# Patient Record
Sex: FEMALE | Race: WHITE | Employment: UNEMPLOYED | URBAN - METROPOLITAN AREA
[De-identification: names, ages, dates, MRNs, and addresses within clinical notes are randomized per-mention and may not be internally consistent; named-entity substitution may affect disease eponyms.]

---

## 2018-02-04 ENCOUNTER — HOSPITAL ENCOUNTER (EMERGENCY)
Age: 43
Discharge: HOME | End: 2018-02-04
Attending: EMERGENCY MEDICINE
Payer: COMMERCIAL

## 2018-02-04 ENCOUNTER — APPOINTMENT (OUTPATIENT)
Dept: GENERAL RADIOLOGY | Age: 43
End: 2018-02-04
Payer: COMMERCIAL

## 2018-02-04 ENCOUNTER — APPOINTMENT (OUTPATIENT)
Dept: CT IMAGING | Age: 43
End: 2018-02-04
Payer: COMMERCIAL

## 2018-02-04 VITALS
WEIGHT: 240 LBS | BODY MASS INDEX: 35.55 KG/M2 | DIASTOLIC BLOOD PRESSURE: 79 MMHG | OXYGEN SATURATION: 96 % | HEIGHT: 69 IN | SYSTOLIC BLOOD PRESSURE: 168 MMHG | TEMPERATURE: 98.5 F | RESPIRATION RATE: 13 BRPM | HEART RATE: 86 BPM

## 2018-02-04 DIAGNOSIS — R51.9 NONINTRACTABLE EPISODIC HEADACHE, UNSPECIFIED HEADACHE TYPE: ICD-10-CM

## 2018-02-04 DIAGNOSIS — I10 HYPERTENSION, UNSPECIFIED TYPE: Primary | ICD-10-CM

## 2018-02-04 DIAGNOSIS — J40 BRONCHITIS: ICD-10-CM

## 2018-02-04 LAB
ABSOLUTE EOS #: 0.23 K/UL (ref 0–0.4)
ABSOLUTE IMMATURE GRANULOCYTE: ABNORMAL K/UL (ref 0–0.3)
ABSOLUTE LYMPH #: 1.69 K/UL (ref 1–4.8)
ABSOLUTE MONO #: 0.31 K/UL (ref 0.2–0.8)
ANION GAP SERPL CALCULATED.3IONS-SCNC: 17 MMOL/L (ref 9–17)
BASOPHILS # BLD: 3 %
BASOPHILS ABSOLUTE: 0.23 K/UL (ref 0–0.2)
BUN BLDV-MCNC: 17 MG/DL (ref 6–20)
BUN/CREAT BLD: 24 (ref 9–20)
CALCIUM SERPL-MCNC: 9 MG/DL (ref 8.6–10.4)
CHLORIDE BLD-SCNC: 100 MMOL/L (ref 98–107)
CO2: 22 MMOL/L (ref 20–31)
CREAT SERPL-MCNC: 0.72 MG/DL (ref 0.5–0.9)
DIFFERENTIAL TYPE: ABNORMAL
DIRECT EXAM: NORMAL
EKG ATRIAL RATE: 78 BPM
EKG P AXIS: 17 DEGREES
EKG P-R INTERVAL: 126 MS
EKG Q-T INTERVAL: 396 MS
EKG QRS DURATION: 82 MS
EKG QTC CALCULATION (BAZETT): 451 MS
EKG R AXIS: 44 DEGREES
EKG T AXIS: -175 DEGREES
EKG VENTRICULAR RATE: 78 BPM
EOSINOPHILS RELATIVE PERCENT: 3 % (ref 1–4)
GFR AFRICAN AMERICAN: >60 ML/MIN
GFR NON-AFRICAN AMERICAN: >60 ML/MIN
GFR SERPL CREATININE-BSD FRML MDRD: ABNORMAL ML/MIN/{1.73_M2}
GFR SERPL CREATININE-BSD FRML MDRD: ABNORMAL ML/MIN/{1.73_M2}
GLUCOSE BLD-MCNC: 108 MG/DL (ref 70–99)
HCT VFR BLD CALC: 33.7 % (ref 36–46)
HEMOGLOBIN: 10.4 G/DL (ref 12–16)
IMMATURE GRANULOCYTES: ABNORMAL %
LYMPHOCYTES # BLD: 22 % (ref 24–44)
Lab: NORMAL
MCH RBC QN AUTO: 21.1 PG (ref 26–34)
MCHC RBC AUTO-ENTMCNC: 31 G/DL (ref 31–37)
MCV RBC AUTO: 68.2 FL (ref 80–100)
MONOCYTES # BLD: 4 % (ref 1–7)
MORPHOLOGY: ABNORMAL
MYOGLOBIN: 34 NG/ML (ref 25–58)
NRBC AUTOMATED: ABNORMAL PER 100 WBC
PDW BLD-RTO: 19.9 % (ref 11.5–14.5)
PLATELET # BLD: 454 K/UL (ref 130–400)
PLATELET ESTIMATE: ABNORMAL
PMV BLD AUTO: ABNORMAL FL (ref 6–12)
POTASSIUM SERPL-SCNC: 4.2 MMOL/L (ref 3.7–5.3)
RBC # BLD: 4.94 M/UL (ref 4–5.2)
RBC # BLD: ABNORMAL 10*6/UL
SEG NEUTROPHILS: 68 % (ref 36–66)
SEGMENTED NEUTROPHILS ABSOLUTE COUNT: 5.24 K/UL (ref 1.8–7.7)
SODIUM BLD-SCNC: 139 MMOL/L (ref 135–144)
SPECIMEN DESCRIPTION: NORMAL
STATUS: NORMAL
TROPONIN INTERP: NORMAL
TROPONIN T: <0.03 NG/ML
WBC # BLD: 7.7 K/UL (ref 3.5–11)
WBC # BLD: ABNORMAL 10*3/UL

## 2018-02-04 PROCEDURE — 94664 DEMO&/EVAL PT USE INHALER: CPT

## 2018-02-04 PROCEDURE — 6360000002 HC RX W HCPCS: Performed by: NURSE PRACTITIONER

## 2018-02-04 PROCEDURE — 70450 CT HEAD/BRAIN W/O DYE: CPT

## 2018-02-04 PROCEDURE — 96375 TX/PRO/DX INJ NEW DRUG ADDON: CPT

## 2018-02-04 PROCEDURE — 96374 THER/PROPH/DIAG INJ IV PUSH: CPT

## 2018-02-04 PROCEDURE — 84484 ASSAY OF TROPONIN QUANT: CPT

## 2018-02-04 PROCEDURE — 6370000000 HC RX 637 (ALT 250 FOR IP): Performed by: NURSE PRACTITIONER

## 2018-02-04 PROCEDURE — 93005 ELECTROCARDIOGRAM TRACING: CPT

## 2018-02-04 PROCEDURE — 80048 BASIC METABOLIC PNL TOTAL CA: CPT

## 2018-02-04 PROCEDURE — 94640 AIRWAY INHALATION TREATMENT: CPT

## 2018-02-04 PROCEDURE — 99284 EMERGENCY DEPT VISIT MOD MDM: CPT

## 2018-02-04 PROCEDURE — 83874 ASSAY OF MYOGLOBIN: CPT

## 2018-02-04 PROCEDURE — 87804 INFLUENZA ASSAY W/OPTIC: CPT

## 2018-02-04 PROCEDURE — 94150 VITAL CAPACITY TEST: CPT

## 2018-02-04 PROCEDURE — 85025 COMPLETE CBC W/AUTO DIFF WBC: CPT

## 2018-02-04 PROCEDURE — 71046 X-RAY EXAM CHEST 2 VIEWS: CPT

## 2018-02-04 RX ORDER — UBIDECARENONE 75 MG
50 CAPSULE ORAL DAILY
COMMUNITY
End: 2019-12-07 | Stop reason: ALTCHOICE

## 2018-02-04 RX ORDER — PREDNISONE 10 MG/1
10 TABLET ORAL DAILY
Qty: 27 TABLET | Refills: 0 | Status: SHIPPED | OUTPATIENT
Start: 2018-02-04 | End: 2018-02-14

## 2018-02-04 RX ORDER — FENTANYL CITRATE 50 UG/ML
50 INJECTION, SOLUTION INTRAMUSCULAR; INTRAVENOUS ONCE
Status: COMPLETED | OUTPATIENT
Start: 2018-02-04 | End: 2018-02-04

## 2018-02-04 RX ORDER — VERAPAMIL HYDROCHLORIDE 120 MG/1
120 CAPSULE, EXTENDED RELEASE ORAL NIGHTLY
COMMUNITY
End: 2019-12-07 | Stop reason: ALTCHOICE

## 2018-02-04 RX ORDER — AZITHROMYCIN 250 MG/1
TABLET, FILM COATED ORAL
Qty: 1 PACKET | Refills: 0 | Status: SHIPPED | OUTPATIENT
Start: 2018-02-04 | End: 2018-02-14

## 2018-02-04 RX ORDER — BENZONATATE 100 MG/1
100 CAPSULE ORAL 3 TIMES DAILY PRN
Qty: 21 CAPSULE | Refills: 0 | Status: SHIPPED | OUTPATIENT
Start: 2018-02-04 | End: 2018-02-11

## 2018-02-04 RX ORDER — FERROUS SULFATE 325(65) MG
325 TABLET ORAL
COMMUNITY
End: 2019-12-07 | Stop reason: ALTCHOICE

## 2018-02-04 RX ORDER — IPRATROPIUM BROMIDE AND ALBUTEROL SULFATE 2.5; .5 MG/3ML; MG/3ML
1 SOLUTION RESPIRATORY (INHALATION)
Status: DISCONTINUED | OUTPATIENT
Start: 2018-02-04 | End: 2018-02-04 | Stop reason: HOSPADM

## 2018-02-04 RX ORDER — ALBUTEROL SULFATE 90 UG/1
2 AEROSOL, METERED RESPIRATORY (INHALATION)
Status: DISCONTINUED | OUTPATIENT
Start: 2018-02-04 | End: 2018-02-04 | Stop reason: HOSPADM

## 2018-02-04 RX ORDER — VENLAFAXINE HYDROCHLORIDE 150 MG/1
150 CAPSULE, EXTENDED RELEASE ORAL DAILY
COMMUNITY
End: 2019-12-07 | Stop reason: ALTCHOICE

## 2018-02-04 RX ORDER — LISINOPRIL 10 MG/1
10 TABLET ORAL DAILY
Qty: 30 TABLET | Refills: 0 | Status: SHIPPED | OUTPATIENT
Start: 2018-02-04 | End: 2020-07-15

## 2018-02-04 RX ORDER — METHYLPREDNISOLONE SODIUM SUCCINATE 125 MG/2ML
125 INJECTION, POWDER, LYOPHILIZED, FOR SOLUTION INTRAMUSCULAR; INTRAVENOUS ONCE
Status: COMPLETED | OUTPATIENT
Start: 2018-02-04 | End: 2018-02-04

## 2018-02-04 RX ORDER — LISINOPRIL 10 MG/1
20 TABLET ORAL DAILY
Status: DISCONTINUED | OUTPATIENT
Start: 2018-02-04 | End: 2018-02-04 | Stop reason: HOSPADM

## 2018-02-04 RX ORDER — ACETAMINOPHEN AND CODEINE PHOSPHATE 300; 30 MG/1; MG/1
1 TABLET ORAL 3 TIMES DAILY PRN
Qty: 12 TABLET | Refills: 0 | Status: SHIPPED | OUTPATIENT
Start: 2018-02-04 | End: 2018-02-08

## 2018-02-04 RX ORDER — HYDRALAZINE HYDROCHLORIDE 20 MG/ML
10 INJECTION INTRAMUSCULAR; INTRAVENOUS ONCE
Status: COMPLETED | OUTPATIENT
Start: 2018-02-04 | End: 2018-02-04

## 2018-02-04 RX ORDER — ALBUTEROL SULFATE 2.5 MG/3ML
5 SOLUTION RESPIRATORY (INHALATION)
Status: DISCONTINUED | OUTPATIENT
Start: 2018-02-04 | End: 2018-02-04 | Stop reason: HOSPADM

## 2018-02-04 RX ORDER — ALBUTEROL SULFATE 90 UG/1
2 AEROSOL, METERED RESPIRATORY (INHALATION) EVERY 6 HOURS PRN
Qty: 1 INHALER | Refills: 0 | Status: SHIPPED | OUTPATIENT
Start: 2018-02-04 | End: 2020-07-15

## 2018-02-04 RX ADMIN — LISINOPRIL 20 MG: 10 TABLET ORAL at 13:40

## 2018-02-04 RX ADMIN — FENTANYL CITRATE 50 MCG: 50 INJECTION INTRAMUSCULAR; INTRAVENOUS at 13:18

## 2018-02-04 RX ADMIN — HYDRALAZINE HYDROCHLORIDE 10 MG: 20 INJECTION INTRAMUSCULAR; INTRAVENOUS at 11:45

## 2018-02-04 RX ADMIN — METHYLPREDNISOLONE SODIUM SUCCINATE 125 MG: 125 INJECTION, POWDER, FOR SOLUTION INTRAMUSCULAR; INTRAVENOUS at 11:29

## 2018-02-04 RX ADMIN — ALBUTEROL SULFATE 2.5 MG: 5 SOLUTION RESPIRATORY (INHALATION) at 11:48

## 2018-02-04 ASSESSMENT — PAIN DESCRIPTION - DESCRIPTORS: DESCRIPTORS: POUNDING;PRESSURE

## 2018-02-04 ASSESSMENT — PAIN SCALES - GENERAL
PAINLEVEL_OUTOF10: 7
PAINLEVEL_OUTOF10: 10

## 2018-02-04 ASSESSMENT — ENCOUNTER SYMPTOMS
COUGH: 1
WHEEZING: 1

## 2018-02-04 ASSESSMENT — PAIN DESCRIPTION - PAIN TYPE: TYPE: ACUTE PAIN

## 2018-02-04 ASSESSMENT — PAIN DESCRIPTION - LOCATION: LOCATION: HEAD

## 2018-02-04 NOTE — ED PROVIDER NOTES
The patient was seen and examined by me in conjunction with the mid-level provider. I agree with his/her assessment and treatment plan. The patient's workup is essentially negative here. She had presented with elevated blood pressure and this improved greatly with treatment here. She is started on lisinopril and has appointment with her family physician back home.      Lc Moser MD  02/04/18 [id number]

## 2018-02-04 NOTE — PROGRESS NOTES
· Bronchodilator assessment   []    Bronchodilator Assessment    FEV1 % PREDICTED 68%  FEV1 actual: 2.4  PEFR  314  PEFR % Predicted 67%  RR 18  Bronchodilator assessment at level  1 Patient given TX due to History MDI instruct given  BRONCHODILATOR ASSESSMENT SCORE  Score 1 2 3 4   Breath Sounds   [x]  Clear []  Mild Wheezing with good aeration []  Moderate I/E wheezing with adequate aeration []  Poor Aeration or diffuse wheezing   Respiratory Rate [x]  Less than 20 []  20-25 []  Greater than 25  []  Greater than 35    Dyspnea [x]  No SOB  []  SOB with minimal activity []  Speaking in partial sentences []  Acute/ At rest   Peakflow (asthma) []  80 % or greater predicted/PB  []  Unable []  70% or greater predicted/PB  []  Unable [x]  51%-70% predicted/PB  []  Unable []  Less than 50% predicted/PB  []  Unable due to distress   FEV1 % Predicted []  Greater than 69%  []  Unable  [x]  Less than 50%-69%  []  Unable  []  Less than 35%-49%  []  Unable  []  Less than 35%  []  Unable due to distress     · Bronchodilator assessment   []    Bronchodilator Assessment    FEV1 % PREDICTED   FEV1 actual:   PEFR    PEFR % Predicted   RR   Bronchodilator assessment at level    BRONCHODILATOR ASSESSMENT SCORE  Score 1 2 3 4   Breath Sounds   []  Clear []  Mild Wheezing with good aeration []  Moderate I/E wheezing with adequate aeration []  Poor Aeration or diffuse wheezing   Respiratory Rate []  Less than 20 []  20-25 []  Greater than 25  []  Greater than 35    Dyspnea []  No SOB  []  SOB with minimal activity []  Speaking in partial sentences []  Acute/ At rest   Peakflow (asthma) []  80 % or greater predicted/PB  []  Unable []  70% or greater predicted/PB  []  Unable []  51%-70% predicted/PB  []  Unable []  Less than 50% predicted/PB  []  Unable due to distress   FEV1 % Predicted []  Greater than 69%  []  Unable  []  Less than 50%-69%  []  Unable  []  Less than 35%-49%  []  Unable  []  Less than 35%  []  Unable due to distress · Bronchodilator assessment   []    Bronchodilator Assessment    FEV1 % PREDICTED   FEV1 actual:   PEFR    PEFR % Predicted   RR   Bronchodilator assessment at level    BRONCHODILATOR ASSESSMENT SCORE  Score 1 2 3 4   Breath Sounds   []  Clear []  Mild Wheezing with good aeration []  Moderate I/E wheezing with adequate aeration []  Poor Aeration or diffuse wheezing   Respiratory Rate []  Less than 20 []  20-25 []  Greater than 25  []  Greater than 35    Dyspnea []  No SOB  []  SOB with minimal activity []  Speaking in partial sentences []  Acute/ At rest   Peakflow (asthma) []  80 % or greater predicted/PB  []  Unable []  70% or greater predicted/PB  []  Unable []  51%-70% predicted/PB  []  Unable []  Less than 50% predicted/PB  []  Unable due to distress   FEV1 % Predicted []  Greater than 69%  []  Unable  []  Less than 50%-69%  []  Unable  []  Less than 35%-49%  []  Unable  []  Less than 35%  []  Unable due to distress     MDI Instruction done

## 2018-02-04 NOTE — ED PROVIDER NOTES
pleural effusion. No free air. Osseous structures appear grossly intact. Mild peribronchial wall thickening suggestive of bronchiolitis. No focal lung consolidation is seen to suggest pneumonia. Ct Head Wo Contrast    Result Date: 2/4/2018  EXAMINATION: CT OF THE HEAD WITHOUT CONTRAST  2/4/2018 12:00 pm TECHNIQUE: CT of the head was performed without the administration of intravenous contrast. Dose modulation, iterative reconstruction, and/or weight based adjustment of the mA/kV was utilized to reduce the radiation dose to as low as reasonably achievable. COMPARISON: None. HISTORY: ORDERING SYSTEM PROVIDED HISTORY: Headache, hypertension TECHNOLOGIST PROVIDED HISTORY: Has a \"code stroke\" or \"stroke alert\" been called? ->No FINDINGS: BRAIN/VENTRICLES: There is no acute intracranial hemorrhage, mass effect or midline shift. No abnormal extra-axial fluid collection. The gray-white differentiation is maintained without evidence of an acute infarct. There is no evidence of hydrocephalus. ORBITS: The visualized portion of the orbits demonstrate no acute abnormality. SINUSES: The visualized paranasal sinuses and mastoid air cells demonstrate no acute abnormality. SOFT TISSUES/SKULL:  No acute abnormality of the visualized skull or soft tissues. No acute intracranial abnormality. Interpretation per the Radiologist below, if available at the time of this note:    CT HEAD WO CONTRAST   Final Result   No acute intracranial abnormality. XR CHEST STANDARD (2 VW)   Final Result   Mild peribronchial wall thickening suggestive of bronchiolitis. No focal   lung consolidation is seen to suggest pneumonia.                ED BEDSIDE ULTRASOUND:   Performed by ED Physician - none    LABS:  Labs Reviewed   CBC WITH AUTO DIFFERENTIAL - Abnormal; Notable for the following:        Result Value    Hemoglobin 10.4 (*)     Hematocrit 33.7 (*)     MCV 68.2 (*)     MCH 21.1 (*)     RDW 19.9 (*)     Platelets 602 (*) Seg Neutrophils 68 (*)     Lymphocytes 22 (*)     Basophils # 0.23 (*)     All other components within normal limits   BASIC METABOLIC PANEL - Abnormal; Notable for the following:     Glucose 108 (*)     Bun/Cre Ratio 24 (*)     All other components within normal limits   RAPID INFLUENZA A/B ANTIGENS   TROP/MYOGLOBIN       All other labs were within normal range or not returned as of this dictation. EMERGENCY DEPARTMENT COURSE and DIFFERENTIAL DIAGNOSIS/MDM:   Patient was evaluated in conjunction with attending physician. Influenza screen negative. Labs reviewed. WBC 7.7. Troponin negative. CT of head per radiologist negative for acute intracranial abnormality. Chest x-ray per radiology shows mild peribronchial wall thickening suggestive of bronchiolitis. No focal lung consolidation. Patient had blood pressure 184/106 with a heart rate of 84. She was given IV hydralazine and breathing treatment in the ED. Blood pressure recheck was 164/73. After CT of head was negative. Patient was given fentanyl for headache which she states helped her pain. Per Dr. Ildefonso Bell she was given 20 mg of lisinopril prior to discharge. No neurological deficits. Patient was provided with a prescription for lisinopril. She will also be discharged with Z-Lemuel, prednisone, Tessalon and inhaler for bronchitis. She was provided with a [facility] primary care physician number as well as Dr. Fátima Welch number per patient request to follow-up with. Return ED if symptoms worsen. Vitals:    Vitals:    02/04/18 1206 02/04/18 1222 02/04/18 1253 02/04/18 1322   BP: (!) 159/77 (!) 164/73 (!) 149/72 (!) 168/79   Pulse:  84 95 86   Resp:  14 26 13   Temp:       TempSrc:       SpO2:  94% 95% 96%   Weight:       Height:             CONSULTS:  None    PROCEDURES:  Procedures    FINAL IMPRESSION      1. Hypertension, unspecified type    2. Nonintractable episodic headache, unspecified headache type    3.  Bronchitis DISPOSITION/PLAN   DISPOSITION Decision To Discharge 02/04/2018 01:26:22 PM      PATIENT REFERRED TO:     Call [phone] to schedule appointment with a [facility] primary care doctor. [facility] ED  [address] [facility]  [phone]    If symptoms worsen    Marcella Maier MD  [address] [place] [address]  [phone]    Schedule an appointment as soon as possible for a visit         DISCHARGE MEDICATIONS:     Discharge Medication List as of 2/4/2018  1:37 PM      START taking these medications    Details   lisinopril (PRINIVIL) 10 MG tablet Take 1 tablet by mouth daily, Disp-30 tablet, R-0Print      azithromycin (ZITHROMAX) 250 MG tablet Take 2 tablets by mouth on day 1, then take 1 tablet by mouth daily days 2 through 5, Disp-1 packet, R-0Print      albuterol sulfate HFA (PROVENTIL HFA) 108 (90 Base) MCG/ACT inhaler Inhale 2 puffs into the lungs every 6 hours as needed for Wheezing, Disp-1 Inhaler, R-0Print      benzonatate (TESSALON PERLES) 100 MG capsule Take 1 capsule by mouth 3 times daily as needed for Cough, Disp-21 capsule, R-0Print      acetaminophen-codeine (TYLENOL/CODEINE #3) 300-30 MG per tablet Take 1 tablet by mouth 3 times daily as needed for Pain for up to 4 days. , Disp-12 tablet, R-0Print      predniSONE (DELTASONE) 10 MG tablet Take 1 tablet by mouth daily for 10 days 4 tabs PO QD for 3 days,  Then 3 tabs PO QD for 3 days,  Then 2 tabs PO QD for 2 days,  Then 1 tab PO QD for 2 days, Disp-27 tablet, R-0Print           Electronically signed by Justina Prader, NP on 2/4/2018 at 1:50 PM           Justina Prader, NP  02/04/18 1715

## 2018-05-23 ENCOUNTER — HOSPITAL ENCOUNTER (EMERGENCY)
Age: 43
Discharge: HOME | End: 2018-05-23
Attending: EMERGENCY MEDICINE
Payer: MEDICAID

## 2018-05-23 ENCOUNTER — APPOINTMENT (OUTPATIENT)
Dept: GENERAL RADIOLOGY | Age: 43
End: 2018-05-23
Payer: MEDICAID

## 2018-05-23 VITALS
SYSTOLIC BLOOD PRESSURE: 188 MMHG | HEIGHT: 69 IN | HEART RATE: 95 BPM | RESPIRATION RATE: 21 BRPM | BODY MASS INDEX: 34.07 KG/M2 | DIASTOLIC BLOOD PRESSURE: 102 MMHG | TEMPERATURE: 100 F | WEIGHT: 230 LBS | OXYGEN SATURATION: 97 %

## 2018-05-23 DIAGNOSIS — J40 BRONCHITIS: Primary | ICD-10-CM

## 2018-05-23 LAB
ABSOLUTE EOS #: 0.2 K/UL (ref 0–0.4)
ABSOLUTE IMMATURE GRANULOCYTE: ABNORMAL K/UL (ref 0–0.3)
ABSOLUTE LYMPH #: 1.4 K/UL (ref 1–4.8)
ABSOLUTE MONO #: 0.4 K/UL (ref 0.2–0.8)
ANION GAP SERPL CALCULATED.3IONS-SCNC: 14 MMOL/L (ref 9–17)
BASOPHILS # BLD: 0 % (ref 0–2)
BASOPHILS ABSOLUTE: 0 K/UL (ref 0–0.2)
BUN BLDV-MCNC: 15 MG/DL (ref 6–20)
BUN/CREAT BLD: 19 (ref 9–20)
CALCIUM SERPL-MCNC: 8.9 MG/DL (ref 8.6–10.4)
CHLORIDE BLD-SCNC: 103 MMOL/L (ref 98–107)
CO2: 23 MMOL/L (ref 20–31)
CREAT SERPL-MCNC: 0.77 MG/DL (ref 0.5–0.9)
DIFFERENTIAL TYPE: ABNORMAL
EKG ATRIAL RATE: 114 BPM
EKG P AXIS: 20 DEGREES
EKG P-R INTERVAL: 130 MS
EKG Q-T INTERVAL: 322 MS
EKG QRS DURATION: 82 MS
EKG QTC CALCULATION (BAZETT): 443 MS
EKG R AXIS: 39 DEGREES
EKG T AXIS: -177 DEGREES
EKG VENTRICULAR RATE: 114 BPM
EOSINOPHILS RELATIVE PERCENT: 2 % (ref 1–4)
GFR AFRICAN AMERICAN: >60 ML/MIN
GFR NON-AFRICAN AMERICAN: >60 ML/MIN
GFR SERPL CREATININE-BSD FRML MDRD: NORMAL ML/MIN/{1.73_M2}
GFR SERPL CREATININE-BSD FRML MDRD: NORMAL ML/MIN/{1.73_M2}
GLUCOSE BLD-MCNC: 76 MG/DL (ref 70–99)
HCT VFR BLD CALC: 35.6 % (ref 36–46)
HEMOGLOBIN: 11.4 G/DL (ref 12–16)
IMMATURE GRANULOCYTES: ABNORMAL %
LYMPHOCYTES # BLD: 15 % (ref 24–44)
MCH RBC QN AUTO: 25 PG (ref 26–34)
MCHC RBC AUTO-ENTMCNC: 32 G/DL (ref 31–37)
MCV RBC AUTO: 78.1 FL (ref 80–100)
MONOCYTES # BLD: 5 % (ref 1–7)
NRBC AUTOMATED: ABNORMAL PER 100 WBC
PDW BLD-RTO: 15.1 % (ref 11.5–14.5)
PLATELET # BLD: 256 K/UL (ref 130–400)
PLATELET ESTIMATE: ABNORMAL
PMV BLD AUTO: 7.5 FL (ref 6–12)
POTASSIUM SERPL-SCNC: 4.1 MMOL/L (ref 3.7–5.3)
RBC # BLD: 4.56 M/UL (ref 4–5.2)
RBC # BLD: ABNORMAL 10*6/UL
SEG NEUTROPHILS: 78 % (ref 36–66)
SEGMENTED NEUTROPHILS ABSOLUTE COUNT: 7.3 K/UL (ref 1.8–7.7)
SODIUM BLD-SCNC: 140 MMOL/L (ref 135–144)
WBC # BLD: 9.4 K/UL (ref 3.5–11)
WBC # BLD: ABNORMAL 10*3/UL

## 2018-05-23 PROCEDURE — 94640 AIRWAY INHALATION TREATMENT: CPT

## 2018-05-23 PROCEDURE — 99283 EMERGENCY DEPT VISIT LOW MDM: CPT

## 2018-05-23 PROCEDURE — 93005 ELECTROCARDIOGRAM TRACING: CPT

## 2018-05-23 PROCEDURE — 71046 X-RAY EXAM CHEST 2 VIEWS: CPT

## 2018-05-23 PROCEDURE — 6370000000 HC RX 637 (ALT 250 FOR IP): Performed by: NURSE PRACTITIONER

## 2018-05-23 PROCEDURE — 85025 COMPLETE CBC W/AUTO DIFF WBC: CPT

## 2018-05-23 PROCEDURE — 94761 N-INVAS EAR/PLS OXIMETRY MLT: CPT

## 2018-05-23 PROCEDURE — 80048 BASIC METABOLIC PNL TOTAL CA: CPT

## 2018-05-23 PROCEDURE — 36415 COLL VENOUS BLD VENIPUNCTURE: CPT

## 2018-05-23 PROCEDURE — 6360000002 HC RX W HCPCS: Performed by: NURSE PRACTITIONER

## 2018-05-23 RX ORDER — METOPROLOL TARTRATE 50 MG/1
TABLET, FILM COATED ORAL
Refills: 11 | COMMUNITY
Start: 2018-04-05 | End: 2019-12-07 | Stop reason: ALTCHOICE

## 2018-05-23 RX ORDER — VENLAFAXINE 100 MG/1
150 TABLET ORAL
COMMUNITY
End: 2019-12-07 | Stop reason: ALTCHOICE

## 2018-05-23 RX ORDER — ALBUTEROL SULFATE 2.5 MG/3ML
2.5 SOLUTION RESPIRATORY (INHALATION) EVERY 6 HOURS PRN
Status: DISCONTINUED | OUTPATIENT
Start: 2018-05-23 | End: 2018-05-24 | Stop reason: HOSPADM

## 2018-05-23 RX ORDER — BUSPIRONE HYDROCHLORIDE 10 MG/1
10 TABLET ORAL
COMMUNITY
End: 2020-07-15 | Stop reason: SDUPTHER

## 2018-05-23 RX ORDER — AZITHROMYCIN 250 MG/1
TABLET, FILM COATED ORAL
Qty: 1 PACKET | Refills: 0 | Status: SHIPPED | OUTPATIENT
Start: 2018-05-23 | End: 2020-07-15

## 2018-05-23 RX ORDER — GUAIFENESIN/DEXTROMETHORPHAN 100-10MG/5
5 SYRUP ORAL 3 TIMES DAILY PRN
Qty: 120 ML | Refills: 0 | Status: SHIPPED | OUTPATIENT
Start: 2018-05-23 | End: 2018-06-02

## 2018-05-23 RX ORDER — ALBUTEROL SULFATE 90 UG/1
2 AEROSOL, METERED RESPIRATORY (INHALATION) EVERY 6 HOURS PRN
Qty: 1 INHALER | Refills: 0 | Status: SHIPPED | OUTPATIENT
Start: 2018-05-23 | End: 2021-03-18 | Stop reason: SDUPTHER

## 2018-05-23 RX ORDER — GUAIFENESIN/DEXTROMETHORPHAN 100-10MG/5
5 SYRUP ORAL EVERY 4 HOURS PRN
Status: DISCONTINUED | OUTPATIENT
Start: 2018-05-23 | End: 2018-05-24 | Stop reason: HOSPADM

## 2018-05-23 RX ORDER — CYCLOBENZAPRINE HCL 10 MG
TABLET ORAL
Refills: 0 | COMMUNITY
Start: 2018-03-20 | End: 2019-12-07 | Stop reason: ALTCHOICE

## 2018-05-23 RX ORDER — LISINOPRIL 10 MG/1
20 TABLET ORAL ONCE
Status: COMPLETED | OUTPATIENT
Start: 2018-05-23 | End: 2018-05-23

## 2018-05-23 RX ADMIN — LISINOPRIL 20 MG: 10 TABLET ORAL at 23:28

## 2018-05-23 RX ADMIN — METOPROLOL TARTRATE 50 MG: 25 TABLET ORAL at 23:31

## 2018-05-23 RX ADMIN — GUAIFENESIN AND DEXTROMETHORPHAN 5 ML: 100; 10 SYRUP ORAL at 22:15

## 2018-05-23 RX ADMIN — ALBUTEROL SULFATE 2.5 MG: 2.5 SOLUTION RESPIRATORY (INHALATION) at 22:55

## 2018-05-23 ASSESSMENT — PAIN DESCRIPTION - DESCRIPTORS: DESCRIPTORS: ACHING

## 2018-05-23 ASSESSMENT — PAIN SCALES - GENERAL: PAINLEVEL_OUTOF10: 6

## 2018-05-23 ASSESSMENT — PAIN DESCRIPTION - LOCATION: LOCATION: HEAD

## 2019-04-18 ENCOUNTER — APPOINTMENT (OUTPATIENT)
Dept: GENERAL RADIOLOGY | Age: 44
End: 2019-04-18

## 2019-04-18 ENCOUNTER — HOSPITAL ENCOUNTER (EMERGENCY)
Age: 44
Discharge: HOME | End: 2019-04-18
Attending: EMERGENCY MEDICINE

## 2019-04-18 VITALS
OXYGEN SATURATION: 100 % | WEIGHT: 234 LBS | HEIGHT: 69 IN | DIASTOLIC BLOOD PRESSURE: 114 MMHG | BODY MASS INDEX: 34.66 KG/M2 | SYSTOLIC BLOOD PRESSURE: 190 MMHG | RESPIRATION RATE: 18 BRPM | HEART RATE: 79 BPM | TEMPERATURE: 98.8 F

## 2019-04-18 DIAGNOSIS — R07.89 ATYPICAL CHEST PAIN: Primary | ICD-10-CM

## 2019-04-18 LAB
ABSOLUTE EOS #: 0.29 K/UL (ref 0–0.4)
ABSOLUTE IMMATURE GRANULOCYTE: ABNORMAL K/UL (ref 0–0.3)
ABSOLUTE LYMPH #: 2.23 K/UL (ref 1–4.8)
ABSOLUTE MONO #: 0.22 K/UL (ref 0.2–0.8)
ANION GAP SERPL CALCULATED.3IONS-SCNC: 14 MMOL/L (ref 9–17)
BASOPHILS # BLD: 0 %
BASOPHILS ABSOLUTE: 0 K/UL (ref 0–0.2)
BUN BLDV-MCNC: 14 MG/DL (ref 6–20)
BUN/CREAT BLD: 18 (ref 9–20)
CALCIUM SERPL-MCNC: 8.8 MG/DL (ref 8.6–10.4)
CHLORIDE BLD-SCNC: 104 MMOL/L (ref 98–107)
CO2: 21 MMOL/L (ref 20–31)
CREAT SERPL-MCNC: 0.8 MG/DL (ref 0.5–0.9)
DIFFERENTIAL TYPE: ABNORMAL
EOSINOPHILS RELATIVE PERCENT: 4 % (ref 1–4)
GFR AFRICAN AMERICAN: >60 ML/MIN
GFR NON-AFRICAN AMERICAN: >60 ML/MIN
GFR SERPL CREATININE-BSD FRML MDRD: ABNORMAL ML/MIN/{1.73_M2}
GFR SERPL CREATININE-BSD FRML MDRD: ABNORMAL ML/MIN/{1.73_M2}
GLUCOSE BLD-MCNC: 112 MG/DL (ref 70–99)
HCT VFR BLD CALC: 31.6 % (ref 36–46)
HEMOGLOBIN: 10.3 G/DL (ref 12–16)
IMMATURE GRANULOCYTES: ABNORMAL %
LYMPHOCYTES # BLD: 31 % (ref 24–44)
MCH RBC QN AUTO: 22.3 PG (ref 26–34)
MCHC RBC AUTO-ENTMCNC: 32.4 G/DL (ref 31–37)
MCV RBC AUTO: 68.7 FL (ref 80–100)
MONOCYTES # BLD: 3 % (ref 1–7)
MORPHOLOGY: ABNORMAL
MYOGLOBIN: <21 NG/ML (ref 25–58)
NRBC AUTOMATED: ABNORMAL PER 100 WBC
PDW BLD-RTO: 17.1 % (ref 11.5–14.5)
PLATELET # BLD: 335 K/UL (ref 130–400)
PLATELET ESTIMATE: ABNORMAL
PMV BLD AUTO: ABNORMAL FL (ref 6–12)
POTASSIUM SERPL-SCNC: 3.8 MMOL/L (ref 3.7–5.3)
RBC # BLD: 4.6 M/UL (ref 4–5.2)
RBC # BLD: ABNORMAL 10*6/UL
SEG NEUTROPHILS: 62 % (ref 36–66)
SEGMENTED NEUTROPHILS ABSOLUTE COUNT: 4.46 K/UL (ref 1.8–7.7)
SODIUM BLD-SCNC: 139 MMOL/L (ref 135–144)
TROPONIN INTERP: ABNORMAL
TROPONIN T: ABNORMAL NG/ML
TROPONIN, HIGH SENSITIVITY: 13 NG/L (ref 0–14)
WBC # BLD: 7.2 K/UL (ref 3.5–11)
WBC # BLD: ABNORMAL 10*3/UL

## 2019-04-18 PROCEDURE — 83874 ASSAY OF MYOGLOBIN: CPT

## 2019-04-18 PROCEDURE — 36415 COLL VENOUS BLD VENIPUNCTURE: CPT

## 2019-04-18 PROCEDURE — 84484 ASSAY OF TROPONIN QUANT: CPT

## 2019-04-18 PROCEDURE — 93005 ELECTROCARDIOGRAM TRACING: CPT

## 2019-04-18 PROCEDURE — 80048 BASIC METABOLIC PNL TOTAL CA: CPT

## 2019-04-18 PROCEDURE — 71046 X-RAY EXAM CHEST 2 VIEWS: CPT

## 2019-04-18 PROCEDURE — 99284 EMERGENCY DEPT VISIT MOD MDM: CPT

## 2019-04-18 PROCEDURE — 6370000000 HC RX 637 (ALT 250 FOR IP): Performed by: NURSE PRACTITIONER

## 2019-04-18 PROCEDURE — 85025 COMPLETE CBC W/AUTO DIFF WBC: CPT

## 2019-04-18 RX ORDER — RANITIDINE 150 MG/1
150 TABLET ORAL 2 TIMES DAILY
Qty: 60 TABLET | Refills: 0 | Status: SHIPPED | OUTPATIENT
Start: 2019-04-18 | End: 2020-07-15

## 2019-04-18 RX ADMIN — LIDOCAINE HYDROCHLORIDE: 20 SOLUTION ORAL; TOPICAL at 19:10

## 2019-04-18 ASSESSMENT — ENCOUNTER SYMPTOMS
BACK PAIN: 0
ABDOMINAL PAIN: 0
COUGH: 0
VOMITING: 0
SHORTNESS OF BREATH: 0
NAUSEA: 0

## 2019-04-18 ASSESSMENT — PAIN SCALES - GENERAL: PAINLEVEL_OUTOF10: 6

## 2019-04-18 NOTE — ED PROVIDER NOTES
Take two tabs on day one. Take one tab daily on days 2-5, Disp-1 packet, R-0Print      !! albuterol sulfate HFA (PROAIR HFA) 108 (90 Base) MCG/ACT inhaler Inhale 2 puffs into the lungs every 6 hours as needed for Wheezing, Disp-1 Inhaler, R-0Print      verapamil (VERELAN) 120 MG extended release capsule Take 120 mg by mouth nightlyHistorical Med      venlafaxine (EFFEXOR XR) 150 MG extended release capsule Take 150 mg by mouth dailyHistorical Med      SUMAtriptan Succinate (IMITREX PO) Take by mouthHistorical Med      Multiple Vitamins-Minerals (MULTIVITAMIN ADULT PO) Take by mouthHistorical Med      ferrous sulfate 325 (65 Fe) MG tablet Take 325 mg by mouth 3 times daily (with meals)Historical Med      vitamin B-12 (CYANOCOBALAMIN) 100 MCG tablet Take 50 mcg by mouth dailyHistorical Med      lisinopril (PRINIVIL) 10 MG tablet Take 1 tablet by mouth daily, Disp-30 tablet, R-0Print      !! albuterol sulfate HFA (PROVENTIL HFA) 108 (90 Base) MCG/ACT inhaler Inhale 2 puffs into the lungs every 6 hours as needed for Wheezing, Disp-1 Inhaler, R-0Print       !! - Potential duplicate medications found. Please discuss with provider. PAST MEDICAL HISTORY         Diagnosis Date    Anemia     Hypertension        SURGICAL HISTORY           Procedure Laterality Date    BREAST SURGERY      negative rt breast lumpectomy    CHOLECYSTECTOMY      GASTRIC BYPASS SURGERY      gastric sleeve    LIPOMA RESECTION      lt shoulder/upper back         FAMILY HISTORY     History reviewed. No pertinent family history. No family status information on file. SOCIAL HISTORY      reports that she has been smoking cigarettes. She has been smoking about 1.00 pack per day. She has never used smokeless tobacco. She reports that she drinks alcohol. She reports that she does not use drugs.     REVIEW OF SYSTEMS    (2-9 systems for level 4, 10 or more for level 5)     Review of Systems   Constitutional: Negative for activity change, appetite change and diaphoresis. Respiratory: Negative for cough and shortness of breath. Cardiovascular: Positive for chest pain. Negative for palpitations. Gastrointestinal: Negative for abdominal pain, nausea and vomiting. Musculoskeletal: Negative for back pain. Neurological: Negative for dizziness. Except as noted above the remainder of the review of systems was reviewed and negative. PHYSICAL EXAM    (up to 7 for level 4, 8 or more for level 5)     Vitals:    04/18/19 1834 04/18/19 1853 04/18/19 1950   BP: (!) 226/124  (!) 190/114   Pulse: 85  79   Resp: 18     Temp: 98.8 °F (37.1 °C)     TempSrc: Oral     SpO2: 100%     Weight:  234 lb (106.1 kg)    Height:  5' 9\" (1.753 m)          Physical Exam   Constitutional: She is oriented to person, place, and time. She appears well-developed and well-nourished. HENT:   Mouth/Throat: Oropharynx is clear and moist. No oropharyngeal exudate. Eyes: Conjunctivae are normal. Right eye exhibits no discharge. Left eye exhibits no discharge. Cardiovascular: Normal rate and regular rhythm. Pulmonary/Chest: Effort normal and breath sounds normal. No respiratory distress. Neurological: She is alert and oriented to person, place, and time. Skin: Skin is warm and dry. Psychiatric: She has a normal mood and affect. Her behavior is normal.           DIAGNOSTIC RESULTS     EKG: All EKG's are interpreted by the Emergency Department Physician who either signs or Co-signs this chart in the absence of a cardiologist.    EKG was interpreted by Dr. Goins Fitting:   Non-plain film images such as CT, Ultrasound and MRI are read by the radiologist. Plain radiographic images are visualized and preliminarily interpreted by the emergency physician with the below findings:    Interpretation per the Radiologist below, if available at the time of this note:    XR CHEST STANDARD (2 VW)   Final Result   No acute process.                LABS:  Labs Reviewed CBC WITH AUTO DIFFERENTIAL - Abnormal; Notable for the following components:       Result Value    Hemoglobin 10.3 (*)     Hematocrit 31.6 (*)     MCV 68.7 (*)     MCH 22.3 (*)     RDW 17.1 (*)     All other components within normal limits   BASIC METABOLIC PANEL - Abnormal; Notable for the following components:    Glucose 112 (*)     All other components within normal limits   TROP/MYOGLOBIN - Abnormal; Notable for the following components:    Myoglobin <21 (*)     All other components within normal limits       All other labs were within normal range or not returned as of this dictation. EMERGENCY DEPARTMENT COURSE and DIFFERENTIAL DIAGNOSIS/MDM:   Vitals:    Vitals:    19 1834 19 1853 19 1950   BP: (!) 226/124  (!) 190/114   Pulse: 85  79   Resp: 18     Temp: 98.8 °F (37.1 °C)     TempSrc: Oral     SpO2: 100%     Weight:  234 lb (106.1 kg)    Height:  5' 9\" (1.753 m)        Medical Decision Makin-year-old female with what she describes as atypical chest pain. Chest x-ray, EKG and cardiac markers are unremarkable. She may need additional outpatient GI workup and contact information has been provided for the GI clinic. Additionally emergent imaging is not indicated. Discharged home with Zantac which she is to take in addition to her Prilosec. FINAL IMPRESSION      1.  Atypical chest pain          DISPOSITION/PLAN   DISPOSITION Decision To Discharge 2019 08:56:56 PM      PATIENT REFERRED TO:   [facility]   [facility]. [phone] [id number] [phone]  Call in 1 day        Establish yourself with a primary care provider for follow up by calling [phone]  Call in 1 day        DISCHARGE MEDICATIONS:     Discharge Medication List as of 2019  8:59 PM      START taking these medications    Details   ranitidine (ZANTAC) 150 MG tablet Take 1 tablet by mouth 2 times daily, Disp-60 tablet, R-0Print               (Please note that portions of this note were completed with a voice recognition program.  Efforts were made to edit the dictations but occasionally words are mis-transcribed. )    TERA ROBERTS CNP, APRN - CNP  04/18/19 8638

## 2019-04-19 LAB
EKG ATRIAL RATE: 80 BPM
EKG P AXIS: 16 DEGREES
EKG P-R INTERVAL: 138 MS
EKG Q-T INTERVAL: 410 MS
EKG QRS DURATION: 88 MS
EKG QTC CALCULATION (BAZETT): 472 MS
EKG R AXIS: 20 DEGREES
EKG T AXIS: -175 DEGREES
EKG VENTRICULAR RATE: 80 BPM

## 2019-06-10 ENCOUNTER — NURSE ONLY (OUTPATIENT)
Dept: FAMILY MEDICINE CLINIC | Age: 44
End: 2019-06-10

## 2019-06-10 DIAGNOSIS — Z02.1 PRE-EMPLOYMENT DRUG SCREENING: Primary | ICD-10-CM

## 2019-12-07 ENCOUNTER — HOSPITAL ENCOUNTER (EMERGENCY)
Age: 44
Discharge: HOME | End: 2019-12-07
Attending: EMERGENCY MEDICINE
Payer: COMMERCIAL

## 2019-12-07 VITALS
RESPIRATION RATE: 20 BRPM | HEART RATE: 79 BPM | HEIGHT: 69 IN | SYSTOLIC BLOOD PRESSURE: 161 MMHG | DIASTOLIC BLOOD PRESSURE: 90 MMHG | BODY MASS INDEX: 37.03 KG/M2 | WEIGHT: 250 LBS | TEMPERATURE: 98.8 F | OXYGEN SATURATION: 98 %

## 2019-12-07 DIAGNOSIS — K08.89 PAIN, DENTAL: Primary | ICD-10-CM

## 2019-12-07 PROCEDURE — 99282 EMERGENCY DEPT VISIT SF MDM: CPT

## 2019-12-07 RX ORDER — HYDROCODONE BITARTRATE AND ACETAMINOPHEN 5; 325 MG/1; MG/1
1 TABLET ORAL EVERY 8 HOURS PRN
Qty: 20 TABLET | Refills: 0 | Status: SHIPPED | OUTPATIENT
Start: 2019-12-07 | End: 2019-12-14

## 2019-12-07 RX ORDER — PENICILLIN V POTASSIUM 500 MG/1
500 TABLET ORAL 4 TIMES DAILY
Qty: 40 TABLET | Refills: 0 | Status: SHIPPED | OUTPATIENT
Start: 2019-12-07 | End: 2019-12-17

## 2019-12-07 ASSESSMENT — ENCOUNTER SYMPTOMS
TROUBLE SWALLOWING: 0
VOICE CHANGE: 0
PHOTOPHOBIA: 0
SORE THROAT: 0
SHORTNESS OF BREATH: 0
EYE PAIN: 0
COLOR CHANGE: 0
FACIAL SWELLING: 0

## 2019-12-07 ASSESSMENT — PAIN SCALES - GENERAL: PAINLEVEL_OUTOF10: 10

## 2019-12-07 ASSESSMENT — PAIN DESCRIPTION - DESCRIPTORS: DESCRIPTORS: ACHING;SHARP;SHOOTING;STABBING;RADIATING

## 2019-12-07 ASSESSMENT — PAIN DESCRIPTION - LOCATION: LOCATION: EAR;JAW

## 2019-12-07 ASSESSMENT — PAIN DESCRIPTION - ORIENTATION: ORIENTATION: RIGHT;UPPER

## 2019-12-07 ASSESSMENT — PAIN DESCRIPTION - PAIN TYPE: TYPE: ACUTE PAIN

## 2020-07-15 ENCOUNTER — OFFICE VISIT (OUTPATIENT)
Dept: FAMILY MEDICINE CLINIC | Age: 45
End: 2020-07-15
Payer: COMMERCIAL

## 2020-07-15 VITALS
WEIGHT: 248.8 LBS | TEMPERATURE: 97.2 F | DIASTOLIC BLOOD PRESSURE: 98 MMHG | BODY MASS INDEX: 36.85 KG/M2 | OXYGEN SATURATION: 98 % | SYSTOLIC BLOOD PRESSURE: 188 MMHG | HEART RATE: 85 BPM | HEIGHT: 69 IN

## 2020-07-15 PROCEDURE — 99214 OFFICE O/P EST MOD 30 MIN: CPT | Performed by: FAMILY MEDICINE

## 2020-07-15 RX ORDER — SERTRALINE HYDROCHLORIDE 25 MG/1
25 TABLET, FILM COATED ORAL DAILY
Qty: 30 TABLET | Refills: 0 | Status: SHIPPED | OUTPATIENT
Start: 2020-07-15 | End: 2020-08-06

## 2020-07-15 RX ORDER — AMLODIPINE BESYLATE 5 MG/1
TABLET ORAL
COMMUNITY
Start: 2020-04-08 | End: 2020-07-15

## 2020-07-15 RX ORDER — SPIRONOLACTONE 25 MG/1
25 TABLET ORAL 2 TIMES DAILY
COMMUNITY
End: 2020-07-15 | Stop reason: SDDI

## 2020-07-15 RX ORDER — VENLAFAXINE HYDROCHLORIDE 150 MG/1
150 CAPSULE, EXTENDED RELEASE ORAL DAILY
COMMUNITY
End: 2020-07-15 | Stop reason: SINTOL

## 2020-07-15 RX ORDER — VENLAFAXINE 75 MG/1
75 TABLET ORAL DAILY
COMMUNITY
End: 2020-07-15

## 2020-07-15 RX ORDER — ESCITALOPRAM OXALATE 10 MG/1
10 TABLET ORAL DAILY
COMMUNITY
End: 2020-07-15 | Stop reason: SDDI

## 2020-07-15 RX ORDER — AMLODIPINE BESYLATE 10 MG/1
10 TABLET ORAL DAILY
COMMUNITY
End: 2020-07-15 | Stop reason: SDDI

## 2020-07-15 RX ORDER — LISINOPRIL 40 MG/1
40 TABLET ORAL DAILY
COMMUNITY
End: 2020-07-15 | Stop reason: DRUGHIGH

## 2020-07-15 RX ORDER — CARVEDILOL 12.5 MG/1
25 TABLET ORAL 2 TIMES DAILY WITH MEALS
COMMUNITY
End: 2020-07-15 | Stop reason: SDDI

## 2020-07-15 RX ORDER — OMEPRAZOLE 10 MG/1
20 CAPSULE, DELAYED RELEASE ORAL 2 TIMES DAILY
COMMUNITY
End: 2020-07-15

## 2020-07-15 RX ORDER — VARENICLINE TARTRATE
KIT
Qty: 1 BOX | Refills: 0 | Status: SHIPPED | OUTPATIENT
Start: 2020-07-15 | End: 2020-09-20

## 2020-07-15 RX ORDER — LISINOPRIL 20 MG/1
20 TABLET ORAL DAILY
Qty: 30 TABLET | Refills: 5 | Status: SHIPPED | OUTPATIENT
Start: 2020-07-15 | End: 2020-08-12 | Stop reason: SDUPTHER

## 2020-07-15 RX ORDER — PANTOPRAZOLE SODIUM 40 MG/1
40 TABLET, DELAYED RELEASE ORAL
Qty: 90 TABLET | Refills: 1 | Status: SHIPPED | OUTPATIENT
Start: 2020-07-15 | End: 2020-12-21

## 2020-07-15 RX ORDER — ALBUTEROL SULFATE 90 UG/1
2 AEROSOL, METERED RESPIRATORY (INHALATION) EVERY 6 HOURS PRN
COMMUNITY
End: 2020-07-15

## 2020-07-15 RX ORDER — BUSPIRONE HYDROCHLORIDE 10 MG/1
5 TABLET ORAL 2 TIMES DAILY
Qty: 60 TABLET | Refills: 1 | Status: SHIPPED | OUTPATIENT
Start: 2020-07-15 | End: 2020-09-28 | Stop reason: SDUPTHER

## 2020-07-15 SDOH — ECONOMIC STABILITY: INCOME INSECURITY: HOW HARD IS IT FOR YOU TO PAY FOR THE VERY BASICS LIKE FOOD, HOUSING, MEDICAL CARE, AND HEATING?: PATIENT DECLINED

## 2020-07-15 SDOH — ECONOMIC STABILITY: TRANSPORTATION INSECURITY
IN THE PAST 12 MONTHS, HAS LACK OF TRANSPORTATION KEPT YOU FROM MEETINGS, WORK, OR FROM GETTING THINGS NEEDED FOR DAILY LIVING?: PATIENT DECLINED

## 2020-07-15 SDOH — ECONOMIC STABILITY: FOOD INSECURITY: WITHIN THE PAST 12 MONTHS, YOU WORRIED THAT YOUR FOOD WOULD RUN OUT BEFORE YOU GOT MONEY TO BUY MORE.: PATIENT DECLINED

## 2020-07-15 SDOH — ECONOMIC STABILITY: TRANSPORTATION INSECURITY
IN THE PAST 12 MONTHS, HAS THE LACK OF TRANSPORTATION KEPT YOU FROM MEDICAL APPOINTMENTS OR FROM GETTING MEDICATIONS?: PATIENT DECLINED

## 2020-07-15 SDOH — ECONOMIC STABILITY: FOOD INSECURITY: WITHIN THE PAST 12 MONTHS, THE FOOD YOU BOUGHT JUST DIDN'T LAST AND YOU DIDN'T HAVE MONEY TO GET MORE.: PATIENT DECLINED

## 2020-07-15 ASSESSMENT — PATIENT HEALTH QUESTIONNAIRE - PHQ9
SUM OF ALL RESPONSES TO PHQ QUESTIONS 1-9: 2
SUM OF ALL RESPONSES TO PHQ QUESTIONS 1-9: 2
2. FEELING DOWN, DEPRESSED OR HOPELESS: 1
SUM OF ALL RESPONSES TO PHQ9 QUESTIONS 1 & 2: 2
1. LITTLE INTEREST OR PLEASURE IN DOING THINGS: 1

## 2020-07-15 ASSESSMENT — ENCOUNTER SYMPTOMS
RESPIRATORY NEGATIVE: 1
BLURRED VISION: 0
GASTROINTESTINAL NEGATIVE: 1
EYES NEGATIVE: 1

## 2020-07-15 NOTE — PROGRESS NOTES
No results found for: LABA1C          ( goal A1Cis < 7)   No results found for: LABMICR  No results found for: LDLCHOLESTEROL, LDLCALC    (goal LDL is <100)   BUN (mg/dL)   Date Value   04/18/2019 14     BP Readings from Last 3 Encounters:   07/15/20 (!) 188/98   12/07/19 (!) 161/90   04/18/19 (!) 190/114          (goal 120/80)    Past Medical History:   Diagnosis Date    Anemia     Hypertension       Past Surgical History:   Procedure Laterality Date    BREAST SURGERY      negative rt breast lumpectomy    CHOLECYSTECTOMY      GASTRIC BYPASS SURGERY      gastric sleeve    LIPOMA RESECTION      lt shoulder/upper back       History reviewed. No pertinent family history. Social History     Tobacco Use    Smoking status: Current Every Day Smoker     Packs/day: 1.00     Types: Cigarettes     Start date: 1/15/2020    Smokeless tobacco: Never Used   Substance Use Topics    Alcohol use: Yes     Comment: social      Current Outpatient Medications   Medication Sig Dispense Refill    cyanocobalamin 100 MCG tablet Take 100 mcg by mouth daily      Multiple Vitamins-Minerals (MULTIVITAMIN ADULT EXTRA C PO) Take 1 tablet by mouth daily      pantoprazole (PROTONIX) 40 MG tablet Take 1 tablet by mouth every morning (before breakfast) 90 tablet 1    lisinopril (PRINIVIL;ZESTRIL) 20 MG tablet Take 1 tablet by mouth daily 30 tablet 5    busPIRone (BUSPAR) 10 MG tablet Take 0.5 tablets by mouth 2 times daily 60 tablet 1    varenicline (CHANTIX STARTING MONTH UVALDO) 0.5 MG X 11 & 1 MG X 42 tablet Take by mouth. 1 box 0    sertraline (ZOLOFT) 25 MG tablet Take 1 tablet by mouth daily 30 tablet 0    levonorgestrel (MIRENA) IUD 52 mg 1 each by Intrauterine route      albuterol sulfate HFA (PROAIR HFA) 108 (90 Base) MCG/ACT inhaler Inhale 2 puffs into the lungs every 6 hours as needed for Wheezing 1 Inhaler 0     No current facility-administered medications for this visit.       Allergies   Allergen Reactions    Nsaids      Pt had gastric sleeve  Cannot take due to bariatric surgery  Cannot take due to bariatric surgery      Tolmetin      Pt had gastric sleeve    Codeine Itching       Health Maintenance   Topic Date Due    Pneumococcal 0-64 years Vaccine (1 of 1 - PPSV23) 06/20/1983    HIV screen  06/20/1992    DTaP/Tdap/Td vaccine (1 - Tdap) 06/20/1996    Cervical cancer screen  06/20/1998    Lipid screen  06/20/2017    Diabetes screen  06/20/2017    Potassium monitoring  04/18/2020    Creatinine monitoring  04/18/2020    Flu vaccine (1) 09/01/2020    Hepatitis A vaccine  Aged Out    Hepatitis B vaccine  Aged Out    Hib vaccine  Aged Out    Meningococcal (ACWY) vaccine  Aged Out       Subjective:     Review of Systems   Constitutional: Negative. HENT: Negative. Eyes: Negative. Negative for blurred vision. Respiratory: Negative. Cardiovascular: Negative. Negative for chest pain. Gastrointestinal: Negative. Genitourinary: Negative. Musculoskeletal: Negative. Skin: Negative. Allergic/Immunologic: Negative for environmental allergies, food allergies and immunocompromised state. Neurological: Positive for headaches. Psychiatric/Behavioral: Negative. Objective:     Physical Exam  Vitals signs and nursing note reviewed. Constitutional:       General: She is awake. She is not in acute distress. Appearance: Normal appearance. She is not toxic-appearing. HENT:      Head: Normocephalic and atraumatic. Right Ear: External ear normal.      Left Ear: External ear normal.      Nose: Nose normal.      Mouth/Throat:      Mouth: Mucous membranes are moist.   Eyes:      Extraocular Movements: Extraocular movements intact. Conjunctiva/sclera: Conjunctivae normal.   Neck:      Musculoskeletal: Normal range of motion and neck supple. Cardiovascular:      Rate and Rhythm: Normal rate and regular rhythm. Pulses: Normal pulses. Heart sounds: Normal heart sounds.  No murmur. Pulmonary:      Effort: Pulmonary effort is normal.      Breath sounds: Normal breath sounds. Abdominal:      General: Abdomen is flat. Bowel sounds are normal.      Palpations: Abdomen is soft. Musculoskeletal: Normal range of motion. Neurological:      General: No focal deficit present. Mental Status: She is alert and oriented to person, place, and time. Psychiatric:         Attention and Perception: Attention normal.         Mood and Affect: Mood and affect normal.         Speech: Speech normal.         Behavior: Behavior normal.       BP (!) 188/98 (Site: Left Upper Arm, Position: Sitting, Cuff Size: Large Adult)   Pulse 85   Temp 97.2 °F (36.2 °C)   Ht 5' 9\" (1.753 m)   Wt 248 lb 12.8 oz (112.9 kg)   SpO2 98%   BMI 36.74 kg/m²     Assessment:       Diagnosis Orders   1. Gastroesophageal reflux disease without esophagitis  pantoprazole (PROTONIX) 40 MG tablet    Sofi Mitchell DO, General Surgery, [place]   2. Class 2 severe obesity due to excess calories with serious comorbidity and body mass index (BMI) of 36.0 to 36.9 in adult [facility])  Sofi Mitchell DO, General Surgery, [place]    Lipid, Fasting    Comprehensive Metabolic Panel, Fasting   3. Essential hypertension  lisinopril (PRINIVIL;ZESTRIL) 20 MG tablet    DME Order for (Specify) as OP    CBC Auto Differential    TSH With Reflex Ft4   4. IUD (intrauterine device) in place  [address] [facility], [facility], Gynecology, [address] [facility]   5. Anxiety  busPIRone (BUSPAR) 10 MG tablet    sertraline (ZOLOFT) 25 MG tablet    Vitamin B12 & Folate    Iron And TIBC    Vitamin B1    Vitamin D 25 Hydroxy   6. Mild episode of recurrent major depressive disorder (HCC)  busPIRone (BUSPAR) 10 MG tablet    sertraline (ZOLOFT) 25 MG tablet    Vitamin B12 & Folate    Iron And TIBC    Vitamin B1    Vitamin D 25 Hydroxy   7. Tobacco use  varenicline (CHANTIX STARTING MONTH PAK) 0.5 MG X 11 & 1 MG X 42 tablet   8.  Encounter for screening for HIV  HIV Screen   9. Lipid screening  Lipid, Fasting   10. Diabetes mellitus screening  Comprehensive Metabolic Panel, Fasting   11. Visit for screening mammogram  CONSTANTINO DIGITAL SCREEN W CAD BILATERAL   12. History of weight loss surgery  Vitamin B12 & Folate    Iron And TIBC    Vitamin B1    Vitamin D 25 Hydroxy   13. Other iron deficiency anemia               Plan:    GERD - patient advised to start protonix and will need to consider EGD. Patient referred to see Dr. Shital Restrepo to discuss concerns about past bariatric surgery    Obesity - patient referred to Dr. Shital Restrepo to discuss bariatric surgery/concerns and potential problems especially with all the patient's symptoms    HTN - patient started on lisinopril and to return in 2 weeks for repeat bp. Get fasting labs done    IUD - patient to follow up with gyn for removal and replacement    Anxiety/depression - start medications as discussed, return in 2-4 weeks for recheck, connect with psychiatry to discuss other treatment options.     Tobacco use - restart chantix   Return in about 2 weeks (around 7/29/2020) for hypertension, physical.    Orders Placed This Encounter   Procedures    CONSTANTINO DIGITAL SCREEN W CAD BILATERAL     Standing Status:   Future     Standing Expiration Date:   9/15/2021     Order Specific Question:   Reason for exam:     Answer:   z12.31    HIV Screen     Standing Status:   Future     Standing Expiration Date:   7/15/2021    Lipid, Fasting     Standing Status:   Future     Standing Expiration Date:   7/15/2021    Comprehensive Metabolic Panel, Fasting     Standing Status:   Future     Standing Expiration Date:   7/15/2021    CBC Auto Differential     Standing Status:   Future     Standing Expiration Date:   7/15/2021    TSH With Reflex Ft4     Standing Status:   Future     Standing Expiration Date:   7/15/2021    Vitamin B12 & Folate     Standing Status:   Future     Standing Expiration Date:   7/15/2021    Iron And TIBC Standing Status:   Future     Standing Expiration Date:   7/15/2021     Order Specific Question:   Is Patient Fasting? Answer:   yes     Order Specific Question:   No of Hours? Answer:   8-12 hours    Vitamin B1     Standing Status:   Future     Standing Expiration Date:   7/15/2021    Vitamin D 25 Hydroxy     Standing Status:   Future     Standing Expiration Date:   7/15/2021   [id number] [facility], , General Surgery, [place]     Referral Priority:   Routine     Referral Type:   Consult for Advice and Opinion     Referral Reason:   Specialty Services Required     Requested Specialty:   General Surgery     Number of Visits Requested:   [address] [facility], [address] [facility], , Gynecology, [facility]     Referral Priority:   Routine     Referral Type:   Eval and Treat     Referral Reason:   Specialty Services Required     Referred to Provider:   Karolyn Zhao DO     Requested Specialty:   Obstetrics & Gynecology     Number of Visits Requested:   1    DME Order for (Specify) as OP     - DME device ordered - blood pressure cuff   - Diagnosis: essential hypertension  - Length of Need: Lifetime     Orders Placed This Encounter   Medications    pantoprazole (PROTONIX) 40 MG tablet     Sig: Take 1 tablet by mouth every morning (before breakfast)     Dispense:  90 tablet     Refill:  1    lisinopril (PRINIVIL;ZESTRIL) 20 MG tablet     Sig: Take 1 tablet by mouth daily     Dispense:  30 tablet     Refill:  5    busPIRone (BUSPAR) 10 MG tablet     Sig: Take 0.5 tablets by mouth 2 times daily     Dispense:  60 tablet     Refill:  1    varenicline (CHANTIX STARTING MONTH UVALDO) 0.5 MG X 11 & 1 MG X 42 tablet     Sig: Take by mouth. Dispense:  1 box     Refill:  0    sertraline (ZOLOFT) 25 MG tablet     Sig: Take 1 tablet by mouth daily     Dispense:  30 tablet     Refill:  0       Patient given educational materials - see patient instructions. Discussed use, benefit, and side effects of prescribed medications. All patientquestions answered. Pt voiced understanding. Reviewed health maintenance. Instructedto continue current medications, diet and exercise. Patient agreed with treatmentplan. Follow up as directed.      Electronically signed by Grecia Ramírez MD on 7/15/2020 at 2:43 PM

## 2020-07-29 ENCOUNTER — TELEPHONE (OUTPATIENT)
Dept: BARIATRICS/WEIGHT MGMT | Age: 45
End: 2020-07-29

## 2020-08-03 ENCOUNTER — HOSPITAL ENCOUNTER (OUTPATIENT)
Age: 45
Setting detail: SPECIMEN
Discharge: HOME | End: 2020-08-03
Payer: COMMERCIAL

## 2020-08-03 LAB
ABSOLUTE EOS #: 0.44 K/UL (ref 0–0.44)
ABSOLUTE IMMATURE GRANULOCYTE: <0.03 K/UL (ref 0–0.3)
ABSOLUTE LYMPH #: 1.67 K/UL (ref 1.1–3.7)
ABSOLUTE MONO #: 0.28 K/UL (ref 0.1–1.2)
ALBUMIN SERPL-MCNC: 4.2 G/DL (ref 3.5–5.2)
ALBUMIN/GLOBULIN RATIO: 1.4 (ref 1–2.5)
ALP BLD-CCNC: 102 U/L (ref 35–104)
ALT SERPL-CCNC: 12 U/L (ref 5–33)
ANION GAP SERPL CALCULATED.3IONS-SCNC: 15 MMOL/L (ref 9–17)
AST SERPL-CCNC: 17 U/L
BASOPHILS # BLD: 1 % (ref 0–2)
BASOPHILS ABSOLUTE: 0.06 K/UL (ref 0–0.2)
BILIRUB SERPL-MCNC: 0.3 MG/DL (ref 0.3–1.2)
BUN BLDV-MCNC: 13 MG/DL (ref 6–20)
BUN/CREAT BLD: NORMAL (ref 9–20)
CALCIUM SERPL-MCNC: 9.7 MG/DL (ref 8.6–10.4)
CHLORIDE BLD-SCNC: 102 MMOL/L (ref 98–107)
CHOLESTEROL, FASTING: 220 MG/DL
CHOLESTEROL/HDL RATIO: 5.2
CO2: 23 MMOL/L (ref 20–31)
CREAT SERPL-MCNC: 0.74 MG/DL (ref 0.5–0.9)
DIFFERENTIAL TYPE: ABNORMAL
EOSINOPHILS RELATIVE PERCENT: 7 % (ref 1–4)
FOLATE: 4.6 NG/ML
GFR AFRICAN AMERICAN: >60 ML/MIN
GFR NON-AFRICAN AMERICAN: >60 ML/MIN
GFR SERPL CREATININE-BSD FRML MDRD: NORMAL ML/MIN/{1.73_M2}
GFR SERPL CREATININE-BSD FRML MDRD: NORMAL ML/MIN/{1.73_M2}
GLUCOSE FASTING: 79 MG/DL (ref 70–99)
HCT VFR BLD CALC: 37.8 % (ref 36.3–47.1)
HDLC SERPL-MCNC: 42 MG/DL
HEMOGLOBIN: 11 G/DL (ref 11.9–15.1)
HIV AG/AB: NONREACTIVE
IMMATURE GRANULOCYTES: 0 %
IRON SATURATION: 10 % (ref 20–55)
IRON: 52 UG/DL (ref 37–145)
LDL CHOLESTEROL: 149 MG/DL (ref 0–130)
LYMPHOCYTES # BLD: 28 % (ref 24–43)
MCH RBC QN AUTO: 21.8 PG (ref 25.2–33.5)
MCHC RBC AUTO-ENTMCNC: 29.1 G/DL (ref 28.4–34.8)
MCV RBC AUTO: 74.9 FL (ref 82.6–102.9)
MONOCYTES # BLD: 5 % (ref 3–12)
NRBC AUTOMATED: 0 PER 100 WBC
PDW BLD-RTO: 16.9 % (ref 11.8–14.4)
PLATELET # BLD: 310 K/UL (ref 138–453)
PLATELET ESTIMATE: ABNORMAL
PMV BLD AUTO: 10.3 FL (ref 8.1–13.5)
POTASSIUM SERPL-SCNC: 4.3 MMOL/L (ref 3.7–5.3)
RBC # BLD: 5.05 M/UL (ref 3.95–5.11)
RBC # BLD: ABNORMAL 10*6/UL
SEG NEUTROPHILS: 59 % (ref 36–65)
SEGMENTED NEUTROPHILS ABSOLUTE COUNT: 3.48 K/UL (ref 1.5–8.1)
SODIUM BLD-SCNC: 140 MMOL/L (ref 135–144)
TOTAL IRON BINDING CAPACITY: 518 UG/DL (ref 250–450)
TOTAL PROTEIN: 7.2 G/DL (ref 6.4–8.3)
TRIGLYCERIDE, FASTING: 144 MG/DL
TSH SERPL DL<=0.05 MIU/L-ACNC: 2.29 MIU/L (ref 0.3–5)
UNSATURATED IRON BINDING CAPACITY: 466 UG/DL (ref 112–347)
VITAMIN B-12: 1917 PG/ML (ref 232–1245)
VITAMIN D 25-HYDROXY: 34 NG/ML (ref 30–100)
VLDLC SERPL CALC-MCNC: ABNORMAL MG/DL (ref 1–30)
WBC # BLD: 5.9 K/UL (ref 3.5–11.3)
WBC # BLD: ABNORMAL 10*3/UL

## 2020-08-03 PROCEDURE — 83540 ASSAY OF IRON: CPT

## 2020-08-03 PROCEDURE — 84425 ASSAY OF VITAMIN B-1: CPT

## 2020-08-03 PROCEDURE — 82607 VITAMIN B-12: CPT

## 2020-08-03 PROCEDURE — 36415 COLL VENOUS BLD VENIPUNCTURE: CPT

## 2020-08-03 PROCEDURE — 80053 COMPREHEN METABOLIC PANEL: CPT

## 2020-08-03 PROCEDURE — 82746 ASSAY OF FOLIC ACID SERUM: CPT

## 2020-08-03 PROCEDURE — 83550 IRON BINDING TEST: CPT

## 2020-08-03 PROCEDURE — 87389 HIV-1 AG W/HIV-1&-2 AB AG IA: CPT

## 2020-08-03 PROCEDURE — 82306 VITAMIN D 25 HYDROXY: CPT

## 2020-08-03 PROCEDURE — 85025 COMPLETE CBC W/AUTO DIFF WBC: CPT

## 2020-08-03 PROCEDURE — 84443 ASSAY THYROID STIM HORMONE: CPT

## 2020-08-03 PROCEDURE — 80061 LIPID PANEL: CPT

## 2020-08-04 ENCOUNTER — PROCEDURE VISIT (OUTPATIENT)
Dept: OBGYN CLINIC | Age: 45
End: 2020-08-04
Payer: COMMERCIAL

## 2020-08-04 VITALS
BODY MASS INDEX: 36.62 KG/M2 | WEIGHT: 248 LBS | TEMPERATURE: 97.9 F | SYSTOLIC BLOOD PRESSURE: 132 MMHG | RESPIRATION RATE: 16 BRPM | DIASTOLIC BLOOD PRESSURE: 70 MMHG

## 2020-08-04 PROCEDURE — 58301 REMOVE INTRAUTERINE DEVICE: CPT | Performed by: OBSTETRICS & GYNECOLOGY

## 2020-08-04 PROCEDURE — 58300 INSERT INTRAUTERINE DEVICE: CPT | Performed by: OBSTETRICS & GYNECOLOGY

## 2020-08-05 PROBLEM — Z97.5 IUD (INTRAUTERINE DEVICE) IN PLACE: Status: ACTIVE | Noted: 2020-08-05

## 2020-08-05 LAB — VITAMIN B1 WHOLE BLOOD: 99 NMOL/L (ref 70–180)

## 2020-08-05 NOTE — PROGRESS NOTES
Noah Castillo  2020    YOB: 1975        HPI:  Noah Castillo is a 39 y.o. female [id number]     Patient states she has not been sexually active since  when her  passed away. Had her last child in  with IUD and IUD is due to be replaced. Menses were very heavy and unpredictable at that time. OB History    Para Term  AB Living   3 2 2 0 1 2   SAB TAB Ectopic Molar Multiple Live Births   1 0 0 0 0 2      # Outcome Date GA Lbr Frank/2nd Weight Sex Delivery Anes PTL Lv   3 Term  40w0d   F Vag-Spont   LIDIA   2 Term  38w0d   M Vag-Spont   LIDIA   1 SAB  12w0d    SAB          Past Medical History:   Diagnosis Date    Anemia     History of abnormal cervical Pap smear     Hypertension     Menometrorrhagia        Past Surgical History:   Procedure Laterality Date    BREAST SURGERY      negative rt breast lumpectomy    CHOLECYSTECTOMY      COLPOSCOPY      GASTRIC BYPASS SURGERY      gastric sleeve    LIPOMA RESECTION      lt shoulder/upper back       No family history on file. Social History     Socioeconomic History    Marital status:       Spouse name: Not on file    Number of children: Not on file    Years of education: Not on file    Highest education level: Not on file   Occupational History    Not on file   Social Needs    Financial resource strain: Patient refused    Food insecurity     Worry: Patient refused     Inability: Patient refused    Transportation needs     Medical: Patient refused     Non-medical: Patient refused   Tobacco Use    Smoking status: Current Every Day Smoker     Packs/day: 1.00     Types: Cigarettes     Start date: 1/15/2020    Smokeless tobacco: Never Used   Substance and Sexual Activity    Alcohol use: Yes     Comment: social    Drug use: No    Sexual activity: Not on file   Lifestyle    Physical activity     Days per week: Not on file     Minutes per session: Not on file    Stress: Not on file Relationships    Social connections     Talks on phone: Not on file     Gets together: Not on file     Attends Voodoo service: Not on file     Active member of club or organization: Not on file     Attends meetings of clubs or organizations: Not on file     Relationship status: Not on file    Intimate partner violence     Fear of current or ex partner: Not on file     Emotionally abused: Not on file     Physically abused: Not on file     Forced sexual activity: Not on file   Other Topics Concern    Not on file   Social History Narrative    Not on file         MEDICATIONS:  Current Outpatient Medications   Medication Sig Dispense Refill    cyanocobalamin 100 MCG tablet Take 100 mcg by mouth daily      Multiple Vitamins-Minerals (MULTIVITAMIN ADULT EXTRA C PO) Take 1 tablet by mouth daily      pantoprazole (PROTONIX) 40 MG tablet Take 1 tablet by mouth every morning (before breakfast) 90 tablet 1    lisinopril (PRINIVIL;ZESTRIL) 20 MG tablet Take 1 tablet by mouth daily 30 tablet 5    busPIRone (BUSPAR) 10 MG tablet Take 0.5 tablets by mouth 2 times daily 60 tablet 1    varenicline (CHANTIX STARTING MONTH PAK) 0.5 MG X 11 & 1 MG X 42 tablet Take by mouth.  1 box 0    sertraline (ZOLOFT) 25 MG tablet Take 1 tablet by mouth daily 30 tablet 0    levonorgestrel (MIRENA) IUD 52 mg 1 each by Intrauterine route      albuterol sulfate HFA (PROAIR HFA) 108 (90 Base) MCG/ACT inhaler Inhale 2 puffs into the lungs every 6 hours as needed for Wheezing 1 Inhaler 0     Current Facility-Administered Medications   Medication Dose Route Frequency Provider Last Rate Last Dose    levonorgestrel (MIRENA) IUD 52 mg 1 each  1 each Intrauterine Once [place] Honor, DO                 ALLERGIES:  Allergies as of 08/04/2020 - Review Complete 07/15/2020   Allergen Reaction Noted    Nsaids  01/21/2018    Tolmetin  02/04/2018    Codeine Itching 06/13/2013       Review Of Systems (11 point):  Constitutional: No fever, chills or malaise; No weight change or fatigue  Head and Eyes: No vision, Headache, Dizziness or trauma in last 12 months  ENT ROS: No hearing, Tinnitis, sinus or taste problems  Hematological and Lymphatic ROS:No Lymphoma, Von Willebrand's, Hemophillia or Bleeding History  Psych ROS: No Depression, Homicidal thoughts,suicidal thoughts, or anxiety  Breast ROS: No prior breast abnormalities or lumps  Respiratory ROS: No SOB, Pneumoniae,Cough, or Pulmonary Embolism History  Cardiovascular ROS: No Chest Pain with Exertion, Palpitations, Syncope, Edema, Arrhythmia  Gastrointestinal ROS: No Indigestion, Heartburn, Nausea, vomiting, Diarrhea, Constipation,or Bowel Changes; No Bloody Stools or melena  Genito-Urinary ROS: No Dysuria, Hematuria or Nocturia. No Urinary Incontinence or Vaginal Discharge  Musculoskeletal ROS: No Arthralgia, Arthritis,Gout,Osteoporosis or Rheumatism  Neurological ROS: No CVA, Migraines, Epilepsy, Seizure Hx, or Limb Weakness  Dermatological ROS: No Rash, Itching, Hives, Mole Changes or Cancer          Blood pressure 132/70, temperature 97.9 °F (36.6 °C), temperature source Temporal, resp. rate 16, weight 248 lb (112.5 kg). Abdomen: Soft non-tender; good bowel sounds. No guarding, rebound or rigidity. No CVA tenderness bilaterally. Extremities: No calf tenderness, DTR 2/4, and No edema bilaterally    IUD PROCEDURE NOTE:     The patient was positioned comfortably on the exam table. speculum was placed without incident and the string was identified at the external OS. The site was then cleansed with betadine and the string was grasped with a ring forcep and the IUD was removed without incident. The patient is requesting that a Mirena IUD be inserted for irregular menses. the uterus was sounded to 7 cm. The Mirena IUD was opened and loaded into the delivery system. The wand was inserted to just past the internal portio and the button was retracted to the first line.  The wand was held in place for 10 seconds and then the button was retracted to its final position while the IUD was moved to the fundus. The string was trimmed in standard fashion. Post procedure restrictions were reviewed and given to the patient. She was instructed to use barrier protection for sexually transmitted disease prevention as well as string checks/timing. Assessment:   Diagnosis Orders   1. Encounter for IUD removal  [phone] - MO REMOVE INTRAUTERINE DEVICE   2. Menometrorrhagia  levonorgestrel (MIRENA) IUD 52 mg 1 each    72058 - MO INSERT INTRAUTERINE DEVICE   3. Encounter for IUD insertion     4. Pre-procedure lab exam     5.  IUD check up  [address] [place] [address] Non OB Transvaginal     Patient Active Problem List    Diagnosis Date Noted    Menometrorrhagia        PLAN:  IUD removed and new IUD inserted today  RTO for annual exam as scheduled      Orders Placed This Encounter   Procedures    US PELVIS COMPLETE     Standing Status:   Future     Standing Expiration Date:   8/4/2021    US Non OB Transvaginal     Standing Status:   Future     Standing Expiration Date:   8/4/2021    41436 - MO REMOVE INTRAUTERINE DEVICE    40314 - MO INSERT INTRAUTERINE DEVICE

## 2020-08-06 RX ORDER — SERTRALINE HYDROCHLORIDE 25 MG/1
TABLET, FILM COATED ORAL
Qty: 30 TABLET | Refills: 0 | Status: SHIPPED | OUTPATIENT
Start: 2020-08-06 | End: 2020-08-12 | Stop reason: SDUPTHER

## 2020-08-06 NOTE — TELEPHONE ENCOUNTER
Electronic medication refill request. Pharmacy on file. Please advise.       Next Visit Date:  Future Appointments   Date Time Provider Bayron Gamez   8/12/2020 10:30 AM Calderon Cespedes MD [facility] [address] Maintenance   Topic Date Due    Pneumococcal 0-64 years Vaccine (1 of 1 - PPSV23) 06/20/1983    DTaP/Tdap/Td vaccine (1 - Tdap) 06/20/1996    Cervical cancer screen  06/20/1998    Flu vaccine (1) 09/01/2020    Potassium monitoring  08/03/2021    Creatinine monitoring  08/03/2021    Diabetes screen  08/03/2023    Lipid screen  08/03/2025    HIV screen  Completed    Hepatitis A vaccine  Aged Out    Hepatitis B vaccine  Aged Out    Hib vaccine  Aged Out    Meningococcal (ACWY) vaccine  Aged Out       No results found for: LABA1C          ( goal A1C is < 7)   No results found for: LABMICR  LDL Cholesterol (mg/dL)   Date Value   08/03/2020 149 (H)       (goal LDL is <100)   AST (U/L)   Date Value   08/03/2020 17     ALT (U/L)   Date Value   08/03/2020 12     BUN (mg/dL)   Date Value   08/03/2020 13     BP Readings from Last 3 Encounters:   08/04/20 132/70   07/15/20 (!) 188/98   12/07/19 (!) 161/90          (goal 120/80)    All Future Testing planned in [facility]  Lab Frequency Next Occurrence   CONSTANTINO DIGITAL SCREEN W CAD BILATERAL Once 07/15/2020   US PELVIS COMPLETE Once 08/14/2020   US Non OB Transvaginal Once 09/05/2020               Patient Active Problem List:     Menometrorrhagia     IUD (intrauterine device) in place

## 2020-08-12 ENCOUNTER — OFFICE VISIT (OUTPATIENT)
Dept: FAMILY MEDICINE CLINIC | Age: 45
End: 2020-08-12
Payer: COMMERCIAL

## 2020-08-12 VITALS
WEIGHT: 247 LBS | HEART RATE: 69 BPM | DIASTOLIC BLOOD PRESSURE: 106 MMHG | HEIGHT: 69 IN | TEMPERATURE: 97.3 F | BODY MASS INDEX: 36.58 KG/M2 | RESPIRATION RATE: 16 BRPM | OXYGEN SATURATION: 98 % | SYSTOLIC BLOOD PRESSURE: 170 MMHG

## 2020-08-12 PROBLEM — F41.1 ANXIETY STATE: Status: ACTIVE | Noted: 2020-08-12

## 2020-08-12 PROBLEM — G47.30 SLEEP APNEA: Status: ACTIVE | Noted: 2020-08-12

## 2020-08-12 PROBLEM — K44.9 HIATAL HERNIA: Status: ACTIVE | Noted: 2020-08-12

## 2020-08-12 PROBLEM — I10 ESSENTIAL HYPERTENSION: Status: ACTIVE | Noted: 2020-08-12

## 2020-08-12 PROBLEM — E78.5 HYPERLIPIDEMIA: Status: ACTIVE | Noted: 2020-08-12

## 2020-08-12 PROBLEM — E66.01 MORBID OBESITY (HCC): Status: ACTIVE | Noted: 2020-08-12

## 2020-08-12 PROBLEM — E55.9 VITAMIN D DEFICIENCY: Status: ACTIVE | Noted: 2020-08-12

## 2020-08-12 PROBLEM — D50.9 IRON DEFICIENCY ANEMIA: Status: ACTIVE | Noted: 2020-08-12

## 2020-08-12 PROCEDURE — 99396 PREV VISIT EST AGE 40-64: CPT | Performed by: FAMILY MEDICINE

## 2020-08-12 RX ORDER — FOLIC ACID 1 MG/1
1 TABLET ORAL DAILY
Qty: 90 TABLET | Refills: 1 | Status: SHIPPED | OUTPATIENT
Start: 2020-08-12 | End: 2021-01-20 | Stop reason: SDUPTHER

## 2020-08-12 RX ORDER — LISINOPRIL 40 MG/1
40 TABLET ORAL DAILY
Qty: 90 TABLET | Refills: 0 | Status: SHIPPED | OUTPATIENT
Start: 2020-08-12 | End: 2020-12-16

## 2020-08-12 RX ORDER — ATORVASTATIN CALCIUM 10 MG/1
10 TABLET, FILM COATED ORAL DAILY
Qty: 90 TABLET | Refills: 1 | Status: SHIPPED | OUTPATIENT
Start: 2020-08-12 | End: 2021-03-15

## 2020-08-12 SDOH — HEALTH STABILITY: PHYSICAL HEALTH: ON AVERAGE, HOW MANY MINUTES DO YOU ENGAGE IN EXERCISE AT THIS LEVEL?: 30 MIN

## 2020-08-12 SDOH — HEALTH STABILITY: MENTAL HEALTH
STRESS IS WHEN SOMEONE FEELS TENSE, NERVOUS, ANXIOUS, OR CAN'T SLEEP AT NIGHT BECAUSE THEIR MIND IS TROUBLED. HOW STRESSED ARE YOU?: ONLY A LITTLE

## 2020-08-12 SDOH — HEALTH STABILITY: PHYSICAL HEALTH: ON AVERAGE, HOW MANY DAYS PER WEEK DO YOU ENGAGE IN MODERATE TO STRENUOUS EXERCISE (LIKE A BRISK WALK)?: 5 DAYS

## 2020-08-12 ASSESSMENT — ENCOUNTER SYMPTOMS
GASTROINTESTINAL NEGATIVE: 1
EYES NEGATIVE: 1
RESPIRATORY NEGATIVE: 1

## 2020-08-12 NOTE — PROGRESS NOTES
[address] PRIMARY CARE  [address] [place] [id number]  Dept: [phone]  Dept Fax: [phone]    Karely Bloom is a 39 y.o. female who presents today for her medical conditions/complaints as noted below. Karely Bloom is c/o of   Chief Complaint   Patient presents with   [employer] Maintenance     she has one scheduled for 8/28 and mamm 8/13/20         HPI:     Patient presents for physical.  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 She currently states her mood has improved since going on the zoloft but states she still doesn't feel like she is 100%. We did discuss increasing the dose. The patient's blood pressure has still been running high at home as well but it is gradually coming down. History was reviewed and updated. Labs reviewed with patient. No results found for: LABA1C          ( goal A1Cis < 7)   No results found for: LABMICR  LDL Cholesterol (mg/dL)   Date Value   08/03/2020 149 (H)       (goal LDL is <100)   AST (U/L)   Date Value   08/03/2020 17     ALT (U/L)   Date Value   08/03/2020 12     BUN (mg/dL)   Date Value   08/03/2020 13     BP Readings from Last 3 Encounters:   08/12/20 (!) 170/106   08/04/20 132/70   07/15/20 (!) 188/98          (goal 120/80)    Past Medical History:   Diagnosis Date    Anemia     History of abnormal cervical Pap smear     Hypertension     IUD (intrauterine device) in place 08/04/2020    Mirena    Menometrorrhagia       Past Surgical History:   Procedure Laterality Date    BREAST SURGERY      negative rt breast lumpectomy    CHOLECYSTECTOMY      COLPOSCOPY      GASTRIC BYPASS SURGERY      gastric sleeve    INTRAUTERINE DEVICE INSERTION  08/04/2020    Mirena     LIPOMA RESECTION      lt shoulder/upper back       History reviewed. No pertinent family history.     Social History     Tobacco Use    Smoking status: Former Smoker     Packs/day: 1.00     Years: 22.00     Pack years: 22.00     Types: Cigarettes     Start date: 1/15/2020     Last attempt to quit: 2020     Years since quittin.0    Smokeless tobacco: Never Used   Substance Use Topics    Alcohol use: Yes     Comment: social      Current Outpatient Medications   Medication Sig Dispense Refill    sertraline (ZOLOFT) 50 MG tablet TAKE 1 TABLET BY MOUTH EVERY DAY 90 tablet 0    folic acid (FOLVITE) 1 MG tablet Take 1 tablet by mouth daily 90 tablet 1    atorvastatin (LIPITOR) 10 MG tablet Take 1 tablet by mouth daily 90 tablet 1    lisinopril (PRINIVIL;ZESTRIL) 40 MG tablet Take 1 tablet by mouth daily 90 tablet 0    cyanocobalamin 100 MCG tablet Take 100 mcg by mouth daily      Multiple Vitamins-Minerals (MULTIVITAMIN ADULT EXTRA C PO) Take 1 tablet by mouth daily      pantoprazole (PROTONIX) 40 MG tablet Take 1 tablet by mouth every morning (before breakfast) 90 tablet 1    busPIRone (BUSPAR) 10 MG tablet Take 0.5 tablets by mouth 2 times daily 60 tablet 1    varenicline (CHANTIX STARTING MONTH ) 0.5 MG X 11 & 1 MG X 42 tablet Take by mouth. 1 box 0    levonorgestrel (MIRENA) IUD 52 mg 1 each by Intrauterine route      albuterol sulfate HFA (PROAIR HFA) 108 (90 Base) MCG/ACT inhaler Inhale 2 puffs into the lungs every 6 hours as needed for Wheezing 1 Inhaler 0     No current facility-administered medications for this visit.       Allergies   Allergen Reactions    Nsaids      Pt had gastric sleeve  Cannot take due to bariatric surgery  Cannot take due to bariatric surgery      Tolmetin      Pt had gastric sleeve    Codeine Itching       Health Maintenance   Topic Date Due    Cervical cancer screen  1998    Flu vaccine (1) 2020    Lipid screen  2021    Potassium monitoring  2021    Creatinine monitoring  2021    Diabetes screen  2023    DTaP/Tdap/Td vaccine (2 - Td) 2023    HIV screen  Completed    Hepatitis A vaccine  Aged Out    Hepatitis B vaccine  Aged Out    Hib vaccine  Aged Out    Meningococcal (ACWY) vaccine  Aged Out    Pneumococcal 0-64 years Vaccine  Aged Out       Subjective:     Review of Systems   Constitutional: Negative. HENT: Negative. Eyes: Negative. Respiratory: Negative. Cardiovascular: Negative. Gastrointestinal: Negative. Genitourinary: Negative. Musculoskeletal: Negative. Skin: Negative. Allergic/Immunologic: Negative for environmental allergies, food allergies and immunocompromised state. Neurological: Negative. Psychiatric/Behavioral: Negative. All other systems reviewed and are negative. Objective:     Physical Exam  Vitals signs and nursing note reviewed. Constitutional:       General: She is awake. She is not in acute distress. Appearance: Normal appearance. She is obese. She is not ill-appearing, toxic-appearing or diaphoretic. HENT:      Head: Normocephalic and atraumatic. Right Ear: External ear normal.      Left Ear: External ear normal.      Nose: Nose normal.      Mouth/Throat:      Mouth: Mucous membranes are moist.   Eyes:      Extraocular Movements: Extraocular movements intact. Conjunctiva/sclera: Conjunctivae normal.      Pupils: Pupils are equal, round, and reactive to light. Neck:      Musculoskeletal: Normal range of motion and neck supple. Cardiovascular:      Rate and Rhythm: Normal rate and regular rhythm. Pulses: Normal pulses. Heart sounds: Normal heart sounds. No murmur. Pulmonary:      Effort: Pulmonary effort is normal.      Breath sounds: Normal breath sounds. Abdominal:      General: Abdomen is flat. Bowel sounds are normal.      Palpations: Abdomen is soft. Musculoskeletal: Normal range of motion. Skin:     Capillary Refill: Capillary refill takes less than 2 seconds. Neurological:      General: No focal deficit present. Mental Status: She is alert and oriented to person, place, and time.       Cranial Nerves: No cranial nerve deficit. Motor: No weakness. Gait: Gait normal.   Psychiatric:         Attention and Perception: Attention normal.         Mood and Affect: Mood and affect normal.         Speech: Speech normal.         Behavior: Behavior normal.         Thought Content: Thought content normal.         Judgment: Judgment normal.       BP (!) 170/106   Pulse 69   Temp 97.3 °F (36.3 °C) (Skin)   Resp 16   Ht 5' 9\" (1.753 m)   Wt 247 lb (112 kg)   SpO2 98%   BMI 36.48 kg/m²     Assessment:       Diagnosis Orders   1. Healthcare maintenance     2. Anxiety  sertraline (ZOLOFT) 50 MG tablet   3. Mild episode of recurrent major depressive disorder (HCC)  sertraline (ZOLOFT) 50 MG tablet   4. Dyslipidemia  atorvastatin (LIPITOR) 10 MG tablet   5. Folic acid deficiency  folic acid (FOLVITE) 1 MG tablet   6. Essential hypertension  lisinopril (PRINIVIL;ZESTRIL) 40 MG tablet             Plan:    HM - patient encouraged to continue to pursue exercise and weight loss. She is pursuing a conversion from partial gastrectomy to rob en y. Encouraged her to take her vitamins and get plenty of sleep. Anxiety - patient's close friend recently passed away and her son is going back to penitentiary. The patient requests increase in zoloft. Encouraged her to pursue counseling as well. Depression - Patient denies any SI/HI. Increase zoloft as discussed    Dyslipid - start atorvastatin, recheck lipids in 6 months    Folic acid deficiency - likely secondary to gastrectomy, will start on folic acid    HTN - increase lisinopril to 40 mg daily  Return in about 3 months (around 11/12/2020) for deprssion. No orders of the defined types were placed in this encounter.     Orders Placed This Encounter   Medications    sertraline (ZOLOFT) 50 MG tablet     Sig: TAKE 1 TABLET BY MOUTH EVERY DAY     Dispense:  90 tablet     Refill:  0    folic acid (FOLVITE) 1 MG tablet     Sig: Take 1 tablet by mouth daily     Dispense:  90 tablet     Refill: 1    atorvastatin (LIPITOR) 10 MG tablet     Sig: Take 1 tablet by mouth daily     Dispense:  90 tablet     Refill:  1    lisinopril (PRINIVIL;ZESTRIL) 40 MG tablet     Sig: Take 1 tablet by mouth daily     Dispense:  90 tablet     Refill:  0       Patient given educational materials - see patient instructions. Discussed use, benefit, and side effects of prescribed medications. All patientquestions answered. Pt voiced understanding. Reviewed health maintenance. Instructedto continue current medications, diet and exercise. Patient agreed with treatmentplan. Follow up as directed.      Electronically signed by Martir Hennessy MD on 8/12/2020 at 2:41 PM

## 2020-08-13 ENCOUNTER — HOSPITAL ENCOUNTER (OUTPATIENT)
Dept: MAMMOGRAPHY | Age: 45
Discharge: HOME | End: 2020-08-15
Payer: COMMERCIAL

## 2020-08-13 ENCOUNTER — HOSPITAL ENCOUNTER (OUTPATIENT)
Dept: ULTRASOUND IMAGING | Age: 45
Discharge: HOME | End: 2020-08-15
Payer: COMMERCIAL

## 2020-08-13 PROCEDURE — 76830 TRANSVAGINAL US NON-OB: CPT

## 2020-08-13 PROCEDURE — 76856 US EXAM PELVIC COMPLETE: CPT

## 2020-08-13 PROCEDURE — 77063 BREAST TOMOSYNTHESIS BI: CPT

## 2020-08-31 ENCOUNTER — TELEPHONE (OUTPATIENT)
Dept: BARIATRICS/WEIGHT MGMT | Age: 45
End: 2020-08-31

## 2020-08-31 NOTE — TELEPHONE ENCOUNTER
Completed online session   Verified  Insurance Benefit   with:  Pura BARBER at [employer] BENEFIT FOR BARIATRIC SURGERY      Patient informed the following: This is NOT a guarantee of payment  When stating that you have a Benefit or Coverage for Bariatric Surgery - that means that you may qualify for the surgery  Bariatric Surgery is considered an elective procedure, patient is responsible to know their benefits . Any information we obtain when calling your insurance  is not  a guarantee of  coverage  and/or  benefit. Appointment Note :   Remind  Patient of  $200  Program fee with  $ 100  Required at  Second visit with office on initial dietician visit. Remind Patient they must be nicotine free. They will be tested at the beginning of the program and prior to surgery. Advise  Patient  Responsible for out of pocket, copay at medical visits,  Deductible and coinsurance applied to medical visits and procedure. You will be responsible for any of the following:  · Copays    · Deductibles   · Co insurances     The items mentioned above are  indicated or required by your insurance plan. Your deductible and coinsurance are applied to medical visits and procedures. Verified with patient if he or she has had any previous bariatric surgery?    ( If yes ,advise patient of transfer of care process and program fee)

## 2020-09-20 ENCOUNTER — HOSPITAL ENCOUNTER (INPATIENT)
Age: 45
LOS: 2 days | Discharge: HOME | DRG: 305 | End: 2020-09-22
Attending: EMERGENCY MEDICINE | Admitting: INTERNAL MEDICINE
Payer: COMMERCIAL

## 2020-09-20 ENCOUNTER — APPOINTMENT (OUTPATIENT)
Dept: CT IMAGING | Age: 45
DRG: 305 | End: 2020-09-20
Payer: COMMERCIAL

## 2020-09-20 PROBLEM — I16.1 HYPERTENSIVE EMERGENCY: Status: ACTIVE | Noted: 2020-09-20

## 2020-09-20 PROBLEM — F33.41 RECURRENT MAJOR DEPRESSIVE DISORDER, IN PARTIAL REMISSION (HCC): Status: ACTIVE | Noted: 2020-09-20

## 2020-09-20 PROBLEM — E66.9 OBESITY (BMI 30-39.9): Status: ACTIVE | Noted: 2020-09-20

## 2020-09-20 LAB
-: NORMAL
ABSOLUTE EOS #: 0.41 K/UL (ref 0–0.44)
ABSOLUTE IMMATURE GRANULOCYTE: 0.02 K/UL (ref 0–0.3)
ABSOLUTE LYMPH #: 2.3 K/UL (ref 1.1–3.7)
ABSOLUTE MONO #: 0.5 K/UL (ref 0.1–1.2)
AMORPHOUS: NORMAL
AMPHETAMINE SCREEN URINE: NEGATIVE
ANION GAP SERPL CALCULATED.3IONS-SCNC: 11 MMOL/L (ref 9–17)
BACTERIA: NORMAL
BARBITURATE SCREEN URINE: NEGATIVE
BASOPHILS # BLD: 1 % (ref 0–2)
BASOPHILS ABSOLUTE: 0.05 K/UL (ref 0–0.2)
BENZODIAZEPINE SCREEN, URINE: NEGATIVE
BILIRUBIN URINE: NEGATIVE
BNP INTERPRETATION: ABNORMAL
BUN BLDV-MCNC: 12 MG/DL (ref 6–20)
BUN/CREAT BLD: 17 (ref 9–20)
BUPRENORPHINE URINE: NORMAL
CALCIUM SERPL-MCNC: 9.6 MG/DL (ref 8.6–10.4)
CANNABINOID SCREEN URINE: NEGATIVE
CASTS UA: NORMAL /LPF
CHLORIDE BLD-SCNC: 104 MMOL/L (ref 98–107)
CO2: 26 MMOL/L (ref 20–31)
COCAINE METABOLITE, URINE: NEGATIVE
COLOR: YELLOW
COMMENT UA: ABNORMAL
CREAT SERPL-MCNC: 0.7 MG/DL (ref 0.5–0.9)
CRYSTALS, UA: NORMAL /HPF
DIFFERENTIAL TYPE: ABNORMAL
EKG ATRIAL RATE: 79 BPM
EKG P AXIS: 19 DEGREES
EKG P-R INTERVAL: 144 MS
EKG Q-T INTERVAL: 422 MS
EKG QRS DURATION: 88 MS
EKG QTC CALCULATION (BAZETT): 483 MS
EKG R AXIS: 23 DEGREES
EKG T AXIS: -178 DEGREES
EKG VENTRICULAR RATE: 79 BPM
EOSINOPHILS RELATIVE PERCENT: 5 % (ref 1–4)
EPITHELIAL CELLS UA: NORMAL /HPF (ref 0–5)
GFR AFRICAN AMERICAN: >60 ML/MIN
GFR NON-AFRICAN AMERICAN: >60 ML/MIN
GFR SERPL CREATININE-BSD FRML MDRD: ABNORMAL ML/MIN/{1.73_M2}
GFR SERPL CREATININE-BSD FRML MDRD: ABNORMAL ML/MIN/{1.73_M2}
GLUCOSE BLD-MCNC: 100 MG/DL (ref 70–99)
GLUCOSE URINE: NEGATIVE
HCG QUALITATIVE: NEGATIVE
HCT VFR BLD CALC: 34.9 % (ref 36.3–47.1)
HEMOGLOBIN: 10.4 G/DL (ref 11.9–15.1)
IMMATURE GRANULOCYTES: 0 %
KETONES, URINE: NEGATIVE
LEUKOCYTE ESTERASE, URINE: ABNORMAL
LYMPHOCYTES # BLD: 30 % (ref 24–43)
MCH RBC QN AUTO: 22.5 PG (ref 25.2–33.5)
MCHC RBC AUTO-ENTMCNC: 29.8 G/DL (ref 28.4–34.8)
MCV RBC AUTO: 75.5 FL (ref 82.6–102.9)
MDMA URINE: NORMAL
METHADONE SCREEN, URINE: NEGATIVE
METHAMPHETAMINE, URINE: NORMAL
MONOCYTES # BLD: 7 % (ref 3–12)
MUCUS: NORMAL
MYOGLOBIN: 31 NG/ML (ref 25–58)
NITRITE, URINE: NEGATIVE
NRBC AUTOMATED: 0 PER 100 WBC
OPIATES, URINE: NEGATIVE
OTHER OBSERVATIONS UA: NORMAL
OXYCODONE SCREEN URINE: NEGATIVE
PDW BLD-RTO: 18 % (ref 11.8–14.4)
PH UA: 7 (ref 5–8)
PHENCYCLIDINE, URINE: NEGATIVE
PLATELET # BLD: 306 K/UL (ref 138–453)
PLATELET ESTIMATE: ABNORMAL
PMV BLD AUTO: 9.1 FL (ref 8.1–13.5)
POTASSIUM SERPL-SCNC: 3.9 MMOL/L (ref 3.7–5.3)
PRO-BNP: 504 PG/ML
PROPOXYPHENE, URINE: NORMAL
PROTEIN UA: NEGATIVE
RBC # BLD: 4.62 M/UL (ref 3.95–5.11)
RBC # BLD: ABNORMAL 10*6/UL
RBC UA: NORMAL /HPF (ref 0–2)
RENAL EPITHELIAL, UA: NORMAL /HPF
SEG NEUTROPHILS: 57 % (ref 36–65)
SEGMENTED NEUTROPHILS ABSOLUTE COUNT: 4.28 K/UL (ref 1.5–8.1)
SODIUM BLD-SCNC: 141 MMOL/L (ref 135–144)
SPECIFIC GRAVITY UA: 1.01 (ref 1–1.03)
TEST INFORMATION: NORMAL
TRICHOMONAS: NORMAL
TRICYCLIC ANTIDEPRESSANTS, UR: NORMAL
TROPONIN INTERP: NORMAL
TROPONIN INTERP: NORMAL
TROPONIN T: NORMAL NG/ML
TROPONIN T: NORMAL NG/ML
TROPONIN, HIGH SENSITIVITY: 13 NG/L (ref 0–14)
TROPONIN, HIGH SENSITIVITY: 13 NG/L (ref 0–14)
TURBIDITY: CLEAR
URINE HGB: ABNORMAL
UROBILINOGEN, URINE: NORMAL
WBC # BLD: 7.6 K/UL (ref 3.5–11.3)
WBC # BLD: ABNORMAL 10*3/UL
WBC UA: NORMAL /HPF (ref 0–5)
YEAST: NORMAL

## 2020-09-20 PROCEDURE — 96375 TX/PRO/DX INJ NEW DRUG ADDON: CPT

## 2020-09-20 PROCEDURE — 2580000003 HC RX 258: Performed by: INTERNAL MEDICINE

## 2020-09-20 PROCEDURE — 83874 ASSAY OF MYOGLOBIN: CPT

## 2020-09-20 PROCEDURE — 2000000000 HC ICU R&B

## 2020-09-20 PROCEDURE — 6360000002 HC RX W HCPCS: Performed by: NURSE PRACTITIONER

## 2020-09-20 PROCEDURE — 96374 THER/PROPH/DIAG INJ IV PUSH: CPT

## 2020-09-20 PROCEDURE — 6370000000 HC RX 637 (ALT 250 FOR IP): Performed by: NURSE PRACTITIONER

## 2020-09-20 PROCEDURE — 6360000002 HC RX W HCPCS: Performed by: INTERNAL MEDICINE

## 2020-09-20 PROCEDURE — 81001 URINALYSIS AUTO W/SCOPE: CPT

## 2020-09-20 PROCEDURE — 6370000000 HC RX 637 (ALT 250 FOR IP): Performed by: INTERNAL MEDICINE

## 2020-09-20 PROCEDURE — 80048 BASIC METABOLIC PNL TOTAL CA: CPT

## 2020-09-20 PROCEDURE — 93005 ELECTROCARDIOGRAM TRACING: CPT | Performed by: NURSE PRACTITIONER

## 2020-09-20 PROCEDURE — 99285 EMERGENCY DEPT VISIT HI MDM: CPT

## 2020-09-20 PROCEDURE — 2580000003 HC RX 258: Performed by: NURSE PRACTITIONER

## 2020-09-20 PROCEDURE — 83880 ASSAY OF NATRIURETIC PEPTIDE: CPT

## 2020-09-20 PROCEDURE — 80307 DRUG TEST PRSMV CHEM ANLYZR: CPT

## 2020-09-20 PROCEDURE — 85025 COMPLETE CBC W/AUTO DIFF WBC: CPT

## 2020-09-20 PROCEDURE — 84484 ASSAY OF TROPONIN QUANT: CPT

## 2020-09-20 PROCEDURE — 70450 CT HEAD/BRAIN W/O DYE: CPT

## 2020-09-20 PROCEDURE — 84703 CHORIONIC GONADOTROPIN ASSAY: CPT

## 2020-09-20 PROCEDURE — 2500000003 HC RX 250 WO HCPCS: Performed by: NURSE PRACTITIONER

## 2020-09-20 PROCEDURE — 99223 1ST HOSP IP/OBS HIGH 75: CPT | Performed by: INTERNAL MEDICINE

## 2020-09-20 RX ORDER — ACETAMINOPHEN 325 MG/1
650 TABLET ORAL EVERY 6 HOURS PRN
Status: DISCONTINUED | OUTPATIENT
Start: 2020-09-20 | End: 2020-09-21

## 2020-09-20 RX ORDER — NICOTINE 21 MG/24HR
1 PATCH, TRANSDERMAL 24 HOURS TRANSDERMAL DAILY PRN
Status: DISCONTINUED | OUTPATIENT
Start: 2020-09-20 | End: 2020-09-22 | Stop reason: HOSPADM

## 2020-09-20 RX ORDER — SODIUM CHLORIDE 9 MG/ML
INJECTION, SOLUTION INTRAVENOUS CONTINUOUS
Status: DISCONTINUED | OUTPATIENT
Start: 2020-09-20 | End: 2020-09-21

## 2020-09-20 RX ORDER — M-VIT,TX,IRON,MINS/CALC/FOLIC 27MG-0.4MG
1 TABLET ORAL DAILY
Status: DISCONTINUED | OUTPATIENT
Start: 2020-09-20 | End: 2020-09-22 | Stop reason: HOSPADM

## 2020-09-20 RX ORDER — FOLIC ACID 1 MG/1
1 TABLET ORAL DAILY
Status: DISCONTINUED | OUTPATIENT
Start: 2020-09-20 | End: 2020-09-22 | Stop reason: HOSPADM

## 2020-09-20 RX ORDER — SODIUM CHLORIDE 0.9 % (FLUSH) 0.9 %
10 SYRINGE (ML) INJECTION PRN
Status: DISCONTINUED | OUTPATIENT
Start: 2020-09-20 | End: 2020-09-22 | Stop reason: HOSPADM

## 2020-09-20 RX ORDER — VARENICLINE TARTRATE 25 MG
1 KIT ORAL 2 TIMES DAILY
Status: DISCONTINUED | OUTPATIENT
Start: 2020-09-20 | End: 2020-09-20

## 2020-09-20 RX ORDER — FENTANYL CITRATE 50 UG/ML
50 INJECTION, SOLUTION INTRAMUSCULAR; INTRAVENOUS ONCE
Status: COMPLETED | OUTPATIENT
Start: 2020-09-20 | End: 2020-09-20

## 2020-09-20 RX ORDER — PROMETHAZINE HYDROCHLORIDE 25 MG/1
12.5 TABLET ORAL EVERY 6 HOURS PRN
Status: DISCONTINUED | OUTPATIENT
Start: 2020-09-20 | End: 2020-09-22 | Stop reason: HOSPADM

## 2020-09-20 RX ORDER — ACETAMINOPHEN 500 MG
1000 TABLET ORAL ONCE
Status: COMPLETED | OUTPATIENT
Start: 2020-09-20 | End: 2020-09-20

## 2020-09-20 RX ORDER — ALBUTEROL SULFATE 90 UG/1
2 AEROSOL, METERED RESPIRATORY (INHALATION) EVERY 6 HOURS PRN
Status: DISCONTINUED | OUTPATIENT
Start: 2020-09-20 | End: 2020-09-22 | Stop reason: HOSPADM

## 2020-09-20 RX ORDER — TRAMADOL HYDROCHLORIDE 50 MG/1
50 TABLET ORAL EVERY 8 HOURS PRN
Status: DISCONTINUED | OUTPATIENT
Start: 2020-09-20 | End: 2020-09-22 | Stop reason: HOSPADM

## 2020-09-20 RX ORDER — ACETAMINOPHEN 650 MG/1
650 SUPPOSITORY RECTAL EVERY 6 HOURS PRN
Status: DISCONTINUED | OUTPATIENT
Start: 2020-09-20 | End: 2020-09-21

## 2020-09-20 RX ORDER — ATORVASTATIN CALCIUM 10 MG/1
10 TABLET, FILM COATED ORAL DAILY
Status: DISCONTINUED | OUTPATIENT
Start: 2020-09-20 | End: 2020-09-22 | Stop reason: HOSPADM

## 2020-09-20 RX ORDER — UBIDECARENONE 75 MG
100 CAPSULE ORAL DAILY
Status: DISCONTINUED | OUTPATIENT
Start: 2020-09-20 | End: 2020-09-22 | Stop reason: HOSPADM

## 2020-09-20 RX ORDER — SODIUM CHLORIDE 0.9 % (FLUSH) 0.9 %
10 SYRINGE (ML) INJECTION EVERY 12 HOURS SCHEDULED
Status: DISCONTINUED | OUTPATIENT
Start: 2020-09-20 | End: 2020-09-22 | Stop reason: HOSPADM

## 2020-09-20 RX ORDER — PANTOPRAZOLE SODIUM 40 MG/1
40 TABLET, DELAYED RELEASE ORAL
Status: DISCONTINUED | OUTPATIENT
Start: 2020-09-21 | End: 2020-09-22 | Stop reason: HOSPADM

## 2020-09-20 RX ORDER — BUSPIRONE HYDROCHLORIDE 5 MG/1
5 TABLET ORAL 2 TIMES DAILY
Status: DISCONTINUED | OUTPATIENT
Start: 2020-09-20 | End: 2020-09-22 | Stop reason: HOSPADM

## 2020-09-20 RX ORDER — HYDRALAZINE HYDROCHLORIDE 20 MG/ML
10 INJECTION INTRAMUSCULAR; INTRAVENOUS ONCE
Status: COMPLETED | OUTPATIENT
Start: 2020-09-20 | End: 2020-09-20

## 2020-09-20 RX ORDER — ONDANSETRON 2 MG/ML
4 INJECTION INTRAMUSCULAR; INTRAVENOUS EVERY 6 HOURS PRN
Status: DISCONTINUED | OUTPATIENT
Start: 2020-09-20 | End: 2020-09-22 | Stop reason: HOSPADM

## 2020-09-20 RX ADMIN — BUSPIRONE HYDROCHLORIDE 5 MG: 5 TABLET ORAL at 20:52

## 2020-09-20 RX ADMIN — ONDANSETRON 4 MG: 2 INJECTION INTRAMUSCULAR; INTRAVENOUS at 22:02

## 2020-09-20 RX ADMIN — DEXTROSE MONOHYDRATE 5 MG/HR: 50 INJECTION, SOLUTION INTRAVENOUS at 13:04

## 2020-09-20 RX ADMIN — TRAMADOL HYDROCHLORIDE 50 MG: 50 TABLET, FILM COATED ORAL at 20:25

## 2020-09-20 RX ADMIN — FENTANYL CITRATE 50 MCG: 50 INJECTION, SOLUTION INTRAMUSCULAR; INTRAVENOUS at 22:02

## 2020-09-20 RX ADMIN — HYDRALAZINE HYDROCHLORIDE 10 MG: 20 INJECTION INTRAMUSCULAR; INTRAVENOUS at 11:54

## 2020-09-20 RX ADMIN — ACETAMINOPHEN 1000 MG: 500 TABLET ORAL at 11:54

## 2020-09-20 RX ADMIN — SODIUM CHLORIDE: 9 INJECTION, SOLUTION INTRAVENOUS at 18:21

## 2020-09-20 RX ADMIN — FENTANYL CITRATE 50 MCG: 50 INJECTION, SOLUTION INTRAMUSCULAR; INTRAVENOUS at 12:52

## 2020-09-20 ASSESSMENT — ENCOUNTER SYMPTOMS
NAUSEA: 0
PHOTOPHOBIA: 0
VOMITING: 0
SHORTNESS OF BREATH: 0

## 2020-09-20 ASSESSMENT — PAIN DESCRIPTION - LOCATION
LOCATION: HEAD
LOCATION: HEAD

## 2020-09-20 ASSESSMENT — PAIN SCALES - GENERAL
PAINLEVEL_OUTOF10: 7
PAINLEVEL_OUTOF10: 7
PAINLEVEL_OUTOF10: 8
PAINLEVEL_OUTOF10: 5
PAINLEVEL_OUTOF10: 8

## 2020-09-20 ASSESSMENT — PAIN DESCRIPTION - PAIN TYPE
TYPE: ACUTE PAIN
TYPE: ACUTE PAIN

## 2020-09-20 ASSESSMENT — VISUAL ACUITY: OU: 1

## 2020-09-20 NOTE — ED PROVIDER NOTES
[address] [facility] ED  EMERGENCY DEPARTMENT ENCOUNTER   ATTENDING ATTESTATION     Pt Name: Margo Campbell  MRN: [id number]  [place] 1975  Date of evaluation: 9/20/20       Margo Campbell is a 39 y.o. female who presents with Hypertension and Headache      MDM:     Patient EKG shows 78 with normal sinus rhythm, DE QRS QTC intervals unremarkable patient has normal axis, no ST elevations or depressions, some diffuse inverted T waves. Nonspecific EKG. Vitals:   Vitals:    09/20/20 1118 09/20/20 1156 09/20/20 1211 09/20/20 1226   BP: (!) 246/130 (!) 191/134 (!) 196/100 (!) 212/117   Pulse: 74 72 79 82   Resp: 14 21 26 24   Temp:       TempSrc:       SpO2: 98% 98% 98% 99%   Weight:       Height:             I personally evaluated and examined the patient in conjunction with the Midlevel provider and agree with the assessment, treatment plan, and disposition of the patient as recorded by the midlevel. I performed a history and physical examination of the patient and discussed management with the midlevel. I reviewed the midlevels note and agree with the documented findings and plan of care. Any areas of disagreement are noted on the chart. I was personally present for the key portions of any procedures. I have documented in the chart those procedures where I was not present during the key portions. I have personally reviewed all images and agree with the midlevel's interpretation. I have reviewed the emergency nurses triage note. I agree with the chief complaint, past medical history, past surgical history, allergies, medications, social and family history as documented unless otherwise noted.     Jose R Flaherty MD  Attending Emergency  Physician                 Geovanna Dempsey MD  09/20/20 [id number]

## 2020-09-20 NOTE — ED PROVIDER NOTES
[address] [place] [address] ED  EMERGENCY DEPARTMENT ENCOUNTER      Pt Name: Karely Bloom  MRN: [id number]  [place] 1975  Date of evaluation: 9/20/2020  Provider: TERA Dyson CNP    CHIEF COMPLAINT       Chief Complaint   Patient presents with    Hypertension    Headache         HISTORY OFPRESENT ILLNESS  (Location/Symptom, Timing/Onset, Context/Setting, Quality, Duration, Modifying Factors, Severity.)   Karely Bloom is a 39 y.o. female who presents to the emergency department for evaluation of hypertension and headache. Patient has history of hypertension. She states yesterday evening she developed a frontal headache and checked her blood pressure and it was in the 238M systolically. States she takes lisinopril 40 mg daily and took her last dose this morning. Denies photophobia, chest pain, nausea or vomiting. Rates her headache pain an 8 out of 10. Nursing Notes were reviewed.     PASTMEDICAL HISTORY     Past Medical History:   Diagnosis Date    Anemia     History of abnormal cervical Pap smear     Hypertension     IUD (intrauterine device) in place 08/04/2020    Mirena    Menometrorrhagia          SURGICAL HISTORY       Past Surgical History:   Procedure Laterality Date    BREAST SURGERY      negative rt breast lumpectomy    CHOLECYSTECTOMY      COLPOSCOPY      GASTRIC BYPASS SURGERY      gastric sleeve    INTRAUTERINE DEVICE INSERTION  08/04/2020    Mirena     LIPOMA RESECTION      lt shoulder/upper back         CURRENT MEDICATIONS     Previous Medications    ALBUTEROL SULFATE HFA (PROAIR HFA) 108 (90 BASE) MCG/ACT INHALER    Inhale 2 puffs into the lungs every 6 hours as needed for Wheezing    ATORVASTATIN (LIPITOR) 10 MG TABLET    Take 1 tablet by mouth daily    BUSPIRONE (BUSPAR) 10 MG TABLET    Take 0.5 tablets by mouth 2 times daily    CYANOCOBALAMIN 100 MCG TABLET    Take 100 mcg by mouth daily    FOLIC ACID (FOLVITE) 1 MG TABLET    Take 1 tablet by mouth daily LEVONORGESTREL (MIRENA) IUD 52 MG    1 each by Intrauterine route    LISINOPRIL (PRINIVIL;ZESTRIL) 40 MG TABLET    Take 1 tablet by mouth daily    MULTIPLE VITAMINS-MINERALS (MULTIVITAMIN ADULT EXTRA C PO)    Take 1 tablet by mouth daily    PANTOPRAZOLE (PROTONIX) 40 MG TABLET    Take 1 tablet by mouth every morning (before breakfast)    SERTRALINE (ZOLOFT) 50 MG TABLET    TAKE 1 TABLET BY MOUTH EVERY DAY    VARENICLINE (CHANTIX STARTING MONTH ) 0.5 MG X 11 & 1 MG X 42 TABLET    Take by mouth. ALLERGIES     Nsaids; Tolmetin; and Codeine    FAMILY HISTORY     History reviewed. No pertinent family history. SOCIAL HISTORY       Social History     Socioeconomic History    Marital status:      Spouse name: None    Number of children: None    Years of education: None    Highest education level: None   Occupational History    None   Social Needs    Financial resource strain: Patient refused    Food insecurity     Worry: Patient refused     Inability: Patient refused    Transportation needs     Medical: Patient refused     Non-medical: Patient refused   Tobacco Use    Smoking status: Former Smoker     Packs/day: 1.00     Years: 22.00     Pack years: 22.00     Types: Cigarettes     Start date: 1/15/2020     Last attempt to quit: 2020     Years since quittin.1    Smokeless tobacco: Never Used   Substance and Sexual Activity    Alcohol use: Yes     Comment: social    Drug use: No    Sexual activity: None   Lifestyle    Physical activity     Days per week: 5 days     Minutes per session: 30 min    Stress:  Only a little   Relationships    Social connections     Talks on phone: None     Gets together: None     Attends [employer] service: None     Active member of club or organization: None     Attends meetings of clubs or organizations: None     Relationship status: None    Intimate partner violence     Fear of current or ex partner: None     Emotionally abused: None     Physically abused: None     Forced sexual activity: None   Other Topics Concern    None   Social History Narrative    None         REVIEW OF SYSTEMS    (2-9 systems for level 4, 10 or more for level 5)     Review of Systems   Eyes: Negative for photophobia and visual disturbance. Respiratory: Negative for shortness of breath. Cardiovascular: Negative for chest pain. Gastrointestinal: Negative for nausea and vomiting. Neurological: Positive for headaches. Negative for dizziness, facial asymmetry, speech difficulty, weakness, light-headedness and numbness. All other systems reviewed and are negative. Except as noted above the remainder of the review of systems was reviewed and negative. PHYSICAL EXAM    (up to 7 for level 4, 8 or more for level 5)     ED Triage Vitals [09/20/20 1100]   BP Temp Temp Source Pulse Resp SpO2 Height Weight   (!) 224/121 98.1 °F (36.7 °C) Oral 78 18 99 % 5' 9\" (1.753 m) 255 lb (115.7 kg)       Physical Exam  Constitutional:       Appearance: Normal appearance. She is well-developed and well-groomed. HENT:      Head: Normocephalic. Right Ear: Hearing and external ear normal.      Left Ear: Hearing and external ear normal.      Nose: Nose normal.      Mouth/Throat:      Lips: Pink. Pharynx: Oropharynx is clear. Eyes:      General: Lids are normal. Vision grossly intact. Extraocular Movements: Extraocular movements intact. Conjunctiva/sclera: Conjunctivae normal.      Pupils: Pupils are equal, round, and reactive to light. Comments: Pupils are 2 mm and equally reactive to light. Neck:      Musculoskeletal: Normal range of motion and neck supple. Cardiovascular:      Rate and Rhythm: Normal rate and regular rhythm. Pulses: Normal pulses. Heart sounds: Normal heart sounds. Pulmonary:      Effort: Pulmonary effort is normal.      Breath sounds: Normal breath sounds. Musculoskeletal: Normal range of motion.    Skin:     General: Skin is warm and dry. Capillary Refill: Capillary refill takes less than 2 seconds. Neurological:      Mental Status: She is alert and oriented to person, place, and time. GCS: GCS eye subscore is 4. GCS verbal subscore is 5. GCS motor subscore is 6. Cranial Nerves: Cranial nerves are intact. Sensory: Sensation is intact. Motor: Motor function is intact. Psychiatric:         Mood and Affect: Mood normal.         Behavior: Behavior normal.         Thought Content: Thought content normal.         Judgment: Judgment normal.           DIAGNOSTIC RESULTS     EKG:All EKG's are interpreted by the Emergency Department Physician who either signs or Co-signs this chart in the absence of a cardiologist.    EKG interpreted by attending physician    RADIOLOGY:   Non-plain film images such as CT, Ultrasound and MRI are read by theradiologist. Plain radiographic images are visualized and preliminarily interpreted by the emergency physician with the below findings:    Ct Head Wo Contrast    Result Date: 9/20/2020  EXAMINATION: CT OF THE HEAD WITHOUT CONTRAST  9/20/2020 11:47 am TECHNIQUE: CT of the head was performed without the administration of intravenous contrast. Dose modulation, iterative reconstruction, and/or weight based adjustment of the mA/kV was utilized to reduce the radiation dose to as low as reasonably achievable. COMPARISON: 02/04/2018 HISTORY: ORDERING SYSTEM PROVIDED HISTORY: Hypertension, headache TECHNOLOGIST PROVIDED HISTORY: Hypertension, headache Is the patient pregnant?->No Reason for Exam: Headache hypertension Acuity: Acute Type of Exam: Initial FINDINGS: BRAIN/VENTRICLES: There is no acute intracranial hemorrhage, mass effect or midline shift. No abnormal extra-axial fluid collection. The gray-white differentiation is maintained without evidence of an acute infarct. There is no evidence of hydrocephalus. ORBITS: The visualized portion of the orbits demonstrate no acute abnormality. SINUSES: The visualized paranasal sinuses and mastoid air cells demonstrate no acute abnormality. SOFT TISSUES/SKULL:  No acute abnormality of the visualized skull or soft tissues. No acute intracranial abnormality. Interpretation per the Radiologist below, if available at the time of this note:    CT HEAD WO CONTRAST   Final Result   No acute intracranial abnormality. EDBEDSIDE ULTRASOUND:   Performed by Gia Anguiano - hero    LABS:  Labs Reviewed   CBC WITH AUTO DIFFERENTIAL - Abnormal; Notable for the following components:       Result Value    Hemoglobin 10.4 (*)     Hematocrit 34.9 (*)     MCV 75.5 (*)     MCH 22.5 (*)     RDW 18.0 (*)     Eosinophils % 5 (*)     All other components within normal limits   BASIC METABOLIC PANEL - Abnormal; Notable for the following components:    Glucose 100 (*)     All other components within normal limits   URINALYSIS - Abnormal; Notable for the following components:    Urine Hgb TRACE (*)     Leukocyte Esterase, Urine TRACE (*)     All other components within normal limits   TROP/MYOGLOBIN   HCG, SERUM, QUALITATIVE   MICROSCOPIC URINALYSIS       All other labs were within normal range or not returned as of this dictation. EMERGENCY DEPARTMENT COURSE andDIFFERENTIAL DIAGNOSIS/MDM:   The patient was evaluated in conjunction with attending physician. Labs reviewed. Kidney function normal.  CT of head per radiologist shows no acute intracranial abnormality. No neurological deficits. Patient had a blood pressure of 224/121 upon arrival.  She was initially given 10 mg of IV hydralazine and blood pressure was 196/100. However her blood pressure did increase again up to 212/117 and she was placed on a Cardene drip. She is given Tylenol initially for headache and then given fentanyl. I discussed test results and admission to the hospital with the patient.     ED Course as of Sep 20 1308   Sun Sep 20, 2020   1254 Spoke with Dr. Iris Moody from [facility] the

## 2020-09-20 NOTE — H&P
[facility]  Office: [address], DO, Landon Sahil, DO, Mayelin Scientology, DO, Rosi Odell Blood, DO, Antonio Wilburn MD, Thanh Bauman MD, María Arce MD, Heather Boast, MD, Dennis Hardin MD, Blue Thurston MD, Lia Collins MD, Flaquita Whittington MD, Andria Smith MD, Ag Thornton, DO, Gisela Vargas MD, James Ozuna MD, Ariel Jimenez, DO, Tamiko Gomez MD,  Maty Perry DO, Tila Garcia MD, Richmond Pena MD, Ramon Canada, LATANYA, [facility], CNP, Tamara Crespo, CNP, Priyank Agarwal, CNS, Billy Braun, CNP, Luis M Reyes, CNP, Reese Bryan, CNP, Leonela Richardson, CNP, Nessa Xie, CNP, Bro Jamison PA-C, Jacinta Lozano, [facility], Nickie Salines, CNP, Benny Phlegm, CNP, Omar Pereira, CNP, Soledad All, CNP, Jeannette Millington, CNP         [address]    HISTORY AND PHYSICAL EXAMINATION            Date:   9/20/2020  Patient name:  Abel Park  Date of admission:  9/20/2020 10:55 AM  MRN:   [id number]  Account:  [de-identified]  YOB: 1975  PCP:    Reji Zacarias MD  Room:   [place] [address]  Code Status:    FULL CODE    Chief Complaint:     Chief Complaint   Patient presents with    Hypertension    Headache       History Obtained From:     patient, electronic medical record    History of Present Illness:     Abel Park is a 39 y.o.  male who presented to [facility] on 9/20/2020 for evaluation of elevated blood pressure readings. Patient was at home and noted that her systolic blood pressures were in the 230s overnight. Her initial symptom that tipped her off was the beginnings of a migraine headache. She reports that the last time she had the symptoms was during her hospital last hospitalization on August 5, 2019. During this hospitalization, patient was discovered to have uncontrolled hypertension. Her antihypertensives were adjusted. She was noted to have LVH on her 2D echo.   She was discharged home on Coreg, Norvasc, lisinopril, spironolactone at that time. However, she discontinued these medications shortly thereafter secondary to adverse effects including dizziness and sluggishness. She follow-up with her PCP who stated that she only needed to be on lisinopril. She states that she was on long-term lisinopril and metoprolol for \"many years. \"  However, she recently DC'd her metoprolol as it was not \"doing anything for her blood pressure. \"  Today, she denies nausea, vomiting, chest came back, abdominal pain, headache. CT of the brain was negative for acute hemorrhages. Urine drug screen negative. Electrolytes within normal limits. She will be admitted for blood pressure control. Past Medical History:     Past Medical History:   Diagnosis Date    Anemia     History of abnormal cervical Pap smear     Hypertension     IUD (intrauterine device) in place 08/04/2020    Mirena    Menometrorrhagia         Past Surgical History:     Past Surgical History:   Procedure Laterality Date    BREAST SURGERY      negative rt breast lumpectomy    CHOLECYSTECTOMY      COLPOSCOPY      GASTRIC BYPASS SURGERY      gastric sleeve    INTRAUTERINE DEVICE INSERTION  08/04/2020    Mirena     LIPOMA RESECTION      lt shoulder/upper back        Medications Prior to Admission:     Prior to Admission medications    Medication Sig Start Date End Date Taking?  Authorizing Provider   sertraline (ZOLOFT) 50 MG tablet TAKE 1 TABLET BY MOUTH EVERY DAY 8/12/20   Josr Chapa MD   folic acid (FOLVITE) 1 MG tablet Take 1 tablet by mouth daily 8/12/20   Josr Chapa MD   atorvastatin (LIPITOR) 10 MG tablet Take 1 tablet by mouth daily 8/12/20   Josr Chapa MD   lisinopril (PRINIVIL;ZESTRIL) 40 MG tablet Take 1 tablet by mouth daily 8/12/20   Josr Chapa MD   cyanocobalamin 100 MCG tablet Take 100 mcg by mouth daily    Historical Provider, MD   Multiple Vitamins-Minerals (MULTIVITAMIN ADULT EXTRA C PO) Take 1 Collection Time: 09/20/20 11:09 AM   Result Value Ref Range    Ventricular Rate 79 BPM    Atrial Rate 79 BPM    P-R Interval 144 ms    QRS Duration 88 ms    Q-T Interval 422 ms    QTc Calculation (Bazett) 483 ms    P Axis 19 degrees    R Axis 23 degrees    T Axis -178 degrees   CBC with DIFF    Collection Time: 09/20/20 11:35 AM   Result Value Ref Range    WBC 7.6 3.5 - 11.3 k/uL    RBC 4.62 3.95 - 5.11 m/uL    Hemoglobin 10.4 (L) 11.9 - 15.1 g/dL    Hematocrit 34.9 (L) 36.3 - 47.1 %    MCV 75.5 (L) 82.6 - 102.9 fL    MCH 22.5 (L) 25.2 - 33.5 pg    MCHC 29.8 28.4 - 34.8 g/dL    RDW 18.0 (H) 11.8 - 14.4 %    Platelets 610 321 - 770 k/uL    MPV 9.1 8.1 - 13.5 fL    NRBC Automated 0.0 0.0 per 100 WBC    Differential Type NOT REPORTED     Seg Neutrophils 57 36 - 65 %    Lymphocytes 30 24 - 43 %    Monocytes 7 3 - 12 %    Eosinophils % 5 (H) 1 - 4 %    Basophils 1 0 - 2 %    Immature Granulocytes 0 0 %    Segs Absolute 4.28 1.50 - 8.10 k/uL    Absolute Lymph # 2.30 1.10 - 3.70 k/uL    Absolute Mono # 0.50 0.10 - 1.20 k/uL    Absolute Eos # 0.41 0.00 - 0.44 k/uL    Basophils Absolute 0.05 0.00 - 0.20 k/uL    Absolute Immature Granulocyte 0.02 0.00 - 0.30 k/uL    WBC Morphology NOT REPORTED     RBC Morphology ANISOCYTOSIS PRESENT     Platelet Estimate NOT REPORTED    Basic Metabolic Prof    Collection Time: 09/20/20 11:35 AM   Result Value Ref Range    Glucose 100 (H) 70 - 99 mg/dL    BUN 12 6 - 20 mg/dL    CREATININE 0.70 0.50 - 0.90 mg/dL    Bun/Cre Ratio 17 9 - 20    Calcium 9.6 8.6 - 10.4 mg/dL    Sodium 141 135 - 144 mmol/L    Potassium 3.9 3.7 - 5.3 mmol/L    Chloride 104 98 - 107 mmol/L    CO2 26 20 - 31 mmol/L    Anion Gap 11 9 - 17 mmol/L    GFR Non-African American >60 >60 mL/min    GFR African American >60 >60 mL/min    GFR Comment          GFR Staging NOT REPORTED    Trop/Myoglobin    Collection Time: 09/20/20 11:35 AM   Result Value Ref Range    Troponin, High Sensitivity 13 0 - 14 ng/L    Troponin T NOT NEGATIVE NEGATIVE    Methamphetamine, Urine NOT REPORTED NEGATIVE    Tricyclic Antidepressants, Urine NOT REPORTED NEGATIVE    MDMA, Urine NOT REPORTED NEGATIVE    Buprenorphine Urine NOT REPORTED NEGATIVE    Test Information       Assay provides medical screening only. The absence of expected drug(s) and/or metabolite(s) may indicate diluted or adulterated urine, limitations of testing or timing of collection. Imaging/Diagnostics:  Ct Head Wo Contrast    Result Date: 9/20/2020  No acute intracranial abnormality. Assessment :      Hospital Problems           Last Modified POA    * (Principal) Hypertensive emergency 9/20/2020 Yes    Essential hypertension 9/20/2020 Yes    Obesity (BMI 30-39.9) 9/20/2020 Yes    Recurrent major depressive disorder, in partial remission ([facility] [address] 9/20/2020 Yes          Plan:     Patient status inpatient in the Cardiovascular ICU    1. Admit to [facility] to CVICU  2. Cardene infusion  3. Goal -180 at midnight. We can decrease her to 140-160 after midnight. 4. Neurologic checks q 4 hours  5. Will initiate oral antihypetensives and wean cardene in AM  6. Check 2D echo  7. UDS negative  8. Check iron studies in AM    Consultations:   IP CONSULT TO HOSPITALIST    Patient is admitted as inpatient status because of co-morbidities listed above, severity of signs and symptoms as outlined, requirement for current medical therapies and most importantly because of direct risk to patient if care not provided in a hospital setting. Expected length of stay > 48 hours.     Susan Katz DO  9/20/2020  5:35 PM    Copy sent to Dr. José Garcia MD

## 2020-09-21 PROBLEM — G43.009 MIGRAINE WITHOUT AURA AND WITHOUT STATUS MIGRAINOSUS, NOT INTRACTABLE: Status: ACTIVE | Noted: 2020-09-21

## 2020-09-21 PROBLEM — E53.8 FOLIC ACID DEFICIENCY: Status: ACTIVE | Noted: 2020-09-21

## 2020-09-21 LAB
ALBUMIN SERPL-MCNC: 4 G/DL (ref 3.5–5.2)
ALBUMIN/GLOBULIN RATIO: NORMAL (ref 1–2.5)
ALP BLD-CCNC: 104 U/L (ref 35–104)
ALT SERPL-CCNC: 14 U/L (ref 5–33)
ANION GAP SERPL CALCULATED.3IONS-SCNC: 13 MMOL/L (ref 9–17)
AST SERPL-CCNC: 16 U/L
BILIRUB SERPL-MCNC: 0.36 MG/DL (ref 0.3–1.2)
BUN BLDV-MCNC: 13 MG/DL (ref 6–20)
BUN/CREAT BLD: 16 (ref 9–20)
CALCIUM SERPL-MCNC: 9.2 MG/DL (ref 8.6–10.4)
CHLORIDE BLD-SCNC: 103 MMOL/L (ref 98–107)
CHOLESTEROL/HDL RATIO: 3.2
CHOLESTEROL: 164 MG/DL
CO2: 25 MMOL/L (ref 20–31)
CREAT SERPL-MCNC: 0.81 MG/DL (ref 0.5–0.9)
FERRITIN: 7 UG/L (ref 13–150)
GFR AFRICAN AMERICAN: >60 ML/MIN
GFR NON-AFRICAN AMERICAN: >60 ML/MIN
GFR SERPL CREATININE-BSD FRML MDRD: NORMAL ML/MIN/{1.73_M2}
GFR SERPL CREATININE-BSD FRML MDRD: NORMAL ML/MIN/{1.73_M2}
GLUCOSE BLD-MCNC: 83 MG/DL (ref 70–99)
HCT VFR BLD CALC: 35.3 % (ref 36.3–47.1)
HDLC SERPL-MCNC: 51 MG/DL
HEMOGLOBIN: 10.5 G/DL (ref 11.9–15.1)
IRON SATURATION: 10 % (ref 20–55)
IRON: 47 UG/DL (ref 37–145)
LDL CHOLESTEROL: 80 MG/DL (ref 0–130)
MCH RBC QN AUTO: 23 PG (ref 25.2–33.5)
MCHC RBC AUTO-ENTMCNC: 29.7 G/DL (ref 28.4–34.8)
MCV RBC AUTO: 77.2 FL (ref 82.6–102.9)
NRBC AUTOMATED: 0 PER 100 WBC
PDW BLD-RTO: 18.3 % (ref 11.8–14.4)
PLATELET # BLD: 301 K/UL (ref 138–453)
PMV BLD AUTO: 9.4 FL (ref 8.1–13.5)
POTASSIUM SERPL-SCNC: 4.3 MMOL/L (ref 3.7–5.3)
RBC # BLD: 4.57 M/UL (ref 3.95–5.11)
SODIUM BLD-SCNC: 141 MMOL/L (ref 135–144)
TOTAL IRON BINDING CAPACITY: 461 UG/DL (ref 250–450)
TOTAL PROTEIN: 7 G/DL (ref 6.4–8.3)
TRIGL SERPL-MCNC: 166 MG/DL
UNSATURATED IRON BINDING CAPACITY: 414 UG/DL (ref 112–347)
VLDLC SERPL CALC-MCNC: ABNORMAL MG/DL (ref 1–30)
WBC # BLD: 7.3 K/UL (ref 3.5–11.3)

## 2020-09-21 PROCEDURE — 6360000002 HC RX W HCPCS: Performed by: INTERNAL MEDICINE

## 2020-09-21 PROCEDURE — 2500000003 HC RX 250 WO HCPCS: Performed by: INTERNAL MEDICINE

## 2020-09-21 PROCEDURE — 36415 COLL VENOUS BLD VENIPUNCTURE: CPT

## 2020-09-21 PROCEDURE — 82728 ASSAY OF FERRITIN: CPT

## 2020-09-21 PROCEDURE — 2000000000 HC ICU R&B

## 2020-09-21 PROCEDURE — 2580000003 HC RX 258: Performed by: INTERNAL MEDICINE

## 2020-09-21 PROCEDURE — 83540 ASSAY OF IRON: CPT

## 2020-09-21 PROCEDURE — 99233 SBSQ HOSP IP/OBS HIGH 50: CPT | Performed by: INTERNAL MEDICINE

## 2020-09-21 PROCEDURE — 85027 COMPLETE CBC AUTOMATED: CPT

## 2020-09-21 PROCEDURE — 6360000002 HC RX W HCPCS: Performed by: NURSE PRACTITIONER

## 2020-09-21 PROCEDURE — 83550 IRON BINDING TEST: CPT

## 2020-09-21 PROCEDURE — 6370000000 HC RX 637 (ALT 250 FOR IP): Performed by: INTERNAL MEDICINE

## 2020-09-21 PROCEDURE — 80053 COMPREHEN METABOLIC PANEL: CPT

## 2020-09-21 PROCEDURE — 80061 LIPID PANEL: CPT

## 2020-09-21 RX ORDER — FENTANYL CITRATE 50 UG/ML
25 INJECTION, SOLUTION INTRAMUSCULAR; INTRAVENOUS ONCE
Status: COMPLETED | OUTPATIENT
Start: 2020-09-21 | End: 2020-09-21

## 2020-09-21 RX ORDER — LISINOPRIL 40 MG/1
40 TABLET ORAL DAILY
Status: DISCONTINUED | OUTPATIENT
Start: 2020-09-21 | End: 2020-09-22 | Stop reason: HOSPADM

## 2020-09-21 RX ORDER — KETOROLAC TROMETHAMINE 30 MG/ML
30 INJECTION, SOLUTION INTRAMUSCULAR; INTRAVENOUS EVERY 6 HOURS PRN
Status: DISCONTINUED | OUTPATIENT
Start: 2020-09-21 | End: 2020-09-21

## 2020-09-21 RX ORDER — DIPHENHYDRAMINE HYDROCHLORIDE 50 MG/ML
25 INJECTION INTRAMUSCULAR; INTRAVENOUS ONCE
Status: COMPLETED | OUTPATIENT
Start: 2020-09-21 | End: 2020-09-21

## 2020-09-21 RX ORDER — BUTALBITAL, ACETAMINOPHEN AND CAFFEINE 50; 325; 40 MG/1; MG/1; MG/1
1 TABLET ORAL EVERY 4 HOURS PRN
Status: DISCONTINUED | OUTPATIENT
Start: 2020-09-21 | End: 2020-09-22 | Stop reason: HOSPADM

## 2020-09-21 RX ORDER — PROMETHAZINE HYDROCHLORIDE 25 MG/ML
6.25 INJECTION, SOLUTION INTRAMUSCULAR; INTRAVENOUS ONCE
Status: COMPLETED | OUTPATIENT
Start: 2020-09-21 | End: 2020-09-21

## 2020-09-21 RX ORDER — LANOLIN ALCOHOL/MO/W.PET/CERES
325 CREAM (GRAM) TOPICAL
Status: DISCONTINUED | OUTPATIENT
Start: 2020-09-21 | End: 2020-09-22 | Stop reason: HOSPADM

## 2020-09-21 RX ADMIN — ATORVASTATIN CALCIUM 10 MG: 10 TABLET, FILM COATED ORAL at 08:16

## 2020-09-21 RX ADMIN — FOLIC ACID 1 MG: 1 TABLET ORAL at 08:16

## 2020-09-21 RX ADMIN — DEXTROSE MONOHYDRATE 12.5 MG/HR: 50 INJECTION, SOLUTION INTRAVENOUS at 12:23

## 2020-09-21 RX ADMIN — FERROUS SULFATE TAB EC 325 MG (65 MG FE EQUIVALENT) 325 MG: 325 (65 FE) TABLET DELAYED RESPONSE at 08:19

## 2020-09-21 RX ADMIN — PANTOPRAZOLE SODIUM 40 MG: 40 TABLET, DELAYED RELEASE ORAL at 08:19

## 2020-09-21 RX ADMIN — BUSPIRONE HYDROCHLORIDE 5 MG: 5 TABLET ORAL at 08:17

## 2020-09-21 RX ADMIN — ONDANSETRON 4 MG: 2 INJECTION INTRAMUSCULAR; INTRAVENOUS at 07:30

## 2020-09-21 RX ADMIN — DIPHENHYDRAMINE HYDROCHLORIDE 25 MG: 50 INJECTION, SOLUTION INTRAMUSCULAR; INTRAVENOUS at 11:45

## 2020-09-21 RX ADMIN — BUSPIRONE HYDROCHLORIDE 5 MG: 5 TABLET ORAL at 21:33

## 2020-09-21 RX ADMIN — SERTRALINE HYDROCHLORIDE 50 MG: 50 TABLET ORAL at 08:17

## 2020-09-21 RX ADMIN — Medication 100 MCG: at 08:17

## 2020-09-21 RX ADMIN — MULTIPLE VITAMINS W/ MINERALS TAB 1 TABLET: TAB at 08:17

## 2020-09-21 RX ADMIN — LISINOPRIL 40 MG: 40 TABLET ORAL at 08:17

## 2020-09-21 RX ADMIN — DEXTROSE MONOHYDRATE 7.5 MG/HR: 50 INJECTION, SOLUTION INTRAVENOUS at 06:36

## 2020-09-21 RX ADMIN — PROMETHAZINE HYDROCHLORIDE 6.25 MG: 25 INJECTION INTRAMUSCULAR; INTRAVENOUS at 11:45

## 2020-09-21 RX ADMIN — FENTANYL CITRATE 25 MCG: 50 INJECTION, SOLUTION INTRAMUSCULAR; INTRAVENOUS at 07:30

## 2020-09-21 ASSESSMENT — PAIN SCALES - GENERAL: PAINLEVEL_OUTOF10: 8

## 2020-09-21 NOTE — PLAN OF CARE
Problem: Pain:  Goal: Pain level will decrease  Description: Pain level will decrease  Outcome: Ongoing     Problem: Pain:  Goal: Control of acute pain  Description: Control of acute pain  Outcome: Ongoing     Problem: Falls - Risk of:  Goal: Will remain free from falls  Description: Will remain free from falls  Outcome: Ongoing  Note: Fall risk assessment completed. Patient instructed to use call light. Bed locked and in lowest position, side rails up 2/4, call light and bedside table within reach, clutter removed, and non-skid footwear on when pt out of bed. Hourly rounds will continue.       Problem: Cardiac:  Goal: Complications related to the disease process, condition or treatment will be avoided or minimized  Description: Complications related to the disease process, condition or treatment will be avoided or minimized  Outcome: Ongoing

## 2020-09-21 NOTE — CARE COORDINATION
Attempted to see pt twice today for an assessment but pt sleeping and not disturbed due to headaches today.

## 2020-09-21 NOTE — FLOWSHEET NOTE
Writer phuong. Patient sleeping,. There are no visitors. Writer prays for patient and leaves note of support. Spiritual Care will follow as needed.        09/21/20 1417   Encounter Summary   Services provided to: Patient   Referral/Consult From: Celia   Continue Visiting No   Volunteer Visit   (9/21/20)   Complexity of Encounter Low   Length of Encounter 15 minutes   Routine   Type Initial   Assessment Sleeping   Intervention Prayer

## 2020-09-21 NOTE — PROGRESS NOTES
Transitions of Care Pharmacy Service   Medication Review    The patient's list of current home medications has been reviewed. Source(s) of information: Surescript and patient     Based on information provided by the above source(s), no changes to the patient's home medication list were necessary. Please review the ACTION REQUESTED section of this note for any discrepancies on current hospital orders. I changed or updated the following medications on the patient's home medication list:  Discontinued None      Added None      Adjusted   None      Other Notes Informed patient to double check the expiration date on her albuterol inhaler (last filled 02-)          PROVIDER ACTION REQUESTED  Discrepancies on current hospital orders that need to be addressed by a physician/nurse practitioner:    Medication Action Requested        None at this time          Please feel free to call me with any questions about this encounter. Thank you. This note will be reviewed and co-signed by the Transitions of [facility] Pharmacist.    Camille Rollins, PharmD student  Transitions [facility] Pharmacy Service  Phone:  [phone]  Fax: [phone]      Electronically signed by Camille Rollins on 9/21/2020 at 11:18 AM     Prior to Admission medications    Medication Sig Start Date End Date Taking?  Authorizing Provider   sertraline (ZOLOFT) 50 MG tablet TAKE 1 TABLET BY MOUTH EVERY DAY 8/12/20   Karlo Dhillon MD   folic acid (FOLVITE) 1 MG tablet Take 1 tablet by mouth daily 8/12/20   Karlo Dhillon MD   atorvastatin (LIPITOR) 10 MG tablet Take 1 tablet by mouth daily 8/12/20   Karlo Dhillon MD   lisinopril (PRINIVIL;ZESTRIL) 40 MG tablet Take 1 tablet by mouth daily 8/12/20   Karlo Dhillon MD   cyanocobalamin 100 MCG tablet Take 100 mcg by mouth daily    Historical Provider, MD   Multiple Vitamins-Minerals (MULTIVITAMIN ADULT EXTRA C PO) Take 1 tablet by mouth daily    Historical Provider, MD   pantoprazole (PROTONIX) 40 MG tablet Take 1 tablet by mouth every morning (before breakfast) 7/15/20   Sury Rivera MD   busPIRone (BUSPAR) 10 MG tablet Take 0.5 tablets by mouth 2 times daily 7/15/20   Sury Rivera MD   levonorgestrel [facility]) IUD 52 mg 1 each by Intrauterine route    Historical Provider, MD   albuterol sulfate HFA (PROAIR HFA) 108 (90 Base) MCG/ACT inhaler Inhale 2 puffs into the lungs every 6 hours as needed for Wheezing 5/23/18   TERA Membreno - CNP

## 2020-09-22 VITALS
RESPIRATION RATE: 18 BRPM | HEIGHT: 69 IN | OXYGEN SATURATION: 95 % | HEART RATE: 72 BPM | WEIGHT: 255 LBS | DIASTOLIC BLOOD PRESSURE: 79 MMHG | BODY MASS INDEX: 37.77 KG/M2 | TEMPERATURE: 98.6 F | SYSTOLIC BLOOD PRESSURE: 151 MMHG

## 2020-09-22 PROCEDURE — 99239 HOSP IP/OBS DSCHRG MGMT >30: CPT | Performed by: INTERNAL MEDICINE

## 2020-09-22 PROCEDURE — 6370000000 HC RX 637 (ALT 250 FOR IP): Performed by: INTERNAL MEDICINE

## 2020-09-22 PROCEDURE — 6360000002 HC RX W HCPCS: Performed by: INTERNAL MEDICINE

## 2020-09-22 RX ORDER — LANOLIN ALCOHOL/MO/W.PET/CERES
325 CREAM (GRAM) TOPICAL
Qty: 90 TABLET | Refills: 0 | Status: SHIPPED | OUTPATIENT
Start: 2020-09-23 | End: 2021-01-20 | Stop reason: SDUPTHER

## 2020-09-22 RX ORDER — BUTALBITAL, ACETAMINOPHEN AND CAFFEINE 50; 325; 40 MG/1; MG/1; MG/1
1 TABLET ORAL EVERY 4 HOURS PRN
Qty: 20 TABLET | Refills: 0 | Status: SHIPPED | OUTPATIENT
Start: 2020-09-22 | End: 2021-09-23

## 2020-09-22 RX ORDER — LABETALOL 100 MG/1
50 TABLET, FILM COATED ORAL EVERY 12 HOURS SCHEDULED
Qty: 60 TABLET | Refills: 3 | Status: SHIPPED | OUTPATIENT
Start: 2020-09-22 | End: 2020-09-28 | Stop reason: SDUPTHER

## 2020-09-22 RX ORDER — LABETALOL 200 MG/1
200 TABLET, FILM COATED ORAL EVERY 12 HOURS SCHEDULED
Status: DISCONTINUED | OUTPATIENT
Start: 2020-09-22 | End: 2020-09-22

## 2020-09-22 RX ORDER — LABETALOL 100 MG/1
50 TABLET, FILM COATED ORAL EVERY 12 HOURS SCHEDULED
Status: DISCONTINUED | OUTPATIENT
Start: 2020-09-22 | End: 2020-09-22 | Stop reason: HOSPADM

## 2020-09-22 RX ADMIN — SERTRALINE HYDROCHLORIDE 50 MG: 50 TABLET ORAL at 08:28

## 2020-09-22 RX ADMIN — Medication 100 MCG: at 08:28

## 2020-09-22 RX ADMIN — ATORVASTATIN CALCIUM 10 MG: 10 TABLET, FILM COATED ORAL at 08:28

## 2020-09-22 RX ADMIN — LABETALOL HYDROCHLORIDE 200 MG: 200 TABLET, FILM COATED ORAL at 08:28

## 2020-09-22 RX ADMIN — BUSPIRONE HYDROCHLORIDE 5 MG: 5 TABLET ORAL at 08:28

## 2020-09-22 RX ADMIN — FERROUS SULFATE TAB EC 325 MG (65 MG FE EQUIVALENT) 325 MG: 325 (65 FE) TABLET DELAYED RESPONSE at 08:28

## 2020-09-22 RX ADMIN — FOLIC ACID 1 MG: 1 TABLET ORAL at 08:28

## 2020-09-22 RX ADMIN — MULTIPLE VITAMINS W/ MINERALS TAB 1 TABLET: TAB at 08:28

## 2020-09-22 RX ADMIN — ONDANSETRON 4 MG: 2 INJECTION INTRAMUSCULAR; INTRAVENOUS at 09:20

## 2020-09-22 RX ADMIN — LISINOPRIL 40 MG: 40 TABLET ORAL at 08:28

## 2020-09-22 RX ADMIN — PANTOPRAZOLE SODIUM 40 MG: 40 TABLET, DELAYED RELEASE ORAL at 08:27

## 2020-09-22 NOTE — DISCHARGE SUMMARY
[facility]  Office: [address], DO, Scar Ledezma, DO, Annel Mansfield, DO, Theodore Alicia, DO, Katei Olivera MD, Rashaad Avila MD, Juan Church MD, Jose Billings MD, Jonathon Soliz MD, Lauren Oconnor MD, Anjel Rutledge MD, Hugo Stanford MD, Andria Regan MD, Rao Coy DO, Liset Aggarwal MD, Chelsie Wesley MD, Welton Sacks, DO, Jesse Anderson MD,  Jitendra Babin, DO, Kadi Glez MD, Ema Le MD, Davonte Lassiter, [facility], [facility], [facility], Robbie Torres, CNP, Marco Barros, CNS, Temo Ansari, CNP, Lizabeth Simmonds, CNP, Lauren Judd, CNP, Vel Reid, CNP, Liddie Dancer, CNP, Ramírez Fortune PA-C, Wiliam Griffith, [facility], Theodore Petersen, CNP, Jay Hardin, CNP, Elyse Richardson, CNP, Su Boas, CNP, Telma Gaines, [facility]    Discharge Summary     Patient ID: Swapnil Soto  :  1975   MRN: [id number]     ACCOUNT:  [de-identified]   Patient's PCP: Grecia Ramírez MD  Admit Date: 2020   Discharge Date: 2020     Length of Stay: 2  Code Status:  Full Code  Admitting Physician: Sha Gregory DO  Discharge Physician: Denisse Alicia DO     Active Discharge Diagnoses:     Hospital Problem Lists:  Principal Problem:    Hypertensive emergency  Active Problems:    Obesity (BMI 30-39. 9)    Recurrent major depressive disorder, in partial remission (HCC)    Folic acid deficiency    Migraine without aura and without status migrainosus, not intractable    Essential hypertension    Iron deficiency anemia  Resolved Problems:    * No resolved hospital problems. *      Admission Condition:  serious     Discharged Condition: stable    Hospital Stay:     Hospital Course:  Swapnil Soto is a 39 y.o. female who was admitted for the management of  Hypertensive emergency , presented to ER with Hypertension and Headache    Admitted with headache and hypertensive urgency, sbp 230.   She was placed in icu on cardene drip, which was ultimately weaned off but then her bp spiked again and it was resumed and then weaned again. She was placed back on her usual lisinopril 40mg daily and had labetalol 200mg added-her sbp dropped to 117 and she felt dizzy; the labetalol dose was reduced and her sbp is now in 140s-150s. She will need further titration of htn meds as an outpatient      Significant therapeutic interventions: see above    Significant Diagnostic Studies:   Labs / Micro:  CBC:   Lab Results   Component Value Date    WBC 7.3 09/21/2020    RBC 4.57 09/21/2020    HGB 10.5 09/21/2020    HCT 35.3 09/21/2020    MCV 77.2 09/21/2020    MCH 23.0 09/21/2020    MCHC 29.7 09/21/2020    RDW 18.3 09/21/2020     09/21/2020     CMP:    Lab Results   Component Value Date    GLUCOSE 83 09/21/2020     09/21/2020    K 4.3 09/21/2020     09/21/2020    CO2 25 09/21/2020    BUN 13 09/21/2020    CREATININE 0.81 09/21/2020    ANIONGAP 13 09/21/2020    ALKPHOS 104 09/21/2020    ALT 14 09/21/2020    AST 16 09/21/2020    BILITOT 0.36 09/21/2020    LABALBU 4.0 09/21/2020    ALBUMIN NOT REPORTED 09/21/2020    LABGLOM >60 09/21/2020    GFRAA >60 09/21/2020    GFR      09/21/2020    GFR NOT REPORTED 09/21/2020    PROT 7.0 09/21/2020    CALCIUM 9.2 09/21/2020        Radiology:  Ct Head Wo Contrast    Result Date: 9/20/2020  No acute intracranial abnormality. Consultations:    Consults:     Final Specialist Recommendations/Findings:   IP CONSULT TO HOSPITALIST      The patient was seen and examined on day of discharge and this discharge summary is in conjunction with any daily progress note from day of discharge.     Discharge plan:     Disposition: Home    Physician Follow Up:     Ramin Fox, [address] [place] [address]  [phone]             Requiring Further Evaluation/Follow Up POST HOSPITALIZATION/Incidental Findings: htn med titration    Also needs iron deficiency anemia workup as an outpatient    Diet: regular diet    Activity: As tolerated    Instructions to Patient: take medications as prescribed      Discharge Medications:      Medication List      START taking these medications    butalbital-acetaminophen-caffeine -40 MG per tablet  Commonly known as:  FIORICET, ESGIC  Take 1 tablet by mouth every 4 hours as needed for Headaches     ferrous sulfate 325 (65 Fe) MG EC tablet  Commonly known as:  FE TABS 325  Take 1 tablet by mouth daily (with breakfast)  Start taking on:  September 23, 2020     labetalol 100 MG tablet  Commonly known as:  NORMODYNE  Take 0.5 tablets by mouth every 12 hours        CONTINUE taking these medications    albuterol sulfate  (90 Base) MCG/ACT inhaler  Commonly known as:  ProAir HFA  Inhale 2 puffs into the lungs every 6 hours as needed for Wheezing     atorvastatin 10 MG tablet  Commonly known as:  LIPITOR  Take 1 tablet by mouth daily     busPIRone 10 MG tablet  Commonly known as:  BUSPAR  Take 0.5 tablets by mouth 2 times daily     cyanocobalamin 100 MCG tablet     folic acid 1 MG tablet  Commonly known as:  FOLVITE  Take 1 tablet by mouth daily     levonorgestrel IUD 52 mg  Commonly known as:  MIRENA     lisinopril 40 MG tablet  Commonly known as:  PRINIVIL;ZESTRIL  Take 1 tablet by mouth daily     MULTIVITAMIN ADULT EXTRA C PO     pantoprazole 40 MG tablet  Commonly known as:  PROTONIX  Take 1 tablet by mouth every morning (before breakfast)     sertraline 50 MG tablet  Commonly known as:  ZOLOFT  TAKE 1 TABLET BY MOUTH EVERY DAY           Where to Get Your Medications      These medications were sent to [address] [place] [address] [phone] - F [phone] [address] [place], [place] [address] [address]    Phone:  [phone]   · butalbital-acetaminophen-caffeine -40 MG per tablet  · ferrous sulfate 325 (65 Fe) MG EC tablet  · labetalol 100 MG tablet         No discharge procedures on file.     Time Spent on discharge is  29 mins in patient examination, evaluation, counseling as well as medication reconciliation, prescriptions for required medications, discharge plan and follow up. Electronically signed by   Tarsha Alicia DO  9/22/2020  2:25 PM      Thank you Dr. Reji Zacarias MD for the opportunity to be involved in this patient's care.

## 2020-09-22 NOTE — PROGRESS NOTES
Patient discharged with all belongings. IV was removed. Discussed discharge instructions with patient. Informed that she should follow up with PCP sometime in the next week. No questions or issues at time of discharge.

## 2020-09-22 NOTE — PROGRESS NOTES
[facility]  Office: [address], DO, Lora Dines, DO, Meg Gela, DO, Cyndi Hedrickaaron Blood, DO, Waldo Carbone MD, Td Navas MD, Michelle Fernández MD, Alli Meneses MD, Jose Francisco Ragland MD, Naomi Fairbanks MD, David Mosher MD, Shanice Solis MD, Andria Harden MD, Bharath Cortez DO, Jordana Haywood MD, Tamara Jimenez MD, Fara Vickers DO, Antonia Mae MD,  Kemar Tamayo DO, Mariel Bragg MD, Andria Trejo MD, Juan Barbour, [facility], [facility], CNP, Honorio Amas, CNP, Barbi Pulse, CNS, Deena Guerreroed, CNP, Suyapa Barajas, CNP, Wally Callejas, CNP, Yi Pulse, CNP, Paulette Nolan, CNP, Meg Starkey PA-C, [place] , [facility], Georgi Moore, CNP, Dane Garay, CNP, Ray Vargas, CNP, Dottie Wu, CNP, Rachana Lechuga, [facility]    Progress Note    9/22/2020    7:43 AM    Name:   Gia Whalen  MRN:     [id number]     Acct:      [de-identified]   Room:   [address] [place] [address] Day:  2  Admit Date:  9/20/2020 10:55 AM    PCP:   Kedar Murphy MD  Code Status:  Full Code    Subjective:     C/C:   Chief Complaint   Patient presents with    Hypertension    Headache     Interval History Status: improved. Still having a headache but bp better: off cardene drip now    Denies cp/sob/n/v    Brief History:     Per my partner:   \"Tricia Montiel Hasten a 39 y.o.  male who presented to [facility] on 9/20/2020 for evaluation of elevated blood pressure readings.  Patient was at home and noted that her systolic blood pressures were in the 230s overnight.  Her initial symptom that tipped her off was the beginnings of a migraine headache.  She reports that the last time she had the symptoms was during her hospital last hospitalization on August 5, 2019.  During this hospitalization, patient was discovered to have uncontrolled hypertension.  Her antihypertensives were adjusted. Malik Boone was noted to have LVH on her 2D echo. Shannan Murillo was discharged home on Coreg, Norvasc, lisinopril, spironolactone at that time. [facility], she discontinued these medications shortly thereafter secondary to adverse effects including dizziness and sluggishness.  She follow-up with her PCP who stated that she only needed to be on lisinopril.  She states that she was on long-term lisinopril and metoprolol for \"many years. \" [facility], she recently DC'd her metoprolol as it was not \"doing anything for her blood pressure. \"\"    Review of Systems:     Constitutional:  negative for chills, fevers, sweats  Respiratory:  negative for cough, dyspnea on exertion, shortness of breath, wheezing  Cardiovascular:  negative for chest pain, chest pressure/discomfort, lower extremity edema, palpitations  Gastrointestinal:  negative for abdominal pain, constipation, diarrhea, nausea, vomiting  Neurological:  positive for dizziness, headache    Medications: Allergies:     Allergies   Allergen Reactions    Nsaids      Pt had gastric sleeve  Cannot take due to bariatric surgery  Cannot take due to bariatric surgery      Tolmetin      Pt had gastric sleeve    Codeine Itching       Current Meds:   Scheduled Meds:    lisinopril  40 mg Oral Daily    ferrous sulfate  325 mg Oral Daily with breakfast    atorvastatin  10 mg Oral Daily    busPIRone  5 mg Oral BID    cyanocobalamin  100 mcg Oral Daily    folic acid  1 mg Oral Daily    therapeutic multivitamin-minerals  1 tablet Oral Daily    pantoprazole  40 mg Oral QAM AC    sertraline  50 mg Oral Daily    sodium chloride flush  10 mL Intravenous 2 times per day     Continuous Infusions:    niCARdipene (CARDENE) infusion Stopped (09/21/20 9966)     PRN Meds: butalbital-acetaminophen-caffeine, albuterol sulfate HFA, sodium chloride flush, promethazine **OR** ondansetron, nicotine, traMADol    Data:     Past Medical History:   has a past medical history of Anemia, History of abnormal cervical Pap smear, Hypertension, POCPO2, PO2ART, PO2, POCHCO3, TAX4NSQ, HCO3, NBEA, PBEA, BEART, BE, THGBART, THB, HTH8NGK, HBOD1KTQ, L8QWQDKC, O2SAT, FIO2  Lab Results   Component Value Date/Time    SPECIAL NOT REPORTED 02/04/2018 11:40 AM     No results found for: CULTURE    Radiology:  Ct Head Wo Contrast    Result Date: 9/20/2020  No acute intracranial abnormality. Physical Examination:        General appearance:  alert, cooperative and no distress  Mental Status:  oriented to person, place and time and normal affect  Lungs:  clear to auscultation bilaterally, normal effort  Heart:  regular rate and rhythm, no murmur  Abdomen:  soft, nontender, nondistended, normal bowel sounds, no masses, hepatomegaly, splenomegaly; obese  Extremities:  no edema, redness, tenderness in the calves  Skin:  no gross lesions, rashes, induration    Assessment:        Hospital Problems           Last Modified POA    * (Principal) Hypertensive emergency 9/20/2020 Yes    Obesity (BMI 30-39.9) 9/20/2020 Yes    Recurrent major depressive disorder, in partial remission ([facility] [address] 2/97/5779 Yes    Folic acid deficiency 8/67/7666 Yes    Migraine without aura and without status migrainosus, not intractable 9/21/2020 Yes    Essential hypertension 9/20/2020 Yes    Iron deficiency anemia 9/21/2020 Yes          Plan:        1. Add labetalol and see how she does (she has side effects to many meds, but metoprolol is tolerated but not real effective)  2. Keep off cardene  3. Ok for sbp 150-170s for now  4. Would add additional htn agents as an outpatient  5. Outpatient MILLER workup  6.  Got message to call mother, tried calling and it went straight to [address], DO  9/22/2020  7:43 AM

## 2020-09-22 NOTE — CARE COORDINATION
Case Management Initial Discharge Plan  Hollie Gonzalez,         Readmission Risk              Risk of Unplanned Readmission:        10             Met with:patient to discuss discharge plans. Information verified: address, contacts, phone number, , insurance Yes  PCP: Francisco Wolf MD  Date of last visit:     Insurance Provider: SARITA     Discharge Planning  Current Residence:  [facility]    Living Arrangements:  Children - 13year old daughter       Home has 2 stories/1 stairs to climb to enter the home. . her bedroom is upstairs but has bathroom on both levels. Support Systems:  Family Members       Current Services PTA:  None   Agency: none       Patient able to perform ADL's:Independent  DME in home:  None   DME used to aid ambulation prior to admission:   None   DME used during admission:  None     Potential Assistance Needed:  N/A    [facility] [address]    Potential Assistance Purchasing Medications:  No  Does patient want to participate in local refill/ meds to beds program?  No    Patient agreeable to home care: No  Ruth of choice provided:  n/a      Type of Home Care Services:  None  Patient expects to be discharged to:  home    Prior SNF/Rehab Placement and Facility: none   Agreeable to SNF/Rehab: No  Ruth of choice provided: n/a   Evaluation: n/a    Expected Discharge date:  20  Follow Up Appointment: Best Day/ Time: Monday AM    Transportation provider: per family  Transportation arrangements needed for discharge: No    Discharge Plan:   Patient lives at home with her teenage daughter. She works full time for [employer]. She is an active . She is admitted with hypertensive emergency. She uses no dme and will follow . Do not anticipate any skilled needs on discharge.      Electronically signed by Lela Navarro RN on 20 at 1:35 PM EDT

## 2020-09-22 NOTE — FLOWSHEET NOTE
Patient resting in bed; states she is feeling tired; expresses no spiritual/emotional needs at this time; thanks writer for visiting. Writer provides presence and support. Spiritual Care will follow as needed.      09/22/20 1117   Encounter Summary   Services provided to: Patient   Referral/Consult From: Celia   Continue Visiting   (9/22/20)   Complexity of Encounter Low   Length of Encounter 15 minutes   Routine   Type Follow up   Assessment Passive   Intervention Active listening   Outcome Expressed gratitude

## 2020-09-22 NOTE — PROGRESS NOTES
Patient now having watery diarrhea. She also desated to 85% on room air, placed on 2 L nasal cannula. Will continue to monitor.

## 2020-09-22 NOTE — PLAN OF CARE
Problem: Pain:  Goal: Pain level will decrease  Description: Pain level will decrease  Outcome: Ongoing  Note: Pt medicated with pain medication prn. Assessed all pain characteristics including level, type, location, frequency, and onset. Non-pharmacologic interventions offered to pt as well. Pt states pain is tolerable at this time. Will continue to monitor      Problem: Falls - Risk of:  Goal: Will remain free from falls  Description: Will remain free from falls  Outcome: Ongoing  Note: Patient is a fall risk during this admission. Fall risk assessment was performed. Patient is absent of falls. Bed is in the lowest position. Wheels on the bed are locked. Call light and bed side table are within reach. Clutter is removed. Patient was educated to call out when needing assistance or wanting to get out of bed. Patient offered toileting assistance during rounding. Hourly rounds have been performed.

## 2020-09-24 ENCOUNTER — TELEPHONE (OUTPATIENT)
Dept: FAMILY MEDICINE CLINIC | Age: 45
End: 2020-09-24

## 2020-09-24 NOTE — TELEPHONE ENCOUNTER
[facility] Transitions Initial Follow Up Call    Outreach made within 2 business days of discharge: Yes    Patient: Warren Aaron Patient : 1975   MRN: [id number]  Reason for Admission: There are no discharge diagnoses documented for the most recent discharge. Discharge Date: 20       Spoke with:     Discharge department/facility:    [facility] Interactive Patient Contact:  Was patient able to fill all prescriptions: Yes  Was patient instructed to bring all medications to the follow-up visit: Yes  Is patient taking all medications as directed in the discharge summary?  Yes  Does patient understand their discharge instructions: Yes  Does patient have questions or concerns that need addressed prior to 7-14 day follow up office visit: yes -     Scheduled appointment with PCP within 7-14 days    Follow Up  Future Appointments   Date Time Provider Bayron Gamez   2020  9:45 AM MD Luz Farah [facility]   2020 12:30 PM MD Luz Farah [facility]       Brunetta Dakin, MA

## 2020-09-28 ENCOUNTER — OFFICE VISIT (OUTPATIENT)
Dept: FAMILY MEDICINE CLINIC | Age: 45
End: 2020-09-28
Payer: COMMERCIAL

## 2020-09-28 ENCOUNTER — CLINICAL DOCUMENTATION (OUTPATIENT)
Dept: PSYCHOLOGY | Age: 45
End: 2020-09-28

## 2020-09-28 VITALS
HEIGHT: 69 IN | DIASTOLIC BLOOD PRESSURE: 92 MMHG | WEIGHT: 250.4 LBS | TEMPERATURE: 97.5 F | BODY MASS INDEX: 37.09 KG/M2 | HEART RATE: 74 BPM | OXYGEN SATURATION: 98 % | SYSTOLIC BLOOD PRESSURE: 142 MMHG

## 2020-09-28 PROCEDURE — 99495 TRANSJ CARE MGMT MOD F2F 14D: CPT | Performed by: FAMILY MEDICINE

## 2020-09-28 PROCEDURE — 1111F DSCHRG MED/CURRENT MED MERGE: CPT | Performed by: FAMILY MEDICINE

## 2020-09-28 RX ORDER — SUMATRIPTAN 25 MG/1
25 TABLET, FILM COATED ORAL
Qty: 9 TABLET | Refills: 0 | Status: SHIPPED | OUTPATIENT
Start: 2020-09-28 | End: 2020-11-12 | Stop reason: SDUPTHER

## 2020-09-28 RX ORDER — SERTRALINE HYDROCHLORIDE 25 MG/1
TABLET, FILM COATED ORAL
COMMUNITY
Start: 2020-09-22 | End: 2020-09-28

## 2020-09-28 RX ORDER — BUSPIRONE HYDROCHLORIDE 10 MG/1
10 TABLET ORAL 2 TIMES DAILY
Qty: 60 TABLET | Refills: 1 | Status: SHIPPED | OUTPATIENT
Start: 2020-09-28 | End: 2020-11-12 | Stop reason: SDUPTHER

## 2020-09-28 RX ORDER — LABETALOL 100 MG/1
100 TABLET, FILM COATED ORAL EVERY 12 HOURS SCHEDULED
Qty: 60 TABLET | Refills: 3
Start: 2020-09-28 | End: 2021-01-06 | Stop reason: SDUPTHER

## 2020-09-28 ASSESSMENT — ENCOUNTER SYMPTOMS
ORTHOPNEA: 0
BLURRED VISION: 0
RESPIRATORY NEGATIVE: 1
SHORTNESS OF BREATH: 0
EYES NEGATIVE: 1
GASTROINTESTINAL NEGATIVE: 1

## 2020-09-28 NOTE — LETTER
[facility]  [address] [address]  Phone: [phone]  Fax: [phone]    José Garcia MD        September 28, 2020     Patient: Ginette Magallon   YOB: 1975   Date of Visit: 9/28/2020       To Whom It May Concern: It is my medical opinion that Sincere Polo should remain out of work until 10/05/2020. If you have any questions or concerns, please don't hesitate to call.     Sincerely,        José Garcia MD

## 2020-09-28 NOTE — PROGRESS NOTES
Post-Discharge Transitional Care Management Services or Hospital Follow Up      Edda Pickett   YOB: 1975    Date of Office Visit:  9/28/2020  Date of Hospital Admission: 9/20/20  Date of Hospital Discharge: 9/22/20  Readmission Risk Score(high >=14%. Medium >=10%):Readmission Risk Score: 10      Care management risk score Rising risk (score 2-5) and Complex Care (Scores >=6): 0     Non face to face  following discharge, date last encounter closed (first attempt may have been earlier): 9/24/2020 11:44 AM 9/24/2020 11:44 AM    Call initiated 2 business days of discharge: Yes     Patient Active Problem List   Diagnosis    Menometrorrhagia    IUD (intrauterine device) in place    Essential hypertension    Hiatal hernia    Hyperlipidemia    Iron deficiency anemia    Morbid obesity ([address]    Postgastrectomy malabsorption    Sleep apnea    Vitamin D deficiency    Anxiety state    Hypertensive emergency    Obesity (BMI 30-39. 9)    Recurrent major depressive disorder, in partial remission (HCC)    Folic acid deficiency    Migraine without aura and without status migrainosus, not intractable       Allergies   Allergen Reactions    Nsaids      Pt had gastric sleeve  Cannot take due to bariatric surgery  Cannot take due to bariatric surgery      Tolmetin      Pt had gastric sleeve    Codeine Itching       Medications listed as ordered at the time of discharge from hospital   Nona Lyons   Willis Medication Instructions STUART:    Printed on:09/28/20 6150   Medication Information                      albuterol sulfate HFA (PROAIR HFA) 108 (90 Base) MCG/ACT inhaler  Inhale 2 puffs into the lungs every 6 hours as needed for Wheezing             atorvastatin (LIPITOR) 10 MG tablet  Take 1 tablet by mouth daily             busPIRone (BUSPAR) 10 MG tablet  Take 1 tablet by mouth 2 times daily             butalbital-acetaminophen-caffeine (FIORICET, ESGIC) -40 MG per tablet  Take 1 tablet by mouth every 4 hours as needed for Headaches             cyanocobalamin 100 MCG tablet  Take 100 mcg by mouth daily             ferrous sulfate (FE TABS 325) 325 (65 Fe) MG EC tablet  Take 1 tablet by mouth daily (with breakfast)             folic acid (FOLVITE) 1 MG tablet  Take 1 tablet by mouth daily             labetalol (NORMODYNE) 100 MG tablet  Take 1 tablet by mouth every 12 hours             levonorgestrel (MIRENA) IUD 52 mg  1 each by Intrauterine route             lisinopril (PRINIVIL;ZESTRIL) 40 MG tablet  Take 1 tablet by mouth daily             Multiple Vitamins-Minerals (MULTIVITAMIN ADULT EXTRA C PO)  Take 1 tablet by mouth daily             pantoprazole (PROTONIX) 40 MG tablet  Take 1 tablet by mouth every morning (before breakfast)             sertraline (ZOLOFT) 50 MG tablet  TAKE 1 TABLET BY MOUTH EVERY DAY             SUMAtriptan (IMITREX) 25 MG tablet  Take 1 tablet by mouth once as needed for Migraine                   Medications marked \"taking\" at this time  Outpatient Medications Marked as Taking for the 9/28/20 encounter (Office Visit) with Ang Ramirez MD   Medication Sig Dispense Refill    SUMAtriptan (IMITREX) 25 MG tablet Take 1 tablet by mouth once as needed for Migraine 9 tablet 0    busPIRone (BUSPAR) 10 MG tablet Take 1 tablet by mouth 2 times daily 60 tablet 1    labetalol (NORMODYNE) 100 MG tablet Take 1 tablet by mouth every 12 hours 60 tablet 3    ferrous sulfate (FE TABS 325) 325 (65 Fe) MG EC tablet Take 1 tablet by mouth daily (with breakfast) 90 tablet 0    butalbital-acetaminophen-caffeine (FIORICET, ESGIC) -40 MG per tablet Take 1 tablet by mouth every 4 hours as needed for Headaches 20 tablet 0    sertraline (ZOLOFT) 50 MG tablet TAKE 1 TABLET BY MOUTH EVERY DAY 90 tablet 0    folic acid (FOLVITE) 1 MG tablet Take 1 tablet by mouth daily 90 tablet 1    atorvastatin (LIPITOR) 10 MG tablet Take 1 tablet by mouth daily 90 tablet 1    lisinopril (PRINIVIL;ZESTRIL) 40 MG tablet Take 1 tablet by mouth daily 90 tablet 0    cyanocobalamin 100 MCG tablet Take 100 mcg by mouth daily      Multiple Vitamins-Minerals (MULTIVITAMIN ADULT EXTRA C PO) Take 1 tablet by mouth daily      pantoprazole (PROTONIX) 40 MG tablet Take 1 tablet by mouth every morning (before breakfast) 90 tablet 1    levonorgestrel (MIRENA) IUD 52 mg 1 each by Intrauterine route      albuterol sulfate HFA (PROAIR HFA) 108 (90 Base) MCG/ACT inhaler Inhale 2 puffs into the lungs every 6 hours as needed for Wheezing 1 Inhaler 0        Medications patient taking as of now reconciled against medications ordered at time of hospital discharge: Yes    Chief Complaint   Patient presents with    Follow-Up from Hospital      3 days  [facility]    Hypertension    Stress       The patient had migraines and noted no improvement despite taking the fiorcet. The patient checked her blood pressure which was in the 200's. The next morning when she woke up she rechecked it and it was still 200 SBP. The patient went to [address] [place] [address]. The patient was placed on cardene drip and and was weaned down then had to be put back on. When they weaned her off the second time they put her on labetalol 200 mg which then caused her bp to go down too low and she got lightheaded so they put her on labetalol 50 mg twice a day. The patient does complain of increasing anxiety d/t work stress and life situations. She notes it is likely triggering her migraines which in turn triggers her blood pressure. Hypertension   This is a chronic problem. The current episode started more than 1 year ago. The problem has been waxing and waning since onset. The problem is uncontrolled. Associated symptoms include headaches. Pertinent negatives include no anxiety, blurred vision, chest pain, malaise/fatigue, neck pain, orthopnea, palpitations, peripheral edema, PND, shortness of breath or sweats.  There are no associated agents 170/106       Physical Exam  Vitals signs and nursing note reviewed. Constitutional:       General: She is awake. She is not in acute distress. Appearance: Normal appearance. She is obese. She is not ill-appearing, toxic-appearing or diaphoretic. HENT:      Head: Normocephalic and atraumatic. Right Ear: External ear normal.      Left Ear: External ear normal.   Eyes:      Extraocular Movements: Extraocular movements intact. Conjunctiva/sclera: Conjunctivae normal.   Neck:      Musculoskeletal: Normal range of motion and neck supple. Cardiovascular:      Rate and Rhythm: Normal rate and regular rhythm. Pulses: Normal pulses. Heart sounds: Normal heart sounds. No murmur. Pulmonary:      Effort: Pulmonary effort is normal.      Breath sounds: Normal breath sounds. Abdominal:      General: Abdomen is flat. Bowel sounds are normal.      Palpations: Abdomen is soft. Musculoskeletal: Normal range of motion. Neurological:      General: No focal deficit present. Mental Status: She is alert and oriented to person, place, and time. Mental status is at baseline. Cranial Nerves: No cranial nerve deficit. Motor: No weakness. Gait: Gait normal.   Psychiatric:         Attention and Perception: Attention normal.         Mood and Affect: Mood and affect normal.         Speech: Speech normal.         Behavior: Behavior normal.         Thought Content: Thought content normal.         Judgment: Judgment normal.             Assessment/Plan:  1. Essential hypertension  Increase labetalol to 100 mg twice daily. Check bp daily and as needed for headaches. Get labs done as discussed. F/u with cardiology d/t this being the 2nd hospitalization in 2 years for htn emergency. - CO DISCHARGE MEDS RECONCILED W/ CURRENT OUTPATIENT MED LIST  - Metanephrines Urine; Future  - AFL - Alicia Ibrahim DO, Cardiology, [place]  - Echocardiogram complete; Future    2.  Migraine without aura and without status migrainosus, not intractable  Add imitrex, no h/o CAD  - SUMAtriptan (IMITREX) 25 MG tablet; Take 1 tablet by mouth once as needed for Migraine  Dispense: 9 tablet; Refill: 0    3. Hypertensive emergency    - AFL - Alicia Ibrahim DO, Cardiology, [place]  - Echocardiogram complete; Future  - Metanephrines Plasma Free; Future  - Aldosterone; Future  - Renin; Future  - labetalol (NORMODYNE) 100 MG tablet; Take 1 tablet by mouth every 12 hours  Dispense: 60 tablet; Refill: 3    4. Anxiety  Increase buspar to 10 mg twice a day  - busPIRone (BUSPAR) 10 MG tablet; Take 1 tablet by mouth 2 times daily  Dispense: 60 tablet; Refill: 1    5. Mild episode of recurrent major depressive disorder (HCC)    - busPIRone (BUSPAR) 10 MG tablet; Take 1 tablet by mouth 2 times daily  Dispense: 60 tablet;  Refill: 1        Medical Decision Making: moderate complexity

## 2020-10-01 ENCOUNTER — TELEPHONE (OUTPATIENT)
Dept: PSYCHOLOGY | Age: 45
End: 2020-10-01

## 2020-10-01 NOTE — TELEPHONE ENCOUNTER
Followed up with pt after 9/28 warm handoff; pt reported she could not reach her insurance company to ask about costs, stated she would like to schedule a first appt and then see how the costs turn out.   Appt scheduled for 10/8 at 8am.

## 2020-10-08 ENCOUNTER — OFFICE VISIT (OUTPATIENT)
Dept: PSYCHOLOGY | Age: 45
End: 2020-10-08
Payer: COMMERCIAL

## 2020-10-08 PROCEDURE — 90791 PSYCH DIAGNOSTIC EVALUATION: CPT | Performed by: PSYCHOLOGIST

## 2020-10-08 ASSESSMENT — PATIENT HEALTH QUESTIONNAIRE - PHQ9
3. TROUBLE FALLING OR STAYING ASLEEP: 1
6. FEELING BAD ABOUT YOURSELF - OR THAT YOU ARE A FAILURE OR HAVE LET YOURSELF OR YOUR FAMILY DOWN: 0
8. MOVING OR SPEAKING SO SLOWLY THAT OTHER PEOPLE COULD HAVE NOTICED. OR THE OPPOSITE, BEING SO FIGETY OR RESTLESS THAT YOU HAVE BEEN MOVING AROUND A LOT MORE THAN USUAL: 0
5. POOR APPETITE OR OVEREATING: 0
SUM OF ALL RESPONSES TO PHQ QUESTIONS 1-9: 6
7. TROUBLE CONCENTRATING ON THINGS, SUCH AS READING THE NEWSPAPER OR WATCHING TELEVISION: 1
4. FEELING TIRED OR HAVING LITTLE ENERGY: 3
10. IF YOU CHECKED OFF ANY PROBLEMS, HOW DIFFICULT HAVE THESE PROBLEMS MADE IT FOR YOU TO DO YOUR WORK, TAKE CARE OF THINGS AT HOME, OR GET ALONG WITH OTHER PEOPLE: 2
2. FEELING DOWN, DEPRESSED OR HOPELESS: 1
SUM OF ALL RESPONSES TO PHQ QUESTIONS 1-9: 6
9. THOUGHTS THAT YOU WOULD BE BETTER OFF DEAD, OR OF HURTING YOURSELF: 0
SUM OF ALL RESPONSES TO PHQ9 QUESTIONS 1 & 2: 1
1. LITTLE INTEREST OR PLEASURE IN DOING THINGS: 0

## 2020-10-08 ASSESSMENT — ANXIETY QUESTIONNAIRES
7. FEELING AFRAID AS IF SOMETHING AWFUL MIGHT HAPPEN: 0-NOT AT ALL
GAD7 TOTAL SCORE: 4
4. TROUBLE RELAXING: 1-SEVERAL DAYS
3. WORRYING TOO MUCH ABOUT DIFFERENT THINGS: 1-SEVERAL DAYS
2. NOT BEING ABLE TO STOP OR CONTROL WORRYING: 1-SEVERAL DAYS
5. BEING SO RESTLESS THAT IT IS HARD TO SIT STILL: 0-NOT AT ALL
1. FEELING NERVOUS, ANXIOUS, OR ON EDGE: 1-SEVERAL DAYS
6. BECOMING EASILY ANNOYED OR IRRITABLE: 0-NOT AT ALL
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
## 2020-11-12 RX ORDER — SUMATRIPTAN 25 MG/1
25 TABLET, FILM COATED ORAL
Qty: 9 TABLET | Refills: 0 | Status: SHIPPED | OUTPATIENT
Start: 2020-11-12 | End: 2021-10-21 | Stop reason: SDUPTHER

## 2020-11-12 RX ORDER — BUSPIRONE HYDROCHLORIDE 10 MG/1
10 TABLET ORAL 2 TIMES DAILY
Qty: 60 TABLET | Refills: 1 | Status: SHIPPED | OUTPATIENT
Start: 2020-11-12 | End: 2020-11-27 | Stop reason: SDUPTHER

## 2020-11-12 NOTE — TELEPHONE ENCOUNTER
Faxed medication request pended medication please advise thank you! Next Visit Date:  Future Appointments   Date Time Provider Bayron Vera   11/16/2020 12:30 PM [facility] [address] Maintenance   Topic Date Due    Cervical cancer screen  06/20/1998    Flu vaccine (1) 09/28/2021 (Originally 9/1/2020)    Lipid screen  09/21/2021    Potassium monitoring  09/21/2021    Creatinine monitoring  09/21/2021    DTaP/Tdap/Td vaccine (2 - Td) 09/01/2023    HIV screen  Completed    Hepatitis A vaccine  Aged Out    Hepatitis B vaccine  Aged Out    Hib vaccine  Aged Out    Meningococcal (ACWY) vaccine  Aged Out    Pneumococcal 0-64 years Vaccine  Aged Out       No results found for: LABA1C          ( goal A1C is < 7)   No results found for: LABMICR  LDL Cholesterol (mg/dL)   Date Value   09/21/2020 80   08/03/2020 149 (H)       (goal LDL is <100)   AST (U/L)   Date Value   09/21/2020 16     ALT (U/L)   Date Value   09/21/2020 14     BUN (mg/dL)   Date Value   09/21/2020 13     BP Readings from Last 3 Encounters:   09/28/20 (!) 142/92   09/22/20 (!) 151/79   08/12/20 (!) 170/106          (goal 120/80)    All Future Testing planned in [facility]  Lab Frequency Next Occurrence   Metanephrines Urine Once 09/28/2020   Echocardiogram complete Once 09/28/2020   Metanephrines Plasma Free Once 09/28/2020   Aldosterone Once 09/28/2020   Renin Once 09/28/2020               Patient Active Problem List:     Menometrorrhagia     IUD (intrauterine device) in place     Essential hypertension     Hiatal hernia     Hyperlipidemia     Iron deficiency anemia     Morbid obesity ([address]     Postgastrectomy malabsorption     Sleep apnea     Vitamin D deficiency     Anxiety state     Hypertensive emergency     Obesity (BMI 30-39. 9)     Recurrent major depressive disorder, in partial remission (HCC)     Folic acid deficiency     Migraine without aura and without status migrainosus, not intractable

## 2020-11-27 RX ORDER — BUSPIRONE HYDROCHLORIDE 10 MG/1
10 TABLET ORAL 2 TIMES DAILY
Qty: 180 TABLET | Refills: 1 | Status: SHIPPED | OUTPATIENT
Start: 2020-11-27 | End: 2021-06-27

## 2020-11-27 NOTE — TELEPHONE ENCOUNTER
Faxed request for a 90 day supply please advise thank you! Next Visit Date:  No future appointments. Health Maintenance   Topic Date Due    Cervical cancer screen  06/20/1998    Flu vaccine (1) 09/28/2021 (Originally 9/1/2020)    Lipid screen  09/21/2021    Potassium monitoring  09/21/2021    Creatinine monitoring  09/21/2021    DTaP/Tdap/Td vaccine (2 - Td) 09/01/2023    HIV screen  Completed    Hepatitis A vaccine  Aged Out    Hepatitis B vaccine  Aged Out    Hib vaccine  Aged Out    Meningococcal (ACWY) vaccine  Aged Out    Pneumococcal 0-64 years Vaccine  Aged Out       No results found for: LABA1C          ( goal A1C is < 7)   No results found for: LABMICR  LDL Cholesterol (mg/dL)   Date Value   09/21/2020 80   08/03/2020 149 (H)       (goal LDL is <100)   AST (U/L)   Date Value   09/21/2020 16     ALT (U/L)   Date Value   09/21/2020 14     BUN (mg/dL)   Date Value   09/21/2020 13     BP Readings from Last 3 Encounters:   09/28/20 (!) 142/92   09/22/20 (!) 151/79   08/12/20 (!) 170/106          (goal 120/80)    All Future Testing planned in [facility]  Lab Frequency Next Occurrence   Metanephrines Urine Once 09/28/2020   Echocardiogram complete Once 09/28/2020   Metanephrines Plasma Free Once 09/28/2020   Aldosterone Once 09/28/2020   Renin Once 09/28/2020               Patient Active Problem List:     Menometrorrhagia     IUD (intrauterine device) in place     Essential hypertension     Hiatal hernia     Hyperlipidemia     Iron deficiency anemia     Morbid obesity ([address]     Postgastrectomy malabsorption     Sleep apnea     Vitamin D deficiency     Anxiety state     Hypertensive emergency     Obesity (BMI 30-39. 9)     Recurrent major depressive disorder, in partial remission (HCC)     Folic acid deficiency     Migraine without aura and without status migrainosus, not intractable

## 2020-12-16 RX ORDER — LISINOPRIL 40 MG/1
TABLET ORAL
Qty: 90 TABLET | Refills: 0 | Status: SHIPPED | OUTPATIENT
Start: 2020-12-16 | End: 2020-12-21

## 2020-12-21 RX ORDER — LISINOPRIL 40 MG/1
TABLET ORAL
Qty: 90 TABLET | Refills: 0 | Status: SHIPPED | OUTPATIENT
Start: 2020-12-21 | End: 2021-04-05

## 2020-12-21 RX ORDER — PANTOPRAZOLE SODIUM 40 MG/1
TABLET, DELAYED RELEASE ORAL
Qty: 90 TABLET | Refills: 1 | Status: SHIPPED | OUTPATIENT
Start: 2020-12-21 | End: 2021-06-27

## 2020-12-30 NOTE — TELEPHONE ENCOUNTER
I spoke with Dr Diana Cabrera and if patient has a benefit for bariatric surgery he will take her on as a YVES.

## 2020-12-30 NOTE — TELEPHONE ENCOUNTER
Received records from previous surgeon. I spoke with patient, she has switched insurance to [facility], she is picking the plan now. I instructed her when does pick insurance and has ID number to redo the information session online so we can verify benefits. I am scanning received records into computer.

## 2021-01-06 DIAGNOSIS — F33.0 MILD EPISODE OF RECURRENT MAJOR DEPRESSIVE DISORDER (HCC): ICD-10-CM

## 2021-01-06 DIAGNOSIS — F41.9 ANXIETY: ICD-10-CM

## 2021-01-06 DIAGNOSIS — I16.1 HYPERTENSIVE EMERGENCY: ICD-10-CM

## 2021-01-06 RX ORDER — LABETALOL 100 MG/1
100 TABLET, FILM COATED ORAL EVERY 12 HOURS SCHEDULED
Qty: 60 TABLET | Refills: 0 | Status: SHIPPED | OUTPATIENT
Start: 2021-01-06 | End: 2021-01-20 | Stop reason: SDUPTHER

## 2021-01-06 NOTE — TELEPHONE ENCOUNTER
Patient states her bp is high its been running in the 180's/100's wanting to know if you can increase her medication she is completely out.  She has been taking 100 mg bid Pt has an upcoming appt scheduled

## 2021-01-06 NOTE — TELEPHONE ENCOUNTER
Electronic medication refill request. Pharmacy on file. Please advise. Next Visit Date:  No future appointments. Health Maintenance   Topic Date Due    Hepatitis C screen  1975    Cervical cancer screen  06/20/1998    Flu vaccine (1) 09/28/2021 (Originally 9/1/2020)    Lipid screen  09/21/2021    Potassium monitoring  09/21/2021    Creatinine monitoring  09/21/2021    DTaP/Tdap/Td vaccine (2 - Td) 09/01/2023    HIV screen  Completed    Hepatitis A vaccine  Aged Out    Hepatitis B vaccine  Aged Out    Hib vaccine  Aged Out    Meningococcal (ACWY) vaccine  Aged Out    Pneumococcal 0-64 years Vaccine  Aged Out       No results found for: LABA1C          ( goal A1C is < 7)   No results found for: LABMICR  LDL Cholesterol (mg/dL)   Date Value   09/21/2020 80   08/03/2020 149 (H)       (goal LDL is <100)   AST (U/L)   Date Value   09/21/2020 16     ALT (U/L)   Date Value   09/21/2020 14     BUN (mg/dL)   Date Value   09/21/2020 13     BP Readings from Last 3 Encounters:   09/28/20 (!) 142/92   09/22/20 (!) 151/79   08/12/20 (!) 170/106          (goal 120/80)    All Future Testing planned in [facility]  Lab Frequency Next Occurrence   Metanephrines Urine Once 09/28/2020   Metanephrines Plasma Free Once 09/28/2020   Aldosterone Once 09/28/2020   Renin Once 09/28/2020               Patient Active Problem List:     Menometrorrhagia     IUD (intrauterine device) in place     Essential hypertension     Hiatal hernia     Hyperlipidemia     Iron deficiency anemia     Morbid obesity ([address]     Postgastrectomy malabsorption     Sleep apnea     Vitamin D deficiency     Anxiety state     Hypertensive emergency     Obesity (BMI 30-39. 9)     Recurrent major depressive disorder, in partial remission (HCC)     Folic acid deficiency     Migraine without aura and without status migrainosus, not intractable

## 2021-01-20 ENCOUNTER — OFFICE VISIT (OUTPATIENT)
Dept: FAMILY MEDICINE CLINIC | Age: 46
End: 2021-01-20
Payer: COMMERCIAL

## 2021-01-20 VITALS
BODY MASS INDEX: 38.45 KG/M2 | HEART RATE: 68 BPM | DIASTOLIC BLOOD PRESSURE: 86 MMHG | RESPIRATION RATE: 14 BRPM | TEMPERATURE: 97.1 F | SYSTOLIC BLOOD PRESSURE: 132 MMHG | HEIGHT: 69 IN | OXYGEN SATURATION: 97 % | WEIGHT: 259.6 LBS

## 2021-01-20 DIAGNOSIS — F33.41 RECURRENT MAJOR DEPRESSIVE DISORDER, IN PARTIAL REMISSION (HCC): ICD-10-CM

## 2021-01-20 DIAGNOSIS — D50.0 IRON DEFICIENCY ANEMIA DUE TO CHRONIC BLOOD LOSS: ICD-10-CM

## 2021-01-20 DIAGNOSIS — E66.9 OBESITY (BMI 30-39.9): ICD-10-CM

## 2021-01-20 DIAGNOSIS — L98.9 SKIN LESION: ICD-10-CM

## 2021-01-20 DIAGNOSIS — K91.2 POSTGASTRECTOMY MALABSORPTION: ICD-10-CM

## 2021-01-20 DIAGNOSIS — G43.009 MIGRAINE WITHOUT AURA AND WITHOUT STATUS MIGRAINOSUS, NOT INTRACTABLE: ICD-10-CM

## 2021-01-20 DIAGNOSIS — E53.8 FOLIC ACID DEFICIENCY: ICD-10-CM

## 2021-01-20 DIAGNOSIS — F41.1 ANXIETY STATE: ICD-10-CM

## 2021-01-20 DIAGNOSIS — I10 ESSENTIAL HYPERTENSION: Primary | ICD-10-CM

## 2021-01-20 DIAGNOSIS — Z90.3 POSTGASTRECTOMY MALABSORPTION: ICD-10-CM

## 2021-01-20 DIAGNOSIS — E78.5 DYSLIPIDEMIA: ICD-10-CM

## 2021-01-20 PROBLEM — E66.01 MORBID OBESITY (HCC): Status: RESOLVED | Noted: 2020-08-12 | Resolved: 2021-01-20

## 2021-01-20 PROCEDURE — 1036F TOBACCO NON-USER: CPT | Performed by: FAMILY MEDICINE

## 2021-01-20 PROCEDURE — 99214 OFFICE O/P EST MOD 30 MIN: CPT | Performed by: FAMILY MEDICINE

## 2021-01-20 PROCEDURE — G8417 CALC BMI ABV UP PARAM F/U: HCPCS | Performed by: FAMILY MEDICINE

## 2021-01-20 PROCEDURE — G8427 DOCREV CUR MEDS BY ELIG CLIN: HCPCS | Performed by: FAMILY MEDICINE

## 2021-01-20 PROCEDURE — G8484 FLU IMMUNIZE NO ADMIN: HCPCS | Performed by: FAMILY MEDICINE

## 2021-01-20 RX ORDER — LANOLIN ALCOHOL/MO/W.PET/CERES
325 CREAM (GRAM) TOPICAL
Qty: 90 TABLET | Refills: 0 | Status: SHIPPED | OUTPATIENT
Start: 2021-01-20 | End: 2021-03-24 | Stop reason: SDUPTHER

## 2021-01-20 RX ORDER — LABETALOL 100 MG/1
150 TABLET, FILM COATED ORAL EVERY 12 HOURS SCHEDULED
Qty: 90 TABLET | Refills: 2 | Status: SHIPPED | OUTPATIENT
Start: 2021-01-20 | End: 2021-01-29

## 2021-01-20 RX ORDER — FOLIC ACID 1 MG/1
1 TABLET ORAL DAILY
Qty: 90 TABLET | Refills: 1 | Status: SHIPPED | OUTPATIENT
Start: 2021-01-20 | End: 2021-12-22

## 2021-01-20 ASSESSMENT — PATIENT HEALTH QUESTIONNAIRE - PHQ9
SUM OF ALL RESPONSES TO PHQ QUESTIONS 1-9: 0
SUM OF ALL RESPONSES TO PHQ9 QUESTIONS 1 & 2: 0

## 2021-01-20 ASSESSMENT — ENCOUNTER SYMPTOMS
GASTROINTESTINAL NEGATIVE: 1
EYES NEGATIVE: 1
RESPIRATORY NEGATIVE: 1

## 2021-01-20 NOTE — PROGRESS NOTES
[address] PRIMARY CARE  [address] [place] [id number]  Dept: [phone]  Dept Fax: [phone]    Sarah Alcaraz is a 39 y.o. female who is a Established patient, who presents today for her medical conditions/complaints as noted below:  Chief Complaint   Patient presents with    Annual Exam    Other     pt states she has 2 spots on her shoulder and back that she would like to discuss    [facility] Maintenance     pt states she had a pap 08/2020         HPI:     The patient presents for recheck of her blood pressure and she would like me to look at two lesions on her right shoulder that have been present for a while but she is concerned about how they look. The patient has been taking 150 mg of labetalol in the morning and 100 mg of labetalol at night time. She was told the ideal dose is 200 mg twice a day. She is noting some side effects when she goes up on the dose. She is checking her blood pressure at home and it has been in the 167'T-264'I systolic. She notes her headaches she had been having are improved with the labetalol at its current dose. She notes her mood has improved and is stable on zoloft 50 mg daily. She notes the buspar did help with the anxiety. She is going to schedule an appointment with [facility] weight management to discuss getting a revision of her gastric bypass in the next couple of months.         No results found for: LABA1C          ( goal A1Cis < 7)   No results found for: LABMICR  LDL Cholesterol (mg/dL)   Date Value   09/21/2020 80   08/03/2020 149 (H)       (goal LDL is <100)   AST (U/L)   Date Value   09/21/2020 16     ALT (U/L)   Date Value   09/21/2020 14     BUN (mg/dL)   Date Value   09/21/2020 13     BP Readings from Last 3 Encounters:   01/20/21 132/86   09/28/20 (!) 142/92   09/22/20 (!) 151/79          (goal 120/80)    Past Medical History:   Diagnosis Date    Anemia  History of abnormal cervical Pap smear     Hypertension     IUD (intrauterine device) in place 2020    Mirena    Menometrorrhagia       Past Surgical History:   Procedure Laterality Date    BREAST SURGERY      negative rt breast lumpectomy    CHOLECYSTECTOMY      COLPOSCOPY      GASTRIC BYPASS SURGERY      gastric sleeve    INTRAUTERINE DEVICE INSERTION  2020    Mirena     LIPOMA RESECTION      lt shoulder/upper back       Family History   Problem Relation Age of Onset    High Blood Pressure Father     Heart Disease Father     Hypertension Maternal Grandmother     Heart Disease Maternal Grandmother     Stroke Maternal Uncle        Social History     Tobacco Use    Smoking status: Former Smoker     Packs/day: 1.00     Years: 22.00     Pack years: 22.00     Types: Cigarettes     Start date: 1/15/2020     Quit date: 2020     Years since quittin.4    Smokeless tobacco: Never Used   Substance Use Topics    Alcohol use: Yes     Comment: social      Current Outpatient Medications   Medication Sig Dispense Refill    labetalol (NORMODYNE) 100 MG tablet Take 1.5 tablets by mouth every 12 hours 90 tablet 2    folic acid (FOLVITE) 1 MG tablet Take 1 tablet by mouth daily 90 tablet 1    ferrous sulfate (FE TABS 325) 325 (65 Fe) MG EC tablet Take 1 tablet by mouth daily (with breakfast) 90 tablet 0    cyanocobalamin 100 MCG tablet Take 1 tablet by mouth daily 30 tablet 2    sertraline (ZOLOFT) 50 MG tablet TAKE 1 TABLET BY MOUTH EVERY DAY 90 tablet 0    pantoprazole (PROTONIX) 40 MG tablet TAKE 1 TABLET BY MOUTH EVERY DAY BEFORE BREAKFAST 90 tablet 1    lisinopril (PRINIVIL;ZESTRIL) 40 MG tablet TAKE 1 TABLET BY MOUTH EVERY DAY 90 tablet 0    busPIRone (BUSPAR) 10 MG tablet Take 1 tablet by mouth 2 times daily 180 tablet 1    butalbital-acetaminophen-caffeine (FIORICET, ESGIC) -40 MG per tablet Take 1 tablet by mouth every 4 hours as needed for Headaches 20 tablet 0  atorvastatin (LIPITOR) 10 MG tablet Take 1 tablet by mouth daily 90 tablet 1    Multiple Vitamins-Minerals (MULTIVITAMIN ADULT EXTRA C PO) Take 1 tablet by mouth daily      levonorgestrel (MIRENA) IUD 52 mg 1 each by Intrauterine route      albuterol sulfate HFA (PROAIR HFA) 108 (90 Base) MCG/ACT inhaler Inhale 2 puffs into the lungs every 6 hours as needed for Wheezing 1 Inhaler 0    SUMAtriptan (IMITREX) 25 MG tablet Take 1 tablet by mouth once as needed for Migraine 9 tablet 0     No current facility-administered medications for this visit. Allergies   Allergen Reactions    Nsaids      Pt had gastric sleeve  Cannot take due to bariatric surgery  Cannot take due to bariatric surgery      Tolmetin      Pt had gastric sleeve    Codeine Itching       Health Maintenance   Topic Date Due    Hepatitis C screen  1975    Flu vaccine (1) 09/28/2021 (Originally 9/1/2020)    Cervical cancer screen  01/20/2022 (Originally 6/20/1998)    Lipid screen  09/21/2021    Potassium monitoring  09/21/2021    Creatinine monitoring  09/21/2021    DTaP/Tdap/Td vaccine (2 - Td) 09/01/2023    HIV screen  Completed    Hepatitis A vaccine  Aged Out    Hepatitis B vaccine  Aged Out    Hib vaccine  Aged Out    Meningococcal (ACWY) vaccine  Aged Out    Pneumococcal 0-64 years Vaccine  Aged Out       Subjective:     Review of Systems   Constitutional: Negative. HENT: Negative. Eyes: Negative. Respiratory: Negative. Cardiovascular: Negative. Gastrointestinal: Negative. Genitourinary: Negative. Musculoskeletal: Negative. Skin: Negative. Allergic/Immunologic: Negative for environmental allergies, food allergies and immunocompromised state. Neurological: Negative. Psychiatric/Behavioral: Negative. Objective:     Physical Exam  Vitals signs and nursing note reviewed. Constitutional:       General: She is awake. She is not in acute distress. Appearance: Normal appearance. She is not toxic-appearing. HENT:      Head: Normocephalic and atraumatic. Right Ear: Tympanic membrane, ear canal and external ear normal.      Left Ear: Tympanic membrane, ear canal and external ear normal.      Nose: Nose normal.      Mouth/Throat:      Mouth: Mucous membranes are moist.   Eyes:      Extraocular Movements: Extraocular movements intact. Conjunctiva/sclera: Conjunctivae normal.      Pupils: Pupils are equal, round, and reactive to light. Neck:      Musculoskeletal: Normal range of motion and neck supple. Cardiovascular:      Rate and Rhythm: Normal rate and regular rhythm. Pulses: Normal pulses. Heart sounds: Normal heart sounds. No murmur. Pulmonary:      Effort: Pulmonary effort is normal.      Breath sounds: Normal breath sounds. Abdominal:      General: Abdomen is flat. Bowel sounds are normal.      Palpations: Abdomen is soft. Musculoskeletal: Normal range of motion. Skin:     Capillary Refill: Capillary refill takes less than 2 seconds. Neurological:      General: No focal deficit present. Mental Status: She is alert and oriented to person, place, and time. Mental status is at baseline. Cranial Nerves: No cranial nerve deficit. Sensory: No sensory deficit. Motor: No weakness. Coordination: Coordination normal.      Gait: Gait normal.   Psychiatric:         Attention and Perception: Attention normal.         Mood and Affect: Mood and affect normal.         Speech: Speech normal.         Behavior: Behavior normal.         Thought Content: Thought content normal.         Judgment: Judgment normal.       /86   Pulse 68   Temp 97.1 °F (36.2 °C) (Temporal)   Resp 14   Ht 5' 9\" (1.753 m)   Wt 259 lb 9.6 oz (117.8 kg)   SpO2 97%   Breastfeeding No   BMI 38.34 kg/m²     Assessment:       Diagnosis Orders   1.  Essential hypertension  Comprehensive Metabolic Panel, Fasting TSH With Reflex Ft4    CBC Auto Differential   2. Skin lesion  Armando Whipple MD, Plastic Surgery, [place]   3. Dyslipidemia  Lipid, Fasting   4. Folic acid deficiency  folic acid (FOLVITE) 1 MG tablet   5. Iron deficiency anemia due to chronic blood loss  Iron And TIBC    Ferritin    ferrous sulfate (FE TABS 325) 325 (65 Fe) MG EC tablet   6. Postgastrectomy malabsorption  cyanocobalamin 100 MCG tablet   7. Obesity (BMI 30-39.9)     8. Recurrent major depressive disorder, in partial remission ([facility] [address]     9. Anxiety state     10. Migraine without aura and without status migrainosus, not intractable               Plan:    HTN - patient on labetalol, will write rx for 150 mg bid and have patient monitor bp closely at home. Fasting lab orders placed. Return in 3 months    Skin lesion - patient referred to plastic surgery for skin lesion removal    Dyslipidemia - patient on atorvastatin, check labs in March (6 months from last check)    Folic acid deficiency/iron deficiency/postgastrecomty - patient going to see bariatric surgery. Encouraged patient to restart folic acid, iron. Labs ordered    Obesity - d/w patient importance of weight loss to help prevent co morbidities    Depression/anxiety - Patient on zoloft and buspar. Continue with current medications    Migraine - patient frequency decreasing, imitrex prn, return if symptoms begin to worsen  Return in about 3 months (around 4/20/2021) for hypertension.     Orders Placed This Encounter   Procedures    Lipid, Fasting     Standing Status:   Future     Standing Expiration Date:   1/20/2022    Comprehensive Metabolic Panel, Fasting     Standing Status:   Future     Standing Expiration Date:   1/20/2022    TSH With Reflex Ft4     Standing Status:   Future     Standing Expiration Date:   1/20/2022    CBC Auto Differential     Standing Status:   Future     Standing Expiration Date:   1/20/2022    Iron And TIBC     Standing Status:   Future Standing Expiration Date:   1/20/2022     Order Specific Question:   Is Patient Fasting? Answer:   yes     Order Specific Question:   No of Hours? Answer:   8-12 hours    Ferritin     Standing Status:   Future     Standing Expiration Date:   1/20/2022   David Small MD, Plastic Surgery, [place]     Referral Priority:   Routine     Referral Type:   Eval and Treat     Referral Reason:   Specialty Services Required     Referred to Provider:   Vaughn Rogers MD     Requested Specialty:   Plastic Surgery     Number of Visits Requested:   1     Orders Placed This Encounter   Medications    labetalol (NORMODYNE) 100 MG tablet     Sig: Take 1.5 tablets by mouth every 12 hours     Dispense:  90 tablet     Refill:  2    folic acid (FOLVITE) 1 MG tablet     Sig: Take 1 tablet by mouth daily     Dispense:  90 tablet     Refill:  1    ferrous sulfate (FE TABS 325) 325 (65 Fe) MG EC tablet     Sig: Take 1 tablet by mouth daily (with breakfast)     Dispense:  90 tablet     Refill:  0    cyanocobalamin 100 MCG tablet     Sig: Take 1 tablet by mouth daily     Dispense:  30 tablet     Refill:  2       Patient given educational materials - see patient instructions. Discussed use, benefit, and side effects of prescribed medications. All patientquestions answered. Pt voiced understanding. Reviewed health maintenance. Instructedto continue current medications, diet and exercise. Patient agreed with treatmentplan. Follow up as directed.      Electronically signed by Alia Rao MD on 1/20/2021 at 6:29 PM

## 2021-02-01 NOTE — TELEPHONE ENCOUNTER
Patient called back, no active with [facility], would like to go to sleeve to bypass.  Patient advised call back in 48-72 hours

## 2021-02-02 NOTE — TELEPHONE ENCOUNTER
Verified that patient is active with [facility]. As of 2/1/21 per Susie.     LVM for patient to call and schedule new patient appointment with Dr. Diana Cabrera

## 2021-02-20 DIAGNOSIS — K91.2 POSTGASTRECTOMY MALABSORPTION: ICD-10-CM

## 2021-02-20 DIAGNOSIS — Z90.3 POSTGASTRECTOMY MALABSORPTION: ICD-10-CM

## 2021-02-22 NOTE — TELEPHONE ENCOUNTER
Electronic medication refill request. Pharmacy on file. Please advise.         Next Visit Date:  Future Appointments   Date Time Provider Bayron Vera   2/25/2021  2:45 PM Alan Piper DO bariatric [address] [facility]   3/10/2021  9:00 AM Stevie Sutherland MD [facility]   4/21/2021 11:00 AM [facility] [address] Maintenance   Topic Date Due    Hepatitis C screen  1975    Flu vaccine (1) 09/28/2021 (Originally 9/1/2020)    Cervical cancer screen  01/20/2022 (Originally 6/20/1998)    Lipid screen  09/21/2021    Potassium monitoring  09/21/2021    Creatinine monitoring  09/21/2021    DTaP/Tdap/Td vaccine (2 - Td) 09/01/2023    HIV screen  Completed    Hepatitis A vaccine  Aged Out    Hepatitis B vaccine  Aged Out    Hib vaccine  Aged Out    Meningococcal (ACWY) vaccine  Aged Out    Pneumococcal 0-64 years Vaccine  Aged Out       No results found for: LABA1C          ( goal A1C is < 7)   No results found for: LABMICR  LDL Cholesterol (mg/dL)   Date Value   09/21/2020 80   08/03/2020 149 (H)       (goal LDL is <100)   AST (U/L)   Date Value   09/21/2020 16     ALT (U/L)   Date Value   09/21/2020 14     BUN (mg/dL)   Date Value   09/21/2020 13     BP Readings from Last 3 Encounters:   01/20/21 132/86   09/28/20 (!) 142/92   09/22/20 (!) 151/79          (goal 120/80)    All Future Testing planned in [facility]  Lab Frequency Next Occurrence   Metanephrines Urine Once 09/28/2020   Metanephrines Plasma Free Once 09/28/2020   Aldosterone Once 09/28/2020   Renin Once 09/28/2020   Lipid, Fasting Once 01/20/2021   Comprehensive Metabolic Panel, Fasting Once 01/20/2021   TSH With Reflex Ft4 Once 01/20/2021   CBC Auto Differential Once 01/20/2021   Iron And TIBC Once 01/20/2021   Ferritin Once 01/20/2021               Patient Active Problem List:     Menometrorrhagia     IUD (intrauterine device) in place     Essential hypertension     Hiatal hernia     Hyperlipidemia     Iron deficiency anemia     Postgastrectomy malabsorption     Sleep apnea     Vitamin D deficiency     Anxiety state     Hypertensive emergency     Obesity (BMI 30-39. 9)     Recurrent major depressive disorder, in partial remission (HCC)     Folic acid deficiency     Migraine without aura and without status migrainosus, not intractable

## 2021-02-25 ENCOUNTER — OFFICE VISIT (OUTPATIENT)
Dept: BARIATRICS/WEIGHT MGMT | Age: 46
End: 2021-02-25
Payer: MEDICARE

## 2021-02-25 VITALS
DIASTOLIC BLOOD PRESSURE: 70 MMHG | BODY MASS INDEX: 38.95 KG/M2 | SYSTOLIC BLOOD PRESSURE: 118 MMHG | WEIGHT: 263 LBS | RESPIRATION RATE: 20 BRPM | HEART RATE: 76 BPM | HEIGHT: 69 IN

## 2021-02-25 DIAGNOSIS — E66.01 MORBID OBESITY DUE TO EXCESS CALORIES (HCC): ICD-10-CM

## 2021-02-25 DIAGNOSIS — G47.33 OSA (OBSTRUCTIVE SLEEP APNEA): Primary | ICD-10-CM

## 2021-02-25 DIAGNOSIS — K21.9 GASTROESOPHAGEAL REFLUX DISEASE WITHOUT ESOPHAGITIS: ICD-10-CM

## 2021-02-25 PROCEDURE — G8484 FLU IMMUNIZE NO ADMIN: HCPCS | Performed by: SURGERY

## 2021-02-25 PROCEDURE — G8417 CALC BMI ABV UP PARAM F/U: HCPCS | Performed by: SURGERY

## 2021-02-25 PROCEDURE — G8427 DOCREV CUR MEDS BY ELIG CLIN: HCPCS | Performed by: SURGERY

## 2021-02-25 PROCEDURE — 1036F TOBACCO NON-USER: CPT | Performed by: SURGERY

## 2021-02-25 PROCEDURE — 99204 OFFICE O/P NEW MOD 45 MIN: CPT | Performed by: SURGERY

## 2021-02-25 RX ORDER — VARENICLINE TARTRATE 1 MG/1
1 TABLET, FILM COATED ORAL 2 TIMES DAILY
COMMUNITY
End: 2021-03-19

## 2021-02-25 NOTE — PATIENT INSTRUCTIONS
Schedule a Vaccine  When you qualify to receive the vaccine, call the [facility]) COVID-19 Vaccination Hotline to schedule your appointment or to get additional information about the [facility]) locations which are offering the COVID-19 vaccine. To be 94% effective, it's important that you receive two doses of one of the COVID-19 vaccines. -If you are receiving the Andino Peter vaccine, your second shot will be scheduled as close to 21 days after the first shot as possible. -If you are receiving the Moderna vaccine, your second shot will be scheduled as close to 28 days after the first shot as possible. [facility]) COVID-19 Vaccination Hotline: [phone]    Links to [facility]) website and [facility] website:    [employer]/mercy-[facility]-monitoring-[email or web address]-[email or web address]-19-vaccine/[email or web address]/[email or web address]    [email or web address]

## 2021-02-25 NOTE — LETTER
Visit Date: 2/25/2021    Patient: Sherif Mitchell  YOB: 1975    Dear Dr. Tayler Patiño MD,      I had the pleasure of seeing Sandhya Aaron in the office today for a consult for weight loss surgery. Her current Weight: 263 lb (119.3 kg), which gives her a Body mass index is 39.41 kg/m². .  We had a long discussion regarding surgical options for weight loss and improvement in co-morbidities. She is considering a bariatric  procedure. We will start the preoperative workup, which includes bloodwork, psychological evaluation, support group attendance, and preoperative medical clearance from you. We will also initiate pre-certification for surgery, which requires a letter of medical necessity from you and often office notes documenting a weight history and co-morbidities. Sandhya Aaron will require 3 months of medically supervised visits as specified by the patient's insurance. Thank you for allowing me to participate in the care of your patient. If you have any questions or concerns, please do not hesitate to call.       Sincerely,     Benito Casas DO  Director of Bariatric and Minimally Invasive Surgery  [facility], [address] [facility], [facility] [place]: [phone]  F: [phone]

## 2021-02-25 NOTE — PROGRESS NOTES
[facility]  [facility]  [address] [address]  Dept: [phone]    SURGICAL WEIGHT MANAGEMENT PROGRAM  PROGRESS NOTE INITIAL EVALUATION     Patient: Marilyn Nunez        Service Date: 2/25/2021      HPI:     Chief Complaint   Patient presents with    Bariatric, Initial Visit     sleeve 2016    Weight Loss       The patient is a pleasant 39y.o. year old female  with morbid obesity, who stands Height: 5' 8.5\" (174 cm) tall with a weight of Weight: 263 lb (119.3 kg) , resulting in a BMI of Body mass index is 39.41 kg/m². . The patient suffers from multiple co-morbidities as a result of morbid obesity, including: Hypertension, Obstructive Sleep Apnea treated with BiPAP/CPAP and GERD. She has suffered from obesity for many years. The patient denies  a history of myocardial infarction, deep vein thrombosis, pulmonary embolism, renal failure, hepatic failure and stroke. The patient has failed multiple attempts at non-surgical weight loss, and is now seeking surgical intervention to promote permanent and consistent weight loss. She  has chosen Sleeve Gastrectomy and Revisional Surgery. She is well educated regarding it, as she has recently viewed our weight loss surgery informational seminar .      Medical History:  Past Medical History:   Diagnosis Date    Anemia     History of abnormal cervical Pap smear     Hypertension     IUD (intrauterine device) in place 08/04/2020    Mirena    Menometrorrhagia        Surgical History:  Past Surgical History:   Procedure Laterality Date    BREAST SURGERY      negative rt breast lumpectomy    CHOLECYSTECTOMY      COLPOSCOPY      GASTRIC BYPASS SURGERY      gastric sleeve    INTRAUTERINE DEVICE INSERTION  08/04/2020    Mirena     LIPOMA RESECTION      lt shoulder/upper back       Family History:      Problem Relation Age of Onset    High Blood Pressure Father     Heart Disease Father     Hypertension Maternal Grandmother     Heart Disease Maternal Grandmother     Stroke Maternal Uncle        Social History:   Social History     Tobacco Use    Smoking status: Former Smoker     Packs/day: 1.00     Years: 22.00     Pack years: 22.00     Types: Cigarettes     Start date: 1/15/2020     Quit date: 2020     Years since quittin.5    Smokeless tobacco: Never Used   Substance Use Topics    Alcohol use: Yes     Comment: social    Drug use: No       Current Med List:  Current Outpatient Medications   Medication Sig Dispense Refill    varenicline (CHANTIX) 1 MG tablet Take 1 mg by mouth 2 times daily      cyanocobalamin 100 MCG tablet TAKE 1 TABLET BY MOUTH EVERY DAY 30 tablet 2    labetalol (NORMODYNE) 100 MG tablet Take 1.5 tablets by mouth 2 times daily 90 tablet 2    folic acid (FOLVITE) 1 MG tablet Take 1 tablet by mouth daily 90 tablet 1    ferrous sulfate (FE TABS 325) 325 (65 Fe) MG EC tablet Take 1 tablet by mouth daily (with breakfast) 90 tablet 0    sertraline (ZOLOFT) 50 MG tablet TAKE 1 TABLET BY MOUTH EVERY DAY 90 tablet 0    pantoprazole (PROTONIX) 40 MG tablet TAKE 1 TABLET BY MOUTH EVERY DAY BEFORE BREAKFAST 90 tablet 1    lisinopril (PRINIVIL;ZESTRIL) 40 MG tablet TAKE 1 TABLET BY MOUTH EVERY DAY 90 tablet 0    busPIRone (BUSPAR) 10 MG tablet Take 1 tablet by mouth 2 times daily 180 tablet 1    butalbital-acetaminophen-caffeine (FIORICET, ESGIC) -40 MG per tablet Take 1 tablet by mouth every 4 hours as needed for Headaches 20 tablet 0    atorvastatin (LIPITOR) 10 MG tablet Take 1 tablet by mouth daily 90 tablet 1    Multiple Vitamins-Minerals (MULTIVITAMIN ADULT EXTRA C PO) Take 1 tablet by mouth daily      levonorgestrel (MIRENA) IUD 52 mg 1 each by Intrauterine route      albuterol sulfate HFA (PROAIR HFA) 108 (90 Base) MCG/ACT inhaler Inhale 2 puffs into the lungs every 6 hours as needed for Wheezing 1 Inhaler 0    SUMAtriptan (IMITREX) 25 MG tablet Take 1 tablet by mouth once as needed for Migraine 9 tablet 0     No current facility-administered medications for this visit. Allergies   Allergen Reactions    Nsaids      Pt had gastric sleeve  Cannot take due to bariatric surgery  Cannot take due to bariatric surgery      Tolmetin      Pt had gastric sleeve    Codeine Itching          SOCIAL:       This patient is alone for the evaluation today. [] HIV Risk Factors (i.e.) intravenous drug abuser; at risk sexual behavior; received blood products    [] TB Risk Factors (i.e.) Medically underserved, institutional care, foreign born, endemic area; exposure to active case    [] Hepatitis B&C Risk Factors (i.e.) Received blood transfusion prior to 1992; recreational drug use; high risk sexual behaviors; tattoos or body piercings; contact with blood or needle sticks in the workplace    Comprehension    Ability to grasp concepts and respond to questions:   [x] High   [] Medium   [] Low    Motivation    [x] Asks Questions; eager to learn   [] Needs education   [] Extreme anxiety    [] uncooperative   [] Denies need for education    English Speaking Ability    [x] Speaks English well   [x] Reads English well   [x] Understands spoken english    [x] Understands written English   [] No need for interpretive support      [] Might benefit from interpretive support   []  required for all services     REVIEW OF SYSTEMS: (Negative unless marked otherwise)       Do you or have you had any of the following?   Cardiovascular YES NO Respiratory YES NO   High Blood Pressure   []   [] COPD   []   []   Heart Attack   []   [] TB/Positive skin Test   []   []   Congestive Heart Failure   []   [] Obstructive Sleep Apnea   []   []   Coronary Artery Disease   []   [] Asthma   []   [x]   Circulation Problems   []   []      Activity Intolerance   []   [] Gastrointestinal YES NO   Peripheral Vascular Disease   []   [] Gastric Problems   []   []        Colorectal problems   []   [] Hematological YES NO Ulcer disease   []   []   Bleeding Tendencies   []   [] Liver disease   []   []   Blood Transfusion last 30d   []   [] Gallstones   []   []   Anemia   []   [] Refulx or Heartburn   []   []   Blood Clots   []   []      High Cholesterol   []   [] Muscoloskeletal YES NO   High Triglycerides   []   [] Joint Limitations   []   []      Muscle Weakness   []   []   Eyes, Ears, Nose, Throat YES NO Multiple Sclerosis   []   []   Cataracts   []   [] Arthritis   []   []   Glasses   []   []      Blurred Vision   []   [] Cancer   []   []   Hearing Aids   []   [] Type:     Ringing in Ears   []   []      Difficulty Swallowing   []   [] Encodrine YES NO      Diabetes   []   []   Neurological YES NO Thyroid   []   []   Stroke   []   []      Seizure   []   [] Psychiatric Disorder YES NO   Dizziness/Blackouts/Fainting   []   [] Depression   []   []   Memory Impairement   []   [] Bipolar   []   []   Parkinson's   []   [] Anxiety disorder   []   []           Genitourinary/Gyn YES NO Skin Intact   [x]   []   Urinary Infection   []   []      Stones   []   [] Sleep   YES NO   Kidney Disease   []   [] Excessive daytime sleepiness   [x]   []   Incontinent   []   [] Snoring   [x]   []   Irregular menstrual cycles   []   [] Unrefreshed sleep   [x]   []   Possibly Pregnant? []     [] Other:      Date of LMP:   Preferred location:            PRESENT ILLNESS:     Weight Parameters  Weight 263 lb (119.3 kg)   Height 5' 8.5\" (1.74 m)   BMI Body mass index is 39.41 kg/m².    IBW     EBW               IMMUNIZATION STATUS  Immunization History   Administered Date(s) Administered    Tdap (Boostrix, Adacel) 09/01/2013       FALLS ASSESSMENT    [] LOW RISK FOR FALLS    [] MODERATE RISK FOR FALLS    [] Difficulty walking/selfcare    [] Falls in the past 2 months    [] Suspicion of Clinician    [] Other:      SMOKING CESSATION     [] Not needed     [] Instructed to stop smoking    [] Pamphlet community resources given     VTE SCREEN [] Family hx DVT/PE  /   [] Personal hx of DVT/PE    [x] Denies any family or personal hx of DVT/PE    Physician Review    [x] Past medical, family, & social history reviewed and discussed with patient. Review of surgery and post-surgical changes (by surgeon for surgical patients only)    [x] Lifelong diet expectations reviewed with patient    [x] Need for lifelong vitamin supplementation reviewed with patient    PHYSICAL EXAMINATION:      /70 (Site: Left Upper Arm, Position: Sitting, Cuff Size: Large Adult)   Pulse 76   Resp 20   Ht 5' 8.5\" (1.74 m)   Wt 263 lb (119.3 kg)   BMI 39.41 kg/m²     Constitutional:  Vital signs are normal. The patient appears well-developed   HEENT:      Head: Normocephalic. Atraumatic     Eyes: pupils are equal and reactive. No scleral icterus is present. Neck: No mass and no thyromegaly present. Cardiovascular: Normal rate, regular rhythm, S1 normal and S2 normal.  Bilateral pulses present. Pulmonary/Chest: Effort normal and breath sounds normal. No retractions. Abdominal: Soft. Normal appearance. There is no organomegaly. No tenderness. There is no rigidity, no rebound, no guarding and no Harris's sign. Musculoskeletal:      Right lower leg: Normal. No tenderness and no edema. Left lower leg: Normal. No tenderness and no edema. Lymphadenopathy:     No cervical adenopathy, No Exrtemity Adenopathy. Neurological: The patient is alert and oriented. Moving all four extremities equally, sensation grossly intact bilateral.  Skin: Skin is warm, dry and intact. Psychiatric: The patient has a normal mood and affect.  Speech is normal and behavior is normal. Judgment and thought content normal. Cognition and memory are normal.     RECOMMENDATIONS:     We spent a great deal of time discussing the risks and benefits of Narendra-en-Y Gastric Bypass and Revisional Surgery, including but not limited to injury to intra-abdominal organs, breakdown of the gastric staple line, the need for re-operative therapy,  prolonged hospitalization,  mechanical ventilation,  and death. We discussed the possibility of bleeding, the need for blood transfusions, blood clots, hospital-acquired and intra-abdominal infection, anastomotic stricture, and worsening GERD. And we discussed the need for post-operative visit compliance, behavior modifications and diet changes, protein and vitamin supplementation, as well as routine scheduled and dedicated exercise. I instructed the patient to utilize the exercise log that will be given to them at their fist dietician appointment. We discussed the potential weight loss benefit of approximately 60-70% of her excess body weight at 12-18 months post-op, as well as the possibility of insufficient weight loss or weight gain after 2 years post-operative time. PLAN:       Diagnosis Orders   1. CORNELIA (obstructive sleep apnea)  Baseline Diagnostic Sleep Study   2. Gastroesophageal reflux disease without esophagitis     3. Morbid obesity due to excess calories ([address]  Baseline Diagnostic Sleep Study          Initial Testing     Primary Procedure: Narendra-en-Y Gastric Bypass and Revisional Surgery     Other Procedures:None    Labwork: Initial Pre-surgical Lab Tests (CMP, TSH, Fasting Lipid Profile, Mg, Zinc, Vit B1 (whole blood), Vit B12, 25-OH Vit D, Fe,  Ferritin,  Folate) and Negative serum nicotine prior to submission for pre-auth    Imaging: None    Endoscopic Studies: Upper GI Endoscopy for GERD which has been untreated. Psychological Assessment: Psychological Evaluation and Clearance    Nutrition Assessment: Bariatric Nutrition Assessment and Clearance    Pulmonary Evaluation: CPAP Settings    Other  Consultations: medical clearance    Physician Supervised Diet and Exercise required by the patients insurance company: 3 months.       Surgical Diet requirement:  2 weeks    Final Testing  Screening Chest Xray  and EKG within 6 months of date of surgery Labwork:  Final Lab Tests  within 3 months of date of surgery (CBC, PT/PTT, BMP)    Electronically signed by Saul Cortes DO on 2/25/2021 at 3:23 PM

## 2021-03-08 ENCOUNTER — NURSE ONLY (OUTPATIENT)
Dept: BARIATRICS/WEIGHT MGMT | Age: 46
End: 2021-03-08

## 2021-03-08 VITALS — BODY MASS INDEX: 39.11 KG/M2 | WEIGHT: 261 LBS

## 2021-03-08 NOTE — PROGRESS NOTES
Medical Nutrition Therapy  Initial Nutrition Assessment for previous Metabolic/ Bariatric Surgery    Required insurance visit prior to surgery:  3      Pt reports:     Keon Pitts is a 39 y.o. female with a date of birth of 1975. There were no vitals filed for this visit. BMI: Body mass index is 39.11 kg/m². Obesity Classification: Class II    Weight History: Wt Readings from Last 3 Encounters:   03/08/21 261 lb (118.4 kg)   02/25/21 263 lb (119.3 kg)   01/20/21 259 lb 9.6 oz (117.8 kg)        What is your goal in coming to see us? bypass    Do you drink alcohol? . YES, occasionally    Do you use tobacco in the form of cigarettes, cigars, chew or any vapor appliance? Yes    Weight History  Desired Weight:     [place] weight prior to MBS was 322 lbs at age 47. Patient was at her highest weight for 6 months. Patient's triggers/known causes to her highest weight are depression and stopped smoking. [facility] lowest weight after MBS was 160 lbs at age 23. Patient was at her lowest weight for 3 years. The lowest weight was achieved through ? .     Physical Activity  Do you participate in a structured exercise program, step counting or regular physical activity? yes    MBS Behaviors    Did the surgeon that did your MBS have a supportive educational program in place? yes  What types of behaviors do you currently maintain to utilize the surgery to it's fullest? noted  Do you have good restriction currently? yes  Since  MBS are there any foods that you are unable to tolerate? Sausage casing  When was the last time bariatric labs were checked? ?  Do your know which labs the  provider checked? ?          Nutrition History  Patient  lactose intolerant. What is your current vitamin and mineral regimen? Have you ever had problems tolerating a multivitamin or mineral supplement? Have you ever been diagnosed with a vitamin or mineral deficiency?     Patient  have food journal daily. 0  14 I will log my exercise daily. 15 I will determine my  calcium and multivitamin plan. 16 I will purchase multivitamin. 17 I will start taking multivitamins following my plan. x 18 I will have 1-2 servings of protein present at each meal.                x 19 I will eat every 3-5 hours. x 20 I will drink 64oz of fluid daily. 21 I will follow the 15-30-15 guideline. 22 I will eat protein first at all meals followed by vegetables  Fruit and lastly whole grains. 21 My first one diet neutral approach is:                 24 my second diet neutral approach is:                 25 My third diet neutral approach is:                 Do you understand your goals? y    Do you have the information you need to achieve your goals? y    Do you have any questions  right now? n        Plan      My reccomendation for follow up:    [x] Individual monthly appointment                                []  Attend non surgical information session  Monitor goals adjust as needed. Pt to complete nutrition appointments at which time appropriateness for surgery will be determined by the interdisciplinary team.        Follow up in: Will follow up as necessary.     [facility]by [facility]

## 2021-03-10 ENCOUNTER — OFFICE VISIT (OUTPATIENT)
Dept: SURGERY | Age: 46
End: 2021-03-10
Payer: MEDICARE

## 2021-03-10 VITALS
HEART RATE: 74 BPM | DIASTOLIC BLOOD PRESSURE: 76 MMHG | BODY MASS INDEX: 40.22 KG/M2 | HEIGHT: 68 IN | SYSTOLIC BLOOD PRESSURE: 121 MMHG | WEIGHT: 265.4 LBS | OXYGEN SATURATION: 97 %

## 2021-03-10 DIAGNOSIS — D48.5 NEOPLASM OF UNCERTAIN BEHAVIOR OF SKIN: Primary | ICD-10-CM

## 2021-03-10 PROCEDURE — G8484 FLU IMMUNIZE NO ADMIN: HCPCS | Performed by: PLASTIC SURGERY

## 2021-03-10 PROCEDURE — 1036F TOBACCO NON-USER: CPT | Performed by: PLASTIC SURGERY

## 2021-03-10 PROCEDURE — 99203 OFFICE O/P NEW LOW 30 MIN: CPT | Performed by: PLASTIC SURGERY

## 2021-03-10 PROCEDURE — G8417 CALC BMI ABV UP PARAM F/U: HCPCS | Performed by: PLASTIC SURGERY

## 2021-03-10 PROCEDURE — G8427 DOCREV CUR MEDS BY ELIG CLIN: HCPCS | Performed by: PLASTIC SURGERY

## 2021-03-10 NOTE — PROGRESS NOTES
[address] [facility] SURGICAL SPECIALISTS  [facility] [id number]       History and Physical     Chief Complaint   Patient presents with    New Patient     Patient states she has four lesions on her back. HPI:   Milan Williamson is a 39 y.o. female who presents with multiple skin lesions. Patient has a lesion on the right upper back/shoulder that has been present since she was 15. It is changing. The coloration has changed. Patient also has a lesion medial to that that has just come up. Patient also has a third lesion on the left upper back that she is concerned about. The lesions cause her irritation. Patient denies bleeding. There is nothing that improves them. Patient is here for evaluation treatment. Patient has had sunburns as a child. Patient does not have a personal history of skin cancer.     Medications:     Current Outpatient Medications   Medication Sig Dispense Refill    cyanocobalamin 100 MCG tablet TAKE 1 TABLET BY MOUTH EVERY DAY 30 tablet 2    labetalol (NORMODYNE) 100 MG tablet Take 1.5 tablets by mouth 2 times daily 90 tablet 2    folic acid (FOLVITE) 1 MG tablet Take 1 tablet by mouth daily 90 tablet 1    ferrous sulfate (FE TABS 325) 325 (65 Fe) MG EC tablet Take 1 tablet by mouth daily (with breakfast) 90 tablet 0    sertraline (ZOLOFT) 50 MG tablet TAKE 1 TABLET BY MOUTH EVERY DAY 90 tablet 0    pantoprazole (PROTONIX) 40 MG tablet TAKE 1 TABLET BY MOUTH EVERY DAY BEFORE BREAKFAST 90 tablet 1    lisinopril (PRINIVIL;ZESTRIL) 40 MG tablet TAKE 1 TABLET BY MOUTH EVERY DAY 90 tablet 0    busPIRone (BUSPAR) 10 MG tablet Take 1 tablet by mouth 2 times daily 180 tablet 1    SUMAtriptan (IMITREX) 25 MG tablet Take 1 tablet by mouth once as needed for Migraine 9 tablet 0    butalbital-acetaminophen-caffeine (FIORICET, ESGIC) -40 MG per tablet Take 1 tablet by mouth every 4 hours as needed for Headaches 20 tablet 0    atorvastatin (LIPITOR) 10 MG tablet Take 1 tablet by mouth daily 90 tablet 1    Multiple Vitamins-Minerals (MULTIVITAMIN ADULT EXTRA C PO) Take 1 tablet by mouth daily      levonorgestrel (MIRENA) IUD 52 mg 1 each by Intrauterine route      albuterol sulfate HFA (PROAIR HFA) 108 (90 Base) MCG/ACT inhaler Inhale 2 puffs into the lungs every 6 hours as needed for Wheezing 1 Inhaler 0    varenicline (CHANTIX) 1 MG tablet Take 1 mg by mouth 2 times daily       No current facility-administered medications for this visit. Allergies: Allergies   Allergen Reactions    Nsaids      Pt had gastric sleeve  Cannot take due to bariatric surgery  Cannot take due to bariatric surgery      Tolmetin      Pt had gastric sleeve    Codeine Itching     Review of Systems:   Constitutional: Negative for fever, chills, fatigue and unexpected weight change. HENT: Negative for hearing loss, sore throat and facial swelling. Eyes: Negative for pain and discharge. Respiratory: Negative for cough and shortness of breath. Cardiovascular: Patient has a history of hypertension. Gastrointestinal: Negative for nausea, vomiting, diarrhea and constipation. Skin: Negative for pallor and rash. Neurological: Negative for seizures, syncope and numbness. Hematological: Patient has a history of anemia. Psychiatric/Behavioral: Negative for behavioral problems. The patient is not nervous/anxious.       Past Medical History:   Diagnosis Date    Anemia     History of abnormal cervical Pap smear     Hypertension     IUD (intrauterine device) in place 08/04/2020    Mirena    Menometrorrhagia      Past Surgical History:   Procedure Laterality Date    BREAST SURGERY      negative rt breast lumpectomy    CHOLECYSTECTOMY      COLPOSCOPY      GASTRIC BYPASS SURGERY      gastric sleeve    INTRAUTERINE DEVICE INSERTION  08/04/2020    Mirena     LIPOMA RESECTION      lt shoulder/upper back     Social History     Socioeconomic History    Marital status:      Spouse name: Not on file    Number of children: Not on file    Years of education: Not on file    Highest education level: Not on file   Occupational History    Not on file   Social Needs    Financial resource strain: Patient refused    Food insecurity     Worry: Patient refused     Inability: Patient refused    Transportation needs     Medical: Patient refused     Non-medical: Patient refused   Tobacco Use    Smoking status: Former Smoker     Packs/day: 1.00     Years: 22.00     Pack years: 22.00     Types: Cigarettes     Start date: 1/15/2020     Quit date: 2020     Years since quittin.6    Smokeless tobacco: Never Used   Substance and Sexual Activity    Alcohol use: Yes     Comment: social    Drug use: No    Sexual activity: Not on file   Lifestyle    Physical activity     Days per week: 5 days     Minutes per session: 30 min    Stress: Only a little   Relationships    Social connections     Talks on phone: Not on file     Gets together: Not on file     Attends Synagogue service: Not on file     Active member of club or organization: Not on file     Attends meetings of clubs or organizations: Not on file     Relationship status: Not on file    Intimate partner violence     Fear of current or ex partner: Not on file     Emotionally abused: Not on file     Physically abused: Not on file     Forced sexual activity: Not on file   Other Topics Concern    Not on file   Social History Narrative    Not on file     Family History   Problem Relation Age of Onset    High Blood Pressure Father     Heart Disease Father     Hypertension Maternal Grandmother     Heart Disease Maternal Grandmother     Stroke Maternal Uncle      Physical Exam:   /76 (Site: Left Upper Arm, Position: Sitting, Cuff Size: Large Adult)   Pulse 74   Ht 5' 8\" (1.727 m)   Wt 265 lb 6.4 oz (120.4 kg)   SpO2 97%   BMI 40.35 kg/m²    Body mass index is 40.35 kg/m².   Physical Exam   Nursing note and vitals reviewed. Constitutional: Oriented to person, place, and time. Appears well-developed and well-nourished. No distress. Head: Normocephalic and atraumatic. Eyes: Conjunctivae and EOM are normal.   Pulmonary/Chest: Effort normal. No respiratory distress. Neurological: Alert and oriented to person, place, and time. Skin:       Right shoulder lateral measuring 1.5 x 0.9 cm, probably consistent with a dermatofibroma versus keloiding. Right shoulder medial measuring 1.2 x 0.5 cm be consistent with a dermatofibroma versus keloiding however due to the pigmentation malignancy cannot be excluded    Left upper back measuring 1 cm x 0.5 cm, probably consistent with dermatofibroma versus keloiding, however due to the pigmentation malignancy cannot be excluded      Full back    Psychiatric: Normal mood and affect. Behavior is normal    Imaging:   [unfilled]        Impression/Plan:      Diagnosis Orders   1. Neoplasm of uncertain behavior of skin       Patient Active Problem List   Diagnosis    Menometrorrhagia    IUD (intrauterine device) in place    Essential hypertension    Hiatal hernia    Hyperlipidemia    Iron deficiency anemia    Postgastrectomy malabsorption    Sleep apnea    Vitamin D deficiency    Anxiety state    Hypertensive emergency    Obesity (BMI 30-39. 9)    Recurrent major depressive disorder, in partial remission (HCC)    Folic acid deficiency    Migraine without aura and without status migrainosus, not intractable       Plan:  Patient with several changing lesions on the back one is located on the right upper back/shoulder medial, right upper back/shoulder lateral, and left upper back. Several options were discussed with the patient. Patient elected to have the lesions entirely excised. The risks were discussed with patient in detail. All questions were answered.     Skin lesions plan:   Perform full body skin assessment   Picture and measure skin lesions to monitor. Recommend sun protection with SPF of 30 or greater when out doors  Recommend self-skin assessments monthly    Discussed suspicious lesions with patient and what to look for during the self-skin exam   If a personal history of skin cancer; recommend full body skin assessments every 3 months for a minimum of 2 years then every 6-12 months   Personal history of skin lesions; recommend full body skin assessments every 6-12 months   Evaluate and treat suspicious lesions            The following information was discussed with the patient, but this is not an all-inclusive-other issue were also discussed with patient. GENERAL INFORMATION  The surgical removal of skin lesions and tumors is frequently performed by plastic surgeons. Certain skin lesions and skin tumors will not disappear spontaneously, surgical removal is a treatment option. There are many different techniques for removing skin lesions and skin tumors. ALTERNATIVE TREATMENTS  Alternative forms of management consist of not treating the skin lesion/skin tumor condition. Removal of skin lesions and some superficial skin tumors may be accomplished by other treatments including the use of liquid nitrogen (freezing), lasers, topical medications, and electric cautery. Risks and potential complications are associated with alternative forms of treatment. Deeper tumors cannot be treated by this. RISKS OF SKIN LESION/SKIN TUMOR SURGERY    I have explained to the patient that every surgical procedure involves a certain amount of risk and it is important that an understanding of these risks and the possible complications associated with them is understood. In addition, every procedure has limitations. An individual's choice to undergo a surgical procedure is based on the comparison of the risk to potential benefit. Although some of patients do not experience these complications.     Bleeding- It is possible, to experience a bleeding episode during or after surgery. Intraoperative blood transfusions may be required. Should post-operative bleeding occur, it may require an emergency treatment to drain the accumulated blood or blood transfusion. Do not take any aspirin or anti-inflammatory medications for ten days before or after surgery, as this may increase the risk of bleeding. Non-prescription herbs and dietary supplements can increase the risk of surgical bleeding. Hematoma can occur at any time following injury. If blood transfusions are necessary to treat blood loss, there is the risk of blood-related infections such as hepatitis and HIV (AIDS). Heparin medications that are used to prevent blood clots in veins can produce bleeding and decreased blood platelets. Please check with the physician who prescribed that blood thinners such as aspirin Coumadin etc. Prior to stopping them. Infection- Infection can occur after surgery. Should an infection occur, additional treatment including antibiotics, hospitalization, or additional surgery may be necessary. Scarring- All surgery leaves scars, some more visible than others. Although wound healing after a surgical procedure is expected, abnormal scars may occur within the skin and deeper tissues. Scars may be unattractive and of different color than the surrounding skin tone. Scar appearance may also vary within the same scar. There is the possibility of visible marks from sutures used to close the wound after the removal of skin lesions and tumors. There is the possibility that scars may limit motion and function. In some cases, scars may require surgical revision or treatment. Obesity: If you're BMI is greater than 30 you may have a higher chance of complications. This may include but not limited to wound healing and infections. Also, if you have other medical problems such as diabetes and hypertension it may affect your healing as well as your surgical results in bleeding. Damage to Deeper Structures- There is the potential for injury to deeper structures including nerves, blood vessels, muscles, and lungs (pneumothorax) during any surgical procedure. The potential for this to occur varies according to where on the body surgery is being performed. Injury to deeper structures may be temporary or permanent. Cancer- In some situations, a skin lesion or tumor that appears to be benign may be determined to be cancerous after laboratory analysis. Additional treatments or surgery may be necessary. Recurrence- In some situation, skin lesions and tumors can recur after surgical excision. Additional treatment or secondary surgery may be necessary. Skin Discoloration / Swelling- Some bruising and swelling normally occurs following surgery. The skin in or near the surgical site can appear either lighter or darker than surrounding skin. Although uncommon, swelling and skin discoloration may persist for long periods of time and, in rare situations, may be permanent. Skin Sensitivity- Itching, tenderness, or exaggerated responses to hot or cold temperatures may occur after surgery. Usually this resolves during healing, but in some situations, it may be chronic, permanent. Pain- You will experience pain after your surgery. Pain of varying intensity and duration may occur and persist after surgery. Chronic pain may occur from nerves becoming trapped in scar tissue. Sutures- Some surgical techniques use deep sutures. You may notice these sutures after your surgery. Sutures may spontaneously poke through the skin, become visible or produce irritation that requires removal.  Delayed Healing- Wound disruption or delayed wound healing is possible. Some areas of the skin may not heal normally and may take a long time to heal.  Some areas of skin may die, requiring frequent dressing changes or further surgery to remove the non-healed tissue.   Smokers have a greater risk of skin loss and wound healing complications. Skin Contour Irregularities- Contour irregularities and depressions may occur after surgery. Visible and palpable wrinkling of skin can occur. Residual skin irregularities at the ends of the incisions or dog ears are always a possibility and may require additional surgery. This may improve with time, or it can be surgically corrected. Allergic Reactions- In some cases, local allergies to tape, suture materials and glues, blood products, topical preparations or injected agents have been reported. Serious systemic reactions including shock (anaphylaxis) may occur to drugs used during surgery and prescription medications. Allergic reactions may require additional treatment. Surgical Anesthesia- Both local and general anesthesia involve risk. There is the possibility of complications, injury, and even death from all forms of surgical anesthesia or sedation. Change in Skin Sensation- It is common to experience diminished (or loss) of skin sensation in areas that have had surgery. Diminished (or complete loss of skin sensation) may not totally resolve. Shock- In rare circumstances, your surgical procedure can cause severe trauma, particularly when multiple or extensive procedures are performed. Although serious complications are infrequent, infections or excessive fluid loss can lead to severe illness and even death. If surgical shock occurs, hospitalization and additional treatment would be necessary. Unsatisfactory Result- There is no guarantee or warranty expressed or implied, on the results that may be obtained. There is the possibility of a poor result from the removal of skin lesions and tumors. This would include risks such as unacceptable visible deformities, loss of function, poor healing, wound disruption, skin death and loss of sensation. You may be disappointed with the results of reconstructive surgery.  It may be necessary to perform additional surgery to improve your results. Cardiac and Pulmonary Complications- Surgery, especially longer procedures, may be associated with the formation of, or increase in, blood clots in the venous system. Pulmonary complications may occur secondarily to both blood clots (pulmonary emboli), fat deposits (fat emboli) or partial collapse of the lungs after general anesthesia. Pulmonary and fat emboli can be life-threatening or fatal in some circumstances. Air travel, inactivity and other conditions may increase the incidence of blood clots traveling to the lungs causing a major blood clot that may result in death. It is important to discuss with your physician any past history of blood clots or swollen legs that may contribute to this condition. Cardiac complications are a risk with any surgery and anesthesia, even in patients without symptoms. If you experience shortness of breath, chest pains, or unusual heart beats, seek medical attention immediately. Should any of these complications occur, you may require hospitalization and additional treatment. Smoking, Second-Hand Smoke Exposure, Nicotine Products (Patch, Gum, Nasal Spray)-   Patients who are currently smoking, use tobacco products, or nicotine products (patch, gum, or nasal spray) are at a greater risk for significant surgical complications of skin dying, delayed healing, and additional scarring. Individuals exposed to second-hand smoke are also at potential risk for similar complications attributable to nicotine exposure. Additionally, smoking may have a significant negative effect on anesthesia and recovery from anesthesia, with coughing and possibly increased bleeding. Individuals who are not exposed to tobacco smoke or nicotine-containing products have a significantly lower risk of this type of complication. It is important to refrain from smoking at least 8-week weeks before and after surgery. This may apply to secondhand smoking.     Mental Health Disorders and Surgery- It is important that all patients seeking to undergo surgery have realistic expectations that focus on improvement rather than perfection. Complications or less than satisfactory results are sometimes unavoidable, may require additional surgery and often are stressful. Please openly discuss with your surgeon, prior to surgery, any history that you may have of significant emotional depression or mental health disorders. Although many individuals may benefit psychologically from the results of surgery, effects on mental health cannot be accurately predicted. Medications- There are many adverse reactions that occur as the result of taking over the counter, herbal, and/or prescription medications. Be sure to check with your physician about any drug interactions that may exist with medications which you are already taking. If you have an adverse reaction, stop the drugs immediately and call your plastic surgeon for further instructions. If the reaction is severe, go immediately to the nearest emergency room. When taking the prescribed pain medications after surgery, realize that they can affect your thought process and coordination. Do not drive, do not operate complex equipment, do not make any important decisions, and do not drink any alcohol while taking these medications. Be sure to take your prescribed medication only as directed. ADDITIONAL SURGERY NECESSARY  Should complications occur, additional surgery or other treatments may be necessary. Even though risks and complications occur infrequently, the risks cited are the ones that are particularly associated with skin lesion and skin tumor removal.  Other complications and risks can occur but are even more uncommon. The practice of medicine and surgery is not an exact science. Although good results are expected, there is no guarantee or warranty expressed or implied, on the results that may be obtained.   PATIENT COMPLIANCE Follow all physician instructions carefully; this is essential for the success of your outcome. It is important that the surgical incisions are not subjected to excessive force, swelling, abrasion, or motion during the time of healing. Protective dressings and drains should not be removed unless instructed by your plastic surgeon. Successful post-operative function depends on both surgery and subsequent care. Physical activity that increases your pulse or heart rate may cause bruising, swelling, fluid accumulation and the need for return to surgery. It is wise to refrain from intimate physical activities after surgery until your physician states it is safe. It is important that you participate in follow-up care, return for aftercare, and promote your recovery after surgery.           Electronically signed by:  Darrick Davis MD 3/10/2021

## 2021-03-15 ENCOUNTER — HOSPITAL ENCOUNTER (OUTPATIENT)
Dept: SLEEP CENTER | Age: 46
Discharge: HOME | End: 2021-03-17
Payer: MEDICARE

## 2021-03-15 VITALS
TEMPERATURE: 95.9 F | WEIGHT: 263 LBS | HEIGHT: 69 IN | RESPIRATION RATE: 18 BRPM | HEART RATE: 54 BPM | BODY MASS INDEX: 38.95 KG/M2

## 2021-03-15 DIAGNOSIS — E66.01 MORBID OBESITY DUE TO EXCESS CALORIES (HCC): ICD-10-CM

## 2021-03-15 DIAGNOSIS — G47.33 OSA (OBSTRUCTIVE SLEEP APNEA): ICD-10-CM

## 2021-03-15 DIAGNOSIS — E78.5 DYSLIPIDEMIA: ICD-10-CM

## 2021-03-15 PROCEDURE — 95810 POLYSOM 6/> YRS 4/> PARAM: CPT

## 2021-03-15 RX ORDER — ATORVASTATIN CALCIUM 10 MG/1
TABLET, FILM COATED ORAL
Qty: 90 TABLET | Refills: 1 | Status: SHIPPED | OUTPATIENT
Start: 2021-03-15 | End: 2021-03-30 | Stop reason: SDUPTHER

## 2021-03-15 NOTE — TELEPHONE ENCOUNTER
Next Visit Date:  Future Appointments   Date Time Provider Bayron Gamez   3/15/2021  8:00 PM [facility] SLEEP RM [address] [facility]   3/28/2021 10:50 AM SCHEDULE, [facility] COVID SCREENING [facility]   4/5/2021  9:15 AM TERA Nieto - CNP Weight Mgmt [facility]   4/14/2021  5:30 PM SCHEDULE, [facility] BARIATRIC SURG bariatric [address]   4/21/2021 11:00 AM MD Luz Medellin PC [facility]   5/6/2021 12:00 PM SCHEDULE, [facility] BARIATRIC SURG bariatric [address] [facility]   6/3/2021 12:00 PM SCHEDULE, [facility] BARIATRIC SURG bariatric [address] Maintenance   Topic Date Due    Hepatitis C screen  Never done    Flu vaccine (1) 09/28/2021 (Originally 9/1/2020)    Cervical cancer screen  01/20/2022 (Originally 6/20/1998)    Lipid screen  09/21/2021    Potassium monitoring  09/21/2021    Creatinine monitoring  09/21/2021    DTaP/Tdap/Td vaccine (2 - Td) 09/01/2023    HIV screen  Completed    Hepatitis A vaccine  Aged Out    Hepatitis B vaccine  Aged Out    Hib vaccine  Aged Out    Meningococcal (ACWY) vaccine  Aged Out    Pneumococcal 0-64 years Vaccine  Aged Out       No results found for: LABA1C          ( goal A1C is < 7)   No results found for: LABMICR  LDL Cholesterol (mg/dL)   Date Value   09/21/2020 80   08/03/2020 149 (H)       (goal LDL is <100)   AST (U/L)   Date Value   09/21/2020 16     ALT (U/L)   Date Value   09/21/2020 14     BUN (mg/dL)   Date Value   09/21/2020 13     BP Readings from Last 3 Encounters:   03/10/21 121/76   02/25/21 118/70   01/20/21 132/86          (goal 120/80)    All Future Testing planned in [facility]  Lab Frequency Next Occurrence   Metanephrines Urine Once 09/28/2020   Metanephrines Plasma Free Once 09/28/2020   Aldosterone Once 09/28/2020   Renin Once 09/28/2020   Lipid, Fasting Once 01/20/2021   Comprehensive Metabolic Panel, Fasting Once 01/20/2021   TSH With Reflex Ft4 Once 01/20/2021   CBC Auto Differential Once 01/20/2021   Iron And TIBC Once 01/20/2021 Ferritin Once 01/20/2021   Baseline Diagnostic Sleep Study Once 02/25/2021               Patient Active Problem List:     Menometrorrhagia     IUD (intrauterine device) in place     Essential hypertension     Hiatal hernia     Hyperlipidemia     Iron deficiency anemia     Postgastrectomy malabsorption     Sleep apnea     Vitamin D deficiency     Anxiety state     Hypertensive emergency     Obesity (BMI 30-39. 9)     Recurrent major depressive disorder, in partial remission (HCC)     Folic acid deficiency     Migraine without aura and without status migrainosus, not intractable     Neoplasm of uncertain behavior of skin

## 2021-03-18 RX ORDER — ALBUTEROL SULFATE 90 UG/1
2 AEROSOL, METERED RESPIRATORY (INHALATION) EVERY 6 HOURS PRN
Qty: 1 INHALER | Refills: 0 | Status: SHIPPED | OUTPATIENT
Start: 2021-03-18 | End: 2021-07-08

## 2021-03-18 NOTE — TELEPHONE ENCOUNTER
Patient states she contacted her pharmacy to get a refill of pended medication, and was informed they do not have the medication on file. Please advise, thank you!           Next Visit Date:  Future Appointments   Date Time Provider Bayron Gamez   3/28/2021 10:50 AM SCHEDULE, [facility]ET [facility] SCREENING STAANGELIKA Holland   4/5/2021  9:15 AM TERA Haynes - CNP Weight Mgmt [facility]   4/14/2021  5:30 PM SCHEDULE, [facility] BARIATRIC SURG [address]ric [address] [facility]   4/21/2021 11:00 AM MD Luz Hawkins PC [facility]   5/6/2021 12:00 PM SCHEDULE, [facility] BARIATRIC SURG b[address]ric [address] [facility]   6/3/2021 12:00 PM SCHEDULE, [facility] BARIATRIC SURG [address]ric [address] Maintenance   Topic Date Due    Hepatitis C screen  Never done    Flu vaccine (1) 09/28/2021 (Originally 9/1/2020)    Cervical cancer screen  01/20/2022 (Originally 6/20/1998)    Lipid screen  09/21/2021    Potassium monitoring  09/21/2021    Creatinine monitoring  09/21/2021    DTaP/Tdap/Td vaccine (2 - Td) 09/01/2023    HIV screen  Completed    Hepatitis A vaccine  Aged Out    Hepatitis B vaccine  Aged Out    Hib vaccine  Aged Out    Meningococcal (ACWY) vaccine  Aged Out    Pneumococcal 0-64 years Vaccine  Aged Out       No results found for: LABA1C          ( goal A1C is < 7)   No results found for: LABMICR  LDL Cholesterol (mg/dL)   Date Value   09/21/2020 80   08/03/2020 149 (H)       (goal LDL is <100)   AST (U/L)   Date Value   09/21/2020 16     ALT (U/L)   Date Value   09/21/2020 14     BUN (mg/dL)   Date Value   09/21/2020 13     BP Readings from Last 3 Encounters:   03/10/21 121/76   02/25/21 118/70   01/20/21 132/86          (goal 120/80)    All Future Testing planned in [facility]  Lab Frequency Next Occurrence   Metanephrines Urine Once 09/28/2020   Metanephrines Plasma Free Once 09/28/2020   Aldosterone Once 09/28/2020   Renin Once 09/28/2020   Lipid, Fasting Once 01/20/2021   Comprehensive Metabolic Panel, Fasting Once 01/20/2021   TSH With Reflex Ft4 Once 01/20/2021   CBC Auto Differential Once 01/20/2021   Iron And TIBC Once 01/20/2021   Ferritin Once 01/20/2021               Patient Active Problem List:     Menometrorrhagia     IUD (intrauterine device) in place     Essential hypertension     Hiatal hernia     Hyperlipidemia     Iron deficiency anemia     Postgastrectomy malabsorption     Sleep apnea     Vitamin D deficiency     Anxiety state     Hypertensive emergency     Obesity (BMI 30-39. 9)     Recurrent major depressive disorder, in partial remission (HCC)     Folic acid deficiency     Migraine without aura and without status migrainosus, not intractable     Neoplasm of uncertain behavior of skin

## 2021-03-23 PROBLEM — G47.33 OSA (OBSTRUCTIVE SLEEP APNEA): Status: ACTIVE | Noted: 2020-08-12

## 2021-03-23 LAB — STATUS: NORMAL

## 2021-03-23 PROCEDURE — 95810 POLYSOM 6/> YRS 4/> PARAM: CPT | Performed by: PSYCHIATRY & NEUROLOGY

## 2021-03-24 ENCOUNTER — HOSPITAL ENCOUNTER (OUTPATIENT)
Age: 46
Discharge: HOME | End: 2021-03-24
Payer: MEDICARE

## 2021-03-24 DIAGNOSIS — I10 ESSENTIAL HYPERTENSION: ICD-10-CM

## 2021-03-24 DIAGNOSIS — E78.5 DYSLIPIDEMIA: ICD-10-CM

## 2021-03-24 DIAGNOSIS — D50.0 IRON DEFICIENCY ANEMIA DUE TO CHRONIC BLOOD LOSS: ICD-10-CM

## 2021-03-24 LAB
ABSOLUTE EOS #: 0.27 K/UL (ref 0–0.44)
ABSOLUTE IMMATURE GRANULOCYTE: <0.03 K/UL (ref 0–0.3)
ABSOLUTE LYMPH #: 1.78 K/UL (ref 1.1–3.7)
ABSOLUTE MONO #: 0.34 K/UL (ref 0.1–1.2)
ALBUMIN SERPL-MCNC: 4.2 G/DL (ref 3.5–5.2)
ALBUMIN/GLOBULIN RATIO: 1.2 (ref 1–2.5)
ALP BLD-CCNC: 105 U/L (ref 35–104)
ALT SERPL-CCNC: 33 U/L (ref 5–33)
ANION GAP SERPL CALCULATED.3IONS-SCNC: 13 MMOL/L (ref 9–17)
AST SERPL-CCNC: 29 U/L
BASOPHILS # BLD: 1 % (ref 0–2)
BASOPHILS ABSOLUTE: 0.04 K/UL (ref 0–0.2)
BILIRUB SERPL-MCNC: 0.24 MG/DL (ref 0.3–1.2)
BUN BLDV-MCNC: 18 MG/DL (ref 6–20)
BUN/CREAT BLD: ABNORMAL (ref 9–20)
CALCIUM SERPL-MCNC: 9.5 MG/DL (ref 8.6–10.4)
CHLORIDE BLD-SCNC: 107 MMOL/L (ref 98–107)
CHOLESTEROL, FASTING: 169 MG/DL
CHOLESTEROL/HDL RATIO: 3.3
CO2: 22 MMOL/L (ref 20–31)
CREAT SERPL-MCNC: 0.72 MG/DL (ref 0.5–0.9)
DIFFERENTIAL TYPE: ABNORMAL
EOSINOPHILS RELATIVE PERCENT: 5 % (ref 1–4)
FERRITIN: 12 UG/L (ref 13–150)
GFR AFRICAN AMERICAN: >60 ML/MIN
GFR NON-AFRICAN AMERICAN: >60 ML/MIN
GFR SERPL CREATININE-BSD FRML MDRD: ABNORMAL ML/MIN/{1.73_M2}
GFR SERPL CREATININE-BSD FRML MDRD: ABNORMAL ML/MIN/{1.73_M2}
GLUCOSE FASTING: 95 MG/DL (ref 70–99)
HCT VFR BLD CALC: 36 % (ref 36.3–47.1)
HDLC SERPL-MCNC: 51 MG/DL
HEMOGLOBIN: 10.8 G/DL (ref 11.9–15.1)
IMMATURE GRANULOCYTES: 0 %
IRON SATURATION: 8 % (ref 20–55)
IRON: 36 UG/DL (ref 37–145)
LDL CHOLESTEROL: 95 MG/DL (ref 0–130)
LYMPHOCYTES # BLD: 31 % (ref 24–43)
MCH RBC QN AUTO: 23.9 PG (ref 25.2–33.5)
MCHC RBC AUTO-ENTMCNC: 30 G/DL (ref 28.4–34.8)
MCV RBC AUTO: 79.6 FL (ref 82.6–102.9)
MONOCYTES # BLD: 6 % (ref 3–12)
NRBC AUTOMATED: 0 PER 100 WBC
PDW BLD-RTO: 17.4 % (ref 11.8–14.4)
PLATELET # BLD: 318 K/UL (ref 138–453)
PLATELET ESTIMATE: ABNORMAL
PMV BLD AUTO: 9.5 FL (ref 8.1–13.5)
POTASSIUM SERPL-SCNC: 4.3 MMOL/L (ref 3.7–5.3)
RBC # BLD: 4.52 M/UL (ref 3.95–5.11)
RBC # BLD: ABNORMAL 10*6/UL
SEG NEUTROPHILS: 57 % (ref 36–65)
SEGMENTED NEUTROPHILS ABSOLUTE COUNT: 3.31 K/UL (ref 1.5–8.1)
SODIUM BLD-SCNC: 142 MMOL/L (ref 135–144)
TOTAL IRON BINDING CAPACITY: 455 UG/DL (ref 250–450)
TOTAL PROTEIN: 7.6 G/DL (ref 6.4–8.3)
TRIGLYCERIDE, FASTING: 115 MG/DL
TSH SERPL DL<=0.05 MIU/L-ACNC: 2.65 MIU/L (ref 0.3–5)
UNSATURATED IRON BINDING CAPACITY: 419 UG/DL (ref 112–347)
VLDLC SERPL CALC-MCNC: NORMAL MG/DL (ref 1–30)
WBC # BLD: 5.8 K/UL (ref 3.5–11.3)
WBC # BLD: ABNORMAL 10*3/UL

## 2021-03-24 PROCEDURE — 36415 COLL VENOUS BLD VENIPUNCTURE: CPT

## 2021-03-24 PROCEDURE — 80053 COMPREHEN METABOLIC PANEL: CPT

## 2021-03-24 PROCEDURE — 83540 ASSAY OF IRON: CPT

## 2021-03-24 PROCEDURE — 84443 ASSAY THYROID STIM HORMONE: CPT

## 2021-03-24 PROCEDURE — 85025 COMPLETE CBC W/AUTO DIFF WBC: CPT

## 2021-03-24 PROCEDURE — 83550 IRON BINDING TEST: CPT

## 2021-03-24 PROCEDURE — 80061 LIPID PANEL: CPT

## 2021-03-24 PROCEDURE — 82728 ASSAY OF FERRITIN: CPT

## 2021-03-24 RX ORDER — LANOLIN ALCOHOL/MO/W.PET/CERES
325 CREAM (GRAM) TOPICAL
Qty: 90 TABLET | Refills: 0 | Status: SHIPPED | OUTPATIENT
Start: 2021-03-24

## 2021-03-26 ENCOUNTER — ANESTHESIA EVENT (OUTPATIENT)
Dept: OPERATING ROOM | Age: 46
End: 2021-03-26
Payer: MEDICARE

## 2021-03-28 ENCOUNTER — HOSPITAL ENCOUNTER (OUTPATIENT)
Dept: LAB | Age: 46
Setting detail: SPECIMEN
Discharge: HOME | End: 2021-03-28
Payer: MEDICARE

## 2021-03-28 DIAGNOSIS — Z01.818 PREOP TESTING: Primary | ICD-10-CM

## 2021-03-28 PROCEDURE — U0005 INFEC AGEN DETEC AMPLI PROBE: HCPCS

## 2021-03-28 PROCEDURE — U0003 INFECTIOUS AGENT DETECTION BY NUCLEIC ACID (DNA OR RNA); SEVERE ACUTE RESPIRATORY SYNDROME CORONAVIRUS 2 (SARS-COV-2) (CORONAVIRUS DISEASE [COVID-19]), AMPLIFIED PROBE TECHNIQUE, MAKING USE OF HIGH THROUGHPUT TECHNOLOGIES AS DESCRIBED BY CMS-2020-01-R: HCPCS

## 2021-03-29 DIAGNOSIS — G47.33 OBSTRUCTIVE SLEEP APNEA: Primary | ICD-10-CM

## 2021-03-29 LAB
SARS-COV-2: NORMAL
SARS-COV-2: NOT DETECTED
SOURCE: NORMAL

## 2021-03-30 DIAGNOSIS — E78.5 DYSLIPIDEMIA: ICD-10-CM

## 2021-03-30 NOTE — TELEPHONE ENCOUNTER
Next Visit Date:  Future Appointments   Date Time Provider Bayron Gamez   4/5/2021  9:15 AM TERA Stone - CNP Weight Mgmt [facility]   4/9/2021  9:30 AM SCHEDULE, [facility]   4/13/2021  7:30 PM STAZ SLEEP RM [address]    4/21/2021 11:00 AM MD Luz Martinez PC [facility]   5/6/2021 12:00 PM SCHEDULE, [facility] BARIATRIC SURG bariatric [address] [facility]   6/3/2021 12:00 PM SCHEDULE, [facility] BARIATRIC SURG b[address]ric [address] Maintenance   Topic Date Due    Hepatitis C screen  Never done    COVID-19 Vaccine (1) Never done    Flu vaccine (1) 09/28/2021 (Originally 9/1/2020)    Cervical cancer screen  01/20/2022 (Originally 6/20/1998)    Lipid screen  03/24/2022    Potassium monitoring  03/24/2022    Creatinine monitoring  03/24/2022    DTaP/Tdap/Td vaccine (2 - Td) 09/01/2023    HIV screen  Completed    Hepatitis A vaccine  Aged Out    Hepatitis B vaccine  Aged Out    Hib vaccine  Aged Out    Meningococcal (ACWY) vaccine  Aged Out    Pneumococcal 0-64 years Vaccine  Aged Out       No results found for: LABA1C          ( goal A1C is < 7)   No results found for: LABMICR  LDL Cholesterol (mg/dL)   Date Value   03/24/2021 95   09/21/2020 80       (goal LDL is <100)   AST (U/L)   Date Value   03/24/2021 29     ALT (U/L)   Date Value   03/24/2021 33     BUN (mg/dL)   Date Value   03/24/2021 18     BP Readings from Last 3 Encounters:   03/10/21 121/76   02/25/21 118/70   01/20/21 132/86          (goal 120/80)    All Future Testing planned in [facility]  Lab Frequency Next Occurrence   Metanephrines Urine Once 09/28/2020   Metanephrines Plasma Free Once 09/28/2020   Aldosterone Once 09/28/2020   Renin Once 09/28/2020   Sleep Study with PAP Titration Once 03/29/2021               Patient Active Problem List:     Menometrorrhagia     IUD (intrauterine device) in place     Essential hypertension     Hiatal hernia     Hyperlipidemia     Iron deficiency anemia     Morbid obesity due to excess calories (HCC)     Postgastrectomy malabsorption     CORNELIA (obstructive sleep apnea)     Vitamin D deficiency     Anxiety state     Hypertensive emergency     Obesity (BMI 30-39. 9)     Recurrent major depressive disorder, in partial remission (HCC)     Folic acid deficiency     Migraine without aura and without status migrainosus, not intractable     Neoplasm of uncertain behavior of skin

## 2021-03-31 DIAGNOSIS — F41.9 ANXIETY: ICD-10-CM

## 2021-03-31 DIAGNOSIS — F33.0 MILD EPISODE OF RECURRENT MAJOR DEPRESSIVE DISORDER (HCC): ICD-10-CM

## 2021-03-31 RX ORDER — ATORVASTATIN CALCIUM 10 MG/1
10 TABLET, FILM COATED ORAL DAILY
Qty: 90 TABLET | Refills: 1 | Status: SHIPPED | OUTPATIENT
Start: 2021-03-31 | End: 2021-10-20

## 2021-03-31 NOTE — TELEPHONE ENCOUNTER
Next Visit Date:  Future Appointments   Date Time Provider Bayron Gamez   4/5/2021  9:15 AM TERA Palacios - CNP Weight Mgmt [facility]   4/9/2021  9:30 AM SCHEDULE, [facility] COVID SCREENING STAZ COV [facility]    4/13/2021  7:30 PM STAZ SLEEP RM 7 [facility] [facility]   4/21/2021 11:00 AM MD Luz Thorpe PC [facility]P   5/6/2021 12:00 PM SCHEDULE, P BARIATRIC SURG bariatric [address]   6/3/2021 12:00 PM SCHEDULE, [facility] BARIATRIC SURG b[address]ric [address] Maintenance   Topic Date Due    Hepatitis C screen  Never done    COVID-19 Vaccine (1) Never done    Flu vaccine (1) 09/28/2021 (Originally 9/1/2020)    Cervical cancer screen  01/20/2022 (Originally 6/20/1998)    Lipid screen  03/24/2022    Potassium monitoring  03/24/2022    Creatinine monitoring  03/24/2022    DTaP/Tdap/Td vaccine (2 - Td) 09/01/2023    HIV screen  Completed    Hepatitis A vaccine  Aged Out    Hepatitis B vaccine  Aged Out    Hib vaccine  Aged Out    Meningococcal (ACWY) vaccine  Aged Out    Pneumococcal 0-64 years Vaccine  Aged Out       No results found for: LABA1C          ( goal A1C is < 7)   No results found for: LABMICR  LDL Cholesterol (mg/dL)   Date Value   03/24/2021 95   09/21/2020 80       (goal LDL is <100)   AST (U/L)   Date Value   03/24/2021 29     ALT (U/L)   Date Value   03/24/2021 33     BUN (mg/dL)   Date Value   03/24/2021 18     BP Readings from Last 3 Encounters:   03/10/21 121/76   02/25/21 118/70   01/20/21 132/86          (goal 120/80)    All Future Testing planned in [facility]  Lab Frequency Next Occurrence   Metanephrines Urine Once 09/28/2020   Metanephrines Plasma Free Once 09/28/2020   Aldosterone Once 09/28/2020   Renin Once 09/28/2020   Sleep Study with PAP Titration Once 03/29/2021               Patient Active Problem List:     Menometrorrhagia     IUD (intrauterine device) in place     Essential hypertension     Hiatal hernia     Hyperlipidemia     Iron deficiency anemia

## 2021-04-01 ENCOUNTER — ANESTHESIA (OUTPATIENT)
Dept: OPERATING ROOM | Age: 46
End: 2021-04-01
Payer: MEDICARE

## 2021-04-01 ENCOUNTER — HOSPITAL ENCOUNTER (OUTPATIENT)
Age: 46
Setting detail: OUTPATIENT SURGERY
Discharge: HOME | End: 2021-04-01
Attending: PLASTIC SURGERY | Admitting: PLASTIC SURGERY
Payer: MEDICARE

## 2021-04-01 VITALS
OXYGEN SATURATION: 90 % | SYSTOLIC BLOOD PRESSURE: 133 MMHG | TEMPERATURE: 97.2 F | BODY MASS INDEX: 39.58 KG/M2 | DIASTOLIC BLOOD PRESSURE: 70 MMHG | WEIGHT: 267.2 LBS | HEART RATE: 62 BPM | HEIGHT: 69 IN | RESPIRATION RATE: 20 BRPM

## 2021-04-01 VITALS — DIASTOLIC BLOOD PRESSURE: 70 MMHG | SYSTOLIC BLOOD PRESSURE: 121 MMHG | TEMPERATURE: 97.7 F | OXYGEN SATURATION: 100 %

## 2021-04-01 DIAGNOSIS — G89.18 POST-OP PAIN: Primary | ICD-10-CM

## 2021-04-01 DIAGNOSIS — D48.5 NEOPLASM OF UNCERTAIN BEHAVIOR OF SKIN: ICD-10-CM

## 2021-04-01 LAB — HCG, PREGNANCY URINE (POC): NEGATIVE

## 2021-04-01 PROCEDURE — 2580000003 HC RX 258: Performed by: ANESTHESIOLOGY

## 2021-04-01 PROCEDURE — 7100000000 HC PACU RECOVERY - FIRST 15 MIN: Performed by: PLASTIC SURGERY

## 2021-04-01 PROCEDURE — 6370000000 HC RX 637 (ALT 250 FOR IP)

## 2021-04-01 PROCEDURE — 81025 URINE PREGNANCY TEST: CPT

## 2021-04-01 PROCEDURE — 11402 EXC TR-EXT B9+MARG 1.1-2 CM: CPT | Performed by: PLASTIC SURGERY

## 2021-04-01 PROCEDURE — 7100000011 HC PHASE II RECOVERY - ADDTL 15 MIN: Performed by: PLASTIC SURGERY

## 2021-04-01 PROCEDURE — 11401 EXC TR-EXT B9+MARG 0.6-1 CM: CPT | Performed by: PLASTIC SURGERY

## 2021-04-01 PROCEDURE — 2500000003 HC RX 250 WO HCPCS: Performed by: PLASTIC SURGERY

## 2021-04-01 PROCEDURE — 3600000002 HC SURGERY LEVEL 2 BASE: Performed by: PLASTIC SURGERY

## 2021-04-01 PROCEDURE — 7100000010 HC PHASE II RECOVERY - FIRST 15 MIN: Performed by: PLASTIC SURGERY

## 2021-04-01 PROCEDURE — 12035 INTMD RPR S/A/T/EXT 12.6-20: CPT | Performed by: PLASTIC SURGERY

## 2021-04-01 PROCEDURE — 11403 EXC TR-EXT B9+MARG 2.1-3CM: CPT | Performed by: PLASTIC SURGERY

## 2021-04-01 PROCEDURE — 3600000012 HC SURGERY LEVEL 2 ADDTL 15MIN: Performed by: PLASTIC SURGERY

## 2021-04-01 PROCEDURE — 3700000001 HC ADD 15 MINUTES (ANESTHESIA): Performed by: PLASTIC SURGERY

## 2021-04-01 PROCEDURE — 6360000002 HC RX W HCPCS: Performed by: ANESTHESIOLOGY

## 2021-04-01 PROCEDURE — 2709999900 HC NON-CHARGEABLE SUPPLY: Performed by: PLASTIC SURGERY

## 2021-04-01 PROCEDURE — 2500000003 HC RX 250 WO HCPCS: Performed by: ANESTHESIOLOGY

## 2021-04-01 PROCEDURE — 88305 TISSUE EXAM BY PATHOLOGIST: CPT

## 2021-04-01 PROCEDURE — 3700000000 HC ANESTHESIA ATTENDED CARE: Performed by: PLASTIC SURGERY

## 2021-04-01 RX ORDER — GLYCOPYRROLATE 1 MG/5 ML
SYRINGE (ML) INTRAVENOUS PRN
Status: DISCONTINUED | OUTPATIENT
Start: 2021-04-01 | End: 2021-04-01 | Stop reason: SDUPTHER

## 2021-04-01 RX ORDER — SODIUM CHLORIDE 0.9 % (FLUSH) 0.9 %
10 SYRINGE (ML) INJECTION EVERY 12 HOURS SCHEDULED
Status: DISCONTINUED | OUTPATIENT
Start: 2021-04-01 | End: 2021-04-01 | Stop reason: HOSPADM

## 2021-04-01 RX ORDER — BUPIVACAINE HYDROCHLORIDE AND EPINEPHRINE 5; 5 MG/ML; UG/ML
INJECTION, SOLUTION PERINEURAL
Status: DISCONTINUED
Start: 2021-04-01 | End: 2021-04-01 | Stop reason: HOSPADM

## 2021-04-01 RX ORDER — MEPERIDINE HYDROCHLORIDE 50 MG/ML
12.5 INJECTION INTRAMUSCULAR; INTRAVENOUS; SUBCUTANEOUS EVERY 5 MIN PRN
Status: DISCONTINUED | OUTPATIENT
Start: 2021-04-01 | End: 2021-04-01 | Stop reason: HOSPADM

## 2021-04-01 RX ORDER — 0.9 % SODIUM CHLORIDE 0.9 %
500 INTRAVENOUS SOLUTION INTRAVENOUS
Status: DISCONTINUED | OUTPATIENT
Start: 2021-04-01 | End: 2021-04-01 | Stop reason: HOSPADM

## 2021-04-01 RX ORDER — OXYCODONE HYDROCHLORIDE AND ACETAMINOPHEN 5; 325 MG/1; MG/1
1 TABLET ORAL PRN
Status: DISCONTINUED | OUTPATIENT
Start: 2021-04-01 | End: 2021-04-01 | Stop reason: HOSPADM

## 2021-04-01 RX ORDER — FENTANYL CITRATE 50 UG/ML
INJECTION, SOLUTION INTRAMUSCULAR; INTRAVENOUS PRN
Status: DISCONTINUED | OUTPATIENT
Start: 2021-04-01 | End: 2021-04-01 | Stop reason: SDUPTHER

## 2021-04-01 RX ORDER — LABETALOL 20 MG/4 ML (5 MG/ML) INTRAVENOUS SYRINGE
5 EVERY 10 MIN PRN
Status: DISCONTINUED | OUTPATIENT
Start: 2021-04-01 | End: 2021-04-01 | Stop reason: HOSPADM

## 2021-04-01 RX ORDER — TRAMADOL HYDROCHLORIDE 50 MG/1
50 TABLET ORAL EVERY 6 HOURS PRN
Qty: 12 TABLET | Refills: 0 | Status: SHIPPED | OUTPATIENT
Start: 2021-04-01 | End: 2021-04-04

## 2021-04-01 RX ORDER — SODIUM CHLORIDE 0.9 % (FLUSH) 0.9 %
10 SYRINGE (ML) INJECTION PRN
Status: DISCONTINUED | OUTPATIENT
Start: 2021-04-01 | End: 2021-04-01 | Stop reason: HOSPADM

## 2021-04-01 RX ORDER — DIPHENHYDRAMINE HYDROCHLORIDE 50 MG/ML
12.5 INJECTION INTRAMUSCULAR; INTRAVENOUS
Status: DISCONTINUED | OUTPATIENT
Start: 2021-04-01 | End: 2021-04-01 | Stop reason: HOSPADM

## 2021-04-01 RX ORDER — SODIUM CHLORIDE, SODIUM LACTATE, POTASSIUM CHLORIDE, CALCIUM CHLORIDE 600; 310; 30; 20 MG/100ML; MG/100ML; MG/100ML; MG/100ML
INJECTION, SOLUTION INTRAVENOUS CONTINUOUS
Status: DISCONTINUED | OUTPATIENT
Start: 2021-04-01 | End: 2021-04-01 | Stop reason: HOSPADM

## 2021-04-01 RX ORDER — PROMETHAZINE HYDROCHLORIDE 25 MG/ML
12.5 INJECTION, SOLUTION INTRAMUSCULAR; INTRAVENOUS
Status: DISCONTINUED | OUTPATIENT
Start: 2021-04-01 | End: 2021-04-01 | Stop reason: HOSPADM

## 2021-04-01 RX ORDER — CEFAZOLIN SODIUM 2 G/50ML
SOLUTION INTRAVENOUS PRN
Status: DISCONTINUED | OUTPATIENT
Start: 2021-04-01 | End: 2021-04-01 | Stop reason: SDUPTHER

## 2021-04-01 RX ORDER — BUPIVACAINE HYDROCHLORIDE AND EPINEPHRINE 5; 5 MG/ML; UG/ML
INJECTION, SOLUTION PERINEURAL PRN
Status: DISCONTINUED | OUTPATIENT
Start: 2021-04-01 | End: 2021-04-01 | Stop reason: ALTCHOICE

## 2021-04-01 RX ORDER — SODIUM CHLORIDE, SODIUM LACTATE, POTASSIUM CHLORIDE, CALCIUM CHLORIDE 600; 310; 30; 20 MG/100ML; MG/100ML; MG/100ML; MG/100ML
INJECTION, SOLUTION INTRAVENOUS CONTINUOUS PRN
Status: DISCONTINUED | OUTPATIENT
Start: 2021-04-01 | End: 2021-04-01 | Stop reason: SDUPTHER

## 2021-04-01 RX ORDER — PHENYLEPHRINE HCL IN 0.9% NACL 1 MG/10 ML
SYRINGE (ML) INTRAVENOUS PRN
Status: DISCONTINUED | OUTPATIENT
Start: 2021-04-01 | End: 2021-04-01 | Stop reason: SDUPTHER

## 2021-04-01 RX ORDER — KETOROLAC TROMETHAMINE 30 MG/ML
INJECTION, SOLUTION INTRAMUSCULAR; INTRAVENOUS PRN
Status: DISCONTINUED | OUTPATIENT
Start: 2021-04-01 | End: 2021-04-01 | Stop reason: SDUPTHER

## 2021-04-01 RX ORDER — PROPOFOL 10 MG/ML
INJECTION, EMULSION INTRAVENOUS PRN
Status: DISCONTINUED | OUTPATIENT
Start: 2021-04-01 | End: 2021-04-01 | Stop reason: SDUPTHER

## 2021-04-01 RX ORDER — MIDAZOLAM HYDROCHLORIDE 1 MG/ML
INJECTION INTRAMUSCULAR; INTRAVENOUS PRN
Status: DISCONTINUED | OUTPATIENT
Start: 2021-04-01 | End: 2021-04-01 | Stop reason: SDUPTHER

## 2021-04-01 RX ORDER — TRAMADOL HYDROCHLORIDE 50 MG/1
50 TABLET ORAL ONCE
Status: COMPLETED | OUTPATIENT
Start: 2021-04-01 | End: 2021-04-01

## 2021-04-01 RX ORDER — DEXAMETHASONE SODIUM PHOSPHATE 10 MG/ML
INJECTION, SOLUTION INTRAMUSCULAR; INTRAVENOUS PRN
Status: DISCONTINUED | OUTPATIENT
Start: 2021-04-01 | End: 2021-04-01 | Stop reason: SDUPTHER

## 2021-04-01 RX ORDER — ONDANSETRON 2 MG/ML
4 INJECTION INTRAMUSCULAR; INTRAVENOUS
Status: DISCONTINUED | OUTPATIENT
Start: 2021-04-01 | End: 2021-04-01 | Stop reason: HOSPADM

## 2021-04-01 RX ORDER — SODIUM CHLORIDE 9 MG/ML
INJECTION, SOLUTION INTRAVENOUS CONTINUOUS
Status: DISCONTINUED | OUTPATIENT
Start: 2021-04-01 | End: 2021-04-01 | Stop reason: HOSPADM

## 2021-04-01 RX ORDER — ONDANSETRON 2 MG/ML
INJECTION INTRAMUSCULAR; INTRAVENOUS PRN
Status: DISCONTINUED | OUTPATIENT
Start: 2021-04-01 | End: 2021-04-01 | Stop reason: SDUPTHER

## 2021-04-01 RX ORDER — TRAMADOL HYDROCHLORIDE 50 MG/1
TABLET ORAL
Status: COMPLETED
Start: 2021-04-01 | End: 2021-04-01

## 2021-04-01 RX ORDER — ROCURONIUM BROMIDE 10 MG/ML
INJECTION, SOLUTION INTRAVENOUS PRN
Status: DISCONTINUED | OUTPATIENT
Start: 2021-04-01 | End: 2021-04-01 | Stop reason: SDUPTHER

## 2021-04-01 RX ORDER — OXYCODONE HYDROCHLORIDE AND ACETAMINOPHEN 5; 325 MG/1; MG/1
2 TABLET ORAL PRN
Status: DISCONTINUED | OUTPATIENT
Start: 2021-04-01 | End: 2021-04-01 | Stop reason: HOSPADM

## 2021-04-01 RX ORDER — CEFAZOLIN SODIUM 1 G/3ML
INJECTION, POWDER, FOR SOLUTION INTRAMUSCULAR; INTRAVENOUS PRN
Status: DISCONTINUED | OUTPATIENT
Start: 2021-04-01 | End: 2021-04-01 | Stop reason: SDUPTHER

## 2021-04-01 RX ORDER — LIDOCAINE HYDROCHLORIDE 10 MG/ML
INJECTION, SOLUTION EPIDURAL; INFILTRATION; INTRACAUDAL; PERINEURAL PRN
Status: DISCONTINUED | OUTPATIENT
Start: 2021-04-01 | End: 2021-04-01 | Stop reason: SDUPTHER

## 2021-04-01 RX ORDER — NEOSTIGMINE METHYLSULFATE 5 MG/5 ML
SYRINGE (ML) INTRAVENOUS PRN
Status: DISCONTINUED | OUTPATIENT
Start: 2021-04-01 | End: 2021-04-01 | Stop reason: SDUPTHER

## 2021-04-01 RX ADMIN — ROCURONIUM BROMIDE 30 MG: 10 INJECTION INTRAVENOUS at 11:53

## 2021-04-01 RX ADMIN — PROPOFOL 200 MG: 10 INJECTION, EMULSION INTRAVENOUS at 11:53

## 2021-04-01 RX ADMIN — SODIUM CHLORIDE, POTASSIUM CHLORIDE, SODIUM LACTATE AND CALCIUM CHLORIDE: 600; 310; 30; 20 INJECTION, SOLUTION INTRAVENOUS at 11:49

## 2021-04-01 RX ADMIN — ONDANSETRON 4 MG: 2 INJECTION INTRAMUSCULAR; INTRAVENOUS at 12:53

## 2021-04-01 RX ADMIN — TRAMADOL HYDROCHLORIDE 50 MG: 50 TABLET, FILM COATED ORAL at 13:39

## 2021-04-01 RX ADMIN — KETOROLAC TROMETHAMINE 30 MG: 30 INJECTION, SOLUTION INTRAMUSCULAR at 12:53

## 2021-04-01 RX ADMIN — CEFAZOLIN 1000 MG: 1 INJECTION, POWDER, FOR SOLUTION INTRAMUSCULAR; INTRAVENOUS at 12:15

## 2021-04-01 RX ADMIN — DEXAMETHASONE SODIUM PHOSPHATE 8 MG: 10 INJECTION, SOLUTION INTRAMUSCULAR; INTRAVENOUS at 12:07

## 2021-04-01 RX ADMIN — Medication 4 MG: at 12:53

## 2021-04-01 RX ADMIN — MIDAZOLAM HYDROCHLORIDE 2 MG: 1 INJECTION, SOLUTION INTRAMUSCULAR; INTRAVENOUS at 11:49

## 2021-04-01 RX ADMIN — Medication 200 MCG: at 13:03

## 2021-04-01 RX ADMIN — FENTANYL CITRATE 50 MCG: 50 INJECTION, SOLUTION INTRAMUSCULAR; INTRAVENOUS at 12:41

## 2021-04-01 RX ADMIN — Medication 0.4 MG: at 13:01

## 2021-04-01 RX ADMIN — CEFAZOLIN SODIUM 2 G: 2 SOLUTION INTRAVENOUS at 12:15

## 2021-04-01 RX ADMIN — TRAMADOL HYDROCHLORIDE 50 MG: 50 TABLET ORAL at 13:39

## 2021-04-01 RX ADMIN — FENTANYL CITRATE 100 MCG: 50 INJECTION, SOLUTION INTRAMUSCULAR; INTRAVENOUS at 11:49

## 2021-04-01 RX ADMIN — FENTANYL CITRATE 100 MCG: 50 INJECTION, SOLUTION INTRAMUSCULAR; INTRAVENOUS at 11:53

## 2021-04-01 RX ADMIN — LIDOCAINE HYDROCHLORIDE 50 MG: 10 INJECTION, SOLUTION EPIDURAL; INFILTRATION; INTRACAUDAL; PERINEURAL at 11:53

## 2021-04-01 RX ADMIN — Medication 0.4 MG: at 12:53

## 2021-04-01 ASSESSMENT — PULMONARY FUNCTION TESTS
PIF_VALUE: 4
PIF_VALUE: 3
PIF_VALUE: 31
PIF_VALUE: 29
PIF_VALUE: 21
PIF_VALUE: 28
PIF_VALUE: 4
PIF_VALUE: 34
PIF_VALUE: 31
PIF_VALUE: 23
PIF_VALUE: 29
PIF_VALUE: 28
PIF_VALUE: 19
PIF_VALUE: 33
PIF_VALUE: 30
PIF_VALUE: 27
PIF_VALUE: 32
PIF_VALUE: 29
PIF_VALUE: 31
PIF_VALUE: 9
PIF_VALUE: 32
PIF_VALUE: 27
PIF_VALUE: 32
PIF_VALUE: 31
PIF_VALUE: 29
PIF_VALUE: 31
PIF_VALUE: 28
PIF_VALUE: 29
PIF_VALUE: 17
PIF_VALUE: 25
PIF_VALUE: 21
PIF_VALUE: 13
PIF_VALUE: 32
PIF_VALUE: 30
PIF_VALUE: 30
PIF_VALUE: 32
PIF_VALUE: 30

## 2021-04-01 ASSESSMENT — PAIN SCALES - GENERAL
PAINLEVEL_OUTOF10: 0
PAINLEVEL_OUTOF10: 5
PAINLEVEL_OUTOF10: 0

## 2021-04-01 ASSESSMENT — PAIN DESCRIPTION - PAIN TYPE: TYPE: SURGICAL PAIN

## 2021-04-01 ASSESSMENT — PAIN DESCRIPTION - DESCRIPTORS: DESCRIPTORS: SORE

## 2021-04-01 NOTE — ANESTHESIA POSTPROCEDURE EVALUATION
Department of Anesthesiology  Postprocedure Note    Patient: Anisha Hernandez  MRN: [id number]  YOB: 1975  Date of evaluation: 4/1/2021  Time:  1:19 PM     Procedure Summary     Date: 04/01/21 Room / Location: [facility] 01 / [address] [place] [address]    Anesthesia Start: 1149 Anesthesia Stop: 1314    Procedure: TRUNK LESION BIOPSY EXCISION RIGHT BACK X 6, LEFT, RIGHT,  AND MID BACK ALL WITH COMPLEX CLOSURE. (N/A ) Diagnosis: (MULTIPLE SKIN LESIONS)    Surgeons: Radha Kingston MD Responsible Provider: Octaviano Cowart MD    Anesthesia Type: general ASA Status: 3          Anesthesia Type: general    Jose Angel Phase I: Jose Angel Score: 10    Jose Angel Phase II:      Last vitals: Reviewed and per EMR flowsheets.        Anesthesia Post Evaluation    Patient location during evaluation: PACU  Patient participation: complete - patient participated  Level of consciousness: awake and alert  Airway patency: patent  Nausea & Vomiting: no nausea and no vomiting  Complications: no  Cardiovascular status: hemodynamically stable  Respiratory status: face mask and spontaneous ventilation  Hydration status: euvolemic

## 2021-04-01 NOTE — H&P
History and Physical           Chief Complaint   Patient presents with    New Patient       Patient states she has four lesions on her back.         HPI:   Jeffrey Neff is a 39 y.o. female who presents with multiple skin lesions. Patient has a lesion on the right upper back/shoulder that has been present since she was 15. It is changing. The coloration has changed. Patient also has a lesion medial to that that has just come up. Patient also has a third lesion on the left upper back that she is concerned about. The lesions cause her irritation. Patient denies bleeding. There is nothing that improves them. Patient is here for evaluation treatment. Patient has had sunburns as a child.   Patient does not have a personal history of skin cancer.     Medications:      Current Facility-Administered Medications          Current Outpatient Medications   Medication Sig Dispense Refill    cyanocobalamin 100 MCG tablet TAKE 1 TABLET BY MOUTH EVERY DAY 30 tablet 2    labetalol (NORMODYNE) 100 MG tablet Take 1.5 tablets by mouth 2 times daily 90 tablet 2    folic acid (FOLVITE) 1 MG tablet Take 1 tablet by mouth daily 90 tablet 1    ferrous sulfate (FE TABS 325) 325 (65 Fe) MG EC tablet Take 1 tablet by mouth daily (with breakfast) 90 tablet 0    sertraline (ZOLOFT) 50 MG tablet TAKE 1 TABLET BY MOUTH EVERY DAY 90 tablet 0    pantoprazole (PROTONIX) 40 MG tablet TAKE 1 TABLET BY MOUTH EVERY DAY BEFORE BREAKFAST 90 tablet 1    lisinopril (PRINIVIL;ZESTRIL) 40 MG tablet TAKE 1 TABLET BY MOUTH EVERY DAY 90 tablet 0    busPIRone (BUSPAR) 10 MG tablet Take 1 tablet by mouth 2 times daily 180 tablet 1    SUMAtriptan (IMITREX) 25 MG tablet Take 1 tablet by mouth once as needed for Migraine 9 tablet 0    butalbital-acetaminophen-caffeine (FIORICET, ESGIC) -40 MG per tablet Take 1 tablet by mouth every 4 hours as needed for Headaches 20 tablet 0    atorvastatin (LIPITOR) 10 MG tablet Take 1 tablet by mouth daily 90 tablet 1    Multiple Vitamins-Minerals (MULTIVITAMIN ADULT EXTRA C PO) Take 1 tablet by mouth daily        levonorgestrel (MIRENA) IUD 52 mg 1 each by Intrauterine route        albuterol sulfate HFA (PROAIR HFA) 108 (90 Base) MCG/ACT inhaler Inhale 2 puffs into the lungs every 6 hours as needed for Wheezing 1 Inhaler 0    varenicline (CHANTIX) 1 MG tablet Take 1 mg by mouth 2 times daily          No current facility-administered medications for this visit. Allergies:            Allergies   Allergen Reactions    Nsaids         Pt had gastric sleeve  Cannot take due to bariatric surgery  Cannot take due to bariatric surgery       Tolmetin         Pt had gastric sleeve    Codeine Itching      Review of Systems:   Constitutional: Negative for fever, chills, fatigue and unexpected weight change. HENT: Negative for hearing loss, sore throat and facial swelling. Eyes: Negative for pain and discharge. Respiratory: Negative for cough and shortness of breath. Cardiovascular: Patient has a history of hypertension. Gastrointestinal: Negative for nausea, vomiting, diarrhea and constipation. Skin: Negative for pallor and rash. Neurological: Negative for seizures, syncope and numbness. Hematological: Patient has a history of anemia. Psychiatric/Behavioral: Negative for behavioral problems.  The patient is not nervous/anxious.       Past Medical History        Past Medical History:   Diagnosis Date    Anemia      History of abnormal cervical Pap smear      Hypertension      IUD (intrauterine device) in place 08/04/2020     Mirena    Menometrorrhagia           Past Surgical History         Past Surgical History:   Procedure Laterality Date    BREAST SURGERY         negative rt breast lumpectomy    CHOLECYSTECTOMY        COLPOSCOPY        GASTRIC BYPASS SURGERY         gastric sleeve    INTRAUTERINE DEVICE INSERTION   08/04/2020     Mirena     LIPOMA RESECTION         lt Size: Large Adult)   Pulse 74   Ht 5' 8\" (1.727 m)   Wt 265 lb 6.4 oz (120.4 kg)   SpO2 97%   BMI 40.35 kg/m²    Body mass index is 40.35 kg/m². Physical Exam   Nursing note and vitals reviewed. Constitutional: Oriented to person, place, and time. Appears well-developed and well-nourished. No distress. Head: Normocephalic and atraumatic. Eyes: Conjunctivae and EOM are normal.   Pulmonary/Chest: Effort normal. No respiratory distress. Neurological: Alert and oriented to person, place, and time. Skin:        Right shoulder lateral measuring 1.5 x 0.9 cm, probably consistent with a dermatofibroma versus keloiding. Right shoulder medial measuring 1.2 x 0.5 cm be consistent with a dermatofibroma versus keloiding however due to the pigmentation malignancy cannot be excluded    Left upper back measuring 1 cm x 0.5 cm, probably consistent with dermatofibroma versus keloiding, however due to the pigmentation malignancy cannot be excluded       Full back    Psychiatric: Normal mood and affect. Behavior is normal     Imaging:   @[address]@         Impression/Plan:        Diagnosis Orders   1. Neoplasm of uncertain behavior of skin             Patient Active Problem List   Diagnosis    Menometrorrhagia    IUD (intrauterine device) in place    Essential hypertension    Hiatal hernia    Hyperlipidemia    Iron deficiency anemia    Postgastrectomy malabsorption    Sleep apnea    Vitamin D deficiency    Anxiety state    Hypertensive emergency    Obesity (BMI 30-39. 9)    Recurrent major depressive disorder, in partial remission (HCC)    Folic acid deficiency    Migraine without aura and without status migrainosus, not intractable         Plan:  Patient with several changing lesions on the back one is located on the right upper back/shoulder medial, right upper back/shoulder lateral, and left upper back. Several options were discussed with the patient.  Patient elected to have the lesions entirely excised. The risks were discussed with patient in detail. All questions were answered. Patient had 2 additional lesions which she wanted to have excised. So a total of 6 lesions are being excised. Skin lesions plan:   Perform full body skin assessment   Picture and measure skin lesions to monitor. Recommend sun protection with SPF of 30 or greater when out doors  Recommend self-skin assessments monthly    Discussed suspicious lesions with patient and what to look for during the self-skin exam   If a personal history of skin cancer; recommend full body skin assessments every 3 months for a minimum of 2 years then every 6-12 months   Personal history of skin lesions; recommend full body skin assessments every 6-12 months   Evaluate and treat suspicious lesions            The following information was discussed with the patient, but this is not an all-inclusive-other issue were also discussed with patient. GENERAL INFORMATION  The surgical removal of skin lesions and tumors is frequently performed by plastic surgeons. Certain skin lesions and skin tumors will not disappear spontaneously, surgical removal is a treatment option. There are many different techniques for removing skin lesions and skin tumors. ALTERNATIVE TREATMENTS  Alternative forms of management consist of not treating the skin lesion/skin tumor condition. Removal of skin lesions and some superficial skin tumors may be accomplished by other treatments including the use of liquid nitrogen (freezing), lasers, topical medications, and electric cautery. Risks and potential complications are associated with alternative forms of treatment. Deeper tumors cannot be treated by this. RISKS OF SKIN LESION/SKIN TUMOR SURGERY    I have explained to the patient that every surgical procedure involves a certain amount of risk and it is important that an understanding of these risks and the possible complications associated with them is understood.   In addition, every procedure has limitations. An individual's choice to undergo a surgical procedure is based on the comparison of the risk to potential benefit. Although some of patients do not experience these complications. Bleeding- It is possible, to experience a bleeding episode during or after surgery. Intraoperative blood transfusions may be required. Should post-operative bleeding occur, it may require an emergency treatment to drain the accumulated blood or blood transfusion. Do not take any aspirin or anti-inflammatory medications for ten days before or after surgery, as this may increase the risk of bleeding. Non-prescription herbs and dietary supplements can increase the risk of surgical bleeding. Hematoma can occur at any time following injury. If blood transfusions are necessary to treat blood loss, there is the risk of blood-related infections such as hepatitis and HIV (AIDS). Heparin medications that are used to prevent blood clots in veins can produce bleeding and decreased blood platelets. Please check with the physician who prescribed that blood thinners such as aspirin Coumadin etc. Prior to stopping them.     Infection- Infection can occur after surgery. Should an infection occur, additional treatment including antibiotics, hospitalization, or additional surgery may be necessary.     Scarring- All surgery leaves scars, some more visible than others. Although wound healing after a surgical procedure is expected, abnormal scars may occur within the skin and deeper tissues. Scars may be unattractive and of different color than the surrounding skin tone. Scar appearance may also vary within the same scar. There is the possibility of visible marks from sutures used to close the wound after the removal of skin lesions and tumors. There is the possibility that scars may limit motion and function.   In some cases, scars may require surgical revision or treatment.     Obesity: If you're disruption or delayed wound healing is possible. Some areas of the skin may not heal normally and may take a long time to heal.  Some areas of skin may die, requiring frequent dressing changes or further surgery to remove the non-healed tissue. Smokers have a greater risk of skin loss and wound healing complications. Skin Contour Irregularities- Contour irregularities and depressions may occur after surgery. Visible and palpable wrinkling of skin can occur. Residual skin irregularities at the ends of the incisions or dog ears are always a possibility and may require additional surgery. This may improve with time, or it can be surgically corrected. Allergic Reactions- In some cases, local allergies to tape, suture materials and glues, blood products, topical preparations or injected agents have been reported. Serious systemic reactions including shock (anaphylaxis) may occur to drugs used during surgery and prescription medications. Allergic reactions may require additional treatment.       Surgical Anesthesia- Both local and general anesthesia involve risk. There is the possibility of complications, injury, and even death from all forms of surgical anesthesia or sedation. Change in Skin Sensation- It is common to experience diminished (or loss) of skin sensation in areas that have had surgery. Diminished (or complete loss of skin sensation) may not totally resolve. Shock- In rare circumstances, your surgical procedure can cause severe trauma, particularly when multiple or extensive procedures are performed. Although serious complications are infrequent, infections or excessive fluid loss can lead to severe illness and even death. If surgical shock occurs, hospitalization and additional treatment would be necessary. Unsatisfactory Result- There is no guarantee or warranty expressed or implied, on the results that may be obtained.   There is the possibility of a poor result from the removal of skin lesions and tumors. This would include risks such as unacceptable visible deformities, loss of function, poor healing, wound disruption, skin death and loss of sensation. You may be disappointed with the results of reconstructive surgery. It may be necessary to perform additional surgery to improve your results. Cardiac and Pulmonary Complications- Surgery, especially longer procedures, may be associated with the formation of, or increase in, blood clots in the venous system. Pulmonary complications may occur secondarily to both blood clots (pulmonary emboli), fat deposits (fat emboli) or partial collapse of the lungs after general anesthesia. Pulmonary and fat emboli can be life-threatening or fatal in some circumstances. Air travel, inactivity and other conditions may increase the incidence of blood clots traveling to the lungs causing a major blood clot that may result in death. It is important to discuss with your physician any past history of blood clots or swollen legs that may contribute to this condition. Cardiac complications are a risk with any surgery and anesthesia, even in patients without symptoms. If you experience shortness of breath, chest pains, or unusual heart beats, seek medical attention immediately. Should any of these complications occur, you may require hospitalization and additional treatment. Smoking, Second-Hand Smoke Exposure, Nicotine Products (Patch, Gum, Nasal Spray)-   Patients who are currently smoking, use tobacco products, or nicotine products (patch, gum, or nasal spray) are at a greater risk for significant surgical complications of skin dying, delayed healing, and additional scarring. Individuals exposed to second-hand smoke are also at potential risk for similar complications attributable to nicotine exposure.   Additionally, smoking may have a significant negative effect on anesthesia and recovery from anesthesia, with coughing and possibly increased bleeding. Individuals who are not exposed to tobacco smoke or nicotine-containing products have a significantly lower risk of this type of complication. It is important to refrain from smoking at least 8-week weeks before and after surgery. This may apply to secondhand smoking.     Mental Health Disorders and Surgery- It is important that all patients seeking to undergo surgery have realistic expectations that focus on improvement rather than perfection. Complications or less than satisfactory results are sometimes unavoidable, may require additional surgery and often are stressful. Please openly discuss with your surgeon, prior to surgery, any history that you may have of significant emotional depression or mental health disorders. Although many individuals may benefit psychologically from the results of surgery, effects on mental health cannot be accurately predicted. Medications- There are many adverse reactions that occur as the result of taking over the counter, herbal, and/or prescription medications. Be sure to check with your physician about any drug interactions that may exist with medications which you are already taking. If you have an adverse reaction, stop the drugs immediately and call your plastic surgeon for further instructions. If the reaction is severe, go immediately to the nearest emergency room. When taking the prescribed pain medications after surgery, realize that they can affect your thought process and coordination. Do not drive, do not operate complex equipment, do not make any important decisions, and do not drink any alcohol while taking these medications. Be sure to take your prescribed medication only as directed. ADDITIONAL SURGERY NECESSARY  Should complications occur, additional surgery or other treatments may be necessary.   Even though risks and complications occur infrequently, the risks cited are the ones that are particularly associated with skin lesion and skin tumor removal.  Other complications and risks can occur but are even more uncommon. The practice of medicine and surgery is not an exact science. Although good results are expected, there is no guarantee or warranty expressed or implied, on the results that may be obtained. PATIENT COMPLIANCE   Follow all physician instructions carefully; this is essential for the success of your outcome. It is important that the surgical incisions are not subjected to excessive force, swelling, abrasion, or motion during the time of healing. Protective dressings and drains should not be removed unless instructed by your plastic surgeon. Successful post-operative function depends on both surgery and subsequent care. Physical activity that increases your pulse or heart rate may cause bruising, swelling, fluid accumulation and the need for return to surgery. It is wise to refrain from intimate physical activities after surgery until your physician states it is safe.   It is important that you participate in follow-up care, return for aftercare, and promote your recovery after surgery.           Electronically signed by Arian Ambrocio MD on 4/1/2021 at 11:44 AM

## 2021-04-01 NOTE — INTERVAL H&P NOTE
Update History & Physical    The patient's History and Physical of 4/1/2021 was reviewed with the patient and I examined the patient. There was no changes. The surgical site was confirmed by the patient and me. Plan: The risks, benefits, expected outcome, and alternative to the recommended procedure have been discussed with the patient. Patient understands and wants to proceed with the procedure.      Electronically signed by Maude Brunner MD on 4/1/2021 at 11:44 AM

## 2021-04-01 NOTE — OP NOTE
Surgery Note     Name: Leticia Goins Date/Time of Admission: 2021 10:50 AM   MRN: [id number] Attending Provider: Gisel Valera MD   Room/Bed: [facility] [place] [facility]/NONE /Age: 1975/45 y.o. 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  Neoplasm of uncertain behavior left lateral upper back  Neoplasm of uncertain behavior central back  Neoplasm of uncertain behavior right shoulder lateral  Neoplasm of uncertain behavior right upper back medial  Neoplasm uncertain behavior right inferior lateral      Operative Procedure:     Excision of left superior upper back lesion measuring 3 x 2 cm with complex closure of defect size measuring 3.2 x 2.2 cm  Excision of left lateral upper back lesion measuring 2 x 1.5 cm with complex closure defect size measuring 2.2 x 1.7 cm  Excision of central back lesion measuring 1.9 x 1.4 cm complex closure of defect size measuring 2 x 1.6 cm  Excision of right shoulder lateral lesion measuring 3 x 2 cm with complex closure defect size measuring 3.2 x 2.4 cm  Excision right medial upper back lesion measuring 2.5 x 1.9 cm with complex closure of defect size measuring 2.7 x 2.1 cm  Excision of right inferior lateral lesion measuring 1 x 0.5 cm with complex closure defect size measuring 1.2 X0.7 cm    ANESTHESIA: General  INTRAVENOUS FLUIDS:  As per Anesthesia.    ESTIMATED BLOOD LOSS: Minimal  SPECIMENS:     ID Type Source Tests Collected by Time Destination   A : LEFT SUPERIOR UPPER BACK, STITCH AT 12 O'CLOCK Tissue Tissue SURGICAL PATHOLOGY Marley Diamond MD 4/1/2021 1232    B : LEFT LATERAL UPPER BACK, STITCH AT 12 O'CLOCK Tissue Tissue SURGICAL PATHOLOGY Marley Diamond MD 4/1/2021 1233    C : CENTRAL BACK, STITCH AT 12 O'CLOCK Tissue Tissue SURGICAL PATHOLOGY Marley Diamond MD 4/1/2021 1234    D : RIGHT SHOULDER LATERAL, STITCH AT 12 O'CLOCK Tissue Tissue SURGICAL PATHOLOGY Marley Diamond MD 4/1/2021 1237    E : MEDIAL RIGHT UPPER BACK, STITCH AT 12 O'CLOCK Tissue Tissue SURGICAL PATHOLOGY Marley Diamond MD 4/1/2021 1238    F : RIGHT INFERIOR LATERAL, STITCH AT 12 O'CLOCK Tissue Tissue SURGICAL PATHOLOGY Marley Diamond MD 4/1/2021 1249        INDICATION FOR PROCEDURE:  The patient is 39 y.o. female . All the risks, benefits, and alternative treatments were explained to the patient and an informed consent was obtained. DESCRIPTION OF PROCEDURE:  The patient was marked in the holding area. The patient was brought into the room, was placed on the table in the prone position. All areas were prepped and draped in usual customary sterile manner. A timeout was performed. Everybody in the room was in agreement with the timeout. The attention was turned to superior upper back left. Then an incision was made over the area. This was done using a #15 blade. The lesion was completely excised to its visible margins. Suture was placed at the 12 o'clock position. Hemostasis was achieved. Then undermining was performed medially, laterally, superiorly, and inferiorly to obtain tension-free closure. Then using 3-0 Vicryl in an interrupted manner to deep tissue was closed. Then using 4-0 Prolene  in a subcuticular manner the skin was closed. The attention was turned to lateral upper back left. Then an incision was made over the area. This was done using a #15 blade. The lesion was completely excised to its visible margins. Suture was placed at the 12 o'clock position. Hemostasis was achieved.    Then undermining was performed medially, laterally, superiorly, and inferiorly to obtain tension-free closure. Then using 3-0 Vicryl in an interrupted manner to deep tissue was closed. Then using 4-0 Prolene  in an interrupted manner the skin was closed. The attention was turned to central back. Then an incision was made over the area. This was done using a #15 blade. The lesion was completely excised to its visible margins. Suture was placed at the 12 o'clock position. Hemostasis was achieved. Then undermining was performed medially, laterally, superiorly, and inferiorly to obtain tension-free closure. Then using 3-0 Vicryl in an interrupted manner to deep tissue was closed. Then using 4-0 Prolene  in an interrupted manner the skin was closed. The attention was turned to right shoulder lateral.   Then an incision was made over the area. This was done using a #15 blade. The lesion was completely excised to its visible margins. Suture was placed at the 12 o'clock position. Hemostasis was achieved. Then undermining was performed medially, laterally, superiorly, and inferiorly to obtain tension-free closure. Then using 3-0 Vicryl in an interrupted manner to deep tissue was closed. Then using 4-0 Prolene  in an interrupted manner the skin was closed. The attention was turned to medial right upper back. Then an incision was made over the area. This was done using a #15 blade. The lesion was completely excised to its visible margins. Suture was placed at the 12 o'clock position. Hemostasis was achieved. Then undermining was performed medially, laterally, superiorly, and inferiorly to obtain tension-free closure. Then using 3-0 Vicryl in an interrupted manner to deep tissue was closed. Then using 4-0 Prolene  in an interrupted manner the skin was closed. The attention was turned to right inferior lateral.   Then an incision was made over the area. This was done using a #15 blade.   The lesion was completely excised to its visible margins. Suture was placed at the 12 o'clock position. Hemostasis was achieved. Then undermining was performed medially, laterally, superiorly, and inferiorly to obtain tension-free closure. Then using 3-0 Vicryl in an interrupted manner to deep tissue was closed. Then using 4-0 Prolene  in an interrupted manner the skin was closed. Local anesthetic was injected. Then Mastisol and Steri-Strips were placed. Then 2 x 2 and Tegaderm was placed. The patient tolerated procedure well. The patient was transferred to recovery area in a stable condition.       Electronically signed by [facility], MD on 4/1/2021 at 1:04 PM

## 2021-04-01 NOTE — PROGRESS NOTES
CLINICAL PHARMACY NOTE: MEDS TO [address] [place] [address] Drive Select Patient?: No  Total # of Prescriptions Filled: 1   The following medications were delivered to the patient:  · Tramadol 50mg  Total # of Interventions Completed: 0  Time Spent (min): 0    Additional Documentation:

## 2021-04-01 NOTE — ANESTHESIA PRE PROCEDURE
Department of Anesthesiology  Preprocedure Note       Name:  Netta Bassett   Age:  39 y.o.  :  1975                                          MRN:  [id number]         Date:  2021      Surgeon: Danielito Menendez):  Desiree Farnsworth MD    Procedure: Procedure(s):  TRUNK LESION BIOPSY EXCISION RIGHT BACK X 2, LEFT BACK AND MID BACK ALL WITH COMPLEX CLOSURE    Medications prior to admission:   Prior to Admission medications    Medication Sig Start Date End Date Taking?  Authorizing Provider   atorvastatin (LIPITOR) 10 MG tablet Take 1 tablet by mouth daily 3/31/21  Yes Rut Ro MD   sertraline (ZOLOFT) 50 MG tablet TAKE 1 TABLET BY MOUTH EVERY DAY 3/31/21  Yes Rut Ro MD   ferrous sulfate (FE TABS 325) 325 (65 Fe) MG EC tablet Take 1 tablet by mouth 3 times daily (with meals) 3/24/21  Yes Rut Ro MD   albuterol sulfate HFA (PROAIR HFA) 108 (90 Base) MCG/ACT inhaler Inhale 2 puffs into the lungs every 6 hours as needed for Wheezing 3/18/21  Yes Rut Ro MD   cyanocobalamin 100 MCG tablet TAKE 1 TABLET BY MOUTH EVERY DAY 21  Yes Rut Ro MD   labetalol (NORMODYNE) 100 MG tablet Take 1.5 tablets by mouth 2 times daily 21  Yes Rut Ro MD   folic acid (FOLVITE) 1 MG tablet Take 1 tablet by mouth daily 21  Yes Rut Ro MD   pantoprazole (PROTONIX) 40 MG tablet TAKE 1 TABLET BY MOUTH EVERY DAY BEFORE BREAKFAST 20  Yes Rut Ro MD   lisinopril (PRINIVIL;ZESTRIL) 40 MG tablet TAKE 1 TABLET BY MOUTH EVERY DAY 20  Yes Rut Ro MD   busPIRone (BUSPAR) 10 MG tablet Take 1 tablet by mouth 2 times daily 20  Yes Rut Ro MD   SUMAtriptan (IMITREX) 25 MG tablet Take 1 tablet by mouth once as needed for Migraine 11/12/20 3/19/21 Yes Rut Ro MD   Multiple Vitamins-Minerals (MULTIVITAMIN ADULT EXTRA C PO) Take 1 tablet by mouth daily   Yes Historical Provider, MD   butalbital-acetaminophen-caffeine (FIORICET, ESGIC) -40 MG per tablet Take 1 negative rt breast lumpectomy    CHOLECYSTECTOMY      COLPOSCOPY      GASTRIC BYPASS SURGERY      gastric sleeve    INTRAUTERINE DEVICE INSERTION  2020    Mirena     LIPOMA RESECTION      lt shoulder/upper back       Social History:    Social History     Tobacco Use    Smoking status: Former Smoker     Packs/day: 1.00     Years: 22.00     Pack years: 22.00     Types: Cigarettes     Start date: 1/15/2020     Quit date: 2020     Years since quittin.6    Smokeless tobacco: Never Used   Substance Use Topics    Alcohol use: Yes     Comment: social                                Counseling given: Not Answered      Vital Signs (Current):   Vitals:    21 1234 21 1115   BP:  (!) 164/79   Pulse:  59   Resp:  18   Temp:  97.5 °F (36.4 °C)   TempSrc:  Temporal   SpO2:  97%   Weight: 260 lb (117.9 kg) 267 lb 3.2 oz (121.2 kg)   Height: 5' 8.5\" (1.74 m) 5' 8.5\" (1.74 m)                                              BP Readings from Last 3 Encounters:   21 (!) 164/79   03/10/21 121/76   21 118/70       NPO Status: Time of last liquid consumption: (sip water)                        Time of last solid consumption:                         Date of last liquid consumption: 21                        Date of last solid food consumption: 21    BMI:   Wt Readings from Last 3 Encounters:   21 267 lb 3.2 oz (121.2 kg)   03/15/21 263 lb (119.3 kg)   03/10/21 265 lb 6.4 oz (120.4 kg)     Body mass index is 40.04 kg/m².     CBC:   Lab Results   Component Value Date    WBC 5.8 2021    RBC 4.52 2021    HGB 10.8 2021    HCT 36.0 2021    MCV 79.6 2021    RDW 17.4 2021     2021       CMP:   Lab Results   Component Value Date     2021    K 4.3 2021     2021    CO2 22 2021    BUN 18 2021    CREATININE 0.72 2021    GFRAA >60 2021    LABGLOM >60 2021    GLUCOSE 83 2020 PROT 7.6 03/24/2021    CALCIUM 9.5 03/24/2021    BILITOT 0.24 03/24/2021    ALKPHOS 105 03/24/2021    AST 29 03/24/2021    ALT 33 03/24/2021       POC Tests: No results for input(s): POCGLU, POCNA, POCK, POCCL, POCBUN, POCHEMO, POCHCT in the last 72 hours. Coags: No results found for: PROTIME, INR, APTT    HCG (If Applicable):   Lab Results   Component Value Date    HCG NEGATIVE 04/01/2021        ABGs: No results found for: PHART, PO2ART, TPM2YJQ, RIU0IWT, BEART, L7WIHMEU     Type & Screen (If Applicable):  No results found for: LABABO, LABRH    Drug/Infectious Status (If Applicable):  No results found for: HIV, HEPCAB    COVID-19 Screening (If Applicable):   Lab Results   Component Value Date    COVID19 Not Detected 03/28/2021           Anesthesia Evaluation  Patient summary reviewed and Nursing notes reviewed  Airway: Mallampati: I  TM distance: >3 FB   Neck ROM: full  Mouth opening: > = 3 FB Dental:      Comment: -MISSING SOME UPPER AND LOWER TEETH    Pulmonary:normal exam    (+) COPD:  sleep apnea: on noncompliant,  asthma:                           ROS comment: -QUIT SMOKING 2020 - 30 PACK YEARS  -ASTHMA - POORLY CONTROLLED  -PRODUCTIVE COUGH   Cardiovascular:    (+) hypertension:, ROSE:,       ECG reviewed                     ROS comment: -EKG - SR @ 79, LVH, PROLONGED QT     Neuro/Psych:   Negative Neuro/Psych ROS              GI/Hepatic/Renal:   (+) hiatal hernia, GERD: poorly controlled, morbid obesity          Endo/Other: Negative Endo/Other ROS                     ROS comment: -NPO AFTER MIDNIGHT  -ALLERGIES - NSAIDS, TOLMETIN, CODEINE ORALLY Abdominal:           Vascular:           ROS comment: -ANEMIA. Anesthesia Plan      general     ASA 3     (GETA, PRONE)  Induction: intravenous. MIPS: Postoperative opioids intended and Prophylactic antiemetics administered. Anesthetic plan and risks discussed with patient. Plan discussed with CRNA.     Attending anesthesiologist reviewed and agrees with Pre Eval content              Karen Goodman MD   4/1/2021

## 2021-04-02 LAB — DERMATOLOGY PATHOLOGY REPORT: NORMAL

## 2021-04-05 ENCOUNTER — OFFICE VISIT (OUTPATIENT)
Dept: BARIATRICS/WEIGHT MGMT | Age: 46
End: 2021-04-05
Payer: MEDICARE

## 2021-04-05 VITALS
WEIGHT: 264 LBS | DIASTOLIC BLOOD PRESSURE: 90 MMHG | BODY MASS INDEX: 39.1 KG/M2 | HEIGHT: 69 IN | SYSTOLIC BLOOD PRESSURE: 138 MMHG | HEART RATE: 66 BPM

## 2021-04-05 DIAGNOSIS — G43.009 MIGRAINE WITHOUT AURA AND WITHOUT STATUS MIGRAINOSUS, NOT INTRACTABLE: ICD-10-CM

## 2021-04-05 DIAGNOSIS — D50.9 IRON DEFICIENCY ANEMIA, UNSPECIFIED IRON DEFICIENCY ANEMIA TYPE: ICD-10-CM

## 2021-04-05 DIAGNOSIS — E78.5 HYPERLIPIDEMIA, UNSPECIFIED HYPERLIPIDEMIA TYPE: ICD-10-CM

## 2021-04-05 DIAGNOSIS — I10 ESSENTIAL HYPERTENSION: ICD-10-CM

## 2021-04-05 DIAGNOSIS — K21.9 GASTROESOPHAGEAL REFLUX DISEASE WITHOUT ESOPHAGITIS: ICD-10-CM

## 2021-04-05 DIAGNOSIS — E66.9 OBESITY (BMI 30-39.9): ICD-10-CM

## 2021-04-05 DIAGNOSIS — Z87.891 HISTORY OF NICOTINE USE: ICD-10-CM

## 2021-04-05 DIAGNOSIS — G47.33 OSA (OBSTRUCTIVE SLEEP APNEA): Primary | ICD-10-CM

## 2021-04-05 PROBLEM — F41.9 ANXIETY AND DEPRESSION: Status: ACTIVE | Noted: 2021-04-05

## 2021-04-05 PROBLEM — F32.A ANXIETY AND DEPRESSION: Status: ACTIVE | Noted: 2021-04-05

## 2021-04-05 PROCEDURE — G8427 DOCREV CUR MEDS BY ELIG CLIN: HCPCS | Performed by: NURSE PRACTITIONER

## 2021-04-05 PROCEDURE — 1036F TOBACCO NON-USER: CPT | Performed by: NURSE PRACTITIONER

## 2021-04-05 PROCEDURE — G8417 CALC BMI ABV UP PARAM F/U: HCPCS | Performed by: NURSE PRACTITIONER

## 2021-04-05 PROCEDURE — 99213 OFFICE O/P EST LOW 20 MIN: CPT | Performed by: NURSE PRACTITIONER

## 2021-04-05 RX ORDER — LISINOPRIL 40 MG/1
TABLET ORAL
Qty: 90 TABLET | Refills: 0 | Status: SHIPPED | OUTPATIENT
Start: 2021-04-05 | End: 2021-08-18

## 2021-04-05 RX ORDER — UBIDECARENONE 75 MG
CAPSULE ORAL
COMMUNITY
Start: 2021-03-15

## 2021-04-05 RX ORDER — FAMOTIDINE 20 MG/1
20 TABLET, FILM COATED ORAL NIGHTLY
Qty: 30 TABLET | Refills: 3 | Status: SHIPPED | OUTPATIENT
Start: 2021-04-05 | End: 2021-08-02

## 2021-04-05 NOTE — PROGRESS NOTES
Medical Weight Management Progress Note    Subjective     Patient being seen for medically supervised weight loss for the chronic conditions of CORNELIA, HTN, HLD, MILLER, Migraine, GERD. She had a sleeve gastrectomy and had weight regain and also developed severe GERD and is now interested in revision surgery from sleeve to bypass. She is taking protonix for GERD, but still has breakthrough symptoms. She is working on the behavior changes discussed at the initial appointment. Patient continues on diet plan. Physical activity includes walking. Weight gain of 1 lb since last visit. Psych eval completed and has recommendations. EGD scheduled 5/7/21. Quit smoking 2/27/21. No current issues. Working toward bariatric surgery:    [] Sleeve Gastrectomy                                                           [] Narendra-en-Y Gastric Bypass                                                           [x] Revision of Sleeve to Gastric Bypass    Allergies: Allergies   Allergen Reactions    Nsaids      Pt had gastric sleeve  Cannot take due to bariatric surgery  Cannot take due to bariatric surgery      Tolmetin      Pt had gastric sleeve    Codeine Itching       Past Medical History:     Past Medical History:   Diagnosis Date    Anemia     Asthma     History of abnormal cervical Pap smear     Hyperlipidemia     Hypertension     IUD (intrauterine device) in place 08/04/2020    Mirena    Menometrorrhagia    .     Past Surgical History:  Past Surgical History:   Procedure Laterality Date    BREAST SURGERY      negative rt breast lumpectomy    CHOLECYSTECTOMY      COLPOSCOPY      GASTRIC BYPASS SURGERY      gastric sleeve    INTRAUTERINE DEVICE INSERTION  08/04/2020    Mirena     LIPOMA RESECTION      lt shoulder/upper back    PRE-MALIGNANT / BENIGN SKIN LESION EXCISION  04/01/2021    TRUNK LESION BIOPSY EXCISION RIGHT BACK X 2, LEFT BACK AND MID BACK ALL WITH COMPLEX CLOSURE     SKIN BIOPSY N/A 4/1/2021 TRUNK LESION BIOPSY EXCISION RIGHT BACK X 6, LEFT, RIGHT, AND MID BACK ALL WITH COMPLEX CLOSURE. performed by Brian Su MD at [id number] [facility].       Family History:  Family History   Problem Relation Age of Onset    High Blood Pressure Father     Heart Disease Father     Hypertension Maternal Grandmother     Heart Disease Maternal Grandmother     Stroke Maternal Uncle        Social History:  Social History     Socioeconomic History    Marital status:      Spouse name: Not on file    Number of children: Not on file    Years of education: Not on file    Highest education level: Not on file   Occupational History    Not on file   Social Needs    Financial resource strain: Patient refused    Food insecurity     Worry: Patient refused     Inability: Patient refused    Transportation needs     Medical: Patient refused     Non-medical: Patient refused   Tobacco Use    Smoking status: Former Smoker     Packs/day: 1.00     Years: 22.00     Pack years: 22.00     Types: Cigarettes     Start date: 1/15/2020     Quit date: 2020     Years since quittin.6    Smokeless tobacco: Never Used   Substance and Sexual Activity    Alcohol use: Yes     Comment: social    Drug use: No    Sexual activity: Not on file   Lifestyle    Physical activity     Days per week: 5 days     Minutes per session: 30 min    Stress:  Only a little   Relationships    Social connections     Talks on phone: Not on file     Gets together: Not on file     Attends Islam service: Not on file     Active member of club or organization: Not on file     Attends meetings of clubs or organizations: Not on file     Relationship status: Not on file    Intimate partner violence     Fear of current or ex partner: Not on file     Emotionally abused: Not on file     Physically abused: Not on file     Forced sexual activity: Not on file   Other Topics Concern    Not on file   Social History Narrative    Not on file       Current Medications:  Current Outpatient Medications   Medication Sig Dispense Refill    famotidine (PEPCID) 20 MG tablet Take 1 tablet by mouth nightly 30 tablet 3    atorvastatin (LIPITOR) 10 MG tablet Take 1 tablet by mouth daily 90 tablet 1    albuterol sulfate HFA (PROAIR HFA) 108 (90 Base) MCG/ACT inhaler Inhale 2 puffs into the lungs every 6 hours as needed for Wheezing 1 Inhaler 0    vitamin B-12 (CYANOCOBALAMIN) 100 MCG tablet TAKE 1 TABLET BY MOUTH EVERY DAY      sertraline (ZOLOFT) 50 MG tablet TAKE 1 TABLET BY MOUTH EVERY DAY 90 tablet 0    ferrous sulfate (FE TABS 325) 325 (65 Fe) MG EC tablet Take 1 tablet by mouth 3 times daily (with meals) 90 tablet 0    labetalol (NORMODYNE) 100 MG tablet Take 1.5 tablets by mouth 2 times daily 90 tablet 2    folic acid (FOLVITE) 1 MG tablet Take 1 tablet by mouth daily 90 tablet 1    pantoprazole (PROTONIX) 40 MG tablet TAKE 1 TABLET BY MOUTH EVERY DAY BEFORE BREAKFAST 90 tablet 1    lisinopril (PRINIVIL;ZESTRIL) 40 MG tablet TAKE 1 TABLET BY MOUTH EVERY DAY 90 tablet 0    busPIRone (BUSPAR) 10 MG tablet Take 1 tablet by mouth 2 times daily 180 tablet 1    SUMAtriptan (IMITREX) 25 MG tablet Take 1 tablet by mouth once as needed for Migraine 9 tablet 0    butalbital-acetaminophen-caffeine (FIORICET, ESGIC) -40 MG per tablet Take 1 tablet by mouth every 4 hours as needed for Headaches 20 tablet 0    Multiple Vitamins-Minerals (MULTIVITAMIN ADULT EXTRA C PO) Take 1 tablet by mouth daily      levonorgestrel (MIRENA) IUD 52 mg 1 each by Intrauterine route       No current facility-administered medications for this visit. Vital Signs:  BP (!) 138/90 (Site: Right Upper Arm, Position: Sitting, Cuff Size: Large Adult)   Pulse 66   Ht 5' 8.5\" (1.74 m)   Wt 264 lb (119.7 kg)   LMP  (LMP Unknown) Comment: urone hCG negative  BMI 39.56 kg/m²     BMI/Height/Weight:  Body mass index is 39.56 kg/m².       Review of Systems - A review of systems was performed. All was negative unless otherwise documented in HPI. Constitutional: Negative for fever, chills and diaphoresis. HENT: Negative for hearing loss and trouble swallowing. Eyes: Negative for photophobia and visual disturbance. Respiratory: Negative for cough, shortness of breath and wheezing. Cardiovascular: Negative for chest pain and palpitations. Gastrointestinal: Negative for nausea, vomiting, abdominal pain, diarrhea, constipation, blood in stool and abdominal distention. Endocrine: Negative for polydipsia, polyphagia and polyuria. Genitourinary: Negative for dysuria, frequency, hematuria and difficulty urinating. Musculoskeletal: Negative for myalgias, joint swelling. Skin: Negative for pallor and rash. Neurological: Negative for dizziness, tremors, light-headedness and headaches. Psychiatric/Behavioral: Negative for sleep disturbance and dysphoric mood. Objective:      Physical Exam   Vital signs reviewed. General: Well-developed and well-nourished. No acute distress. Skin: Warm, dry and intact. HEENT: Normocephalic. EOMs intact. Conjunctivae normal. Neck supple. Cardiovascular: Normal rate, regular rhythm. Pulmonary/Chest: Normal effort. Lungs clear to auscultation. No rales, rhonchi or wheezing. Abdominal: Positive bowel sounds. Soft, nontender. Nondistended. Musculoskeletal: Movement x4. No edema. Neurological: Gait normal. Alert and oriented to person, place, and time. Psychiatric: Normal mood and affect. Speech and behavior normal. Judgment and thought content normal. Cognition and memory intact. Assessment:       Diagnosis Orders   1.  CORNELIA (obstructive sleep apnea)  CBC Auto Differential    Comprehensive Metabolic Panel    Hemoglobin A1C    Iron and TIBC    Lipid Panel    Magnesium    PTH, Intact    TSH without Reflex    Urine Drug Screen    Vitamin A    Vitamin B1    Vitamin B12 & Folate    Vitamin D 25 Hydroxy    Zinc    famotidine (PEPCID) 20 MG tablet    Nicotine, Blood   2. Essential hypertension  CBC Auto Differential    Comprehensive Metabolic Panel    Hemoglobin A1C    Iron and TIBC    Lipid Panel    Magnesium    PTH, Intact    TSH without Reflex    Urine Drug Screen    Vitamin A    Vitamin B1    Vitamin B12 & Folate    Vitamin D 25 Hydroxy    Zinc    famotidine (PEPCID) 20 MG tablet    Nicotine, Blood   3. Obesity (BMI 30-39. 9)  CBC Auto Differential    Comprehensive Metabolic Panel    Hemoglobin A1C    Iron and TIBC    Lipid Panel    Magnesium    PTH, Intact    TSH without Reflex    Urine Drug Screen    Vitamin A    Vitamin B1    Vitamin B12 & Folate    Vitamin D 25 Hydroxy    Zinc    famotidine (PEPCID) 20 MG tablet    Nicotine, Blood   4. Hyperlipidemia, unspecified hyperlipidemia type  CBC Auto Differential    Comprehensive Metabolic Panel    Hemoglobin A1C    Iron and TIBC    Lipid Panel    Magnesium    PTH, Intact    TSH without Reflex    Urine Drug Screen    Vitamin A    Vitamin B1    Vitamin B12 & Folate    Vitamin D 25 Hydroxy    Zinc    famotidine (PEPCID) 20 MG tablet    Nicotine, Blood   5. Iron deficiency anemia, unspecified iron deficiency anemia type  CBC Auto Differential    Comprehensive Metabolic Panel    Hemoglobin A1C    Iron and TIBC    Lipid Panel    Magnesium    PTH, Intact    TSH without Reflex    Urine Drug Screen    Vitamin A    Vitamin B1    Vitamin B12 & Folate    Vitamin D 25 Hydroxy    Zinc    famotidine (PEPCID) 20 MG tablet    Nicotine, Blood   6. Migraine without aura and without status migrainosus, not intractable  CBC Auto Differential    Comprehensive Metabolic Panel    Hemoglobin A1C    Iron and TIBC    Lipid Panel    Magnesium    PTH, Intact    TSH without Reflex    Urine Drug Screen    Vitamin A    Vitamin B1    Vitamin B12 & Folate    Vitamin D 25 Hydroxy    Zinc    famotidine (PEPCID) 20 MG tablet    Nicotine, Blood   7.  Gastroesophageal reflux disease without esophagitis  CBC Auto Differential Comprehensive Metabolic Panel    Hemoglobin A1C    Iron and TIBC    Lipid Panel    Magnesium    PTH, Intact    TSH without Reflex    Urine Drug Screen    Vitamin A    Vitamin B1    Vitamin B12 & Folate    Vitamin D 25 Hydroxy    Zinc    famotidine (PEPCID) 20 MG tablet    Nicotine, Blood   8. History of nicotine use  Nicotine, Blood       Plan:    No dietitian visit today. Patient was encouraged to journal all food intake. Keep calorie level at approximately 5746-2851. Protein intake is to be a minimum of 60-80 grams per day. Water drinking was encouraged with a goal of 64oz-128oz daily. Beverages to be calorie free except for milk. Every other beverage should be water. Avoid soda. Continue to increase level of physical activity. Encouraged use of exercise log. Pepcid added. Follow-up  Return in about 1 month (around 5/5/2021). Orders this encounter:  Orders Placed This Encounter   Procedures    CBC Auto Differential     Standing Status:   Future     Standing Expiration Date:   4/5/2022    Comprehensive Metabolic Panel     Standing Status:   Future     Standing Expiration Date:   4/5/2022    Hemoglobin A1C     Standing Status:   Future     Standing Expiration Date:   4/5/2022    Iron and TIBC     Standing Status:   Future     Standing Expiration Date:   4/5/2022     Order Specific Question:   Is Patient Fasting? Answer:   yes     Order Specific Question:   No of Hours?      Answer:   12    Lipid Panel     Standing Status:   Future     Standing Expiration Date:   4/5/2022     Order Specific Question:   Is Patient Fasting?/# of Hours     Answer:   12    Magnesium     Standing Status:   Future     Standing Expiration Date:   4/5/2022    PTH, Intact     Standing Status:   Future     Standing Expiration Date:   4/5/2022    TSH without Reflex     Standing Status:   Future     Standing Expiration Date:   4/5/2022    Urine Drug Screen     Standing Status:   Future     Standing Expiration Date:

## 2021-04-05 NOTE — TELEPHONE ENCOUNTER
Next Visit Date:  Future Appointments   Date Time Provider Bayron Gamez   4/5/2021  9:15 AM TERA Quintero CNP Weight Mgmt [facility]   4/9/2021  9:30 AM SCHEDULE, [facility] COVID SCREENING [facility]    4/13/2021  7:30 PM [facility] SLEEP RM 7 [facility]    4/21/2021 11:00 AM MD Luz Hudson PC [facility]   5/6/2021 12:00 PM SCHEDULE, P BARIATRIC SURG bariatric [address] [facility]   6/3/2021 12:00 PM SCHEDULE, [facility] BARIATRIC SURG b[address]ric [address] Maintenance   Topic Date Due    Hepatitis C screen  Never done    COVID-19 Vaccine (1) Never done    Flu vaccine (Season Ended) 09/28/2021 (Originally 9/1/2021)    Cervical cancer screen  01/20/2022 (Originally 6/20/1998)    Lipid screen  03/24/2022    Potassium monitoring  03/24/2022    Creatinine monitoring  03/24/2022    DTaP/Tdap/Td vaccine (2 - Td) 09/01/2023    HIV screen  Completed    Hepatitis A vaccine  Aged Out    Hepatitis B vaccine  Aged Out    Hib vaccine  Aged Out    Meningococcal (ACWY) vaccine  Aged Out    Pneumococcal 0-64 years Vaccine  Aged Out       No results found for: LABA1C          ( goal A1C is < 7)   No results found for: LABMICR  LDL Cholesterol (mg/dL)   Date Value   03/24/2021 95   09/21/2020 80       (goal LDL is <100)   AST (U/L)   Date Value   03/24/2021 29     ALT (U/L)   Date Value   03/24/2021 33     BUN (mg/dL)   Date Value   03/24/2021 18     BP Readings from Last 3 Encounters:   04/01/21 121/70   04/01/21 133/70   03/10/21 121/76          (goal 120/80)    All Future Testing planned in [facility]  Lab Frequency Next Occurrence   Metanephrines Urine Once 09/28/2020   Metanephrines Plasma Free Once 09/28/2020   Aldosterone Once 09/28/2020   Renin Once 09/28/2020   Sleep Study with PAP Titration Once 03/29/2021               Patient Active Problem List:     Menometrorrhagia     IUD (intrauterine device) in place     Essential hypertension     Hiatal hernia     Hyperlipidemia     Iron deficiency anemia     Morbid obesity due to excess calories (HCC)     Postgastrectomy malabsorption     CORNELIA (obstructive sleep apnea)     Vitamin D deficiency     Anxiety state     Hypertensive emergency     Obesity (BMI 30-39. 9)     Recurrent major depressive disorder, in partial remission (HCC)     Folic acid deficiency     Migraine without aura and without status migrainosus, not intractable     Neoplasm of uncertain behavior of skin

## 2021-04-09 ENCOUNTER — HOSPITAL ENCOUNTER (OUTPATIENT)
Dept: LAB | Age: 46
Setting detail: SPECIMEN
Discharge: HOME | End: 2021-04-09
Payer: MEDICARE

## 2021-04-09 DIAGNOSIS — Z01.818 PREOP TESTING: Primary | ICD-10-CM

## 2021-04-09 PROCEDURE — U0005 INFEC AGEN DETEC AMPLI PROBE: HCPCS

## 2021-04-09 PROCEDURE — U0003 INFECTIOUS AGENT DETECTION BY NUCLEIC ACID (DNA OR RNA); SEVERE ACUTE RESPIRATORY SYNDROME CORONAVIRUS 2 (SARS-COV-2) (CORONAVIRUS DISEASE [COVID-19]), AMPLIFIED PROBE TECHNIQUE, MAKING USE OF HIGH THROUGHPUT TECHNOLOGIES AS DESCRIBED BY CMS-2020-01-R: HCPCS

## 2021-04-11 LAB
SARS-COV-2: NORMAL
SARS-COV-2: NOT DETECTED
SOURCE: NORMAL

## 2021-04-13 ENCOUNTER — HOSPITAL ENCOUNTER (OUTPATIENT)
Dept: SLEEP CENTER | Age: 46
Discharge: HOME | End: 2021-04-15
Payer: MEDICARE

## 2021-04-13 VITALS — TEMPERATURE: 96 F

## 2021-04-13 DIAGNOSIS — G47.33 OBSTRUCTIVE SLEEP APNEA: ICD-10-CM

## 2021-04-13 PROCEDURE — 95811 POLYSOM 6/>YRS CPAP 4/> PARM: CPT

## 2021-04-14 ENCOUNTER — OFFICE VISIT (OUTPATIENT)
Dept: SURGERY | Age: 46
End: 2021-04-14

## 2021-04-14 VITALS
OXYGEN SATURATION: 97 % | SYSTOLIC BLOOD PRESSURE: 171 MMHG | HEART RATE: 55 BPM | BODY MASS INDEX: 40.62 KG/M2 | HEIGHT: 68 IN | WEIGHT: 268 LBS | DIASTOLIC BLOOD PRESSURE: 91 MMHG

## 2021-04-14 DIAGNOSIS — Z09 POSTOPERATIVE EXAMINATION: Primary | ICD-10-CM

## 2021-04-14 PROCEDURE — 99024 POSTOP FOLLOW-UP VISIT: CPT | Performed by: PLASTIC SURGERY

## 2021-04-14 NOTE — PROGRESS NOTES
[facility]  [facility] [id number]       OFFICE POST-OP NOTE    Patient Name:  Jeanie Heller    :  1975    MRN:  [id number]  STATUS POST  Chief Complaint   Patient presents with    Post-Op Check     Patient states she is here to have sutures removed from her back. SUBJECTIVE  Patient seen and examined. Patient status post excision of multiple lesions on 2021. The results were discussed with the patient. A copy was provided to the patient. The left superior upper back was consistent with a dermatofibroma, the left lateral upper back was consistent with a dermatofibroma, central back was consistent with a dermatofibroma, the right shoulder lateral was consistent with a dermatofibroma, the upper back right medial was consistent with dermatofibroma    Right inferior lateral back was consistent with a nevus. None were malignant. PHYSICAL EXAM  Vital Signs:  BP (!) 171/91 (Site: Left Upper Arm, Position: Sitting, Cuff Size: Large Adult)   Pulse 55   Ht 5' 8\" (1.727 m)   Wt 268 lb (121.6 kg)   LMP  (LMP Unknown) Comment: urone hCG negative  SpO2 97%   BMI 40.75 kg/m²     Incisions:  Suture line clean dry and intact. Patient has healed well. Skin:  No evidence of infection. Neurologic:  Alert & oriented x 3. Laboratory:  Dermatology Pathology Report 2021  3:12 PM [facility]   -- Diagnosis --   1.  SKIN, LEFT SUPERIOR UPPER BACK, EXCISION:        - [facility]. 2.  SKIN, LEFT LATERAL UPPER BACK, EXCISION:        -  DERMATOFIBROMA. 3.  SKIN, CENTRAL BACK, EXCISION:        -  DERMATOFIBROMA. 4.  SKIN, RIGHT SHOULDER LATERAL, EXCISION:        -  DERMATOFIBROMA. 5.  SKIN, MEDIAL RIGHT UPPER BACK, EXCISION:        -  DERMATOFIBROMA. 6.  SKIN, RIGHT INFERIOR LATERAL BACK, EXCISION:        -  INTRADERMAL NEVUS AND MILIAL CYST.       [facility] [place].  Boogie Arimzendi M.D.   **Electronically Signed Out**   jet/4/2/2021         Clinical Information   Pre-op Diagnosis:  MULTIPLE SKIN LESIONS   Operative Findings:  LEFT SUPERIOR UPPER BACK  STITCH AT 12:00; LEFT   LATERAL UPPER BACK  STITCH AT 12:00; CENTRAL BACK  STITCH AT 12:00;   RIGHT SHOULDER LATERAL  STITCH AT 12:00; MEDIAL RIGHT UPPER BACK   STITCH AT 12:00; RIGHT INFERIOR LATERAL BACK STITCH AT 12:00 (SITE   CONFIRMED AS FROM \"BACK\" PER DR. Ronal Ferguson)   Operation Performed:  EXCISION x 6     Source of Specimen   1: LEFT SUPERIOR UPPER BACK, STITCH AT 12 O'CLOCK   2: LEFT LATERAL UPPER BACK, STITCH AT 12 O'CLOCK   3: CENTRAL BACK   4: RIGHT SHOULDER LATERAL   5: MEDIAL RIGHT UPPER BACK   6: RIGHT INFERIOR LATERAL     Gross Description   1.  \"ALHAJI HARTMAN, LEFT SUPERIOR UPPER BACK  STITCH AT 12:00\" 2.4 x   1.5 x 1.0 (depth) cm oriented tan skin ellipse with a suture   designating 12:00.  The 12:00 half is inked blue and 6:00 half black. Cassette summary:  \"A-B\" specimen entirely serially submitted from   3-9. 2. Stephanie Sylvester, LEFT LATERAL UPPER BACK  STITCH AT 12:00\" 1.8 x   1.0 x 0.4 (depth) cm oriented tan skin ellipse with a suture   designating 12:00.  The 12:00 half is inked blue and 6:00 half black. On the skin surface, is a 0.8 cm ill-defined faint mottled tan lesion.    Cassette summary:  \"A-B\" specimen entirely serially submitted from   3-9. [address] AT 12:00\" 1.3 x 0.8 x 0.5   (depth) cm oriented tan skin ellipse with a suture designating 12:00. The 3:00 half is inked blue and 9:00 half black.  Cassette summary:   \"A-B\" specimen entirely serially submitted from 12-6. 4.  \"ALHAJI HARTMAN, RIGHT SHOULDER LATERAL  STITCH AT 12:00\" 2.4 x   1.4 x 1.0 (depth) cm oriented tan skin ellipse with a suture   designating 12:00.  The 3:00 half is inked blue and 9:00 half black.    On the skin surface, is a 0.7 cm raised tan-white lesion.  Cassette   summary:  \"A-B\" specimen entirely serially submitted from 12-6. 5.  \"ALHAJI HARTMAN, MEDIAL RIGHT UPPER BACK  STITCH AT 12:00\" 2.1 x   1.5 x 0.8 (depth) cm oriented tan skin ellipse with a suture   designating 12:00.  The 12:00 half is inked blue and 6:00 half black. On the skin surface, is a 0.5 cm slightly raised mottled tan lesion   Cassette summary:  \"A-B\" specimen entirely serially submitted from   3-9. 6.  \"ALHAJI HARTMAN, RIGHT INFERIOR LATERAL BACK STITCH AT 12:00\" 0.9   x 0.4 x 0.2 (depth) cm oriented tan skin ellipse with a suture   designating 12:00.  The 3:00 half is inked blue and 9:00 half black. On the surface, is a 0.3 cm papule.  Cassette summary:  \"A\" 12:00   half, \"B\" 6:00 half.  tm       Microscopic Description   1, 2, 3, 4.  The sections show a nodular growth in the dermis of   fibroblasts arranged in a haphazard array.  The collagen at the   periphery has been thickened.  The overlying epidermis is   hyperplastic.  Margins appear negative for involvement   5.  The sections show a nodular growth in the dermis of fibroblasts   arranged in a haphazard array.  The collagen at the periphery has been   thickened.  The overlying epidermis is hyperplastic.  The lesion   extends to the deep margin focally. 6.  The sections show nests of melanocytes in the dermis, showing   maturation with depth. Lodema [place] is a small epithelial cyst in the dermis   lined by atrophic stratified squamous epithelium with a granular cell   layer. Lodema [place] is no evidence of malignancy in these sections.  Nevus   extends to the deep margin. SURGICAL PATHOLOGY CONSULTATION         Patient Name: Sara Chowdhury [facility] Rec: [id number]   Path Number: [id number]     [facility]   [facility]   ANATOMIC PATHOLOGY   [address] [place] [address], [facility] [address]. [place], [address]   [phone]   Fax: [phone]            ASSESSMENT   Diagnosis Orders   1. Postoperative examination         PLAN  1. We will remove all the sutures  2.   We will follow-up in 1 to 2 months. Plan discussed with patient.     Electronically signed by:  Rae Barahona M.D.   4/14/2021

## 2021-04-21 ENCOUNTER — OFFICE VISIT (OUTPATIENT)
Dept: FAMILY MEDICINE CLINIC | Age: 46
End: 2021-04-21
Payer: MEDICARE

## 2021-04-21 VITALS
HEART RATE: 72 BPM | DIASTOLIC BLOOD PRESSURE: 82 MMHG | RESPIRATION RATE: 16 BRPM | WEIGHT: 265 LBS | BODY MASS INDEX: 40.29 KG/M2 | TEMPERATURE: 97.1 F | OXYGEN SATURATION: 98 % | SYSTOLIC BLOOD PRESSURE: 132 MMHG

## 2021-04-21 DIAGNOSIS — Z11.59 NEED FOR HEPATITIS C SCREENING TEST: ICD-10-CM

## 2021-04-21 DIAGNOSIS — E66.01 MORBID OBESITY DUE TO EXCESS CALORIES (HCC): ICD-10-CM

## 2021-04-21 DIAGNOSIS — K21.9 GASTROESOPHAGEAL REFLUX DISEASE WITHOUT ESOPHAGITIS: ICD-10-CM

## 2021-04-21 DIAGNOSIS — F32.A ANXIETY AND DEPRESSION: ICD-10-CM

## 2021-04-21 DIAGNOSIS — J44.9 CHRONIC OBSTRUCTIVE PULMONARY DISEASE, UNSPECIFIED COPD TYPE (HCC): Primary | ICD-10-CM

## 2021-04-21 DIAGNOSIS — I10 ESSENTIAL HYPERTENSION: ICD-10-CM

## 2021-04-21 DIAGNOSIS — F41.9 ANXIETY AND DEPRESSION: ICD-10-CM

## 2021-04-21 DIAGNOSIS — Z87.891 HISTORY OF NICOTINE USE: ICD-10-CM

## 2021-04-21 PROCEDURE — G8427 DOCREV CUR MEDS BY ELIG CLIN: HCPCS | Performed by: FAMILY MEDICINE

## 2021-04-21 PROCEDURE — G8417 CALC BMI ABV UP PARAM F/U: HCPCS | Performed by: FAMILY MEDICINE

## 2021-04-21 PROCEDURE — 3023F SPIROM DOC REV: CPT | Performed by: FAMILY MEDICINE

## 2021-04-21 PROCEDURE — 1036F TOBACCO NON-USER: CPT | Performed by: FAMILY MEDICINE

## 2021-04-21 PROCEDURE — 99214 OFFICE O/P EST MOD 30 MIN: CPT | Performed by: FAMILY MEDICINE

## 2021-04-21 PROCEDURE — G8926 SPIRO NO PERF OR DOC: HCPCS | Performed by: FAMILY MEDICINE

## 2021-04-21 RX ORDER — ALBUTEROL SULFATE 90 UG/1
2 AEROSOL, METERED RESPIRATORY (INHALATION) 4 TIMES DAILY PRN
Qty: 1 INHALER | Refills: 0 | Status: SHIPPED | OUTPATIENT
Start: 2021-04-21 | Stop reason: SDUPTHER

## 2021-04-21 ASSESSMENT — ENCOUNTER SYMPTOMS
BLURRED VISION: 0
GASTROINTESTINAL NEGATIVE: 1
SHORTNESS OF BREATH: 0
RESPIRATORY NEGATIVE: 1
EYES NEGATIVE: 1
ORTHOPNEA: 0

## 2021-04-21 NOTE — PROGRESS NOTES
[address] PRIMARY CARE  [address] [place] [address]  Dept: [phone]  Dept Fax: [phone]    Leticia Goins is a 39 y.o. female who is a Established patient, who presents today for her medical conditions/complaints as noted below:  Chief Complaint   Patient presents with    Hypertension     her inhaler isnt covered can we change it please          HPI:     The patient is a 82-year-old female with past medical history significant for hypertension, morbid obesity, COPD who presents for 6-month checkup. The patient is considering bariatric surgery and is following with them. Labs were ordered by them that she is going to get in the near future. She also recently had a sleep study which is still pending. The patient's blood pressure has been improving since being on labetalol and lisinopril. She is taking a tablet and a half of labetalol twice a day and seems to control her blood pressure transfer. Hypertension  This is a chronic problem. The current episode started more than 1 year ago. The problem has been waxing and waning since onset. The problem is controlled. Pertinent negatives include no anxiety, blurred vision, chest pain, headaches, malaise/fatigue, neck pain, orthopnea, palpitations, peripheral edema, PND, shortness of breath or sweats. There are no associated agents to hypertension. Risk factors for coronary artery disease include obesity, sedentary lifestyle and smoking/tobacco exposure. Past treatments include beta blockers and ACE inhibitors. The current treatment provides moderate improvement. Compliance problems include diet and exercise. There is no history of angina, kidney disease, CAD/MI, CVA, heart failure, left ventricular hypertrophy, PVD or retinopathy. Identifiable causes of hypertension include sleep apnea.  There is no history of chronic renal disease, coarctation of the aorta, hyperaldosteronism, hypercortisolism, hyperparathyroidism, a hypertension causing med, pheochromocytoma, renovascular disease or a thyroid problem.        No results found for: LABA1C          ( goal A1Cis < 7)   No results found for: LABMICR  LDL Cholesterol (mg/dL)   Date Value   2021 95   2020 80   2020 149 (H)       (goal LDL is <100)   AST (U/L)   Date Value   2021 29     ALT (U/L)   Date Value   2021 33     BUN (mg/dL)   Date Value   2021 18     BP Readings from Last 3 Encounters:   21 132/82   21 (!) 171/91   21 (!) 138/90          (goal 120/80)    Past Medical History:   Diagnosis Date    Anemia     Asthma     History of abnormal cervical Pap smear     Hyperlipidemia     Hypertension     IUD (intrauterine device) in place 2020    Mirena    Menometrorrhagia       Past Surgical History:   Procedure Laterality Date    BREAST SURGERY      negative rt breast lumpectomy    CHOLECYSTECTOMY      COLPOSCOPY      GASTRIC BYPASS SURGERY      gastric sleeve    INTRAUTERINE DEVICE INSERTION  2020    Mirena     LIPOMA RESECTION      lt shoulder/upper back    PRE-MALIGNANT / BENIGN SKIN LESION EXCISION  2021    TRUNK LESION BIOPSY EXCISION RIGHT BACK X 2, LEFT BACK AND MID BACK ALL WITH COMPLEX CLOSURE     SKIN BIOPSY N/A 2021    TRUNK LESION BIOPSY EXCISION RIGHT BACK X 6, LEFT, RIGHT, AND MID BACK ALL WITH COMPLEX CLOSURE. performed by Gisel Valera MD at [address] [facility].       Family History   Problem Relation Age of Onset    High Blood Pressure Father     Heart Disease Father     Hypertension Maternal Grandmother     Heart Disease Maternal Grandmother     Stroke Maternal Uncle        Social History     Tobacco Use    Smoking status: Former Smoker     Packs/day: 1.00     Years: 22.00     Pack years: 22.00     Types: Cigarettes     Start date: 1/15/2020     Quit date: 2020     Years since quittin.7    Smokeless tobacco: Never Used   Substance Use Topics    Alcohol use: Yes     Comment: social      Current Outpatient Medications   Medication Sig Dispense Refill    albuterol sulfate HFA (VENTOLIN HFA) 108 (90 Base) MCG/ACT inhaler Inhale 2 puffs into the lungs 4 times daily as needed for Wheezing 1 Inhaler 0    lisinopril (PRINIVIL;ZESTRIL) 40 MG tablet TAKE 1 TABLET BY MOUTH EVERY DAY 90 tablet 0    vitamin B-12 (CYANOCOBALAMIN) 100 MCG tablet TAKE 1 TABLET BY MOUTH EVERY DAY      famotidine (PEPCID) 20 MG tablet Take 1 tablet by mouth nightly 30 tablet 3    atorvastatin (LIPITOR) 10 MG tablet Take 1 tablet by mouth daily 90 tablet 1    sertraline (ZOLOFT) 50 MG tablet TAKE 1 TABLET BY MOUTH EVERY DAY 90 tablet 0    ferrous sulfate (FE TABS 325) 325 (65 Fe) MG EC tablet Take 1 tablet by mouth 3 times daily (with meals) 90 tablet 0    albuterol sulfate HFA (PROAIR HFA) 108 (90 Base) MCG/ACT inhaler Inhale 2 puffs into the lungs every 6 hours as needed for Wheezing 1 Inhaler 0    labetalol (NORMODYNE) 100 MG tablet Take 1.5 tablets by mouth 2 times daily 90 tablet 2    folic acid (FOLVITE) 1 MG tablet Take 1 tablet by mouth daily 90 tablet 1    pantoprazole (PROTONIX) 40 MG tablet TAKE 1 TABLET BY MOUTH EVERY DAY BEFORE BREAKFAST 90 tablet 1    busPIRone (BUSPAR) 10 MG tablet Take 1 tablet by mouth 2 times daily 180 tablet 1    SUMAtriptan (IMITREX) 25 MG tablet Take 1 tablet by mouth once as needed for Migraine 9 tablet 0    butalbital-acetaminophen-caffeine (FIORICET, ESGIC) -40 MG per tablet Take 1 tablet by mouth every 4 hours as needed for Headaches 20 tablet 0    Multiple Vitamins-Minerals (MULTIVITAMIN ADULT EXTRA C PO) Take 1 tablet by mouth daily      levonorgestrel (MIRENA) IUD 52 mg 1 each by Intrauterine route       No current facility-administered medications for this visit.       Allergies   Allergen Reactions    Nsaids      Pt had gastric sleeve  Cannot take due to bariatric surgery  Cannot take due to bariatric surgery  Tolmetin      Pt had gastric sleeve    Codeine Itching       Health Maintenance   Topic Date Due    Hepatitis C screen  Never done    COVID-19 Vaccine (1) Never done    Flu vaccine (Season Ended) 09/28/2021 (Originally 9/1/2021)    Cervical cancer screen  01/20/2022 (Originally 6/20/1998)    Lipid screen  03/24/2022    Potassium monitoring  03/24/2022    Creatinine monitoring  03/24/2022    DTaP/Tdap/Td vaccine (2 - Td) 09/01/2023    HIV screen  Completed    Hepatitis A vaccine  Aged Out    Hepatitis B vaccine  Aged Out    Hib vaccine  Aged Out    Meningococcal (ACWY) vaccine  Aged Out    Pneumococcal 0-64 years Vaccine  Aged Out       Subjective:     Review of Systems   Constitutional: Negative. Negative for malaise/fatigue. HENT: Negative. Eyes: Negative. Negative for blurred vision. Respiratory: Negative. Negative for shortness of breath. Cardiovascular: Negative. Negative for chest pain, palpitations, orthopnea and PND. Gastrointestinal: Negative. Genitourinary: Negative. Musculoskeletal: Negative. Negative for neck pain. Skin: Negative. Allergic/Immunologic: Negative for environmental allergies, food allergies and immunocompromised state. Neurological: Negative. Negative for headaches. Psychiatric/Behavioral: Negative. Objective:     Physical Exam  Vitals signs and nursing note reviewed. Constitutional:       General: She is awake. She is not in acute distress. Appearance: Normal appearance. She is obese. She is not ill-appearing, toxic-appearing or diaphoretic. HENT:      Head: Normocephalic and atraumatic. Right Ear: External ear normal.      Left Ear: External ear normal.      Nose: Nose normal.   Eyes:      Extraocular Movements: Extraocular movements intact. Conjunctiva/sclera: Conjunctivae normal.      Pupils: Pupils are equal, round, and reactive to light. Neck:      Musculoskeletal: Normal range of motion.    Cardiovascular: this time we seem to have the blood pressure under decent control. She is on labetalol 150 mg twice a day, lisinopril. I would like her to get fasting labs done, this was ordered through bariatric surgery so we will get the results when they return. Weight loss would significantly improve the patient's blood pressure control. GERDpatient is on current medications. No increase in symptoms. Continue medications and notify us if symptoms worsen. Obesitypatient is pursuing bariatric surgery through [facility] weight loss. She has tried multiple other things in the past without success. This time this is her best option. Anxiety and depressionstable on current medications and current doses. Patient encouraged to continue on current medications. Return in about 6 months (around 10/21/2021) for htn. Orders Placed This Encounter   Procedures    Hepatitis C Antibody     Standing Status:   Future     Standing Expiration Date:   4/21/2022    Spirometry With Bronchodilator     Standing Status:   Future     Standing Expiration Date:   4/21/2022     Orders Placed This Encounter   Medications    albuterol sulfate HFA (VENTOLIN HFA) 108 (90 Base) MCG/ACT inhaler     Sig: Inhale 2 puffs into the lungs 4 times daily as needed for Wheezing     Dispense:  1 Inhaler     Refill:  0       Patient given educational materials - see patient instructions. Discussed use, benefit, and side effects of prescribed medications. All patientquestions answered. Pt voiced understanding. Reviewed health maintenance. Instructedto continue current medications, diet and exercise. Patient agreed with treatmentplan. Follow up as directed.      Electronically signed by Karina Cramer MD on 4/21/2021 at 6:18 PM

## 2021-04-22 ENCOUNTER — HOSPITAL ENCOUNTER (OUTPATIENT)
Age: 46
Discharge: HOME | End: 2021-04-22
Payer: MEDICARE

## 2021-04-22 DIAGNOSIS — Z11.59 NEED FOR HEPATITIS C SCREENING TEST: ICD-10-CM

## 2021-04-22 DIAGNOSIS — E78.5 HYPERLIPIDEMIA, UNSPECIFIED HYPERLIPIDEMIA TYPE: ICD-10-CM

## 2021-04-22 DIAGNOSIS — K21.9 GASTROESOPHAGEAL REFLUX DISEASE WITHOUT ESOPHAGITIS: ICD-10-CM

## 2021-04-22 DIAGNOSIS — D50.9 IRON DEFICIENCY ANEMIA, UNSPECIFIED IRON DEFICIENCY ANEMIA TYPE: ICD-10-CM

## 2021-04-22 DIAGNOSIS — E66.9 OBESITY (BMI 30-39.9): ICD-10-CM

## 2021-04-22 DIAGNOSIS — I10 ESSENTIAL HYPERTENSION: ICD-10-CM

## 2021-04-22 DIAGNOSIS — G43.009 MIGRAINE WITHOUT AURA AND WITHOUT STATUS MIGRAINOSUS, NOT INTRACTABLE: ICD-10-CM

## 2021-04-22 DIAGNOSIS — G47.33 OSA (OBSTRUCTIVE SLEEP APNEA): ICD-10-CM

## 2021-04-22 DIAGNOSIS — Z87.891 HISTORY OF NICOTINE USE: ICD-10-CM

## 2021-04-22 LAB
ABSOLUTE EOS #: 0.28 K/UL (ref 0–0.44)
ABSOLUTE IMMATURE GRANULOCYTE: <0.03 K/UL (ref 0–0.3)
ABSOLUTE LYMPH #: 1.47 K/UL (ref 1.1–3.7)
ABSOLUTE MONO #: 0.28 K/UL (ref 0.1–1.2)
ALBUMIN SERPL-MCNC: 4.2 G/DL (ref 3.5–5.2)
ALBUMIN/GLOBULIN RATIO: 1.4 (ref 1–2.5)
ALP BLD-CCNC: 106 U/L (ref 35–104)
ALT SERPL-CCNC: 24 U/L (ref 5–33)
AMPHETAMINE SCREEN URINE: NEGATIVE
ANION GAP SERPL CALCULATED.3IONS-SCNC: 10 MMOL/L (ref 9–17)
AST SERPL-CCNC: 25 U/L
BARBITURATE SCREEN URINE: NEGATIVE
BASOPHILS # BLD: 1 % (ref 0–2)
BASOPHILS ABSOLUTE: 0.03 K/UL (ref 0–0.2)
BENZODIAZEPINE SCREEN, URINE: NEGATIVE
BILIRUB SERPL-MCNC: 0.34 MG/DL (ref 0.3–1.2)
BUN BLDV-MCNC: 16 MG/DL (ref 6–20)
BUN/CREAT BLD: ABNORMAL (ref 9–20)
BUPRENORPHINE URINE: NORMAL
CALCIUM SERPL-MCNC: 9.3 MG/DL (ref 8.6–10.4)
CANNABINOID SCREEN URINE: NEGATIVE
CHLORIDE BLD-SCNC: 104 MMOL/L (ref 98–107)
CHOLESTEROL/HDL RATIO: 3.6
CHOLESTEROL: 190 MG/DL
CO2: 24 MMOL/L (ref 20–31)
COCAINE METABOLITE, URINE: NEGATIVE
CREAT SERPL-MCNC: 0.62 MG/DL (ref 0.5–0.9)
DIFFERENTIAL TYPE: ABNORMAL
EOSINOPHILS RELATIVE PERCENT: 5 % (ref 1–4)
ESTIMATED AVERAGE GLUCOSE: 100 MG/DL
FOLATE: >20 NG/ML
GFR AFRICAN AMERICAN: >60 ML/MIN
GFR NON-AFRICAN AMERICAN: >60 ML/MIN
GFR SERPL CREATININE-BSD FRML MDRD: ABNORMAL ML/MIN/{1.73_M2}
GFR SERPL CREATININE-BSD FRML MDRD: ABNORMAL ML/MIN/{1.73_M2}
GLUCOSE BLD-MCNC: 97 MG/DL (ref 70–99)
HBA1C MFR BLD: 5.1 % (ref 4–6)
HCT VFR BLD CALC: 37.1 % (ref 36.3–47.1)
HDLC SERPL-MCNC: 53 MG/DL
HEMOGLOBIN: 11.8 G/DL (ref 11.9–15.1)
HEPATITIS C ANTIBODY: NONREACTIVE
IMMATURE GRANULOCYTES: 0 %
IRON SATURATION: 15 % (ref 20–55)
IRON: 65 UG/DL (ref 37–145)
LDL CHOLESTEROL: 109 MG/DL (ref 0–130)
LYMPHOCYTES # BLD: 27 % (ref 24–43)
MAGNESIUM: 2 MG/DL (ref 1.6–2.6)
MCH RBC QN AUTO: 25.9 PG (ref 25.2–33.5)
MCHC RBC AUTO-ENTMCNC: 31.8 G/DL (ref 28.4–34.8)
MCV RBC AUTO: 81.4 FL (ref 82.6–102.9)
MDMA URINE: NORMAL
METHADONE SCREEN, URINE: NEGATIVE
METHAMPHETAMINE, URINE: NORMAL
MONOCYTES # BLD: 5 % (ref 3–12)
NRBC AUTOMATED: 0 PER 100 WBC
OPIATES, URINE: NEGATIVE
OXYCODONE SCREEN URINE: NEGATIVE
PDW BLD-RTO: 18.6 % (ref 11.8–14.4)
PHENCYCLIDINE, URINE: NEGATIVE
PLATELET # BLD: 286 K/UL (ref 138–453)
PLATELET ESTIMATE: ABNORMAL
PMV BLD AUTO: 9.5 FL (ref 8.1–13.5)
POTASSIUM SERPL-SCNC: 4.3 MMOL/L (ref 3.7–5.3)
PROPOXYPHENE, URINE: NORMAL
PTH INTACT: 57.91 PG/ML (ref 15–65)
RBC # BLD: 4.56 M/UL (ref 3.95–5.11)
RBC # BLD: ABNORMAL 10*6/UL
SEG NEUTROPHILS: 62 % (ref 36–65)
SEGMENTED NEUTROPHILS ABSOLUTE COUNT: 3.38 K/UL (ref 1.5–8.1)
SODIUM BLD-SCNC: 138 MMOL/L (ref 135–144)
TEST INFORMATION: NORMAL
TOTAL IRON BINDING CAPACITY: 432 UG/DL (ref 250–450)
TOTAL PROTEIN: 7.3 G/DL (ref 6.4–8.3)
TRICYCLIC ANTIDEPRESSANTS, UR: NORMAL
TRIGL SERPL-MCNC: 141 MG/DL
TSH SERPL DL<=0.05 MIU/L-ACNC: 2.35 MIU/L (ref 0.3–5)
UNSATURATED IRON BINDING CAPACITY: 367 UG/DL (ref 112–347)
VITAMIN B-12: 1558 PG/ML (ref 232–1245)
VITAMIN D 25-HYDROXY: 32.8 NG/ML (ref 30–100)
VLDLC SERPL CALC-MCNC: NORMAL MG/DL (ref 1–30)
WBC # BLD: 5.5 K/UL (ref 3.5–11.3)
WBC # BLD: ABNORMAL 10*3/UL

## 2021-04-22 PROCEDURE — 80307 DRUG TEST PRSMV CHEM ANLYZR: CPT

## 2021-04-22 PROCEDURE — 84425 ASSAY OF VITAMIN B-1: CPT

## 2021-04-22 PROCEDURE — 84590 ASSAY OF VITAMIN A: CPT

## 2021-04-22 PROCEDURE — 84630 ASSAY OF ZINC: CPT

## 2021-04-22 PROCEDURE — 80061 LIPID PANEL: CPT

## 2021-04-22 PROCEDURE — 82746 ASSAY OF FOLIC ACID SERUM: CPT

## 2021-04-22 PROCEDURE — 83550 IRON BINDING TEST: CPT

## 2021-04-22 PROCEDURE — 36415 COLL VENOUS BLD VENIPUNCTURE: CPT

## 2021-04-22 PROCEDURE — 83970 ASSAY OF PARATHORMONE: CPT

## 2021-04-22 PROCEDURE — 85025 COMPLETE CBC W/AUTO DIFF WBC: CPT

## 2021-04-22 PROCEDURE — 82306 VITAMIN D 25 HYDROXY: CPT

## 2021-04-22 PROCEDURE — 83540 ASSAY OF IRON: CPT

## 2021-04-22 PROCEDURE — G0480 DRUG TEST DEF 1-7 CLASSES: HCPCS

## 2021-04-22 PROCEDURE — 84443 ASSAY THYROID STIM HORMONE: CPT

## 2021-04-22 PROCEDURE — 82607 VITAMIN B-12: CPT

## 2021-04-22 PROCEDURE — 83036 HEMOGLOBIN GLYCOSYLATED A1C: CPT

## 2021-04-22 PROCEDURE — 80053 COMPREHEN METABOLIC PANEL: CPT

## 2021-04-22 PROCEDURE — 83735 ASSAY OF MAGNESIUM: CPT

## 2021-04-22 PROCEDURE — 86803 HEPATITIS C AB TEST: CPT

## 2021-04-25 LAB
RETINYL PALMITATE: <0.02 MG/L (ref 0–0.1)
VITAMIN A LEVEL: 0.54 MG/L (ref 0.3–1.2)
VITAMIN A, INTERP: NORMAL
ZINC: 75.9 UG/DL (ref 60–120)

## 2021-04-26 LAB
3-OH-COTININE: <2 NG/ML
COTININE: <2 NG/ML
NICOTINE: <2 NG/ML
VITAMIN B1 WHOLE BLOOD: 110 NMOL/L (ref 70–180)

## 2021-05-02 LAB — STATUS: NORMAL

## 2021-05-03 ENCOUNTER — OFFICE VISIT (OUTPATIENT)
Dept: BARIATRICS/WEIGHT MGMT | Age: 46
End: 2021-05-03
Payer: MEDICARE

## 2021-05-03 VITALS
SYSTOLIC BLOOD PRESSURE: 142 MMHG | HEIGHT: 68 IN | HEART RATE: 62 BPM | BODY MASS INDEX: 39.86 KG/M2 | WEIGHT: 263 LBS | DIASTOLIC BLOOD PRESSURE: 94 MMHG

## 2021-05-03 DIAGNOSIS — K21.9 GASTROESOPHAGEAL REFLUX DISEASE WITHOUT ESOPHAGITIS: ICD-10-CM

## 2021-05-03 DIAGNOSIS — G47.33 OSA (OBSTRUCTIVE SLEEP APNEA): Primary | ICD-10-CM

## 2021-05-03 DIAGNOSIS — E78.5 HYPERLIPIDEMIA, UNSPECIFIED HYPERLIPIDEMIA TYPE: ICD-10-CM

## 2021-05-03 DIAGNOSIS — I10 ESSENTIAL HYPERTENSION: Primary | ICD-10-CM

## 2021-05-03 DIAGNOSIS — E66.9 OBESITY (BMI 30-39.9): ICD-10-CM

## 2021-05-03 DIAGNOSIS — G47.33 OSA (OBSTRUCTIVE SLEEP APNEA): ICD-10-CM

## 2021-05-03 DIAGNOSIS — D50.9 IRON DEFICIENCY ANEMIA, UNSPECIFIED IRON DEFICIENCY ANEMIA TYPE: ICD-10-CM

## 2021-05-03 DIAGNOSIS — Z87.891 HISTORY OF NICOTINE USE: ICD-10-CM

## 2021-05-03 PROCEDURE — 1036F TOBACCO NON-USER: CPT | Performed by: NURSE PRACTITIONER

## 2021-05-03 PROCEDURE — 99213 OFFICE O/P EST LOW 20 MIN: CPT | Performed by: NURSE PRACTITIONER

## 2021-05-03 PROCEDURE — G8427 DOCREV CUR MEDS BY ELIG CLIN: HCPCS | Performed by: NURSE PRACTITIONER

## 2021-05-03 PROCEDURE — G8417 CALC BMI ABV UP PARAM F/U: HCPCS | Performed by: NURSE PRACTITIONER

## 2021-05-03 NOTE — PROGRESS NOTES
Medical Weight Management Progress Note    Subjective     Patient being seen for medically supervised weight loss for the chronic conditions of CORNELIA, HTN, HLD, MILLER, Migraine, GERD. She had a sleeve gastrectomy and had weight regain and also developed severe GERD and is now interested in revision surgery from sleeve to bypass. She is taking protonix for GERD, but still has breakthrough symptoms. She is working on the behavior changes discussed at the initial appointment. Patient continues on diet plan. Physical activity includes walking. Weight loss of 1 lb since last visit. Psych eval completed and clearance received. Sleep study in process. EGD scheduled 5/7/21. Quit smoking 2/27/21. Nicotine level from 4/22/21 negative. No current issues. Working toward bariatric surgery:    [] Sleeve Gastrectomy                                                           [] Narendra-en-Y Gastric Bypass                                                           [x] Revision of Sleeve to Gastric Bypass    Allergies: Allergies   Allergen Reactions    Nsaids      Pt had gastric sleeve  Cannot take due to bariatric surgery  Cannot take due to bariatric surgery      Tolmetin      Pt had gastric sleeve    Codeine Itching       Past Medical History:     Past Medical History:   Diagnosis Date    Anemia     Asthma     History of abnormal cervical Pap smear     Hyperlipidemia     Hypertension     IUD (intrauterine device) in place 08/04/2020    Mirena    Menometrorrhagia    .     Past Surgical History:  Past Surgical History:   Procedure Laterality Date    BREAST SURGERY      negative rt breast lumpectomy    CHOLECYSTECTOMY      COLPOSCOPY      GASTRIC BYPASS SURGERY      gastric sleeve    INTRAUTERINE DEVICE INSERTION  08/04/2020    Mirena     LIPOMA RESECTION      lt shoulder/upper back    PRE-MALIGNANT / BENIGN SKIN LESION EXCISION  04/01/2021    TRUNK LESION BIOPSY EXCISION RIGHT BACK X 2, LEFT BACK AND MID BACK ALL WITH COMPLEX CLOSURE     SKIN BIOPSY N/A 2021    TRUNK LESION BIOPSY EXCISION RIGHT BACK X 6, LEFT, RIGHT, AND MID BACK ALL WITH COMPLEX CLOSURE. performed by Marley Diamond MD at [address] [facility].       Family History:  Family History   Problem Relation Age of Onset    High Blood Pressure Father     Heart Disease Father     Hypertension Maternal Grandmother     Heart Disease Maternal Grandmother     Stroke Maternal Uncle        Social History:  Social History     Socioeconomic History    Marital status:      Spouse name: Not on file    Number of children: Not on file    Years of education: Not on file    Highest education level: Not on file   Occupational History    Not on file   Social Needs    Financial resource strain: Patient refused    Food insecurity     Worry: Patient refused     Inability: Patient refused    Transportation needs     Medical: Patient refused     Non-medical: Patient refused   Tobacco Use    Smoking status: Former Smoker     Packs/day: 1.00     Years: 22.00     Pack years: 22.00     Types: Cigarettes     Start date: 1/15/2020     Quit date: 2020     Years since quittin.7    Smokeless tobacco: Never Used   Substance and Sexual Activity    Alcohol use: Yes     Comment: social    Drug use: No    Sexual activity: Not on file   Lifestyle    Physical activity     Days per week: 5 days     Minutes per session: 30 min    Stress:  Only a little   Relationships    Social connections     Talks on phone: Not on file     Gets together: Not on file     Attends Hoahaoism service: Not on file     Active member of club or organization: Not on file     Attends meetings of clubs or organizations: Not on file     Relationship status: Not on file    Intimate partner violence     Fear of current or ex partner: Not on file     Emotionally abused: Not on file     Physically abused: Not on file     Forced sexual activity: Not on file   Other Topics Concern    Not on file   Social History Narrative    Not on file       Current Medications:  Current Outpatient Medications   Medication Sig Dispense Refill    albuterol sulfate HFA (VENTOLIN HFA) 108 (90 Base) MCG/ACT inhaler Inhale 2 puffs into the lungs 4 times daily as needed for Wheezing 1 Inhaler 0    lisinopril (PRINIVIL;ZESTRIL) 40 MG tablet TAKE 1 TABLET BY MOUTH EVERY DAY 90 tablet 0    vitamin B-12 (CYANOCOBALAMIN) 100 MCG tablet TAKE 1 TABLET BY MOUTH EVERY DAY      famotidine (PEPCID) 20 MG tablet Take 1 tablet by mouth nightly 30 tablet 3    atorvastatin (LIPITOR) 10 MG tablet Take 1 tablet by mouth daily 90 tablet 1    sertraline (ZOLOFT) 50 MG tablet TAKE 1 TABLET BY MOUTH EVERY DAY 90 tablet 0    ferrous sulfate (FE TABS 325) 325 (65 Fe) MG EC tablet Take 1 tablet by mouth 3 times daily (with meals) 90 tablet 0    albuterol sulfate HFA (PROAIR HFA) 108 (90 Base) MCG/ACT inhaler Inhale 2 puffs into the lungs every 6 hours as needed for Wheezing 1 Inhaler 0    labetalol (NORMODYNE) 100 MG tablet Take 1.5 tablets by mouth 2 times daily 90 tablet 2    folic acid (FOLVITE) 1 MG tablet Take 1 tablet by mouth daily 90 tablet 1    pantoprazole (PROTONIX) 40 MG tablet TAKE 1 TABLET BY MOUTH EVERY DAY BEFORE BREAKFAST 90 tablet 1    busPIRone (BUSPAR) 10 MG tablet Take 1 tablet by mouth 2 times daily 180 tablet 1    butalbital-acetaminophen-caffeine (FIORICET, ESGIC) -40 MG per tablet Take 1 tablet by mouth every 4 hours as needed for Headaches 20 tablet 0    Multiple Vitamins-Minerals (MULTIVITAMIN ADULT EXTRA C PO) Take 1 tablet by mouth daily      levonorgestrel (MIRENA) IUD 52 mg 1 each by Intrauterine route      SUMAtriptan (IMITREX) 25 MG tablet Take 1 tablet by mouth once as needed for Migraine 9 tablet 0     No current facility-administered medications for this visit.         Vital Signs:  BP (!) 142/94 (Site: Right Upper Arm, Position: Sitting, Cuff Size: Large Adult)   Pulse 62   Ht 5' 8\" (1.727 m)   Wt 263 lb (119.3 kg)   BMI 39.99 kg/m²     BMI/Height/Weight:  Body mass index is 39.99 kg/m². Review of Systems - A review of systems was performed. All was negative unless otherwise documented in HPI. Constitutional: Negative for fever, chills and diaphoresis. HENT: Negative for hearing loss and trouble swallowing. Eyes: Negative for photophobia and visual disturbance. Respiratory: Negative for cough, shortness of breath and wheezing. Cardiovascular: Negative for chest pain and palpitations. Gastrointestinal: Negative for nausea, vomiting, abdominal pain, diarrhea, constipation, blood in stool and abdominal distention. Endocrine: Negative for polydipsia, polyphagia and polyuria. Genitourinary: Negative for dysuria, frequency, hematuria and difficulty urinating. Musculoskeletal: Negative for myalgias, joint swelling. Skin: Negative for pallor and rash. Neurological: Negative for dizziness, tremors, light-headedness and headaches. Psychiatric/Behavioral: Negative for sleep disturbance and dysphoric mood. Objective:      Physical Exam   Vital signs reviewed. General: Well-developed and well-nourished. No acute distress. Skin: Warm, dry and intact. HEENT: Normocephalic. EOMs intact. Conjunctivae normal. Neck supple. Cardiovascular: Normal rate, regular rhythm. Pulmonary/Chest: Normal effort. Lungs clear to auscultation. No rales, rhonchi or wheezing. Abdominal: Positive bowel sounds. Soft, nontender. Nondistended. Musculoskeletal: Movement x4. No edema. Neurological: Gait normal. Alert and oriented to person, place, and time. Psychiatric: Normal mood and affect. Speech and behavior normal. Judgment and thought content normal. Cognition and memory intact. Assessment:       Diagnosis Orders   1. Essential hypertension     2. Hyperlipidemia, unspecified hyperlipidemia type     3. CORNELIA (obstructive sleep apnea)     4.  Gastroesophageal reflux disease without esophagitis     5. Obesity (BMI 30-39.9)     6. Iron deficiency anemia, unspecified iron deficiency anemia type     7. History of nicotine use         Plan:    No dietitian visit today. Patient was encouraged to journal all food intake. Keep calorie level at approximately 1131-5995. Protein intake is to be a minimum of 60-80 grams per day. Water drinking was encouraged with a goal of 64oz-128oz daily. Beverages to be calorie free except for milk. Every other beverage should be water. Avoid soda. Continue to increase level of physical activity. Encouraged use of exercise log. BP elevated today. Asymptomatic. Labs reviewed and discussed with patient. Acceptable. Follow-up  Return in about 1 month (around 6/3/2021). Orders this encounter:  No orders of the defined types were placed in this encounter. Prescriptions this encounter:  No orders of the defined types were placed in this encounter.       Electronically signed by:  Toby Pride CNP

## 2021-05-05 ENCOUNTER — ANESTHESIA EVENT (OUTPATIENT)
Dept: OPERATING ROOM | Age: 46
End: 2021-05-05
Payer: MEDICARE

## 2021-05-06 ENCOUNTER — NURSE ONLY (OUTPATIENT)
Dept: BARIATRICS/WEIGHT MGMT | Age: 46
End: 2021-05-06

## 2021-05-07 ENCOUNTER — ANESTHESIA (OUTPATIENT)
Dept: OPERATING ROOM | Age: 46
End: 2021-05-07
Payer: MEDICARE

## 2021-05-07 ENCOUNTER — HOSPITAL ENCOUNTER (OUTPATIENT)
Age: 46
Setting detail: OUTPATIENT SURGERY
Discharge: HOME | End: 2021-05-07
Attending: SURGERY | Admitting: SURGERY
Payer: MEDICARE

## 2021-05-07 VITALS
HEART RATE: 82 BPM | WEIGHT: 270.38 LBS | BODY MASS INDEX: 40.98 KG/M2 | HEIGHT: 68 IN | DIASTOLIC BLOOD PRESSURE: 82 MMHG | OXYGEN SATURATION: 96 % | SYSTOLIC BLOOD PRESSURE: 144 MMHG | TEMPERATURE: 97.8 F | RESPIRATION RATE: 16 BRPM

## 2021-05-07 VITALS — SYSTOLIC BLOOD PRESSURE: 192 MMHG | TEMPERATURE: 96.8 F | OXYGEN SATURATION: 100 % | DIASTOLIC BLOOD PRESSURE: 98 MMHG

## 2021-05-07 LAB — HCG, PREGNANCY URINE (POC): NEGATIVE

## 2021-05-07 PROCEDURE — 88342 IMHCHEM/IMCYTCHM 1ST ANTB: CPT

## 2021-05-07 PROCEDURE — 81025 URINE PREGNANCY TEST: CPT

## 2021-05-07 PROCEDURE — 43239 EGD BIOPSY SINGLE/MULTIPLE: CPT | Performed by: SURGERY

## 2021-05-07 PROCEDURE — 88305 TISSUE EXAM BY PATHOLOGIST: CPT

## 2021-05-07 PROCEDURE — 6360000002 HC RX W HCPCS: Performed by: ANESTHESIOLOGY

## 2021-05-07 PROCEDURE — 2709999900 HC NON-CHARGEABLE SUPPLY: Performed by: SURGERY

## 2021-05-07 PROCEDURE — 3609012400 HC EGD TRANSORAL BIOPSY SINGLE/MULTIPLE: Performed by: SURGERY

## 2021-05-07 PROCEDURE — 2500000003 HC RX 250 WO HCPCS: Performed by: ANESTHESIOLOGY

## 2021-05-07 PROCEDURE — 7100000011 HC PHASE II RECOVERY - ADDTL 15 MIN: Performed by: SURGERY

## 2021-05-07 PROCEDURE — 3700000000 HC ANESTHESIA ATTENDED CARE: Performed by: SURGERY

## 2021-05-07 PROCEDURE — 2580000003 HC RX 258: Performed by: ANESTHESIOLOGY

## 2021-05-07 PROCEDURE — 7100000010 HC PHASE II RECOVERY - FIRST 15 MIN: Performed by: SURGERY

## 2021-05-07 PROCEDURE — 7100000000 HC PACU RECOVERY - FIRST 15 MIN: Performed by: SURGERY

## 2021-05-07 RX ORDER — HYDRALAZINE HYDROCHLORIDE 20 MG/ML
5 INJECTION INTRAMUSCULAR; INTRAVENOUS EVERY 10 MIN PRN
Status: DISCONTINUED | OUTPATIENT
Start: 2021-05-07 | End: 2021-05-07 | Stop reason: HOSPADM

## 2021-05-07 RX ORDER — CALCIUM CARBONATE 500(1250)
500 TABLET ORAL DAILY
COMMUNITY

## 2021-05-07 RX ORDER — LIDOCAINE HYDROCHLORIDE 10 MG/ML
INJECTION, SOLUTION EPIDURAL; INFILTRATION; INTRACAUDAL; PERINEURAL PRN
Status: DISCONTINUED | OUTPATIENT
Start: 2021-05-07 | End: 2021-05-07 | Stop reason: SDUPTHER

## 2021-05-07 RX ORDER — ONDANSETRON 2 MG/ML
4 INJECTION INTRAMUSCULAR; INTRAVENOUS
Status: DISCONTINUED | OUTPATIENT
Start: 2021-05-07 | End: 2021-05-07 | Stop reason: HOSPADM

## 2021-05-07 RX ORDER — SODIUM CHLORIDE 9 MG/ML
INJECTION, SOLUTION INTRAVENOUS CONTINUOUS
Status: DISCONTINUED | OUTPATIENT
Start: 2021-05-07 | End: 2021-05-07 | Stop reason: HOSPADM

## 2021-05-07 RX ORDER — SODIUM CHLORIDE 0.9 % (FLUSH) 0.9 %
10 SYRINGE (ML) INJECTION EVERY 12 HOURS SCHEDULED
Status: DISCONTINUED | OUTPATIENT
Start: 2021-05-07 | End: 2021-05-07 | Stop reason: HOSPADM

## 2021-05-07 RX ORDER — SODIUM CHLORIDE 9 MG/ML
25 INJECTION, SOLUTION INTRAVENOUS PRN
Status: DISCONTINUED | OUTPATIENT
Start: 2021-05-07 | End: 2021-05-07 | Stop reason: HOSPADM

## 2021-05-07 RX ORDER — KETAMINE HYDROCHLORIDE 100 MG/ML
INJECTION, SOLUTION INTRAMUSCULAR; INTRAVENOUS PRN
Status: DISCONTINUED | OUTPATIENT
Start: 2021-05-07 | End: 2021-05-07 | Stop reason: SDUPTHER

## 2021-05-07 RX ORDER — SODIUM CHLORIDE, SODIUM LACTATE, POTASSIUM CHLORIDE, CALCIUM CHLORIDE 600; 310; 30; 20 MG/100ML; MG/100ML; MG/100ML; MG/100ML
INJECTION, SOLUTION INTRAVENOUS CONTINUOUS PRN
Status: DISCONTINUED | OUTPATIENT
Start: 2021-05-07 | End: 2021-05-07 | Stop reason: SDUPTHER

## 2021-05-07 RX ORDER — DIPHENHYDRAMINE HYDROCHLORIDE 50 MG/ML
12.5 INJECTION INTRAMUSCULAR; INTRAVENOUS
Status: DISCONTINUED | OUTPATIENT
Start: 2021-05-07 | End: 2021-05-07 | Stop reason: HOSPADM

## 2021-05-07 RX ORDER — GLYCOPYRROLATE 1 MG/5 ML
SYRINGE (ML) INTRAVENOUS PRN
Status: DISCONTINUED | OUTPATIENT
Start: 2021-05-07 | End: 2021-05-07 | Stop reason: SDUPTHER

## 2021-05-07 RX ORDER — MIDAZOLAM HYDROCHLORIDE 1 MG/ML
INJECTION INTRAMUSCULAR; INTRAVENOUS PRN
Status: DISCONTINUED | OUTPATIENT
Start: 2021-05-07 | End: 2021-05-07 | Stop reason: SDUPTHER

## 2021-05-07 RX ORDER — PROMETHAZINE HYDROCHLORIDE 25 MG/ML
6.25 INJECTION, SOLUTION INTRAMUSCULAR; INTRAVENOUS
Status: DISCONTINUED | OUTPATIENT
Start: 2021-05-07 | End: 2021-05-07 | Stop reason: HOSPADM

## 2021-05-07 RX ORDER — FENTANYL CITRATE 50 UG/ML
25 INJECTION, SOLUTION INTRAMUSCULAR; INTRAVENOUS EVERY 5 MIN PRN
Status: DISCONTINUED | OUTPATIENT
Start: 2021-05-07 | End: 2021-05-07 | Stop reason: HOSPADM

## 2021-05-07 RX ORDER — PROPOFOL 10 MG/ML
INJECTION, EMULSION INTRAVENOUS PRN
Status: DISCONTINUED | OUTPATIENT
Start: 2021-05-07 | End: 2021-05-07 | Stop reason: SDUPTHER

## 2021-05-07 RX ORDER — SODIUM CHLORIDE, SODIUM LACTATE, POTASSIUM CHLORIDE, CALCIUM CHLORIDE 600; 310; 30; 20 MG/100ML; MG/100ML; MG/100ML; MG/100ML
INJECTION, SOLUTION INTRAVENOUS CONTINUOUS
Status: DISCONTINUED | OUTPATIENT
Start: 2021-05-07 | End: 2021-05-07 | Stop reason: HOSPADM

## 2021-05-07 RX ORDER — MEPERIDINE HYDROCHLORIDE 50 MG/ML
12.5 INJECTION INTRAMUSCULAR; INTRAVENOUS; SUBCUTANEOUS EVERY 5 MIN PRN
Status: DISCONTINUED | OUTPATIENT
Start: 2021-05-07 | End: 2021-05-07 | Stop reason: HOSPADM

## 2021-05-07 RX ORDER — SODIUM CHLORIDE 0.9 % (FLUSH) 0.9 %
10 SYRINGE (ML) INJECTION PRN
Status: DISCONTINUED | OUTPATIENT
Start: 2021-05-07 | End: 2021-05-07 | Stop reason: HOSPADM

## 2021-05-07 RX ADMIN — LIDOCAINE HYDROCHLORIDE 50 MG: 10 INJECTION, SOLUTION EPIDURAL; INFILTRATION; INTRACAUDAL; PERINEURAL at 08:53

## 2021-05-07 RX ADMIN — PROPOFOL 30 MG: 10 INJECTION, EMULSION INTRAVENOUS at 08:55

## 2021-05-07 RX ADMIN — KETAMINE HYDROCHLORIDE 30 MG: 100 INJECTION, SOLUTION, CONCENTRATE INTRAMUSCULAR; INTRAVENOUS at 08:53

## 2021-05-07 RX ADMIN — Medication 0.4 MG: at 08:48

## 2021-05-07 RX ADMIN — PROPOFOL 80 MG: 10 INJECTION, EMULSION INTRAVENOUS at 08:53

## 2021-05-07 RX ADMIN — MIDAZOLAM HYDROCHLORIDE 2 MG: 1 INJECTION, SOLUTION INTRAMUSCULAR; INTRAVENOUS at 08:48

## 2021-05-07 RX ADMIN — SODIUM CHLORIDE, POTASSIUM CHLORIDE, SODIUM LACTATE AND CALCIUM CHLORIDE: 600; 310; 30; 20 INJECTION, SOLUTION INTRAVENOUS at 08:30

## 2021-05-07 RX ADMIN — SODIUM CHLORIDE, POTASSIUM CHLORIDE, SODIUM LACTATE AND CALCIUM CHLORIDE: 600; 310; 30; 20 INJECTION, SOLUTION INTRAVENOUS at 08:11

## 2021-05-07 RX ADMIN — SODIUM CHLORIDE, POTASSIUM CHLORIDE, SODIUM LACTATE AND CALCIUM CHLORIDE: 600; 310; 30; 20 INJECTION, SOLUTION INTRAVENOUS at 08:48

## 2021-05-07 RX ADMIN — PROPOFOL 40 MG: 10 INJECTION, EMULSION INTRAVENOUS at 08:58

## 2021-05-07 ASSESSMENT — PULMONARY FUNCTION TESTS
PIF_VALUE: 1

## 2021-05-07 ASSESSMENT — COPD QUESTIONNAIRES: CAT_SEVERITY: NO INTERVAL CHANGE

## 2021-05-07 NOTE — H&P
Subjective     The patient is a 39 y.o. female who is here to undergo EGD for prior sleeve and GERD. She is considering a revision     Past Medical History:   Diagnosis Date    Anemia     Asthma     History of abnormal cervical Pap smear     Hyperlipidemia     Hypertension     IUD (intrauterine device) in place 08/04/2020    Mirena    Menometrorrhagia    . Review of Systems - A complete 14 point review of systems was performed. All was negative unless otherwise documented in HPI. Allergies: Allergies   Allergen Reactions    Nsaids      Pt had gastric sleeve  Cannot take due to bariatric surgery  Cannot take due to bariatric surgery      Tolmetin      Pt had gastric sleeve    Codeine Itching       Past Surgical History:  Past Surgical History:   Procedure Laterality Date    BREAST SURGERY      negative rt breast lumpectomy    CHOLECYSTECTOMY      COLPOSCOPY      GASTRIC BYPASS SURGERY      gastric sleeve    INTRAUTERINE DEVICE INSERTION  08/04/2020    Mirena     LIPOMA RESECTION      lt shoulder/upper back    PRE-MALIGNANT / BENIGN SKIN LESION EXCISION  04/01/2021    TRUNK LESION BIOPSY EXCISION RIGHT BACK X 2, LEFT BACK AND MID BACK ALL WITH COMPLEX CLOSURE     SKIN BIOPSY N/A 4/1/2021    TRUNK LESION BIOPSY EXCISION RIGHT BACK X 6, LEFT, RIGHT, AND MID BACK ALL WITH COMPLEX CLOSURE. performed by Edmund Beatty MD at [address] [facility]       Family History:  Family History   Problem Relation Age of Onset    High Blood Pressure Father     Heart Disease Father     Hypertension Maternal Grandmother     Heart Disease Maternal Grandmother     Stroke Maternal Uncle        Social History:  Social History     Socioeconomic History    Marital status:       Spouse name: Not on file    Number of children: Not on file    Years of education: Not on file    Highest education level: Not on file   Occupational History    Not on file   Social Needs    Financial resource strain: Patient refused    Food insecurity     Worry: Patient refused     Inability: Patient refused    Transportation needs     Medical: Patient refused     Non-medical: Patient refused   Tobacco Use    Smoking status: Former Smoker     Packs/day: 1.00     Years: 22.00     Pack years: 22.00     Types: Cigarettes     Start date: 1/15/2020     Quit date: 2020     Years since quittin.7    Smokeless tobacco: Never Used   Substance and Sexual Activity    Alcohol use: Yes     Comment: social    Drug use: No    Sexual activity: Not on file   Lifestyle    Physical activity     Days per week: 5 days     Minutes per session: 30 min    Stress: Only a little   Relationships    Social connections     Talks on phone: Not on file     Gets together: Not on file     Attends Pentecostalism service: Not on file     Active member of club or organization: Not on file     Attends meetings of clubs or organizations: Not on file     Relationship status: Not on file    Intimate partner violence     Fear of current or ex partner: Not on file     Emotionally abused: Not on file     Physically abused: Not on file     Forced sexual activity: Not on file   Other Topics Concern    Not on file   Social History Narrative    Not on file       No current facility-administered medications for this encounter. Objective      Physical Exam  There were no vitals taken for this visit. Constitutional:  Vital signs are normal. The patient appears well-developed and well-nourished. HEENT:   Head: Normocephalic. Atraumatic  Eyes: pupils are equal and reactive. No scleral icterus is present. Neck: No mass and no thyromegaly present. Cardiovascular: Normal rate, regular rhythm, S1 normal and S2 normal.    Pulmonary/Chest: Effort normal and breath sounds normal.   Abdominal: Soft. Normal appearance. There is no organomegaly. No tenderness. There is no rigidity, no rebound, no guarding and no Harris's sign.    Musculoskeletal:        Right lower leg: Normal. No tenderness and no edema. Left lower leg: Normal. No tenderness and no edema. Lymphadenopathy:     No cervical adenopathy, No Exrtemity Adenopathy. Neurological: The patient is alert and oriented. Skin: Skin is warm, dry and intact. Psychiatric: The patient has a normal mood and affect. Speech is normal and behavior is normal. Judgment and thought content normal. Cognition and memory are normal.     Assessment     Patient Active Problem List   Diagnosis    Menometrorrhagia    IUD (intrauterine device) in place    Essential hypertension    Hiatal hernia    Hyperlipidemia    Iron deficiency anemia    Morbid obesity due to excess calories (HCC)    Postgastrectomy malabsorption    CORNELIA (obstructive sleep apnea)    Vitamin D deficiency    Anxiety state    Hypertensive emergency    Obesity (BMI 30-39. 9)    Recurrent major depressive disorder, in partial remission ([facility])    Folic acid deficiency    Migraine without aura and without status migrainosus, not intractable    Neoplasm of uncertain behavior of skin    Gastroesophageal reflux disease without esophagitis    Anxiety and depression    History of nicotine use       Plan   EGD

## 2021-05-07 NOTE — ANESTHESIA PRE PROCEDURE
Department of Anesthesiology  Preprocedure Note       Name:  Roberta Espinosa   Age:  39 y.o.  :  1975                                          MRN:  [id number]         Date:  2021      Surgeon: Siva Cunningham):  Andrez Steiner DO    Procedure: Procedure(s):  EGD BIOPSY    Medications prior to admission:   Prior to Admission medications    Medication Sig Start Date End Date Taking?  Authorizing Provider   calcium carbonate (OSCAL) 500 MG TABS tablet Take 500 mg by mouth daily   Yes Historical Provider, MD   albuterol sulfate HFA (VENTOLIN HFA) 108 (90 Base) MCG/ACT inhaler Inhale 2 puffs into the lungs 4 times daily as needed for Wheezing 21  Yes Chapis Mccarthy MD   lisinopril (PRINIVIL;ZESTRIL) 40 MG tablet TAKE 1 TABLET BY MOUTH EVERY DAY 21  Yes Chapis Mccarthy MD   vitamin B-12 (CYANOCOBALAMIN) 100 MCG tablet TAKE 1 TABLET BY MOUTH EVERY DAY 3/15/21  Yes Historical Provider, MD   famotidine (PEPCID) 20 MG tablet Take 1 tablet by mouth nightly 21  Yes Ben Mariam, APRN - CNP   atorvastatin (LIPITOR) 10 MG tablet Take 1 tablet by mouth daily 3/31/21  Yes Chapis Mccarthy MD   sertraline (ZOLOFT) 50 MG tablet TAKE 1 TABLET BY MOUTH EVERY DAY 3/31/21  Yes Chapis Mccarthy MD   ferrous sulfate (FE TABS 325) 325 (65 Fe) MG EC tablet Take 1 tablet by mouth 3 times daily (with meals) 3/24/21  Yes Chapis Mccarthy MD   albuterol sulfate HFA (PROAIR HFA) 108 (90 Base) MCG/ACT inhaler Inhale 2 puffs into the lungs every 6 hours as needed for Wheezing 3/18/21  Yes Chapis Mccarthy MD   labetalol (NORMODYNE) 100 MG tablet Take 1.5 tablets by mouth 2 times daily 21  Yes Chapis Mccarthy MD   folic acid (FOLVITE) 1 MG tablet Take 1 tablet by mouth daily 21  Yes Chapis Mccarthy MD   pantoprazole (PROTONIX) 40 MG tablet TAKE 1 TABLET BY MOUTH EVERY DAY BEFORE BREAKFAST 20  Yes Chapis Mccarthy MD   busPIRone (BUSPAR) 10 MG tablet Take 1 tablet by mouth 2 times daily 20  Yes Chapis Mccarthy MD   Multiple Vitamins-Minerals (MULTIVITAMIN ADULT EXTRA C PO) Take 1 tablet by mouth daily   Yes Historical Provider, MD   SUMAtriptan (IMITREX) 25 MG tablet Take 1 tablet by mouth once as needed for Migraine 11/12/20 5/7/21  Chapis Mccarthy MD   butalbital-acetaminophen-caffeine (FIORICET, ESGIC) -53 MG per tablet Take 1 tablet by mouth every 4 hours as needed for Headaches 9/22/20   Catheline Paxico P Blood, DO   levonorgestrel (MIRENA) IUD 52 mg 1 each by Intrauterine route    Historical Provider, MD       Current medications:    Current Facility-Administered Medications   Medication Dose Route Frequency Provider Last Rate Last Admin    0.9 % sodium chloride infusion   Intravenous Continuous Bill Mosher MD        lactated ringers infusion   Intravenous Continuous Bill Mosher  mL/hr at 05/07/21 0811 New Bag at 05/07/21 0811    sodium chloride flush 0.9 % injection 10 mL  10 mL Intravenous 2 times per day Bill Mosher MD        sodium chloride flush 0.9 % injection 10 mL  10 mL Intravenous PRN Bill Mosher MD        0.9 % sodium chloride infusion  25 mL Intravenous PRN Bill Mosher MD           Allergies: Allergies   Allergen Reactions    Nsaids      Pt had gastric sleeve  Cannot take due to bariatric surgery  Cannot take due to bariatric surgery      Tolmetin      Pt had gastric sleeve    Codeine Itching       Problem List:    Patient Active Problem List   Diagnosis Code    Menometrorrhagia N92.1    IUD (intrauterine device) in place Z97.5    Essential hypertension I10    Hiatal hernia K44.9    Hyperlipidemia E78.5    Iron deficiency anemia D50.9    Morbid obesity due to excess calories (HCC) E66.01    Postgastrectomy malabsorption K91.2, Z90.3    CORNELIA (obstructive sleep apnea) G47.33    Vitamin D deficiency E55.9    Anxiety state F41.1    Hypertensive emergency I16.1    Obesity (BMI 30-39. 9) E66.9    Recurrent major depressive disorder, in partial remission (HCC) X96.50    Folic acid deficiency K75.1    Migraine without aura and without status migrainosus, not intractable G43.009    Neoplasm of uncertain behavior of skin D48.5    Gastroesophageal reflux disease without esophagitis K21.9    Anxiety and depression F41.9, F32.9    History of nicotine use Z87.891       Past Medical History:        Diagnosis Date    Anemia     Asthma     COPD (chronic obstructive pulmonary disease) (HCC)     History of abnormal cervical Pap smear     Hyperlipidemia     Hypertension     IUD (intrauterine device) in place 2020    Mirena    Menometrorrhagia        Past Surgical History:        Procedure Laterality Date    BREAST SURGERY      negative rt breast lumpectomy    CHOLECYSTECTOMY      COLPOSCOPY      GASTRIC BYPASS SURGERY      gastric sleeve    HERNIA REPAIR      INTRAUTERINE DEVICE INSERTION  2020    Mirena     LIPOMA RESECTION      lt shoulder/upper back    PRE-MALIGNANT / BENIGN SKIN LESION EXCISION  2021    TRUNK LESION BIOPSY EXCISION RIGHT BACK X 2, LEFT BACK AND MID BACK ALL WITH COMPLEX CLOSURE     SKIN BIOPSY N/A 2021    TRUNK LESION BIOPSY EXCISION RIGHT BACK X 6, LEFT, RIGHT, AND MID BACK ALL WITH COMPLEX CLOSURE. performed by Alejandro Neff MD at [address] [place] [address] History:    Social History     Tobacco Use    Smoking status: Former Smoker     Packs/day: 1.00     Years: 22.00     Pack years: 22.00     Types: Cigarettes     Start date: 1/15/2020     Quit date: 2020     Years since quittin.7    Smokeless tobacco: Never Used   Substance Use Topics    Alcohol use: Yes     Comment: social                                Counseling given: Not Answered      Vital Signs (Current):   Vitals:    21 0755   BP: (!) 156/96   Pulse: 78   Resp: 16   Temp: 98.7 °F (37.1 °C)   TempSrc: Temporal   SpO2: 96%   Weight: 270 lb 6 oz (122.6 kg)   Height: 5' 8\" (1.727 m)                                              BP Readings from Last 3 Encounters:   21 (!) 156/96 05/03/21 (!) 142/94   04/21/21 132/82       NPO Status: Time of last liquid consumption: 2200                        Time of last solid consumption: 2200                        Date of last liquid consumption: 05/06/21                        Date of last solid food consumption: 05/06/21    BMI:   Wt Readings from Last 3 Encounters:   05/07/21 270 lb 6 oz (122.6 kg)   05/03/21 263 lb (119.3 kg)   04/21/21 265 lb (120.2 kg)     Body mass index is 41.11 kg/m². CBC:   Lab Results   Component Value Date    WBC 5.5 04/22/2021    RBC 4.56 04/22/2021    HGB 11.8 04/22/2021    HCT 37.1 04/22/2021    MCV 81.4 04/22/2021    RDW 18.6 04/22/2021     04/22/2021       CMP:   Lab Results   Component Value Date     04/22/2021    K 4.3 04/22/2021     04/22/2021    CO2 24 04/22/2021    BUN 16 04/22/2021    CREATININE 0.62 04/22/2021    GFRAA >60 04/22/2021    LABGLOM >60 04/22/2021    GLUCOSE 97 04/22/2021    PROT 7.3 04/22/2021    CALCIUM 9.3 04/22/2021    BILITOT 0.34 04/22/2021    ALKPHOS 106 04/22/2021    AST 25 04/22/2021    ALT 24 04/22/2021       POC Tests: No results for input(s): POCGLU, POCNA, POCK, POCCL, POCBUN, POCHEMO, POCHCT in the last 72 hours.     Coags: No results found for: PROTIME, INR, APTT    HCG (If Applicable):   Lab Results   Component Value Date    HCG NEGATIVE 05/07/2021        ABGs: No results found for: PHART, PO2ART, WRD5JIL, GWI8ZWV, BEART, R0XFLGMI     Type & Screen (If Applicable):  No results found for: LABABO, LABRH    Drug/Infectious Status (If Applicable):  Lab Results   Component Value Date    HEPCAB NONREACTIVE 04/22/2021       COVID-19 Screening (If Applicable):   Lab Results   Component Value Date    COVID19 Not Detected 05/03/2021           Anesthesia Evaluation  Patient summary reviewed and Nursing notes reviewed no history of anesthetic complications:   Airway: Mallampati: II  TM distance: >3 FB   Neck ROM: full  Mouth opening: > = 3 FB Dental: normal exam Pulmonary:normal exam  breath sounds clear to auscultation  (+) COPD: no interval change,  sleep apnea:  asthma:                            Cardiovascular:    (+) hypertension: no interval change, hyperlipidemia        Rhythm: regular  Rate: normal                    Neuro/Psych:   (+) headaches:, psychiatric history: stable with treatmentdepression/anxiety             GI/Hepatic/Renal:   (+) hiatal hernia, GERD: no interval change, morbid obesity          Endo/Other: Negative Endo/Other ROS                    Abdominal:   (+) obese,     Abdomen: soft. Vascular: negative vascular ROS. Anesthesia Plan      MAC     ASA 3             Anesthetic plan and risks discussed with patient. Plan discussed with CRNA.                   Pilo Canales MD   5/7/2021

## 2021-05-07 NOTE — OP NOTE
Preoperative Diagnosis:    GERD  Morbid obesity, BMI of Body mass index is 41.11 kg/m². Postoperative Diagnosis:   GERD with hiatal hernia  Sleeve anatomy  Minimal Fundus  Morbid obesity, BMI of Body mass index is 41.11 kg/m². Procedure: Esophagogastroduodenoscopy with Biopsy    Surgeon: Camille Gibbs DO    Assistant:    Anesthesia: MAC, see anesthesia records    Specimen:    1) Antrum for H. Pylori      Findings: small 1-2 cm hiatal hernia, no esophagitis. Sleeve anatomy, no stricture    EBL: NONE    Operative Narrative: The risks and benefits were explained in detail to the patient who agreed and consented to the procedure. The patient was taken to the endoscopic suite and placed in a lateral position. Oxygen was administered via nasal cannula and a mouth guard was placed. MAC was administered via the anesthetic team.      The endoscope was then advanced into the oropharynx and down into the esophagus under direct visualization. The scope was further advanced through the esophagus, GE junction and stomach to the pylorus under visualization. The scope was passed through the pylorus and duodenal sweep performed, advancing and visualizing to the second portion of the duodenum. The scope was then withdrawn to the antrum and cold forceps were used to take biopsies of the antrum for H. Pylori. Appropriate hemostasis was noted. The scope was then retroflexed to visualize the GE junction. Evidence of a hiatal hernia was  noted. The scope was then slowly withdrawn through the GE junction. The Z line was noted. The stomach was then decompressed. The scope was withdrawn from the esophagus and no further lesions noted. The scope was withdrawn. The patient tolerated the procedure well. PPI. She will follow up with the [facility] for further assessment.

## 2021-05-07 NOTE — ANESTHESIA POSTPROCEDURE EVALUATION
Department of Anesthesiology  Postprocedure Note    Patient: Helyn Riedel  MRN: [id number]  YOB: 1975  Date of evaluation: 5/7/2021  Time:  9:04 AM     Procedure Summary     Date: 05/07/21 Room / Location: [address] [place] [address]    Anesthesia Start: 5898 Anesthesia Stop: 0901    Procedure: EGD BIOPSY (N/A ) Diagnosis: (K21.9 Joseline Murders / E66.01 Obesity)    Surgeons: Pepe Moreno DO Responsible Provider: Zakiya Hill MD    Anesthesia Type: MAC ASA Status: 3          Anesthesia Type: MAC    Jose Angel Phase I: Jose Angel Score: 7    Jose Angel Phase II:      Last vitals: Reviewed and per EMR flowsheets.        Anesthesia Post Evaluation    Patient location during evaluation: PACU  Patient participation: complete - patient participated  Level of consciousness: awake and alert  Airway patency: patent  Nausea & Vomiting: no nausea and no vomiting  Complications: no  Cardiovascular status: hemodynamically stable  Respiratory status: face mask and spontaneous ventilation  Hydration status: euvolemic

## 2021-05-10 LAB — SURGICAL PATHOLOGY REPORT: NORMAL

## 2021-05-12 DIAGNOSIS — I16.1 HYPERTENSIVE EMERGENCY: ICD-10-CM

## 2021-05-12 RX ORDER — LABETALOL 100 MG/1
TABLET, FILM COATED ORAL
Qty: 270 TABLET | Refills: 0 | Status: ON HOLD | OUTPATIENT
Start: 2021-05-12 | End: 2021-09-07

## 2021-05-12 NOTE — TELEPHONE ENCOUNTER
Next Visit Date:  Future Appointments   Date Time Provider Bayron Vera   5/14/2021 10:30 AM Mina Ayala MD [facility]   6/2/2021  9:00 AM TERA Rodgers - CNP Weight Mgmt [facility]   6/3/2021 12:00 PM SCHEDULE, [facility] bariatric [facility]   6/9/2021  2:15 PM Son Sampson MD Resp [facility]   7/7/2021  7:30 AM STV PFT RM 1 [facility]   10/21/2021 10:45 AM Wyatt Humphrey MD [facility] [address] Maintenance   Topic Date Due    COVID-19 Vaccine (1) Never done    Pneumococcal 0-64 years Vaccine (1 of 1 - PPSV23) 08/12/2021 (Originally 6/20/1983)    Flu vaccine (Season Ended) 09/28/2021 (Originally 9/1/2021)    Cervical cancer screen  01/20/2022 (Originally 6/20/1998)    Lipid screen  04/22/2022    Potassium monitoring  04/22/2022    Creatinine monitoring  04/22/2022    DTaP/Tdap/Td vaccine (2 - Td) 09/01/2023    Hepatitis C screen  Completed    HIV screen  Completed    Hepatitis A vaccine  Aged Out    Hepatitis B vaccine  Aged Out    Hib vaccine  Aged Out    Meningococcal (ACWY) vaccine  Aged Out       Hemoglobin A1C (%)   Date Value   04/22/2021 5.1             ( goal A1C is < 7)   No results found for: LABMICR  LDL Cholesterol (mg/dL)   Date Value   04/22/2021 109   03/24/2021 95       (goal LDL is <100)   AST (U/L)   Date Value   04/22/2021 25     ALT (U/L)   Date Value   04/22/2021 24     BUN (mg/dL)   Date Value   04/22/2021 16     BP Readings from Last 3 Encounters:   05/07/21 (!) 192/98   05/07/21 (!) 144/82   05/03/21 (!) 142/94          (goal 120/80)    All Future Testing planned in [facility]  Lab Frequency Next Occurrence   Metanephrines Urine Once 09/28/2020   Metanephrines Plasma Free Once 09/28/2020   Aldosterone Once 09/28/2020   Renin Once 09/28/2020   Spirometry With Bronchodilator Once 04/21/2021               Patient Active Problem List:     Menometrorrhagia     IUD (intrauterine device) in place     Essential hypertension Hiatal hernia     Hyperlipidemia     Iron deficiency anemia     Morbid obesity due to excess calories (HCC)     Postgastrectomy malabsorption     CORNELIA (obstructive sleep apnea)     Vitamin D deficiency     Anxiety state     Hypertensive emergency     Obesity (BMI 30-39. 9)     Recurrent major depressive disorder, in partial remission (HCC)     Folic acid deficiency     Migraine without aura and without status migrainosus, not intractable     Neoplasm of uncertain behavior of skin     Gastroesophageal reflux disease without esophagitis     Anxiety and depression     History of nicotine use

## 2021-05-20 PROBLEM — K44.9 HIATAL HERNIA WITH GERD: Status: ACTIVE | Noted: 2021-04-05

## 2021-05-21 DIAGNOSIS — J44.9 CHRONIC OBSTRUCTIVE PULMONARY DISEASE, UNSPECIFIED COPD TYPE (HCC): ICD-10-CM

## 2021-05-21 RX ORDER — ALBUTEROL SULFATE 90 UG/1
2 AEROSOL, METERED RESPIRATORY (INHALATION) 4 TIMES DAILY PRN
Qty: 6.7 INHALER | Refills: 2 | Status: SHIPPED | OUTPATIENT
Start: 2021-05-21 | End: 2021-09-02

## 2021-05-21 NOTE — TELEPHONE ENCOUNTER
Next Visit Date:  Future Appointments   Date Time Provider Bayron Vera   6/2/2021  9:00 AM Katherin Espinoza APRN - CNP Weight Mgmt [facility]   6/3/2021 12:00 PM SCHEDULE, P BARIATRIC SURG bariatric nú[facility]ez [facility]   6/9/2021  2:15 PM Shama Perez MD Resp [facility]   6/11/2021  9:30 AM Nicolle Cortez MD [facility]   7/7/2021  7:30 AM STV PFT RM 1 [facility]Z [facility]   10/21/2021 10:45 AM Suyapa Kahn MD [facility] [address] Maintenance   Topic Date Due    COVID-19 Vaccine (1) Never done    Pneumococcal 0-64 years Vaccine (1 of 2 - PPSV23) 08/12/2021 (Originally 6/20/1983)    Flu vaccine (Season Ended) 09/28/2021 (Originally 9/1/2021)    Cervical cancer screen  01/20/2022 (Originally 6/20/1998)    Lipid screen  04/22/2022    Potassium monitoring  04/22/2022    Creatinine monitoring  04/22/2022    DTaP/Tdap/Td vaccine (2 - Td) 09/01/2023    Hepatitis C screen  Completed    HIV screen  Completed    Hepatitis A vaccine  Aged Out    Hepatitis B vaccine  Aged Out    Hib vaccine  Aged Out    Meningococcal (ACWY) vaccine  Aged Out       Hemoglobin A1C (%)   Date Value   04/22/2021 5.1             ( goal A1C is < 7)   No results found for: LABMICR  LDL Cholesterol (mg/dL)   Date Value   04/22/2021 109   03/24/2021 95       (goal LDL is <100)   AST (U/L)   Date Value   04/22/2021 25     ALT (U/L)   Date Value   04/22/2021 24     BUN (mg/dL)   Date Value   04/22/2021 16     BP Readings from Last 3 Encounters:   05/07/21 (!) 192/98   05/07/21 (!) 144/82   05/03/21 (!) 142/94          (goal 120/80)    All Future Testing planned in [facility]  Lab Frequency Next Occurrence   Metanephrines Urine Once 09/28/2020   Metanephrines Plasma Free Once 09/28/2020   Aldosterone Once 09/28/2020   Renin Once 09/28/2020   Spirometry With Bronchodilator Once 04/21/2021               Patient Active Problem List:     Menometrorrhagia     IUD (intrauterine device) in place     Essential hypertension     Hiatal hernia     Hyperlipidemia     Iron deficiency anemia     Morbid obesity due to excess calories (HCC)     Postgastrectomy malabsorption     CORNELIA (obstructive sleep apnea)     Vitamin D deficiency     Anxiety state     Hypertensive emergency     Obesity (BMI 30-39. 9)     Recurrent major depressive disorder, in partial remission (HCC)     Folic acid deficiency     Migraine without aura and without status migrainosus, not intractable     Neoplasm of uncertain behavior of skin     Hiatal hernia with GERD     Anxiety and depression     History of nicotine use

## 2021-06-02 ENCOUNTER — OFFICE VISIT (OUTPATIENT)
Dept: BARIATRICS/WEIGHT MGMT | Age: 46
End: 2021-06-02
Payer: MEDICARE

## 2021-06-02 VITALS
SYSTOLIC BLOOD PRESSURE: 126 MMHG | BODY MASS INDEX: 38.95 KG/M2 | DIASTOLIC BLOOD PRESSURE: 82 MMHG | WEIGHT: 263 LBS | HEART RATE: 84 BPM | HEIGHT: 69 IN

## 2021-06-02 DIAGNOSIS — E66.9 OBESITY (BMI 30-39.9): ICD-10-CM

## 2021-06-02 DIAGNOSIS — D50.9 IRON DEFICIENCY ANEMIA, UNSPECIFIED IRON DEFICIENCY ANEMIA TYPE: ICD-10-CM

## 2021-06-02 DIAGNOSIS — F32.A ANXIETY AND DEPRESSION: ICD-10-CM

## 2021-06-02 DIAGNOSIS — K21.9 HIATAL HERNIA WITH GERD: ICD-10-CM

## 2021-06-02 DIAGNOSIS — K44.9 HIATAL HERNIA WITH GERD: ICD-10-CM

## 2021-06-02 DIAGNOSIS — I10 ESSENTIAL HYPERTENSION: Primary | ICD-10-CM

## 2021-06-02 DIAGNOSIS — G47.33 OSA (OBSTRUCTIVE SLEEP APNEA): ICD-10-CM

## 2021-06-02 DIAGNOSIS — F41.9 ANXIETY AND DEPRESSION: ICD-10-CM

## 2021-06-02 DIAGNOSIS — E78.5 HYPERLIPIDEMIA, UNSPECIFIED HYPERLIPIDEMIA TYPE: ICD-10-CM

## 2021-06-02 PROCEDURE — 1036F TOBACCO NON-USER: CPT | Performed by: NURSE PRACTITIONER

## 2021-06-02 PROCEDURE — G8427 DOCREV CUR MEDS BY ELIG CLIN: HCPCS | Performed by: NURSE PRACTITIONER

## 2021-06-02 PROCEDURE — G8417 CALC BMI ABV UP PARAM F/U: HCPCS | Performed by: NURSE PRACTITIONER

## 2021-06-02 PROCEDURE — 99213 OFFICE O/P EST LOW 20 MIN: CPT | Performed by: NURSE PRACTITIONER

## 2021-06-02 NOTE — PROGRESS NOTES
Medical Weight Management Progress Note    Subjective     Patient being seen for medically supervised weight loss for the chronic conditions of CORNELIA, HTN, HLD, MILLER, Migraine, GERD. She had a sleeve gastrectomy and had weight regain and also developed severe GERD and is now interested in revision surgery from sleeve to bypass. She is taking protonix for GERD, but still has breakthrough symptoms. She is working on the behavior changes discussed at the initial appointment. Patient continues on diet plan. Physical activity includes walking. Weight loss of 0 lbs since last visit. Psych eval completed and clearance received. Using CPAP. EGD completed and H Pylori negative. Quit smoking 2/27/21. Nicotine level from 4/22/21 negative. No current issues. Working toward bariatric surgery:    [] Sleeve Gastrectomy                                                           [] Narendra-en-Y Gastric Bypass                                                           [x] Revision of Sleeve to Gastric Bypass    Allergies: Allergies   Allergen Reactions    Nsaids      Pt had gastric sleeve  Cannot take due to bariatric surgery  Cannot take due to bariatric surgery      Tolmetin      Pt had gastric sleeve    Codeine Itching       Past Medical History:     Past Medical History:   Diagnosis Date    Anemia     Asthma     COPD (chronic obstructive pulmonary disease) (HCC)     History of abnormal cervical Pap smear     Hyperlipidemia     Hypertension     IUD (intrauterine device) in place 08/04/2020    Mirena    Menometrorrhagia    .     Past Surgical History:  Past Surgical History:   Procedure Laterality Date    BREAST SURGERY      negative rt breast lumpectomy    CHOLECYSTECTOMY      COLPOSCOPY      ENDOSCOPY, COLON, DIAGNOSTIC  05/07/2021    GASTRIC BYPASS SURGERY      gastric sleeve    HERNIA REPAIR      INTRAUTERINE DEVICE INSERTION  08/04/2020    Mirena     LIPOMA RESECTION      lt shoulder/upper back    PRE-MALIGNANT / BENIGN SKIN LESION EXCISION  2021    TRUNK LESION BIOPSY EXCISION RIGHT BACK X 2, LEFT BACK AND MID BACK ALL WITH COMPLEX CLOSURE     SKIN BIOPSY N/A 2021    TRUNK LESION BIOPSY EXCISION RIGHT BACK X 6, LEFT, RIGHT, AND MID BACK ALL WITH COMPLEX CLOSURE. performed by Maude Brunner MD at [address]  2021    UPPER GASTROINTESTINAL ENDOSCOPY N/A 2021    EGD BIOPSY performed by Colten Monahan DO at [address] [facility].       Family History:  Family History   Problem Relation Age of Onset    High Blood Pressure Father     Heart Disease Father     Hypertension Maternal Grandmother     Heart Disease Maternal Grandmother     Stroke Maternal Uncle        Social History:  Social History     Socioeconomic History    Marital status:       Spouse name: Not on file    Number of children: Not on file    Years of education: Not on file    Highest education level: Not on file   Occupational History    Not on file   Tobacco Use    Smoking status: Former Smoker     Packs/day: 1.00     Years: 22.00     Pack years: 22.00     Types: Cigarettes     Start date: 1/15/2020     Quit date: 2020     Years since quittin.8    Smokeless tobacco: Never Used   Vaping Use    Vaping Use: Former    Substances: Nicotine    Devices: Disposable   Substance and Sexual Activity    Alcohol use: Yes     Comment: social    Drug use: No    Sexual activity: Not on file   Other Topics Concern    Not on file   Social History Narrative    Not on file     Social Determinants of Health     Financial Resource Strain: Unknown    Difficulty of Paying Living Expenses: Patient refused   Food Insecurity: Unknown    Worried About [facility] in the Last Year: Patient refused    Ran Out of Food in the Last Year: Patient refused   Transportation Needs: Unknown    Lack of Transportation (Medical): Patient refused    Lack of Transportation (Non-Medical): Patient refused   Physical Activity: Sufficiently Active    Days of Exercise per Week: 5 days    Minutes of Exercise per Session: 30 min   Stress: No Stress Concern Present    Feeling of Stress :  Only a little   Social Connections:     Frequency of Communication with Friends and Family:     Frequency of Social Gatherings with Friends and Family:     Attends Temple Services:     Active Member of Clubs or Organizations:     Attends Club or Organization Meetings:     Marital Status:    Intimate Partner Violence:     Fear of Current or Ex-Partner:     Emotionally Abused:     Physically Abused:     Sexually Abused:        Current Medications:  Current Outpatient Medications   Medication Sig Dispense Refill    albuterol sulfate  (90 Base) MCG/ACT inhaler INHALE 2 PUFFS INTO THE LUNGS 4 TIMES DAILY AS NEEDED FOR WHEEZING 6.7 Inhaler 2    labetalol (NORMODYNE) 100 MG tablet TAKE 1 AND 1/2 TABLET BY MOUTH TWICE DAILY 270 tablet 0    calcium carbonate (OSCAL) 500 MG TABS tablet Take 500 mg by mouth daily      lisinopril (PRINIVIL;ZESTRIL) 40 MG tablet TAKE 1 TABLET BY MOUTH EVERY DAY 90 tablet 0    vitamin B-12 (CYANOCOBALAMIN) 100 MCG tablet TAKE 1 TABLET BY MOUTH EVERY DAY      famotidine (PEPCID) 20 MG tablet Take 1 tablet by mouth nightly 30 tablet 3    atorvastatin (LIPITOR) 10 MG tablet Take 1 tablet by mouth daily 90 tablet 1    sertraline (ZOLOFT) 50 MG tablet TAKE 1 TABLET BY MOUTH EVERY DAY 90 tablet 0    ferrous sulfate (FE TABS 325) 325 (65 Fe) MG EC tablet Take 1 tablet by mouth 3 times daily (with meals) 90 tablet 0    albuterol sulfate HFA (PROAIR HFA) 108 (90 Base) MCG/ACT inhaler Inhale 2 puffs into the lungs every 6 hours as needed for Wheezing 1 Inhaler 0    pantoprazole (PROTONIX) 40 MG tablet TAKE 1 TABLET BY MOUTH EVERY DAY BEFORE BREAKFAST 90 tablet 1    busPIRone (BUSPAR) 10 MG tablet Take 1 tablet by mouth 2 times daily 180 tablet 1    butalbital-acetaminophen-caffeine (FIORICET, ESGIC) -40 MG per tablet Take 1 tablet by mouth every 4 hours as needed for Headaches 20 tablet 0    Multiple Vitamins-Minerals (MULTIVITAMIN ADULT EXTRA C PO) Take 1 tablet by mouth daily      levonorgestrel (MIRENA) IUD 52 mg 1 each by Intrauterine route      folic acid (FOLVITE) 1 MG tablet Take 1 tablet by mouth daily 90 tablet 1    SUMAtriptan (IMITREX) 25 MG tablet Take 1 tablet by mouth once as needed for Migraine 9 tablet 0     No current facility-administered medications for this visit. Vital Signs:  /82 (Site: Right Upper Arm, Position: Sitting, Cuff Size: Large Adult)   Pulse 84   Ht 5' 8.5\" (1.74 m)   Wt 263 lb (119.3 kg)   BMI 39.41 kg/m²     BMI/Height/Weight:  Body mass index is 39.41 kg/m². Review of Systems - A review of systems was performed. All was negative unless otherwise documented in HPI. Constitutional: Negative for fever, chills and diaphoresis. HENT: Negative for hearing loss and trouble swallowing. Eyes: Negative for photophobia and visual disturbance. Respiratory: Negative for cough, shortness of breath and wheezing. Cardiovascular: Negative for chest pain and palpitations. Gastrointestinal: Negative for nausea, vomiting, abdominal pain, diarrhea, constipation, blood in stool and abdominal distention. Endocrine: Negative for polydipsia, polyphagia and polyuria. Genitourinary: Negative for dysuria, frequency, hematuria and difficulty urinating. Musculoskeletal: Negative for myalgias, joint swelling. Skin: Negative for pallor and rash. Neurological: Negative for dizziness, tremors, light-headedness and headaches. Psychiatric/Behavioral: Negative for sleep disturbance and dysphoric mood. Objective:      Physical Exam   Vital signs reviewed. General: Well-developed and well-nourished. No acute distress. Skin: Warm, dry and intact. HEENT: Normocephalic. EOMs intact.  Conjunctivae normal. Neck supple. Cardiovascular: Normal rate, regular rhythm. Pulmonary/Chest: Normal effort. Lungs clear to auscultation. No rales, rhonchi or wheezing. Abdominal: Positive bowel sounds. Soft, nontender. Nondistended. Musculoskeletal: Movement x4. No edema. Neurological: Gait normal. Alert and oriented to person, place, and time. Psychiatric: Normal mood and affect. Speech and behavior normal. Judgment and thought content normal. Cognition and memory intact. Assessment:       Diagnosis Orders   1. Essential hypertension     2. CORNELIA (obstructive sleep apnea)     3. Hiatal hernia with GERD     4. Anxiety and depression     5. Hyperlipidemia, unspecified hyperlipidemia type     6. Iron deficiency anemia, unspecified iron deficiency anemia type     7. Obesity (BMI 30-39. 9)         Plan:    Dietitian visit today. Patient was encouraged to journal all food intake. Keep calorie level at approximately 3365-7155. Protein intake is to be a minimum of 60-80 grams per day. Water drinking was encouraged with a goal of 64oz-128oz daily. Beverages to be calorie free except for milk. Every other beverage should be water. Avoid soda. Continue to increase level of physical activity. Encouraged use of exercise log. Follow-up  No follow-ups on file. Orders this encounter:  No orders of the defined types were placed in this encounter. Prescriptions this encounter:  No orders of the defined types were placed in this encounter.       Electronically signed by:  Ana Coy CNP

## 2021-06-02 NOTE — PROGRESS NOTES
Medical Nutrition Therapy   Metabolic and Bariatric Surgery         Supervised diet and exercise preparation  Visit 3 out of 3  Pt reports:  Nutrition goals complete. Changes in eating patterns to promote health are noted below on the goals number 22-25    Vitals: Wt Readings from Last 3 Encounters:   06/02/21 263 lb (119.3 kg)   05/07/21 270 lb 6 oz (122.6 kg)   05/03/21 263 lb (119.3 kg)         Nutrition Assessment:   PES: Knowledge deficit related to healthy behaviors that support weight management post weight loss surgery as evidenced by Body mass index is 39.41 kg/m². Nutrition Assessment of Goal Attainment:  TREATMENT GOALS:    1. Pt  Completed 0 out of 0 goals. 2.TREATMENT GOALS FOR UPCOMING WEEK: continue all previous goals and add: # 0    All goals were planned with and agreed on by the patient. Nutrition goals complete. Do you understand your goals? y    Do you have the information you need to achieve your goals? y    Do you have any questions  right now? n        [x]  Consistent goal achievement in the program thus far and further success with goals is expected. []  Unable to consistently make progress in goal achievement. At this time patient is not moving forward  in developing the skills needed for success after surgery. Plan:    Continue to follow monthly and review goals.          []  Nutrition visits complete    [x]

## 2021-06-03 ENCOUNTER — NURSE ONLY (OUTPATIENT)
Dept: BARIATRICS/WEIGHT MGMT | Age: 46
End: 2021-06-03

## 2021-06-07 ENCOUNTER — TELEPHONE (OUTPATIENT)
Dept: BARIATRICS/WEIGHT MGMT | Age: 46
End: 2021-06-07

## 2021-06-09 ENCOUNTER — OFFICE VISIT (OUTPATIENT)
Dept: PULMONOLOGY | Age: 46
End: 2021-06-09
Payer: MEDICARE

## 2021-06-09 VITALS
OXYGEN SATURATION: 98 % | BODY MASS INDEX: 39.99 KG/M2 | HEIGHT: 69 IN | SYSTOLIC BLOOD PRESSURE: 147 MMHG | TEMPERATURE: 97.3 F | HEART RATE: 61 BPM | DIASTOLIC BLOOD PRESSURE: 92 MMHG | RESPIRATION RATE: 16 BRPM | WEIGHT: 270 LBS

## 2021-06-09 DIAGNOSIS — J44.9 CHRONIC OBSTRUCTIVE PULMONARY DISEASE, UNSPECIFIED COPD TYPE (HCC): ICD-10-CM

## 2021-06-09 DIAGNOSIS — G47.33 OSA (OBSTRUCTIVE SLEEP APNEA): Primary | ICD-10-CM

## 2021-06-09 PROCEDURE — G8417 CALC BMI ABV UP PARAM F/U: HCPCS | Performed by: INTERNAL MEDICINE

## 2021-06-09 PROCEDURE — G8427 DOCREV CUR MEDS BY ELIG CLIN: HCPCS | Performed by: INTERNAL MEDICINE

## 2021-06-09 PROCEDURE — 1036F TOBACCO NON-USER: CPT | Performed by: INTERNAL MEDICINE

## 2021-06-09 PROCEDURE — 99204 OFFICE O/P NEW MOD 45 MIN: CPT | Performed by: INTERNAL MEDICINE

## 2021-06-09 ASSESSMENT — SLEEP AND FATIGUE QUESTIONNAIRES
HOW LIKELY ARE YOU TO NOD OFF OR FALL ASLEEP WHILE LYING DOWN TO REST IN THE AFTERNOON WHEN CIRCUMSTANCES PERMIT: 1
HOW LIKELY ARE YOU TO NOD OFF OR FALL ASLEEP WHEN YOU ARE A PASSENGER IN A CAR FOR AN HOUR WITHOUT A BREAK: 2
HOW LIKELY ARE YOU TO NOD OFF OR FALL ASLEEP WHILE SITTING INACTIVE IN A PUBLIC PLACE: 0
HOW LIKELY ARE YOU TO NOD OFF OR FALL ASLEEP WHILE SITTING QUIETLY AFTER LUNCH WITHOUT ALCOHOL: 0
HOW LIKELY ARE YOU TO NOD OFF OR FALL ASLEEP WHILE SITTING AND READING: 2
ESS TOTAL SCORE: 7
HOW LIKELY ARE YOU TO NOD OFF OR FALL ASLEEP IN A CAR, WHILE STOPPED FOR A FEW MINUTES IN TRAFFIC: 0
HOW LIKELY ARE YOU TO NOD OFF OR FALL ASLEEP WHILE SITTING AND TALKING TO SOMEONE: 0
HOW LIKELY ARE YOU TO NOD OFF OR FALL ASLEEP WHILE WATCHING TV: 2

## 2021-06-09 NOTE — PROGRESS NOTES
PULMONARY MEDICINE OUTPATIENT  CONSULT NOTE                                                                       PATIENT:  Jasmin Wheeler OF BIRTH: 1975  MRN: [id number]     REFERRED BY: Jesus Alcantar MD   CHIEF COMPLIANT: Sleep Apnea (new patient)       HISTORY     HISTORY OF PRESENT ILLNESS:   Miriam Rodriguez is a 42y.o. year old female here for [facility] care    Patient resents clinic to establish care for sleep apnea. She reported she was initially diagnosed obstructive sleep apnea in 2011 and she had \"very bad symptoms\". She subsequently had a gastric sleeve surgery in 2016 after which she lost about 83 pounds. Patient quit using CPAP on her own. She is now contemplating getting a revision for gastric sleeve surgery. She recently underwent a repeat sleep study which showed severe obstructive sleep apnea, with an AHI of 82.6. She was subsequently recommended in an AutoPap on a follow-up titration study. She got her machine about 3 weeks back and reports that she been using it with a full facemask every day. Compliance data unavailable at the time of clinic visit. She is also a former smoker, reports diagnosis of COPD/asthma. Her pulmonary symptoms seems to be fairly well-controlled and she currently only uses as needed albuterol.         PAST MEDICAL HISTORY:      Diagnosis Date    Anemia     Anxiety and depression 04/05/2021    Anxiety state 08/12/2020    Asthma     COPD (chronic obstructive pulmonary disease) ([facility]     Essential hypertension 26/52/9454    Folic acid deficiency 69/10/8728    Hiatal hernia 08/12/2020    Hiatal hernia with GERD 04/05/2021    History of abnormal cervical Pap smear     History of nicotine use 04/05/2021    Hyperlipidemia     Hypertension     PCP DR Crystal Baires    Hypertensive emergency 09/20/2020    Iron deficiency anemia 08/12/2020    IUD (intrauterine device) in place 08/04/2020    Mirena    Menometrorrhagia     Migraine without aura and without status migrainosus, not intractable 09/21/2020    Morbid obesity due to excess calories ([facility] 08/12/2020    Obesity (BMI 30-39.9) 09/20/2020    CORNELIA on CPAP     DR Kayleen Rider (last visit June 2021)    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 2/22/16 Sleeve Gastrectomy and repair of sm to mod size sliding Hiatal Hernia; 2/23/16 UGI WNL postop    Recurrent major depressive disorder, in partial remission ([facility] 09/20/2020    Vitamin D deficiency 08/12/2020    Vit D low 18.0/18.1; treat with Vitamin D 50,000 iu 3x wk x 6 wks then repeat level     PAST SURGICAL HISTORY:      Procedure Laterality Date    BARIATRIC SURGERY  02/22/2016    GASTRIC SLEEVE (IN [place])    BREAST LUMPECTOMY Right     CHOLECYSTECTOMY      COLPOSCOPY      GASTROSTOMY TUBE PLACEMENT  07/21/2021    HERNIA REPAIR      DONE DURING GASTRIC SLEEVE    HERNIA REPAIR  07/21/2021    REPAIR OF RECURRENT HIATAL HERNIA     INTRAUTERINE DEVICE INSERTION  08/04/2020    Mirena     LIPOMA RESECTION      lt shoulder/upper back    ADRIAN-EN-Y GASTRIC BYPASS  07/21/2021    ROBOTIC LAPAROSCOPIC GASTRIC BYPASS ADRIAN-EN-Y,  EXTENSIVE LYSIS OF ADHESIONS, EGD, REPAIR OF RECURRENT HIATAL HERNIA , G TUBE INSERTION    ADRIAN-EN-Y GASTRIC BYPASS N/A 7/21/2021    XI ROBOTIC LAPAROSCOPIC GASTRIC BYPASS ADRIAN-EN-Y,  EXTENSIVE LYSIS OF ADHESIONS, EGD, REPAIR OF RECURRENT HIATAL HERNIA , G TUBE INSERTION performed by Felicity Duffy DO at [facility] N/A 04/01/2021    TRUNK LESION BIOPSY EXCISION RIGHT BACK X 6, LEFT, RIGHT, AND MID BACK ALL WITH COMPLEX CLOSURE. performed by Carlos Blount MD at [address] 05/07/2021    EGD BIOPSY performed by Felicity Duffy DO at [address]:  TOBACCO:   reports that she quit smoking about a year ago. Her smoking use included cigarettes. She started smoking about 18 months ago.  She has a 22.00 pack-year smoking history. She has never used smokeless tobacco.  ETOH:   reports current alcohol use. DRUGS: reports no history of drug use.      AVOCATION/OCCUPATIONAL EXPOSURE:  [] Asbestos      [] Hot tub  [] Silica dust    [] Pets  [] Coal            [] Exotic birds  [] [employer]       [] Tuberculosis  [] Lilo Roly         [] Others  [] [place]          PHYSICAL EXAMINATION      VITAL SIGNS:   BP (!) 147/92 (Site: Right Upper Arm, Position: Sitting)   Pulse 61   Temp 97.3 °F (36.3 °C) (Temporal)   Resp 16   Ht 5' 8.5\" (1.74 m)   Wt 270 lb (122.5 kg)   SpO2 98%   BMI 40.46 kg/m²   Wt Readings from Last 3 Encounters:   08/09/21 250 lb (113.4 kg)   08/06/21 254 lb (115.2 kg)   07/28/21 268 lb (121.6 kg)     SYSTEMIC EXAMINATION:   General appearance -  [x] well appearing  [] overweight  [x] morbidly obese [] cachectic [x] comfortable  [x] in no acute distress  [] chronically ill-appearing  [] in mild to moderate respiratory distress   Mental status -  [x] alert  [x] oriented to person, place, and time  [] anxious  [] depressed mood    Head-  [x] atraumatic  [x] normocephalic   Eyes -  [] pupils equal and reactive, extraocular eye movements intact  [] sclera anicteric   Ears -  [x] hearing grossly normal bilaterally [] bilateral TM's and external ear canals normal   Nose -  [x] normal and patent [] no erythema  [] discharge   Mouth -  [x] mucous membranes moist  [] pharynx normal without lesions [] erythematous  [] exudate noted   Mallampati Airway Score -  [] I (soft palate, uvula, fauces, tonsillar pillars visible) [] II (soft palate, uvula, fauces visible) [x]  III (soft palate, base of uvula visible) [] IV (only hard palate visible)   Neck -  [] supple  [] no significant adenopathy  [] carotids upstroke normal bilaterally [] no JVD  [] no bruit   Lymphatics -  [x] no palpable lymphadenopathy  [] no hepatosplenomegaly   Chest -   [x] normal chest excursion  [x] no chest wall tenderness  [] increased AP diameter[] pectus noted [] scoliosis noted [x] no tachypnea retraction or cyanosis [x] clear to auscultation, no wheezes, rales or rhonchi, symmetric air entry  [] wheezing noted  [] rales noted  []rhonchi noted [] decreased air entry noted bilaterally   Heart -  [x] normal rate,  [x] regular rhythm  [] irregularly irregular rhythm [x] normal S1  [x] normal S2  [x] no murmurs, rubs, clicks or gallops  [] S3 present  [] S4 present  [] systolic murmur  [] diastolic murmur  [] midsystolic click []pericardial rub present    Abdomen -  [] soft [] nontender  [] nondistended  [] no masses or organomegaly   Neurological -  [x] normal speech  [x] no focal findings or movement disorder noted  [] cranial nerves II through XII intact  [] DTR's normal and symmetric  [] Babinski sign negative,  [x] motor and sensory grossly normal bilaterally  [] normal muscle tone [x] no tremors  [] strength 5/5  [] Romberg sign negative [] normal gait    Musculoskeletal -  [x] no joint tenderness [x] no deformity or swelling   Extremities - [x] no clubbing or cyanosis,  [x] no pedal edema [] pedal edema  [] intact peripheral pulses   Skin -  [x] normal coloration and turgor  [x] no rashes  [] no suspicious skin lesions noted          MEDICAL DECISION MAKING     1. PROBLEMS ADDRESSED (NUMBER AND COMPLEXITY)       ICD-10-CM    1. CORNELIA (obstructive sleep apnea)  G47.33    2. Chronic obstructive pulmonary disease, unspecified COPD type ([facility]  J44.9         2.  DATA REVIEWED AND ANALYZED (AMOUNT AND/OR COMPLEXITY)   LABS:  ABG:   No results found for: PH, PHART, PCO2, NQQ4GJM, PO2, PO2ART, HCO3, RZM7IZS, BE, BEART, THGB, C9BDGZPV  VBG:  No results found for: PHVEN, HFG5UTN, BEVEN, K1YWBVDQ  CBC:   Alpha 1 antitrypsin: No results found for: A1A  BNP:   Lab Results   Component Value Date    PROBNP 504 (H) 09/20/2020     D-Dimer/Fibrinogen:  No results found for: DDIMER  Others labs:  Lab Results   Component Value Date    TSH 2.35 04/22/2021     No results found for: JAD, ANATITER, RHEUMFACTOR, RF, C3, C4, MPO, PR3, SEDRATE, CRP  Lab Results   Component Value Date    IRON 65 04/22/2021    TIBC 432 04/22/2021    FERRITIN 12 (L) 03/24/2021    FOLATE >20.0 04/22/2021     No results found for: SPEP, UPEP, PSA, CEA, , QX5429,   No results found for: RPR, HIV    RADIOLOGY:  [] Ordered  [] Unavailable  [x] Reviewed    Chest x-ray: 4/18/2019  Impression   No acute process.         CT chest:  PET scan:    ECHOCARDIOGRAM:  No results found for this or any previous visit. PULMONARY FUNCTION TEST:  [] Ordered  [x] Unavailable  [] Reviewed  No results found for: FEV1, FVC, HIB0MTZ, TLC, DLCO   No results found for this or any previous visit. 6 MINUTE WALK TEST:  [] Ordered  [x] Unavailable  [] Reviewed  No results found for this or any previous visit.     Distance Walk Predicted Distance LLN** Predicted % Duration (min) Duration of Stops [place] Dyspnea [place] Fatigue               POLYSOMNOGRAM:  BASELINE SLEEP STUDY:   [] Ordered  [] Unavailable  [x] Reviewed  3/15/2021  AHI 82.6, lowest SaO2 75%    TITRATION STUDY:   [] Ordered  [] Unavailable  [x] Reviewed   4/13/2021, recommended AutoPap    CPAP/BIPAP COMPLIANCE DATA:   [x] Unavailable    ASSESSMENT OF EXCESSIVE DAYTIME SLEEPINESS (BY INDEPENDENT HISTORIAN):  [place] Sleepiness Scale:  [] Not obtained  [x] Obtained  Sleep Medicine 6/9/2021 2/25/2021   Sitting and reading 2 -   Watching TV 2 -   Sitting, inactive in a public place (e.g. a theatre or a meeting) 0 -   As a passenger in a car for an hour without a break 2 -   Lying down to rest in the afternoon when circumstances permit 1 -   Sitting and talking to someone 0 -   Sitting quietly after a lunch without alcohol 0 -   In a car, while stopped for a few minutes in traffic 0 -   Total score 7 -   Neck circumference - 19        PRIOR EXTERNAL NOTES:  [] Unavailable  [x] Reviewed    ORDERS PLACED:  No orders of the defined types were placed in this encounter.        3. RISK OF COMPLICATIONS AND/OR MORBIDITY OR MORTALITY:     ALLERGIES:  Allergies   Allergen Reactions    Nsaids      Pt had gastric sleeve  Cannot take due to bariatric surgery      Tolmetin      Pt had gastric sleeve    Codeine Itching      MEDICATIONS:  Outpatient Medications Prior to Visit   Medication Sig Dispense Refill    albuterol sulfate  (90 Base) MCG/ACT inhaler INHALE 2 PUFFS INTO THE LUNGS 4 TIMES DAILY AS NEEDED FOR WHEEZING 6.7 Inhaler 2    labetalol (NORMODYNE) 100 MG tablet TAKE 1 AND 1/2 TABLET BY MOUTH TWICE DAILY (Patient taking differently: 100 mg ) 270 tablet 0    calcium carbonate (OSCAL) 500 MG TABS tablet Take 500 mg by mouth daily      lisinopril (PRINIVIL;ZESTRIL) 40 MG tablet TAKE 1 TABLET BY MOUTH EVERY DAY 90 tablet 0    vitamin B-12 (CYANOCOBALAMIN) 100 MCG tablet TAKE 1 TABLET BY MOUTH EVERY DAY      famotidine (PEPCID) 20 MG tablet Take 1 tablet by mouth nightly 30 tablet 3    atorvastatin (LIPITOR) 10 MG tablet Take 1 tablet by mouth daily 90 tablet 1    sertraline (ZOLOFT) 50 MG tablet TAKE 1 TABLET BY MOUTH EVERY DAY 90 tablet 0    ferrous sulfate (FE TABS 325) 325 (65 Fe) MG EC tablet Take 1 tablet by mouth 3 times daily (with meals) (Patient not taking: Reported on 8/9/2021) 90 tablet 0    albuterol sulfate HFA (PROAIR HFA) 108 (90 Base) MCG/ACT inhaler Inhale 2 puffs into the lungs every 6 hours as needed for Wheezing 1 Inhaler 0    folic acid (FOLVITE) 1 MG tablet Take 1 tablet by mouth daily 90 tablet 1    pantoprazole (PROTONIX) 40 MG tablet TAKE 1 TABLET BY MOUTH EVERY DAY BEFORE BREAKFAST 90 tablet 1    busPIRone (BUSPAR) 10 MG tablet Take 1 tablet by mouth 2 times daily 180 tablet 1    SUMAtriptan (IMITREX) 25 MG tablet Take 1 tablet by mouth once as needed for Migraine 9 tablet 0    butalbital-acetaminophen-caffeine (FIORICET, ESGIC) -40 MG per tablet Take 1 tablet by mouth every 4 hours as needed for Headaches 20 tablet 0    Multiple Vitamins-Minerals (MULTIVITAMIN ADULT EXTRA C PO) Take 1 tablet by mouth daily      levonorgestrel (MIRENA) IUD 52 mg 1 each by Intrauterine route       No facility-administered medications prior to visit. PRESCRIPTION DRUG MANAGEMENT/RECOMMENDATIONS:   PFT pending   Patient minimally symptomatic from respiratory standpoint   Currently using as only needed albuterol    SUPPLEMENTAL OXYGEN/NIV RECOMMENDATIONS:   Patient is not on home oxygen therapy. SLEEP DISORDER RECOMMENDATIONS:   Patient has severe sleep apnea   She recently got a new CPAP machine after a titration study   Compliance data unavailable at the time of clinic visit    SMOKING CESSATION RECOMMENDATIONS/COUNSELING:   Recommended to continue smoking cessation    LIFESTYLE MODIFICATION RECOMMENDATIONS/COUNSELING:   Follow healthy behaviors: nutrition, exercise and medication adherence.  Maintain an active lifestyle. LUNG CANCER SCREENING/OTHER IMAGING RECOMMENDATIONS:   After reviewing the patient's smoking history, age and other clinical criteria, the low dose CT is not indicated (Nonsmoker/Smoking <30 pack-years)     IMMUNIZATION HISTORY/RECOMMENDATIONS:    Immunization History   Administered Date(s) Administered    COVID-19, J&J, PF, 0.5 mL 06/24/2021    Tdap (Boostrix, Adacel) 09/01/2013     All the questions that the patient had were answered to her satisfaction  We'll see the patient back in two months  Thank you for having us involved in the care of your patient. Please call us if you have any questions or concerns. Dex Hansen MD  Pulmonary and Critical Care Medicine            Please note that this chart was generated using voice recognition Dragon dictation software. Although every effort was made to ensure the accuracy of this automated transcription, some errors in transcription may have occurred.

## 2021-06-11 ENCOUNTER — OFFICE VISIT (OUTPATIENT)
Dept: SURGERY | Age: 46
End: 2021-06-11
Payer: MEDICARE

## 2021-06-11 VITALS
WEIGHT: 271 LBS | RESPIRATION RATE: 12 BRPM | BODY MASS INDEX: 40.14 KG/M2 | SYSTOLIC BLOOD PRESSURE: 165 MMHG | HEIGHT: 69 IN | HEART RATE: 64 BPM | DIASTOLIC BLOOD PRESSURE: 96 MMHG | OXYGEN SATURATION: 94 %

## 2021-06-11 DIAGNOSIS — Z98.890 HISTORY OF LOCAL EXCISION OF SKIN LESION: Primary | ICD-10-CM

## 2021-06-11 PROCEDURE — 99213 OFFICE O/P EST LOW 20 MIN: CPT | Performed by: PLASTIC SURGERY

## 2021-06-11 PROCEDURE — G8427 DOCREV CUR MEDS BY ELIG CLIN: HCPCS | Performed by: PLASTIC SURGERY

## 2021-06-11 PROCEDURE — 1036F TOBACCO NON-USER: CPT | Performed by: PLASTIC SURGERY

## 2021-06-11 PROCEDURE — G8417 CALC BMI ABV UP PARAM F/U: HCPCS | Performed by: PLASTIC SURGERY

## 2021-06-11 NOTE — PROGRESS NOTES
[address] [place] [address] [facility]  [facility] [id number]       Office progress note    Patient Name:  Jeffrey Neff    :  1975    MRN:  [id number]  STATUS POST  Chief Complaint   Patient presents with    Lesion(s)     Multiple back lesions removed DOS- 21       SUBJECTIVE  Patient seen and examined. Patient status post excision of multiple lesions on 2021. The results were discussed with the patient. A copy was provided to the patient. The left superior upper back was consistent with a dermatofibroma, the left lateral upper back was consistent with a dermatofibroma, central back was consistent with a dermatofibroma, the right shoulder lateral was consistent with a dermatofibroma, the upper back right medial was consistent with dermatofibroma. Patient has no complaints. Right inferior lateral back was consistent with a nevus. None were malignant. Review of Systems -review of system was performed. There were no changes. PHYSICAL EXAM  Vital Signs:  BP (!) 165/96   Pulse 64   Resp 12   Ht 5' 8.5\" (1.74 m)   Wt 271 lb (122.9 kg)   SpO2 94%   BMI 40.61 kg/m²     Incisions:  Suture line clean dry and intact. Patient has healed well. Skin:  No evidence of infection. Neurologic:  Alert & oriented x 3. Laboratory:  Dermatology Pathology Report 2021  3:12 PM [facility]   -- Diagnosis --   1.  SKIN, LEFT SUPERIOR UPPER BACK, EXCISION:        - [address] 2.  SKIN, LEFT LATERAL UPPER BACK, EXCISION:        -  DERMATOFIBROMA. 3.  SKIN, CENTRAL BACK, EXCISION:        -  DERMATOFIBROMA. 4.  SKIN, RIGHT SHOULDER LATERAL, EXCISION:        -  DERMATOFIBROMA. 5.  SKIN, MEDIAL RIGHT UPPER BACK, EXCISION:        -  DERMATOFIBROMA. 6.  SKIN, RIGHT INFERIOR LATERAL BACK, EXCISION:        -  INTRADERMAL NEVUS AND MILIAL CYST.       Kobe Bhat.  Dougie Bustillos M.D.   **Electronically Signed Out** jet/4/2/2021         Clinical Information   Pre-op Diagnosis:  MULTIPLE SKIN LESIONS   Operative Findings:  LEFT SUPERIOR UPPER BACK  STITCH AT 12:00; LEFT   LATERAL UPPER BACK  STITCH AT 12:00; CENTRAL BACK  STITCH AT 12:00;   RIGHT SHOULDER LATERAL  STITCH AT 12:00; MEDIAL RIGHT UPPER BACK   STITCH AT 12:00; RIGHT INFERIOR LATERAL BACK STITCH AT 12:00 (SITE   CONFIRMED AS FROM \"BACK\" PER DR. Allison Florentinoer)   Operation Performed:  EXCISION x 6     Source of Specimen   1: LEFT SUPERIOR UPPER BACK, STITCH AT 12 O'CLOCK   2: LEFT LATERAL UPPER BACK, STITCH AT 12 O'CLOCK   3: CENTRAL BACK   4: RIGHT SHOULDER LATERAL   5: MEDIAL RIGHT UPPER BACK   6: RIGHT INFERIOR LATERAL     Gross Description   1.  \"ALHAJI HARTMAN, LEFT SUPERIOR UPPER BACK  STITCH AT 12:00\" 2.4 x   1.5 x 1.0 (depth) cm oriented tan skin ellipse with a suture   designating 12:00.  The 12:00 half is inked blue and 6:00 half black. Cassette summary:  \"A-B\" specimen entirely serially submitted from   3-9. 2. Dianalizzy Anguiano, LEFT LATERAL UPPER BACK  STITCH AT 12:00\" 1.8 x   1.0 x 0.4 (depth) cm oriented tan skin ellipse with a suture   designating 12:00.  The 12:00 half is inked blue and 6:00 half black. On the skin surface, is a 0.8 cm ill-defined faint mottled tan lesion.    Cassette summary:  \"A-B\" specimen entirely serially submitted from   3-9. [address] AT 12:00\" 1.3 x 0.8 x 0.5   (depth) cm oriented tan skin ellipse with a suture designating 12:00. The 3:00 half is inked blue and 9:00 half black.  Cassette summary:   \"A-B\" specimen entirely serially submitted from 12-6. 4.  \"ALHAJI HARTMAN, RIGHT SHOULDER LATERAL  STITCH AT 12:00\" 2.4 x   1.4 x 1.0 (depth) cm oriented tan skin ellipse with a suture   designating 12:00.  The 3:00 half is inked blue and 9:00 half black. On the skin surface, is a 0.7 cm raised tan-white lesion.  Cassette   summary:  \"A-B\" specimen entirely serially submitted from 12-6.    5.  \"ALHAJI HARTMAN, MEDIAL RIGHT UPPER BACK  STITCH AT 12:00\" 2.1 x   1.5 x 0.8 (depth) cm oriented tan skin ellipse with a suture   designating 12:00.  The 12:00 half is inked blue and 6:00 half black. On the skin surface, is a 0.5 cm slightly raised mottled tan lesion   Cassette summary:  \"A-B\" specimen entirely serially submitted from   3-9. 6.  \"ALHAJI HARTMAN, RIGHT INFERIOR LATERAL BACK STITCH AT 12:00\" 0.9   x 0.4 x 0.2 (depth) cm oriented tan skin ellipse with a suture   designating 12:00.  The 3:00 half is inked blue and 9:00 half black. On the surface, is a 0.3 cm papule.  Cassette summary:  \"A\" 12:00   half, \"B\" 6:00 half.  tm       Microscopic Description   1, 2, 3, 4.  The sections show a nodular growth in the dermis of   fibroblasts arranged in a haphazard array.  The collagen at the   periphery has been thickened.  The overlying epidermis is   hyperplastic.  Margins appear negative for involvement   5.  The sections show a nodular growth in the dermis of fibroblasts   arranged in a haphazard array.  The collagen at the periphery has been   thickened.  The overlying epidermis is hyperplastic.  The lesion   extends to the deep margin focally. 6.  The sections show nests of melanocytes in the dermis, showing   maturation with depth. Lynda Pilon is a small epithelial cyst in the dermis   lined by atrophic stratified squamous epithelium with a granular cell   layer. Lynda Pilon is no evidence of malignancy in these sections.  Nevus   extends to the deep margin. SURGICAL PATHOLOGY CONSULTATION         Patient Name: Rachana Broderick Med Rec: [id number]   Path Number: [id number]     [facility]   [facility]   ANATOMIC PATHOLOGY   [address] [place] [address], [facility] [address]. [place], [address]   [phone]   Fax: [phone]            ASSESSMENT   Diagnosis Orders   1. History of local excision of skin lesion         PLAN  1 we discussed monthly skin exams.   2.  We discussed sun protection  3. Follow-up as needed. Plan discussed with patient.     Electronically signed by:  Malinda Gordillo M.D.   6/11/2021

## 2021-06-14 NOTE — TELEPHONE ENCOUNTER
Authorization for surgery received, patient notified will receive call back the week of 6/21 to discuss surgery dates and time

## 2021-06-24 ENCOUNTER — TELEPHONE (OUTPATIENT)
Dept: BARIATRICS/WEIGHT MGMT | Age: 46
End: 2021-06-24

## 2021-06-26 DIAGNOSIS — K21.9 GASTROESOPHAGEAL REFLUX DISEASE WITHOUT ESOPHAGITIS: ICD-10-CM

## 2021-06-26 DIAGNOSIS — F33.0 MILD EPISODE OF RECURRENT MAJOR DEPRESSIVE DISORDER (HCC): ICD-10-CM

## 2021-06-26 DIAGNOSIS — F41.9 ANXIETY: ICD-10-CM

## 2021-06-27 RX ORDER — BUSPIRONE HYDROCHLORIDE 10 MG/1
TABLET ORAL
Qty: 180 TABLET | Refills: 1 | Status: SHIPPED | OUTPATIENT
Start: 2021-06-27 | End: 2022-01-03

## 2021-06-27 RX ORDER — PANTOPRAZOLE SODIUM 40 MG/1
TABLET, DELAYED RELEASE ORAL
Qty: 90 TABLET | Refills: 1 | Status: ON HOLD | OUTPATIENT
Start: 2021-06-27 | End: 2021-07-24 | Stop reason: HOSPADM

## 2021-07-06 ENCOUNTER — NURSE ONLY (OUTPATIENT)
Dept: BARIATRICS/WEIGHT MGMT | Age: 46
End: 2021-07-06

## 2021-07-06 NOTE — PROGRESS NOTES
Pt attended Bariatric Surgery pre-op class. Pt verbalized understanding and signed Surgical Patient Agreement. Vitamin, calcium and protein drink use reviewed. Preop & Post- op diet reviewed. Pt allowed the opportunity to ask questions.

## 2021-07-07 ENCOUNTER — HOSPITAL ENCOUNTER (OUTPATIENT)
Dept: PULMONOLOGY | Age: 46
Discharge: HOME | End: 2021-07-07
Payer: MEDICARE

## 2021-07-07 DIAGNOSIS — J44.9 CHRONIC OBSTRUCTIVE PULMONARY DISEASE, UNSPECIFIED COPD TYPE (HCC): ICD-10-CM

## 2021-07-07 PROCEDURE — 94640 AIRWAY INHALATION TREATMENT: CPT

## 2021-07-07 PROCEDURE — 94726 PLETHYSMOGRAPHY LUNG VOLUMES: CPT

## 2021-07-07 PROCEDURE — 94060 EVALUATION OF WHEEZING: CPT

## 2021-07-07 PROCEDURE — 94729 DIFFUSING CAPACITY: CPT

## 2021-07-07 PROCEDURE — 94664 DEMO&/EVAL PT USE INHALER: CPT

## 2021-07-08 ENCOUNTER — HOSPITAL ENCOUNTER (OUTPATIENT)
Dept: PREADMISSION TESTING | Age: 46
Discharge: HOME | End: 2021-07-12
Payer: MEDICARE

## 2021-07-08 ENCOUNTER — HOSPITAL ENCOUNTER (OUTPATIENT)
Dept: GENERAL RADIOLOGY | Age: 46
Discharge: HOME | End: 2021-07-10
Payer: MEDICARE

## 2021-07-08 VITALS
HEIGHT: 69 IN | TEMPERATURE: 97.2 F | DIASTOLIC BLOOD PRESSURE: 80 MMHG | HEART RATE: 73 BPM | RESPIRATION RATE: 20 BRPM | WEIGHT: 277 LBS | SYSTOLIC BLOOD PRESSURE: 126 MMHG | BODY MASS INDEX: 41.03 KG/M2 | OXYGEN SATURATION: 94 %

## 2021-07-08 LAB
ANION GAP SERPL CALCULATED.3IONS-SCNC: 17 MMOL/L (ref 9–17)
BUN BLDV-MCNC: 21 MG/DL (ref 6–20)
BUN/CREAT BLD: ABNORMAL (ref 9–20)
CALCIUM SERPL-MCNC: 9.8 MG/DL (ref 8.6–10.4)
CHLORIDE BLD-SCNC: 102 MMOL/L (ref 98–107)
CO2: 22 MMOL/L (ref 20–31)
CREAT SERPL-MCNC: 0.64 MG/DL (ref 0.5–0.9)
GFR AFRICAN AMERICAN: >60 ML/MIN
GFR NON-AFRICAN AMERICAN: >60 ML/MIN
GFR SERPL CREATININE-BSD FRML MDRD: ABNORMAL ML/MIN/{1.73_M2}
GFR SERPL CREATININE-BSD FRML MDRD: ABNORMAL ML/MIN/{1.73_M2}
GLUCOSE BLD-MCNC: 84 MG/DL (ref 70–99)
HCT VFR BLD CALC: 39.5 % (ref 36.3–47.1)
HEMOGLOBIN: 12.7 G/DL (ref 11.9–15.1)
INR BLD: 0.9
MCH RBC QN AUTO: 28.5 PG (ref 25.2–33.5)
MCHC RBC AUTO-ENTMCNC: 32.2 G/DL (ref 28.4–34.8)
MCV RBC AUTO: 88.6 FL (ref 82.6–102.9)
NRBC AUTOMATED: 0 PER 100 WBC
PARTIAL THROMBOPLASTIN TIME: 23 SEC (ref 20.5–30.5)
PDW BLD-RTO: 14.4 % (ref 11.8–14.4)
PLATELET # BLD: 258 K/UL (ref 138–453)
PMV BLD AUTO: 9.2 FL (ref 8.1–13.5)
POTASSIUM SERPL-SCNC: 4.5 MMOL/L (ref 3.7–5.3)
PROTHROMBIN TIME: 10.2 SEC (ref 9.1–12.3)
RBC # BLD: 4.46 M/UL (ref 3.95–5.11)
SODIUM BLD-SCNC: 141 MMOL/L (ref 135–144)
WBC # BLD: 7.2 K/UL (ref 3.5–11.3)

## 2021-07-08 PROCEDURE — G0480 DRUG TEST DEF 1-7 CLASSES: HCPCS

## 2021-07-08 PROCEDURE — 93005 ELECTROCARDIOGRAM TRACING: CPT | Performed by: SURGERY

## 2021-07-08 PROCEDURE — 85730 THROMBOPLASTIN TIME PARTIAL: CPT

## 2021-07-08 PROCEDURE — 85610 PROTHROMBIN TIME: CPT

## 2021-07-08 PROCEDURE — 85027 COMPLETE CBC AUTOMATED: CPT

## 2021-07-08 PROCEDURE — 36415 COLL VENOUS BLD VENIPUNCTURE: CPT

## 2021-07-08 PROCEDURE — 71046 X-RAY EXAM CHEST 2 VIEWS: CPT

## 2021-07-08 PROCEDURE — 80048 BASIC METABOLIC PNL TOTAL CA: CPT

## 2021-07-08 RX ORDER — HEPARIN SODIUM 5000 [USP'U]/ML
5000 INJECTION, SOLUTION INTRAVENOUS; SUBCUTANEOUS ONCE
Status: CANCELLED | OUTPATIENT
Start: 2021-07-08 | End: 2021-07-08

## 2021-07-08 RX ORDER — MULTIVIT WITH MINERALS/LUTEIN
500 TABLET ORAL 3 TIMES DAILY
Status: ON HOLD | COMMUNITY
End: 2021-07-24 | Stop reason: HOSPADM

## 2021-07-08 RX ORDER — MULTIVIT-MIN/IRON/FOLIC ACID/K 18-600-40
CAPSULE ORAL DAILY
Status: ON HOLD | COMMUNITY
End: 2021-07-24 | Stop reason: HOSPADM

## 2021-07-08 NOTE — H&P
History and Physical    Pt Name: Asha Luke  MRN: [id number]  YOB: 1975  Date of evaluation: 7/8/2021  Primary Care Physician: Antonio Serrano MD    SUBJECTIVE:   History of Chief Complaint:    Asha Luke is a 42 y.o. female who presents for PAT appointment. Patient has been scheduled for XI ROBOTIC LAPAROSCOPIC GASTRIC BYPASS ADRIAN-EN-Y, LIVER BIOPSY, EGD - GI SCHEDULED. Patient reports she has been trying to lose weight for years, and had a gastric sleeve in 2016. She states since then, she had been unable to lose much weight and her GERD and heartburn have been worsening. Allergies  is allergic to nsaids, tolmetin, and codeine. Medications  Prior to Admission medications    Medication Sig Start Date End Date Taking?  Authorizing Provider   Cholecalciferol (VITAMIN D) 50 MCG (2000 UT) CAPS capsule Take by mouth daily   Yes Historical Provider, MD   Ascorbic Acid (VITAMIN C) 250 MG tablet Take 500 mg by mouth 3 times daily Takes with ferrous sulfate   Yes Historical Provider, MD   busPIRone (BUSPAR) 10 MG tablet TAKE 1 TABLET BY MOUTH TWICE A DAY 6/27/21  Yes Antonio Serrano MD   pantoprazole (PROTONIX) 40 MG tablet TAKE 1 TABLET BY MOUTH EVERY MORNING BEFORE BREAKFAST 6/27/21  Yes Antonio Serrano MD   sertraline (ZOLOFT) 50 MG tablet TAKE 1 TABLET BY MOUTH EVERY DAY 6/27/21  Yes Antonio Serrano MD   albuterol sulfate  (90 Base) MCG/ACT inhaler INHALE 2 PUFFS INTO THE LUNGS 4 TIMES DAILY AS NEEDED FOR WHEEZING 5/21/21  Yes Antonio Serrano MD   labetalol (NORMODYNE) 100 MG tablet TAKE 1 AND 1/2 TABLET BY MOUTH TWICE DAILY 5/12/21  Yes Antonio Serrano MD   calcium carbonate (OSCAL) 500 MG TABS tablet Take 500 mg by mouth daily   Yes Historical Provider, MD   lisinopril (PRINIVIL;ZESTRIL) 40 MG tablet TAKE 1 TABLET BY MOUTH EVERY DAY 4/5/21  Yes Antonio Serrano MD   vitamin B-12 (CYANOCOBALAMIN) 100 MCG tablet TAKE 1 TABLET BY MOUTH EVERY DAY 3/15/21  Yes Historical Provider, MD   famotidine (PEPCID) insertion (08/04/2020); pre-malignant / benign skin lesion excision (04/01/2021); skin biopsy (N/A, 04/01/2021); hernia repair; Endoscopy, colon, diagnostic (05/07/2021); Upper gastrointestinal endoscopy (N/A, 05/07/2021); Bariatric Surgery (02/22/2016); Breast lumpectomy (Right); and [place] tooth extraction. Social History   reports that she quit smoking about a year ago. Her smoking use included cigarettes. She started smoking about 17 months ago. She has a 22.00 pack-year smoking history. She has never used smokeless tobacco.    reports current alcohol use. reports no history of drug use. Marital Status   Children 2  Occupation unemployed  Family History  Family Status   Relation Name Status    Mother  Alive    Father  Alive    [place]  (Not Specified)    [facility]  (Not Specified)     family history includes Heart Disease in her father and maternal grandmother; High Blood Pressure in her father; Hypertension in her maternal grandmother; Stroke in her maternal uncle. Review of Systems:  CONSTITUTIONAL:   negative for fevers, chills, fatigue and malaise    EYES:   negative for double vision, blurred vision and photophobia    HEENT:   negative for tinnitus, epistaxis and sore throat     RESPIRATORY:   negative for cough, shortness of breath, wheezing     CARDIOVASCULAR:   negative for chest pain, palpitations, syncope, edema     GASTROINTESTINAL:   negative for nausea, vomiting, heart burn   GENITOURINARY:   negative for incontinence     MUSCULOSKELETAL:   negative for neck or back pain     NEUROLOGICAL:   Negative for weakness and tingling  negative for headaches and dizziness     PSYCHIATRIC:   negative for anxiety       OBJECTIVE:   VITALS:  height is 5' 8.5\" (1.74 m) and weight is 277 lb (125.6 kg). Her temporal temperature is 97.2 °F (36.2 °C). Her blood pressure is 126/80 and her pulse is 73. Her respiration is 20 and oxygen saturation is 94%.    CONSTITUTIONAL:alert & oriented x 3, no acute distress. Calm and pleasant. SKIN:  Warm and dry, no rashes to exposed areas of skin. HEAD:  Normocephalic, atraumatic. EYES: PERRL. EOMs intact. EARS:  Intact and equal bilaterally. No edema or thickening, without lumps, lesions, or discharge. Hearing grossly WNL. NOSE:  Nares patent. No rhinorrhea   MOUTH/THROAT:  Mucous membranes pink and moist, uvula midline, teeth appear to be intact, one missing. NECK:supple, no lymphadenopathy. LUNGS: Respirations even and non-labored. Clear to auscultation bilaterally, no wheezes, rales, or rhonchi. CARDIOVASCULAR: Regular rate and rhythm, no murmurs/rubs/gallops   ABDOMEN: soft, non-tender, non-distended, bowel sounds active x 4   EXTREMITIES: No edema to bilateral lower extremities. No varicosities to bilateral lower extremities. NEUROLOGIC: CN II-XII are grossly intact. Gait is smooth, rhythmic and effortless. Testing:   EK21  Labs pending: drawn 2021   Chest XRay:  21   IMPRESSIONS:   Obesity.     PLANS:   XI ROBOTIC LAPAROSCOPIC GASTRIC BYPASS ADRIAN-EN-Y, LIVER BIOPSY, EGD - GI SCHEDULED    TERA Soto CNP  Electronically signed 2021 at 11:33 AM

## 2021-07-08 NOTE — PROCEDURES
[address] [facility]                               PULMONARY FUNCTION    PATIENT NAME: Dinah Pisano                  :        1975  MED REC NO:   [id number]                             ROOM:  ACCOUNT NO:   [de-identified]                           ADMIT DATE: 2021  PROVIDER:     Roxanna Martinez    DATE OF PROCEDURE:  2021    Spirometry shows FVC is 4.25, 99% predicted. FEV1 is 3.05, 89%  predicted. Both are within normal limits. FEV1/FVC ratio is slightly  reduced, suggestive of mild obstructive ventilatory impairment. Post  bronchodilator, there was no significant improvement in FEV1 or FVC to  suggest bronchospasticity or airway reversibility, but clinical response  is possible. Lung volume shows residual volume is 2.98, 155% predicted. Total lung capacity is 7.35, 126% predicted, both consistent with airway  trapping/hyperinflation. Diffusion capacity is 26.87, 86%, which is  within normal limits. IMPRESSION:  This pulmonary function test is consistent with mild  obstructive ventilatory impairment according to FEV1/FVC ratio. There  is no significant response to bronchodilator but clinical response is  possible. Lung volume is consistent with airway trapping or  hyperinflation. Diffusion capacity is within normal limits. Clinical  correlation is recommended.         Froy Chambers    D: 2021 21:28:59       T: 2021 21:35:13     [email or web address]  Job#: [id number]     Doc#: [id number]    CC:

## 2021-07-08 NOTE — PROGRESS NOTES
Anesthesia Focused Assessment    Hx of anesthesia complications:  no  Family hx of anesthesia complications:  no      Prior + Covid-19 test? no    STOP-BANG Sleep Apnea Questionnaire    SNORE loudly (heard through closed doors)? Yes  TIRED, fatigued, sleepy during daytime? No  OBSERVED stopping breathing during sleep? No  High blood PRESSURE or being treated? Yes    BMI over 35? Yes  AGE over 48? No  NECK circumference over 16\"? No  GENDER (male)? No             Total 3  High risk 5-8  Intermediate risk 3-4  Low risk 0-2    ----------------------------------------------------------------------------------------------------------------------  CORNELIA                              Yes  If yes, machine? Yes    DM1                                            No  DM2                   No    Coronary Artery Disease      No  HTN         Yes  Defib/AICD/Pacemaker               No             Renal Failure                   No  If yes, on dialysis       No    Active smoker? No  Drinks alcohol? No  Illicit drugs? No  Dentition?         One missing tooth      Past Medical History:   Diagnosis Date    Anemia     Anxiety and depression 04/05/2021    Anxiety state 08/12/2020    Asthma     [facility] (chronic obstructive [facility] disease) [facility] [address]     Essential hypertension 51/48/4412    Folic acid deficiency 86/27/4521    Hiatal hernia 08/12/2020    Hiatal hernia with GERD 04/05/2021    History of abnormal cervical Pap smear     History of nicotine use 04/05/2021    Hyperlipidemia     Hypertension     PCP DR Chen Expose    Hypertensive emergency 09/20/2020    Iron deficiency anemia 08/12/2020    IUD (intrauterine device) in place 08/04/2020    Mirena    Menometrorrhagia     Migraine without aura and without status migrainosus, not intractable 09/21/2020    Morbid obesity due to excess calories ([facility] [address] 08/12/2020    Obesity (BMI 30-39.9) 09/20/2020    CORNELIA on CPAP      Pablo Orellana (last visit June 2021)    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 2/22/16 Sleeve Gastrectomy and repair of sm to mod size sliding Hiatal Hernia; 2/23/16 UGI WNL postop    Recurrent major depressive disorder, in partial remission ([facility] [address] 09/20/2020    Vitamin D deficiency 08/12/2020    Vit D low 18.0/18.1; treat with Vitamin D 50,000 iu 3x wk x 6 wks then repeat level         Patient was evaluated in PAT & anesthesia guidelines were applied. NPO guidelines, medication instructions and scheduled arrival time were reviewed with patient. Anesthesia contacted:   Yes    I spoke with Dr. Chelsie Ryder. Patient history and pertinent findings reviewed. Patient has medical clearance pending, will see PCP on 7/19/21. Presents today with abnormal EKG, asymptomatic, denies chest pain or SOB. Last cardiac echo in 2019 with EF = 55-60%.      Medical or cardiac clearance ordered: TERA Santamaria - CNP   7/8/21  11:39 AM

## 2021-07-08 NOTE — H&P (VIEW-ONLY)
History and Physical    Pt Name: Mariah Casas  MRN: [id number]  YOB: 1975  Date of evaluation: 7/8/2021  Primary Care Physician: Ke Jaeger MD    SUBJECTIVE:   History of Chief Complaint:    Mariah Casas is a 42 y.o. female who presents for PAT appointment. Patient has been scheduled for XI ROBOTIC LAPAROSCOPIC GASTRIC BYPASS ADRIAN-EN-Y, LIVER BIOPSY, EGD - GI SCHEDULED. Patient reports she has been trying to lose weight for years, and had a gastric sleeve in 2016. She states since then, she had been unable to lose much weight and her GERD and heartburn have been worsening. Allergies  is allergic to nsaids, tolmetin, and codeine. Medications  Prior to Admission medications    Medication Sig Start Date End Date Taking?  Authorizing Provider   Cholecalciferol (VITAMIN D) 50 MCG (2000 UT) CAPS capsule Take by mouth daily   Yes Historical Provider, MD   Ascorbic Acid (VITAMIN C) 250 MG tablet Take 500 mg by mouth 3 times daily Takes with ferrous sulfate   Yes Historical Provider, MD   busPIRone (BUSPAR) 10 MG tablet TAKE 1 TABLET BY MOUTH TWICE A DAY 6/27/21  Yes Ke Jaeger MD   pantoprazole (PROTONIX) 40 MG tablet TAKE 1 TABLET BY MOUTH EVERY MORNING BEFORE BREAKFAST 6/27/21  Yes Ke Jaeger MD   sertraline (ZOLOFT) 50 MG tablet TAKE 1 TABLET BY MOUTH EVERY DAY 6/27/21  Yes Ke Jaeger MD   albuterol sulfate  (90 Base) MCG/ACT inhaler INHALE 2 PUFFS INTO THE LUNGS 4 TIMES DAILY AS NEEDED FOR WHEEZING 5/21/21  Yes Ke Jaeger MD   labetalol (NORMODYNE) 100 MG tablet TAKE 1 AND 1/2 TABLET BY MOUTH TWICE DAILY 5/12/21  Yes Ke Jaeger MD   calcium carbonate (OSCAL) 500 MG TABS tablet Take 500 mg by mouth daily   Yes Historical Provider, MD   lisinopril (PRINIVIL;ZESTRIL) 40 MG tablet TAKE 1 TABLET BY MOUTH EVERY DAY 4/5/21  Yes Ke Jaeger MD   vitamin B-12 (CYANOCOBALAMIN) 100 MCG tablet TAKE 1 TABLET BY MOUTH EVERY DAY 3/15/21  Yes Historical Provider, MD   famotidine (PEPCID) 20 MG tablet Take 1 tablet by mouth nightly 4/5/21  Yes TERA Sutherland - CNP   atorvastatin (LIPITOR) 10 MG tablet Take 1 tablet by mouth daily 3/31/21  Yes Huma Bravo MD   ferrous sulfate (FE TABS 325) 325 (65 Fe) MG EC tablet Take 1 tablet by mouth 3 times daily (with meals) 3/24/21  Yes Huma Bravo MD   folic acid (FOLVITE) 1 MG tablet Take 1 tablet by mouth daily 1/20/21  Yes Huma Bravo MD   SUMAtriptan (IMITREX) 25 MG tablet Take 1 tablet by mouth once as needed for Migraine 11/12/20 7/8/21 Yes Huma Bravo MD   butalbital-acetaminophen-caffeine (FIORICET, ESGIC) -67 MG per tablet Take 1 tablet by mouth every 4 hours as needed for Headaches 9/22/20  Yes Mayi WINTER Blood, DO   Multiple Vitamins-Minerals (MULTIVITAMIN ADULT EXTRA C PO) Take 1 tablet by mouth daily   Yes Historical Provider, MD   albuterol sulfate HFA (VENTOLIN HFA) 108 (90 Base) MCG/ACT inhaler Inhale 2 puffs into the lungs 4 times daily as needed for Wheezing 4/21/21   Huma Bravo MD   levonorgestrel [facility]) IUD 52 mg 1 each by Intrauterine route    Historical Provider, MD     Past Medical History    has a past medical history of Anemia, Anxiety and depression, Anxiety state, Asthma, COPD (chronic obstructive pulmonary disease) ([address] Essential hypertension, Folic acid deficiency, Hiatal hernia, Hiatal hernia with GERD, History of abnormal cervical Pap smear, History of nicotine use, Hyperlipidemia, Hypertension, Hypertensive emergency, Iron deficiency anemia, IUD (intrauterine device) in place, Menometrorrhagia, Migraine without aura and without status migrainosus, not intractable, Morbid obesity due to excess calories ([address] Obesity (BMI 30-39.9), CORNELIA on CPAP, Postgastrectomy malabsorption, Recurrent major depressive disorder, in partial remission ([address] and Vitamin D deficiency.   Past Surgical History   has a past surgical history that includes Cholecystectomy; lipoma resection; Colposcopy; intrauterine device insertion (08/04/2020); pre-malignant / benign skin lesion excision (04/01/2021); skin biopsy (N/A, 04/01/2021); hernia repair; Endoscopy, colon, diagnostic (05/07/2021); Upper gastrointestinal endoscopy (N/A, 05/07/2021); Bariatric Surgery (02/22/2016); Breast lumpectomy (Right); and [place] tooth extraction. Social History   reports that she quit smoking about a year ago. Her smoking use included cigarettes. She started smoking about 17 months ago. She has a 22.00 pack-year smoking history. She has never used smokeless tobacco.    reports current alcohol use. reports no history of drug use. Marital Status   Children 2  Occupation unemployed  Family History  Family Status   Relation Name Status    Mother  Alive    Father  Alive    [place]  (Not Specified)    [facility]  (Not Specified)     family history includes Heart Disease in her father and maternal grandmother; High Blood Pressure in her father; Hypertension in her maternal grandmother; Stroke in her maternal uncle. Review of Systems:  CONSTITUTIONAL:   negative for fevers, chills, fatigue and malaise    EYES:   negative for double vision, blurred vision and photophobia    HEENT:   negative for tinnitus, epistaxis and sore throat     RESPIRATORY:   negative for cough, shortness of breath, wheezing     CARDIOVASCULAR:   negative for chest pain, palpitations, syncope, edema     GASTROINTESTINAL:   negative for nausea, vomiting, heart burn   GENITOURINARY:   negative for incontinence     MUSCULOSKELETAL:   negative for neck or back pain     NEUROLOGICAL:   Negative for weakness and tingling  negative for headaches and dizziness     PSYCHIATRIC:   negative for anxiety       OBJECTIVE:   VITALS:  height is 5' 8.5\" (1.74 m) and weight is 277 lb (125.6 kg). Her temporal temperature is 97.2 °F (36.2 °C). Her blood pressure is 126/80 and her pulse is 73. Her respiration is 20 and oxygen saturation is 94%.    CONSTITUTIONAL:alert & oriented x 3, no acute distress. Calm and pleasant. SKIN:  Warm and dry, no rashes to exposed areas of skin. HEAD:  Normocephalic, atraumatic. EYES: PERRL. EOMs intact. EARS:  Intact and equal bilaterally. No edema or thickening, without lumps, lesions, or discharge. Hearing grossly WNL. NOSE:  Nares patent. No rhinorrhea   MOUTH/THROAT:  Mucous membranes pink and moist, uvula midline, teeth appear to be intact, one missing. NECK:supple, no lymphadenopathy. LUNGS: Respirations even and non-labored. Clear to auscultation bilaterally, no wheezes, rales, or rhonchi. CARDIOVASCULAR: Regular rate and rhythm, no murmurs/rubs/gallops   ABDOMEN: soft, non-tender, non-distended, bowel sounds active x 4   EXTREMITIES: No edema to bilateral lower extremities. No varicosities to bilateral lower extremities. NEUROLOGIC: CN II-XII are grossly intact. Gait is smooth, rhythmic and effortless. Testing:   EK21  Labs pending: drawn 2021   Chest XRay:  21   IMPRESSIONS:   Obesity.     PLANS:   XI ROBOTIC LAPAROSCOPIC GASTRIC BYPASS ADRIAN-EN-Y, LIVER BIOPSY, EGD - GI SCHEDULED    TERA June CNP  Electronically signed 2021 at 11:33 AM

## 2021-07-09 LAB
EKG ATRIAL RATE: 62 BPM
EKG P AXIS: 28 DEGREES
EKG P-R INTERVAL: 154 MS
EKG Q-T INTERVAL: 446 MS
EKG QRS DURATION: 82 MS
EKG QTC CALCULATION (BAZETT): 452 MS
EKG R AXIS: 28 DEGREES
EKG T AXIS: 129 DEGREES
EKG VENTRICULAR RATE: 62 BPM

## 2021-07-11 LAB
3-OH-COTININE: <2 NG/ML
COTININE: <2 NG/ML
NICOTINE: <2 NG/ML

## 2021-07-14 ENCOUNTER — OFFICE VISIT (OUTPATIENT)
Dept: BARIATRICS/WEIGHT MGMT | Age: 46
End: 2021-07-14
Payer: MEDICARE

## 2021-07-14 VITALS
WEIGHT: 269 LBS | HEIGHT: 69 IN | HEART RATE: 68 BPM | SYSTOLIC BLOOD PRESSURE: 102 MMHG | DIASTOLIC BLOOD PRESSURE: 80 MMHG | BODY MASS INDEX: 39.84 KG/M2

## 2021-07-14 DIAGNOSIS — E66.01 MORBID OBESITY WITH BMI OF 40.0-44.9, ADULT (HCC): ICD-10-CM

## 2021-07-14 DIAGNOSIS — K44.9 HIATAL HERNIA WITH GERD: Primary | ICD-10-CM

## 2021-07-14 DIAGNOSIS — K21.9 HIATAL HERNIA WITH GERD: Primary | ICD-10-CM

## 2021-07-14 PROCEDURE — G8427 DOCREV CUR MEDS BY ELIG CLIN: HCPCS | Performed by: SURGERY

## 2021-07-14 PROCEDURE — 99212 OFFICE O/P EST SF 10 MIN: CPT | Performed by: SURGERY

## 2021-07-14 PROCEDURE — 1036F TOBACCO NON-USER: CPT | Performed by: SURGERY

## 2021-07-14 PROCEDURE — G8417 CALC BMI ABV UP PARAM F/U: HCPCS | Performed by: SURGERY

## 2021-07-18 NOTE — PROGRESS NOTES
[facility] INVASIVE BARIATRIC SURG  [address]  [address]  [place] [address]  Dept: [phone]    SURGICAL WEIGHT MANAGEMENT PROGRAM   PROGRESS NOTE -  SURGICAL EVALUATION    CC: Weight Loss     Patient: Salvador Ragland        Service Date: 7/14/2021       Medical Record #: [id number]    Patient History/Assessment Summary:    The patient is a pleasant 42y.o. year old female  with morbid obesity, who stands Height: 5' 8.5\" (174 cm) tall with a weight of Weight: 269 lb (122 kg) , resulting in a BMI of Body mass index is 40.31 kg/m². .     This patient is alone for the evaluation today. The patient is being evaluated to undergo weight loss surgery to treat the following comorbid conditions caused by her morbid obesity: Hypertension, Hyperlipidemia, GERD and Anxiety    She attended the weight loss surgery seminar, and attended bariatric education    Last Visit Weight:   Wt Readings from Last 3 Encounters:   07/14/21 269 lb (122 kg)   07/08/21 277 lb (125.6 kg)   06/11/21 271 lb (122.9 kg)     Today's weight is decreased from the last visit. Highest Weight: 277    The patient is being evaluated for Laparoscopic Narendra-en-Y Gastric Bypass and Laparoscopic Revisional Gastric Surgery. She  is here today to review the details of surgery     The patient acknowledges and understands the risks, benefits, and options we have discussed, as outlined in the Additional Informed Consent for this procedure. Patient also understands the importance of surgical and post-operative recommendations, including the operative diet and regular post-operative follow up care. The importance of ambulation and incentive spirometry was also discussed. All questions of this patient and any family members present have been answered to their satisfaction.     Physical Examination:     /80 (Site: Right Upper Arm, Position: Sitting, Cuff Size: Large Adult)   Pulse 68   Ht 5' 8.5\" (1.74 m)   Wt 269 lb (122 kg) BMI 40.31 kg/m²   General This patient is awake, alert, and oriented, and is in no apparent distress. Cardiac Regular rate and rhythm without evidence of murmur. Respiratory Clear to auscultation bilaterally. Abdomen Obese, soft, non-tender, non-distended without masses/ No  evidence of abdominal hernia / Incisions consistent with previous surgeries. Head and Neck Obese, normocephalic and atraumatic/soft and supple, no  lymphadenopathy or obvious bruits. Extremeties No cyanosis, clubbing or edema/ No calf tenderness/No  restrictions of movement, is ambulatory without assistance. Neurological Intact x 4 extremities, no focal deficits noted   Skin No rashes or lesions noted   Rectal Deferred     RECOMMENDATIONS:       Diagnosis Orders   1. Hiatal hernia with GERD     2. Morbid obesity with BMI of 40.0-44.9, adult ([facility] [address]            We spent a great deal of time discussing the risks and benefits of Laparoscopic Narendra-en-Y Gastric Bypass and Laparoscopic Revisional Gastric Surgery, including but not limited to injury to intra-abdominal organs, breakdown of the gastric staple line, the need for re-operative therapy,  prolonged hospitalization,  mechanical ventilation,  and death. We discussed the possibility of bleeding, the need for blood transfusions, blood clots, hospital-acquired and intra-abdominal infection, anastomotic stricture, and worsening GERD. And we discussed the need for post-operative visit compliance, behavior modifications and diet changes, protein and vitamin supplementation, as well as routine scheduled and dedicated exercise. We discussed the potential weight loss benefit of approximately 60-70% of her excess body weight at 12-18 months post-op, as well as the possibility of insufficient weight loss or weight gain after 2 years post-operative time.        The following was discussed with the patient:    DVT Prophylaxis    GERD and hiatal hernia repair, risk of esophageal perforation given prior repair. Elevated surgical risks as a whole given prior sleeve and bariatric surgery. Risks of revision surgery including an increased risk of leak. She would like to move forward with a revision    Electronically signed by Brenda Ibrahim DO on 7/17/2021 at 9:50 PM    Please note that this chart was generated using voice recognition Dragon dictation software. Although every effort was made to ensure the accuracy of this automated transcription, some errors in transcription may have occurred.

## 2021-07-19 ENCOUNTER — TELEPHONE (OUTPATIENT)
Dept: BARIATRICS/WEIGHT MGMT | Age: 46
End: 2021-07-19

## 2021-07-19 ENCOUNTER — OFFICE VISIT (OUTPATIENT)
Dept: FAMILY MEDICINE CLINIC | Age: 46
End: 2021-07-19
Payer: MEDICARE

## 2021-07-19 ENCOUNTER — TELEPHONE (OUTPATIENT)
Dept: FAMILY MEDICINE CLINIC | Age: 46
End: 2021-07-19

## 2021-07-19 VITALS
SYSTOLIC BLOOD PRESSURE: 124 MMHG | HEIGHT: 69 IN | HEART RATE: 68 BPM | BODY MASS INDEX: 40.49 KG/M2 | WEIGHT: 273.4 LBS | OXYGEN SATURATION: 97 % | TEMPERATURE: 97.3 F | DIASTOLIC BLOOD PRESSURE: 76 MMHG

## 2021-07-19 DIAGNOSIS — I10 ESSENTIAL HYPERTENSION: Primary | ICD-10-CM

## 2021-07-19 DIAGNOSIS — Z01.818 PRE-OP EVALUATION: ICD-10-CM

## 2021-07-19 DIAGNOSIS — G47.33 OSA (OBSTRUCTIVE SLEEP APNEA): ICD-10-CM

## 2021-07-19 DIAGNOSIS — R94.31 ACUTE ELECTROCARDIOGRAM CHANGES: ICD-10-CM

## 2021-07-19 PROCEDURE — 1036F TOBACCO NON-USER: CPT | Performed by: STUDENT IN AN ORGANIZED HEALTH CARE EDUCATION/TRAINING PROGRAM

## 2021-07-19 PROCEDURE — G8417 CALC BMI ABV UP PARAM F/U: HCPCS | Performed by: STUDENT IN AN ORGANIZED HEALTH CARE EDUCATION/TRAINING PROGRAM

## 2021-07-19 PROCEDURE — G8427 DOCREV CUR MEDS BY ELIG CLIN: HCPCS | Performed by: STUDENT IN AN ORGANIZED HEALTH CARE EDUCATION/TRAINING PROGRAM

## 2021-07-19 PROCEDURE — 99214 OFFICE O/P EST MOD 30 MIN: CPT | Performed by: STUDENT IN AN ORGANIZED HEALTH CARE EDUCATION/TRAINING PROGRAM

## 2021-07-19 SDOH — ECONOMIC STABILITY: FOOD INSECURITY: WITHIN THE PAST 12 MONTHS, THE FOOD YOU BOUGHT JUST DIDN'T LAST AND YOU DIDN'T HAVE MONEY TO GET MORE.: NEVER TRUE

## 2021-07-19 SDOH — ECONOMIC STABILITY: FOOD INSECURITY: WITHIN THE PAST 12 MONTHS, YOU WORRIED THAT YOUR FOOD WOULD RUN OUT BEFORE YOU GOT MONEY TO BUY MORE.: NEVER TRUE

## 2021-07-19 ASSESSMENT — SOCIAL DETERMINANTS OF HEALTH (SDOH): HOW HARD IS IT FOR YOU TO PAY FOR THE VERY BASICS LIKE FOOD, HOUSING, MEDICAL CARE, AND HEATING?: NOT HARD AT ALL

## 2021-07-19 NOTE — TELEPHONE ENCOUNTER
Patient called in upset that this office will not clear her without cardiac clearance, bottom line I told her that anesthe will not put her under without having the cardiac clearacne I told her that I made an appt with dr Ana Burgos and she said she is going to call around to find a different one who can see her tomorrow and hung up on me if she calls back the appt is on July 21 at 915 with Dr Konstantin [place] [id number] [address] opposite side of er into [address]

## 2021-07-19 NOTE — TELEPHONE ENCOUNTER
Received call from PCP - they will not clear patient for surgery due to abnormal EKG. PCP's office requesting cardiac clearance. Patient very upset. PCP's office was able to get patient in with cardiology 7/21. I told patient we would have to cancel her and reschedule when clearances have been obtained. Patient was not happy with that answer and became very upset. I told patient that we have to follow her pcp's recommendations based on anesthesiology recommendations due to abnormal EKG. Marisol from [facility] office has had multiple conversations with the patient and has also explained to her. Called and spoke with patient and she states she has calls into other cardiology offices trying to get in on 7/20/21. I explained to patient to call me first thing in the morning and let me know so I know to cancel surgery or not.

## 2021-07-19 NOTE — PROGRESS NOTES
[address] PRIMARY CARE  [address] [place] [address] [address] [place] [address]  Dept: [phone]  Dept Fax: [phone]    Navya Cagle is a 42 y.o. female who is a Established patient, who presents today for her medical conditions/complaints as noted below:  Chief Complaint   Patient presents with    Pre-op Exam         HPI:     She is here today for preop clearance for her upcoming Laparoscopic Narendra-en-Y Gastric Bypass and Laparoscopic Revisional Gastric Surgery on 7/23 by Dr. Tony Fortune. She has a past medical history significant for hypertension and has been hospitalized twice for hypertensive emergency in the past 2 years. She was noted to have LVH on her echocardiogram and was put on carvedilol, amlodipine, lisinopril and spironolactone. She was then eventually switched to lisinopril and metoprolol. She then discontinued metoprolol and had another admission for hypertensive emergency. During the second admission she was started on labetalol which she has been taking since then and her blood pressures are better. She is also on lisinopril. She was supposed to see cardiology but she has not had any follow-up with them yet. She also has sleep apnea for which she follows with sleep medicine and uses a CPAP machine. She is a former smoker and quit about 7 to 8 months ago. She denies any chest pain, shortness of breath or palpitations.       Hemoglobin A1C (%)   Date Value   04/22/2021 5.1             ( goal A1Cis < 7)   No results found for: LABMICR  LDL Cholesterol (mg/dL)   Date Value   04/22/2021 109   03/24/2021 95   09/21/2020 80       (goal LDL is <100)   AST (U/L)   Date Value   04/22/2021 25     ALT (U/L)   Date Value   04/22/2021 24     BUN (mg/dL)   Date Value   07/08/2021 21 (H)     BP Readings from Last 3 Encounters:   07/19/21 124/76   07/14/21 102/80   07/08/21 126/80          (goal 120/80)    Past Medical History:   Diagnosis Date    Anemia     Anxiety and depression 04/05/2021    Anxiety state 08/12/2020    Asthma     COPD (chronic obstructive pulmonary disease) (HCC)     Essential hypertension 96/96/7876    Folic acid deficiency 80/51/5311    Hiatal hernia 08/12/2020    Hiatal hernia with GERD 04/05/2021    History of abnormal cervical Pap smear     History of nicotine use 04/05/2021    Hyperlipidemia     Hypertension     PCP DR Svetlana Oliveros    Hypertensive emergency 09/20/2020    Iron deficiency anemia 08/12/2020    IUD (intrauterine device) in place 08/04/2020    Mirena    Menometrorrhagia     Migraine without aura and without status migrainosus, not intractable 09/21/2020    Morbid obesity due to excess calories ([facility] 08/12/2020    Obesity (BMI 30-39.9) 09/20/2020    CORNELIA on CPAP     DR Alf Garcia (last visit June 2021)    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 2/22/16 Sleeve Gastrectomy and repair of sm to mod size sliding Hiatal Hernia; 2/23/16 UGI WNL postop    Recurrent major depressive disorder, in partial remission ([facility] 09/20/2020    Vitamin D deficiency 08/12/2020    Vit D low 18.0/18.1; treat with Vitamin D 50,000 iu 3x wk x 6 wks then repeat level      Past Surgical History:   Procedure Laterality Date    BARIATRIC SURGERY  02/22/2016    GASTRIC SLEEVE (IN [place])    BREAST LUMPECTOMY Right     CHOLECYSTECTOMY      COLPOSCOPY      ENDOSCOPY, COLON, DIAGNOSTIC  05/07/2021    HERNIA REPAIR      DONE DURING GASTRIC SLEEVE    INTRAUTERINE DEVICE INSERTION  08/04/2020    Mirena     LIPOMA RESECTION      lt shoulder/upper back    PRE-MALIGNANT / BENIGN SKIN LESION EXCISION  04/01/2021    TRUNK LESION BIOPSY EXCISION RIGHT BACK X 2, LEFT BACK AND MID BACK ALL WITH COMPLEX CLOSURE     SKIN BIOPSY N/A 04/01/2021    TRUNK LESION BIOPSY EXCISION RIGHT BACK X 6, LEFT, RIGHT, AND MID BACK ALL WITH COMPLEX CLOSURE. performed by Ayesha Rob MD at [address] 05/07/2021    EGD BIOPSY performed by Fara France DO at [address] [place] [address] EXTRACTION         Family History   Problem Relation Age of Onset    High Blood Pressure Father     Heart Disease Father     Hypertension Maternal Grandmother     Heart Disease Maternal Grandmother     Stroke Maternal Uncle        Social History     Tobacco Use    Smoking status: Former Smoker     Packs/day: 1.00     Years: 22.00     Pack years: 22.00     Types: Cigarettes     Start date: 1/15/2020     Quit date: 2020     Years since quittin.9    Smokeless tobacco: Never Used   Substance Use Topics    Alcohol use: Yes     Comment: social      Current Outpatient Medications   Medication Sig Dispense Refill    Cholecalciferol (VITAMIN D) 50 MCG ( UT) CAPS capsule Take by mouth daily      Ascorbic Acid (VITAMIN C) 250 MG tablet Take 500 mg by mouth 3 times daily Takes with ferrous sulfate      busPIRone (BUSPAR) 10 MG tablet TAKE 1 TABLET BY MOUTH TWICE A  tablet 1    pantoprazole (PROTONIX) 40 MG tablet TAKE 1 TABLET BY MOUTH EVERY MORNING BEFORE BREAKFAST 90 tablet 1    sertraline (ZOLOFT) 50 MG tablet TAKE 1 TABLET BY MOUTH EVERY DAY 30 tablet 2    albuterol sulfate  (90 Base) MCG/ACT inhaler INHALE 2 PUFFS INTO THE LUNGS 4 TIMES DAILY AS NEEDED FOR WHEEZING 6.7 Inhaler 2    labetalol (NORMODYNE) 100 MG tablet TAKE 1 AND 1/2 TABLET BY MOUTH TWICE DAILY 270 tablet 0    calcium carbonate (OSCAL) 500 MG TABS tablet Take 500 mg by mouth daily      lisinopril (PRINIVIL;ZESTRIL) 40 MG tablet TAKE 1 TABLET BY MOUTH EVERY DAY 90 tablet 0    vitamin B-12 (CYANOCOBALAMIN) 100 MCG tablet TAKE 1 TABLET BY MOUTH EVERY DAY      famotidine (PEPCID) 20 MG tablet Take 1 tablet by mouth nightly 30 tablet 3    atorvastatin (LIPITOR) 10 MG tablet Take 1 tablet by mouth daily 90 tablet 1    ferrous sulfate (FE TABS 325) 325 (65 Fe) MG EC tablet Take 1 tablet by mouth 3 times daily (with meals) (Patient taking differently: Take 325 mg by mouth 2 times daily ) 90 tablet 0    folic acid (FOLVITE) 1 MG tablet Take 1 tablet by mouth daily 90 tablet 1    SUMAtriptan (IMITREX) 25 MG tablet Take 1 tablet by mouth once as needed for Migraine 9 tablet 0    butalbital-acetaminophen-caffeine (FIORICET, ESGIC) -40 MG per tablet Take 1 tablet by mouth every 4 hours as needed for Headaches 20 tablet 0    Multiple Vitamins-Minerals (MULTIVITAMIN ADULT EXTRA C PO) Take 1 tablet by mouth daily      levonorgestrel (MIRENA) IUD 52 mg 1 each by Intrauterine route       No current facility-administered medications for this visit. Allergies   Allergen Reactions    Nsaids      Pt had gastric sleeve  Cannot take due to bariatric surgery      Tolmetin      Pt had gastric sleeve    Codeine Itching       Health Maintenance   Topic Date Due    Pneumococcal 0-64 years Vaccine (1 of 2 - PPSV23) 08/12/2021 (Originally 6/20/1983)    Cervical cancer screen  01/20/2022 (Originally 6/20/1998)    Flu vaccine (1) 09/01/2021    Lipid screen  04/22/2022    Potassium monitoring  07/08/2022    Creatinine monitoring  07/08/2022    DTaP/Tdap/Td vaccine (2 - Td or Tdap) 09/01/2023    COVID-19 Vaccine  Completed    Hepatitis C screen  Completed    HIV screen  Completed    Hepatitis A vaccine  Aged Out    Hepatitis B vaccine  Aged Out    Hib vaccine  Aged Out    Meningococcal (ACWY) vaccine  Aged Out       Subjective:     Review of Systems   Constitutional: Negative for appetite change, fatigue and fever. HENT: Negative for congestion, ear pain, hearing loss and sore throat. Eyes: Negative for discharge and visual disturbance. Respiratory: Negative for cough, chest tightness, shortness of breath and wheezing. Cardiovascular: Negative for chest pain, palpitations and leg swelling. Gastrointestinal: Negative for abdominal pain, constipation, diarrhea, nausea and vomiting.    Genitourinary: Negative for flank pain, frequency, hematuria and urgency. Musculoskeletal: Negative for arthralgias, gait problem, joint swelling and myalgias. Skin: Negative. Neurological: Negative for dizziness, weakness, numbness and headaches. Psychiatric/Behavioral: Negative. Negative for dysphoric mood. The patient is not nervous/anxious. Objective:     Physical Exam  Vitals reviewed. Constitutional:       Appearance: Normal appearance. She is normal weight. HENT:      Head: Normocephalic and atraumatic. Nose: Nose normal.      Mouth/Throat:      Mouth: Mucous membranes are moist.      Pharynx: Oropharynx is clear. Eyes:      Extraocular Movements: Extraocular movements intact. Conjunctiva/sclera: Conjunctivae normal.      Pupils: Pupils are equal, round, and reactive to light. Cardiovascular:      Rate and Rhythm: Normal rate and regular rhythm. Heart sounds: Normal heart sounds. No murmur heard. No gallop. Pulmonary:      Effort: Pulmonary effort is normal. No respiratory distress. Breath sounds: Normal breath sounds. No stridor. No wheezing. Abdominal:      General: Bowel sounds are normal. There is no distension. Palpations: Abdomen is soft. Tenderness: There is no abdominal tenderness. There is no guarding or rebound. Musculoskeletal:         General: No swelling or tenderness. Normal range of motion. Cervical back: Normal range of motion and neck supple. Skin:     General: Skin is warm and dry. Coloration: Skin is not jaundiced. Findings: No rash. Neurological:      General: No focal deficit present. Mental Status: She is alert and oriented to person, place, and time. Psychiatric:         Mood and Affect: Mood normal.         Behavior: Behavior normal.         Thought Content:  Thought content normal.         Judgment: Judgment normal.       /76   Pulse 68   Temp 97.3 °F (36.3 °C)   Ht 5' 8.5\" (1.74 m)   Wt 273 lb 6.4 oz (124 kg)   SpO2 97%   BMI 40.97 kg/m²     Assessment/Plan:   1. Essential hypertension  2. Acute electrocardiogram changes  -     AFL - Ferne Mortimer, MD, Cardiology, [facility]  3. Pre-op evaluation  4. CORNELIA (obstructive sleep apnea)    Hypertension-controlled. On labetalol 150 mg twice daily and lisinopril 40 mg daily. Recent labs normal.  Has had previous to admissions to hospital for hypertensive emergency. EKG shows possible anterolateral ischemia with ST and T wave changes. Previous EKG shows similar changes but she has not had any follow-up with cardiology. Recommend following up with cardiology prior to the upcoming surgery for cardiac clearance. Referral placed. Sleep apnea-compliant with CPAP machine, follows with sleep medicine. No follow-ups on file. Orders Placed This Encounter   Procedures   Bjorn Yeh MD, Cardiology, [facility]     Referral Priority:   Routine     Referral Type:   Eval and Treat     Referral Reason:   Specialty Services Required     Referred to Provider:   Chun Bergeron MD     Requested Specialty:   Cardiology     Number of Visits Requested:   1     No orders of the defined types were placed in this encounter. Patient given educational materials - see patient instructions. Discussed use, benefit, and side effects of prescribed medications. All patientquestions answered. Pt voiced understanding. Reviewed health maintenance. Instructedto continue current medications, diet and exercise. Patient agreed with treatmentplan. Follow up as directed.      Electronically signed by Nadeem Covington MD on 7/20/2021 at 8:04 PM

## 2021-07-19 NOTE — TELEPHONE ENCOUNTER
Called patient and informed her that I had to change surgery time from 9:20 am to 8:20 am - to arrive at surgery center by 6:15 am    Patient verbalized understanding.

## 2021-07-20 ENCOUNTER — ANESTHESIA EVENT (OUTPATIENT)
Dept: OPERATING ROOM | Age: 46
DRG: 403 | End: 2021-07-20
Payer: MEDICARE

## 2021-07-20 ASSESSMENT — ENCOUNTER SYMPTOMS
DIARRHEA: 0
SHORTNESS OF BREATH: 0
VOMITING: 0
CHEST TIGHTNESS: 0
NAUSEA: 0
ABDOMINAL PAIN: 0
COUGH: 0
SORE THROAT: 0
EYE DISCHARGE: 0
WHEEZING: 0
CONSTIPATION: 0

## 2021-07-20 NOTE — TELEPHONE ENCOUNTER
Spoke with patient she states she was not able to get a hold of [facility]. I personally called [facility] to see if they had any cancellations for today - per Reilly Muller they had no cancellations but would move the patient to the top of the list.  I also called [address] [facility] [facility] - they are able to see patient today at 11:00 am with Dr. Schuyler Bernal at [facility] [address]  Fx:  [phone]. Patient was very happy to take this appointment. She is to arrive at 10:45 with insurance card and photo ID. All records faxed. I did call patient and ask her to  the copies of the reports just incase they did not receive. Patient verbalized understanding. Cristiano Maria at [facility] and cancelled appointment for 7/21/21.   Marisol at [facility] office was updated also

## 2021-07-21 ENCOUNTER — ANESTHESIA (OUTPATIENT)
Dept: OPERATING ROOM | Age: 46
DRG: 403 | End: 2021-07-21
Payer: MEDICARE

## 2021-07-21 ENCOUNTER — HOSPITAL ENCOUNTER (INPATIENT)
Age: 46
LOS: 1 days | Discharge: HOME | DRG: 403 | End: 2021-07-24
Attending: SURGERY | Admitting: SURGERY
Payer: MEDICARE

## 2021-07-21 VITALS — DIASTOLIC BLOOD PRESSURE: 96 MMHG | TEMPERATURE: 97.9 F | OXYGEN SATURATION: 98 % | SYSTOLIC BLOOD PRESSURE: 167 MMHG

## 2021-07-21 DIAGNOSIS — G89.18 ACUTE POSTOPERATIVE PAIN: Primary | ICD-10-CM

## 2021-07-21 PROBLEM — Z98.84 S/P GASTRIC BYPASS: Status: ACTIVE | Noted: 2021-07-21

## 2021-07-21 LAB
ANION GAP SERPL CALCULATED.3IONS-SCNC: 15 MMOL/L (ref 9–17)
BUN BLDV-MCNC: 19 MG/DL (ref 6–20)
BUN/CREAT BLD: ABNORMAL (ref 9–20)
CALCIUM SERPL-MCNC: 8.9 MG/DL (ref 8.6–10.4)
CHLORIDE BLD-SCNC: 104 MMOL/L (ref 98–107)
CO2: 21 MMOL/L (ref 20–31)
CREAT SERPL-MCNC: 0.87 MG/DL (ref 0.5–0.9)
GFR AFRICAN AMERICAN: >60 ML/MIN
GFR NON-AFRICAN AMERICAN: >60 ML/MIN
GFR SERPL CREATININE-BSD FRML MDRD: ABNORMAL ML/MIN/{1.73_M2}
GFR SERPL CREATININE-BSD FRML MDRD: ABNORMAL ML/MIN/{1.73_M2}
GLUCOSE BLD-MCNC: 148 MG/DL (ref 70–99)
HCG, PREGNANCY URINE (POC): NEGATIVE
HCT VFR BLD CALC: 38.4 % (ref 36.3–47.1)
HEMOGLOBIN: 12.5 G/DL (ref 11.9–15.1)
MCH RBC QN AUTO: 29.1 PG (ref 25.2–33.5)
MCHC RBC AUTO-ENTMCNC: 32.6 G/DL (ref 28.4–34.8)
MCV RBC AUTO: 89.5 FL (ref 82.6–102.9)
NRBC AUTOMATED: 0 PER 100 WBC
PDW BLD-RTO: 14.6 % (ref 11.8–14.4)
PLATELET # BLD: 250 K/UL (ref 138–453)
PMV BLD AUTO: 9.4 FL (ref 8.1–13.5)
POTASSIUM SERPL-SCNC: 4.2 MMOL/L (ref 3.7–5.3)
RBC # BLD: 4.29 M/UL (ref 3.95–5.11)
SODIUM BLD-SCNC: 140 MMOL/L (ref 135–144)
WBC # BLD: 12.7 K/UL (ref 3.5–11.3)

## 2021-07-21 PROCEDURE — 3700000000 HC ANESTHESIA ATTENDED CARE: Performed by: SURGERY

## 2021-07-21 PROCEDURE — 2500000003 HC RX 250 WO HCPCS

## 2021-07-21 PROCEDURE — 0DNL4ZZ RELEASE TRANSVERSE COLON, PERCUTANEOUS ENDOSCOPIC APPROACH: ICD-10-PCS | Performed by: SURGERY

## 2021-07-21 PROCEDURE — 94761 N-INVAS EAR/PLS OXIMETRY MLT: CPT

## 2021-07-21 PROCEDURE — G0378 HOSPITAL OBSERVATION PER HR: HCPCS

## 2021-07-21 PROCEDURE — 2580000003 HC RX 258: Performed by: ANESTHESIOLOGY

## 2021-07-21 PROCEDURE — 6360000002 HC RX W HCPCS: Performed by: SURGERY

## 2021-07-21 PROCEDURE — 0DH64UZ INSERTION OF FEEDING DEVICE INTO STOMACH, PERCUTANEOUS ENDOSCOPIC APPROACH: ICD-10-PCS | Performed by: SURGERY

## 2021-07-21 PROCEDURE — 87086 URINE CULTURE/COLONY COUNT: CPT

## 2021-07-21 PROCEDURE — 2720000010 HC SURG SUPPLY STERILE: Performed by: SURGERY

## 2021-07-21 PROCEDURE — 2500000003 HC RX 250 WO HCPCS: Performed by: ANESTHESIOLOGY

## 2021-07-21 PROCEDURE — 96376 TX/PRO/DX INJ SAME DRUG ADON: CPT

## 2021-07-21 PROCEDURE — 6370000000 HC RX 637 (ALT 250 FOR IP)

## 2021-07-21 PROCEDURE — 7100000001 HC PACU RECOVERY - ADDTL 15 MIN: Performed by: SURGERY

## 2021-07-21 PROCEDURE — 2580000003 HC RX 258: Performed by: NURSE ANESTHETIST, CERTIFIED REGISTERED

## 2021-07-21 PROCEDURE — 2500000003 HC RX 250 WO HCPCS: Performed by: SURGERY

## 2021-07-21 PROCEDURE — 3700000001 HC ADD 15 MINUTES (ANESTHESIA): Performed by: SURGERY

## 2021-07-21 PROCEDURE — 3600000009 HC SURGERY ROBOT BASE: Performed by: SURGERY

## 2021-07-21 PROCEDURE — 2700000000 HC OXYGEN THERAPY PER DAY

## 2021-07-21 PROCEDURE — 43281 LAP PARAESOPHAG HERN REPAIR: CPT | Performed by: SURGERY

## 2021-07-21 PROCEDURE — S2900 ROBOTIC SURGICAL SYSTEM: HCPCS | Performed by: SURGERY

## 2021-07-21 PROCEDURE — 96372 THER/PROPH/DIAG INJ SC/IM: CPT

## 2021-07-21 PROCEDURE — 2580000003 HC RX 258: Performed by: SURGERY

## 2021-07-21 PROCEDURE — 43644 LAP GASTRIC BYPASS/ROUX-EN-Y: CPT | Performed by: SURGERY

## 2021-07-21 PROCEDURE — L3650 SO 8 ABD RESTRAINT PRE OTS: HCPCS | Performed by: SURGERY

## 2021-07-21 PROCEDURE — 2500000003 HC RX 250 WO HCPCS: Performed by: NURSE ANESTHETIST, CERTIFIED REGISTERED

## 2021-07-21 PROCEDURE — 8E0W4CZ ROBOTIC ASSISTED PROCEDURE OF TRUNK REGION, PERCUTANEOUS ENDOSCOPIC APPROACH: ICD-10-PCS | Performed by: SURGERY

## 2021-07-21 PROCEDURE — 81025 URINE PREGNANCY TEST: CPT

## 2021-07-21 PROCEDURE — 0BQT4ZZ REPAIR DIAPHRAGM, PERCUTANEOUS ENDOSCOPIC APPROACH: ICD-10-PCS | Performed by: SURGERY

## 2021-07-21 PROCEDURE — 7100000000 HC PACU RECOVERY - FIRST 15 MIN: Performed by: SURGERY

## 2021-07-21 PROCEDURE — 80048 BASIC METABOLIC PNL TOTAL CA: CPT

## 2021-07-21 PROCEDURE — 2709999900 HC NON-CHARGEABLE SUPPLY: Performed by: SURGERY

## 2021-07-21 PROCEDURE — 6360000002 HC RX W HCPCS: Performed by: NURSE ANESTHETIST, CERTIFIED REGISTERED

## 2021-07-21 PROCEDURE — 96374 THER/PROPH/DIAG INJ IV PUSH: CPT

## 2021-07-21 PROCEDURE — 85027 COMPLETE CBC AUTOMATED: CPT

## 2021-07-21 PROCEDURE — 0D164ZA BYPASS STOMACH TO JEJUNUM, PERCUTANEOUS ENDOSCOPIC APPROACH: ICD-10-PCS | Performed by: SURGERY

## 2021-07-21 PROCEDURE — 3600000019 HC SURGERY ROBOT ADDTL 15MIN: Performed by: SURGERY

## 2021-07-21 PROCEDURE — 6370000000 HC RX 637 (ALT 250 FOR IP): Performed by: SURGERY

## 2021-07-21 PROCEDURE — 6360000002 HC RX W HCPCS: Performed by: ANESTHESIOLOGY

## 2021-07-21 PROCEDURE — 64488 TAP BLOCK BI INJECTION: CPT | Performed by: ANESTHESIOLOGY

## 2021-07-21 PROCEDURE — 0DJ08ZZ INSPECTION OF UPPER INTESTINAL TRACT, VIA NATURAL OR ARTIFICIAL OPENING ENDOSCOPIC: ICD-10-PCS | Performed by: SURGERY

## 2021-07-21 RX ORDER — FENTANYL CITRATE 50 UG/ML
50 INJECTION, SOLUTION INTRAMUSCULAR; INTRAVENOUS ONCE
Status: COMPLETED | OUTPATIENT
Start: 2021-07-21 | End: 2021-07-21

## 2021-07-21 RX ORDER — MEPERIDINE HYDROCHLORIDE 50 MG/ML
12.5 INJECTION INTRAMUSCULAR; INTRAVENOUS; SUBCUTANEOUS EVERY 5 MIN PRN
Status: DISCONTINUED | OUTPATIENT
Start: 2021-07-21 | End: 2021-07-21 | Stop reason: HOSPADM

## 2021-07-21 RX ORDER — SODIUM CHLORIDE 0.9 % (FLUSH) 0.9 %
5-40 SYRINGE (ML) INJECTION EVERY 12 HOURS SCHEDULED
Status: DISCONTINUED | OUTPATIENT
Start: 2021-07-21 | End: 2021-07-24 | Stop reason: HOSPADM

## 2021-07-21 RX ORDER — IPRATROPIUM BROMIDE AND ALBUTEROL SULFATE 2.5; .5 MG/3ML; MG/3ML
1 SOLUTION RESPIRATORY (INHALATION)
Status: DISCONTINUED | OUTPATIENT
Start: 2021-07-21 | End: 2021-07-24 | Stop reason: HOSPADM

## 2021-07-21 RX ORDER — PROMETHAZINE HYDROCHLORIDE 12.5 MG/1
25 TABLET ORAL EVERY 6 HOURS PRN
Status: DISCONTINUED | OUTPATIENT
Start: 2021-07-21 | End: 2021-07-24 | Stop reason: HOSPADM

## 2021-07-21 RX ORDER — HEPARIN SODIUM 5000 [USP'U]/ML
5000 INJECTION, SOLUTION INTRAVENOUS; SUBCUTANEOUS ONCE
Status: COMPLETED | OUTPATIENT
Start: 2021-07-21 | End: 2021-07-21

## 2021-07-21 RX ORDER — INDOCYANINE GREEN AND WATER 25 MG
KIT INJECTION PRN
Status: DISCONTINUED | OUTPATIENT
Start: 2021-07-21 | End: 2021-07-21 | Stop reason: SDUPTHER

## 2021-07-21 RX ORDER — ROCURONIUM BROMIDE 10 MG/ML
INJECTION, SOLUTION INTRAVENOUS PRN
Status: DISCONTINUED | OUTPATIENT
Start: 2021-07-21 | End: 2021-07-21 | Stop reason: SDUPTHER

## 2021-07-21 RX ORDER — PROPOFOL 10 MG/ML
INJECTION, EMULSION INTRAVENOUS PRN
Status: DISCONTINUED | OUTPATIENT
Start: 2021-07-21 | End: 2021-07-21 | Stop reason: SDUPTHER

## 2021-07-21 RX ORDER — MIDAZOLAM HYDROCHLORIDE 2 MG/2ML
1 INJECTION, SOLUTION INTRAMUSCULAR; INTRAVENOUS ONCE
Status: COMPLETED | OUTPATIENT
Start: 2021-07-21 | End: 2021-07-21

## 2021-07-21 RX ORDER — PHENYLEPHRINE HCL IN 0.9% NACL 1 MG/10 ML
SYRINGE (ML) INTRAVENOUS PRN
Status: DISCONTINUED | OUTPATIENT
Start: 2021-07-21 | End: 2021-07-21 | Stop reason: SDUPTHER

## 2021-07-21 RX ORDER — ONDANSETRON 2 MG/ML
4 INJECTION INTRAMUSCULAR; INTRAVENOUS EVERY 6 HOURS PRN
Status: DISCONTINUED | OUTPATIENT
Start: 2021-07-21 | End: 2021-07-24 | Stop reason: HOSPADM

## 2021-07-21 RX ORDER — DEXAMETHASONE SODIUM PHOSPHATE 10 MG/ML
INJECTION INTRAMUSCULAR; INTRAVENOUS PRN
Status: DISCONTINUED | OUTPATIENT
Start: 2021-07-21 | End: 2021-07-21 | Stop reason: SDUPTHER

## 2021-07-21 RX ORDER — GLYCOPYRROLATE 1 MG/5 ML
SYRINGE (ML) INTRAVENOUS PRN
Status: DISCONTINUED | OUTPATIENT
Start: 2021-07-21 | End: 2021-07-21 | Stop reason: SDUPTHER

## 2021-07-21 RX ORDER — LABETALOL HYDROCHLORIDE 5 MG/ML
INJECTION, SOLUTION INTRAVENOUS
Status: COMPLETED
Start: 2021-07-21 | End: 2021-07-21

## 2021-07-21 RX ORDER — ONDANSETRON 2 MG/ML
INJECTION INTRAMUSCULAR; INTRAVENOUS PRN
Status: DISCONTINUED | OUTPATIENT
Start: 2021-07-21 | End: 2021-07-21 | Stop reason: SDUPTHER

## 2021-07-21 RX ORDER — LIDOCAINE HYDROCHLORIDE 10 MG/ML
INJECTION, SOLUTION EPIDURAL; INFILTRATION; INTRACAUDAL; PERINEURAL PRN
Status: DISCONTINUED | OUTPATIENT
Start: 2021-07-21 | End: 2021-07-21 | Stop reason: SDUPTHER

## 2021-07-21 RX ORDER — HEPARIN SODIUM 5000 [USP'U]/ML
5000 INJECTION, SOLUTION INTRAVENOUS; SUBCUTANEOUS EVERY 8 HOURS SCHEDULED
Status: DISCONTINUED | OUTPATIENT
Start: 2021-07-21 | End: 2021-07-24 | Stop reason: HOSPADM

## 2021-07-21 RX ORDER — SCOLOPAMINE TRANSDERMAL SYSTEM 1 MG/1
1 PATCH, EXTENDED RELEASE TRANSDERMAL
Status: DISCONTINUED | OUTPATIENT
Start: 2021-07-21 | End: 2021-07-24 | Stop reason: HOSPADM

## 2021-07-21 RX ORDER — LABETALOL HYDROCHLORIDE 5 MG/ML
10 INJECTION, SOLUTION INTRAVENOUS ONCE
Status: COMPLETED | OUTPATIENT
Start: 2021-07-21 | End: 2021-07-21

## 2021-07-21 RX ORDER — SODIUM CHLORIDE, SODIUM LACTATE, POTASSIUM CHLORIDE, CALCIUM CHLORIDE 600; 310; 30; 20 MG/100ML; MG/100ML; MG/100ML; MG/100ML
INJECTION, SOLUTION INTRAVENOUS CONTINUOUS PRN
Status: DISCONTINUED | OUTPATIENT
Start: 2021-07-21 | End: 2021-07-21 | Stop reason: SDUPTHER

## 2021-07-21 RX ORDER — METOPROLOL TARTRATE 5 MG/5ML
5 INJECTION INTRAVENOUS EVERY 6 HOURS PRN
Status: DISCONTINUED | OUTPATIENT
Start: 2021-07-21 | End: 2021-07-24 | Stop reason: HOSPADM

## 2021-07-21 RX ORDER — LIDOCAINE HYDROCHLORIDE 10 MG/ML
1 INJECTION, SOLUTION EPIDURAL; INFILTRATION; INTRACAUDAL; PERINEURAL
Status: ACTIVE | OUTPATIENT
Start: 2021-07-21 | End: 2021-07-21

## 2021-07-21 RX ORDER — BUPIVACAINE HYDROCHLORIDE 5 MG/ML
INJECTION, SOLUTION EPIDURAL; INTRACAUDAL
Status: COMPLETED | OUTPATIENT
Start: 2021-07-21 | End: 2021-07-21

## 2021-07-21 RX ORDER — SODIUM CHLORIDE 0.9 % (FLUSH) 0.9 %
5-40 SYRINGE (ML) INJECTION PRN
Status: DISCONTINUED | OUTPATIENT
Start: 2021-07-21 | End: 2021-07-24 | Stop reason: HOSPADM

## 2021-07-21 RX ORDER — MAGNESIUM HYDROXIDE 1200 MG/15ML
LIQUID ORAL CONTINUOUS PRN
Status: COMPLETED | OUTPATIENT
Start: 2021-07-21 | End: 2021-07-21

## 2021-07-21 RX ORDER — MIDAZOLAM HYDROCHLORIDE 2 MG/2ML
1 INJECTION, SOLUTION INTRAMUSCULAR; INTRAVENOUS EVERY 10 MIN PRN
Status: DISCONTINUED | OUTPATIENT
Start: 2021-07-21 | End: 2021-07-23

## 2021-07-21 RX ORDER — FENTANYL CITRATE 50 UG/ML
INJECTION, SOLUTION INTRAMUSCULAR; INTRAVENOUS PRN
Status: DISCONTINUED | OUTPATIENT
Start: 2021-07-21 | End: 2021-07-21 | Stop reason: SDUPTHER

## 2021-07-21 RX ORDER — SODIUM CHLORIDE 9 MG/ML
25 INJECTION, SOLUTION INTRAVENOUS PRN
Status: DISCONTINUED | OUTPATIENT
Start: 2021-07-21 | End: 2021-07-24 | Stop reason: HOSPADM

## 2021-07-21 RX ORDER — BUPIVACAINE HYDROCHLORIDE 5 MG/ML
40 INJECTION, SOLUTION EPIDURAL; INTRACAUDAL ONCE
Status: DISCONTINUED | OUTPATIENT
Start: 2021-07-21 | End: 2021-07-22

## 2021-07-21 RX ORDER — FENTANYL CITRATE 50 UG/ML
25 INJECTION, SOLUTION INTRAMUSCULAR; INTRAVENOUS EVERY 5 MIN PRN
Status: DISCONTINUED | OUTPATIENT
Start: 2021-07-21 | End: 2021-07-23

## 2021-07-21 RX ORDER — SODIUM CHLORIDE, SODIUM LACTATE, POTASSIUM CHLORIDE, CALCIUM CHLORIDE 600; 310; 30; 20 MG/100ML; MG/100ML; MG/100ML; MG/100ML
INJECTION, SOLUTION INTRAVENOUS CONTINUOUS
Status: DISCONTINUED | OUTPATIENT
Start: 2021-07-21 | End: 2021-07-23

## 2021-07-21 RX ORDER — LABETALOL HYDROCHLORIDE 5 MG/ML
20 INJECTION, SOLUTION INTRAVENOUS ONCE
Status: COMPLETED | OUTPATIENT
Start: 2021-07-21 | End: 2021-07-21

## 2021-07-21 RX ORDER — SODIUM CHLORIDE, SODIUM LACTATE, POTASSIUM CHLORIDE, CALCIUM CHLORIDE 600; 310; 30; 20 MG/100ML; MG/100ML; MG/100ML; MG/100ML
INJECTION, SOLUTION INTRAVENOUS CONTINUOUS
Status: DISCONTINUED | OUTPATIENT
Start: 2021-07-21 | End: 2021-07-22

## 2021-07-21 RX ORDER — SIMETHICONE 20 MG/.3ML
40 EMULSION ORAL EVERY 6 HOURS PRN
Status: DISCONTINUED | OUTPATIENT
Start: 2021-07-21 | End: 2021-07-24 | Stop reason: HOSPADM

## 2021-07-21 RX ORDER — DIPHENHYDRAMINE HYDROCHLORIDE 50 MG/ML
INJECTION INTRAMUSCULAR; INTRAVENOUS PRN
Status: DISCONTINUED | OUTPATIENT
Start: 2021-07-21 | End: 2021-07-21 | Stop reason: SDUPTHER

## 2021-07-21 RX ORDER — FENTANYL CITRATE 50 UG/ML
50 INJECTION, SOLUTION INTRAMUSCULAR; INTRAVENOUS EVERY 5 MIN PRN
Status: COMPLETED | OUTPATIENT
Start: 2021-07-21 | End: 2021-07-21

## 2021-07-21 RX ORDER — FENTANYL CITRATE 50 UG/ML
25 INJECTION, SOLUTION INTRAMUSCULAR; INTRAVENOUS EVERY 5 MIN PRN
Status: DISCONTINUED | OUTPATIENT
Start: 2021-07-21 | End: 2021-07-21 | Stop reason: HOSPADM

## 2021-07-21 RX ORDER — NEOSTIGMINE METHYLSULFATE 5 MG/5 ML
SYRINGE (ML) INTRAVENOUS PRN
Status: DISCONTINUED | OUTPATIENT
Start: 2021-07-21 | End: 2021-07-21 | Stop reason: SDUPTHER

## 2021-07-21 RX ADMIN — SIMETHICONE 40 MG: 20 EMULSION ORAL at 22:23

## 2021-07-21 RX ADMIN — Medication 0.2 MG: at 10:09

## 2021-07-21 RX ADMIN — ROCURONIUM BROMIDE 20 MG: 10 INJECTION INTRAVENOUS at 14:02

## 2021-07-21 RX ADMIN — DEXTROSE MONOHYDRATE 3000 MG: 50 INJECTION, SOLUTION INTRAVENOUS at 23:51

## 2021-07-21 RX ADMIN — Medication 100 MCG: at 12:59

## 2021-07-21 RX ADMIN — HEPARIN SODIUM 5000 UNITS: 5000 INJECTION INTRAVENOUS; SUBCUTANEOUS at 23:52

## 2021-07-21 RX ADMIN — FENTANYL CITRATE 100 MCG: 50 INJECTION, SOLUTION INTRAMUSCULAR; INTRAVENOUS at 09:44

## 2021-07-21 RX ADMIN — FENTANYL CITRATE 50 MCG: 50 INJECTION, SOLUTION INTRAMUSCULAR; INTRAVENOUS at 17:14

## 2021-07-21 RX ADMIN — FENTANYL CITRATE 50 MCG: 50 INJECTION, SOLUTION INTRAMUSCULAR; INTRAVENOUS at 11:50

## 2021-07-21 RX ADMIN — Medication 10 MG: at 18:41

## 2021-07-21 RX ADMIN — Medication 20 MG: at 19:05

## 2021-07-21 RX ADMIN — ROCURONIUM BROMIDE 20 MG: 10 INJECTION INTRAVENOUS at 11:59

## 2021-07-21 RX ADMIN — FENTANYL CITRATE 50 MCG: 50 INJECTION, SOLUTION INTRAMUSCULAR; INTRAVENOUS at 16:37

## 2021-07-21 RX ADMIN — DEXAMETHASONE SODIUM PHOSPHATE 10 MG: 10 INJECTION INTRAMUSCULAR; INTRAVENOUS at 09:58

## 2021-07-21 RX ADMIN — LIDOCAINE HYDROCHLORIDE 50 MG: 10 INJECTION, SOLUTION EPIDURAL; INFILTRATION; INTRACAUDAL; PERINEURAL at 09:44

## 2021-07-21 RX ADMIN — ROCURONIUM BROMIDE 20 MG: 10 INJECTION INTRAVENOUS at 10:16

## 2021-07-21 RX ADMIN — METOPROLOL TARTRATE 5 MG: 1 INJECTION, SOLUTION INTRAVENOUS at 19:40

## 2021-07-21 RX ADMIN — Medication 100 MCG: at 12:28

## 2021-07-21 RX ADMIN — BUPIVACAINE HYDROCHLORIDE 40 ML: 5 INJECTION, SOLUTION EPIDURAL; INTRACAUDAL; PERINEURAL at 09:13

## 2021-07-21 RX ADMIN — INDOCYANINE GREEN AND WATER 3 MG: KIT at 13:35

## 2021-07-21 RX ADMIN — FENTANYL CITRATE 50 MCG: 50 INJECTION, SOLUTION INTRAMUSCULAR; INTRAVENOUS at 17:30

## 2021-07-21 RX ADMIN — ROCURONIUM BROMIDE 10 MG: 10 INJECTION INTRAVENOUS at 12:42

## 2021-07-21 RX ADMIN — Medication 100 MCG: at 12:31

## 2021-07-21 RX ADMIN — PROPOFOL INJECTABLE EMULSION 200 MG: 10 INJECTION, EMULSION INTRAVENOUS at 09:44

## 2021-07-21 RX ADMIN — LABETALOL HYDROCHLORIDE 10 MG: 5 INJECTION, SOLUTION INTRAVENOUS at 18:41

## 2021-07-21 RX ADMIN — Medication 100 MCG: at 10:10

## 2021-07-21 RX ADMIN — ONDANSETRON 4 MG: 2 INJECTION, SOLUTION INTRAMUSCULAR; INTRAVENOUS at 16:15

## 2021-07-21 RX ADMIN — HYDROMORPHONE HYDROCHLORIDE 1 MG: 1 INJECTION, SOLUTION INTRAMUSCULAR; INTRAVENOUS; SUBCUTANEOUS at 20:49

## 2021-07-21 RX ADMIN — Medication 3000 MG: at 10:01

## 2021-07-21 RX ADMIN — ROCURONIUM BROMIDE 20 MG: 10 INJECTION INTRAVENOUS at 10:48

## 2021-07-21 RX ADMIN — Medication 0.6 MG: at 16:15

## 2021-07-21 RX ADMIN — Medication 100 MCG: at 13:34

## 2021-07-21 RX ADMIN — FAMOTIDINE 20 MG: 10 INJECTION INTRAVENOUS at 23:51

## 2021-07-21 RX ADMIN — FENTANYL CITRATE 50 MCG: 50 INJECTION, SOLUTION INTRAMUSCULAR; INTRAVENOUS at 16:50

## 2021-07-21 RX ADMIN — METRONIDAZOLE 500 MG: 500 INJECTION, SOLUTION INTRAVENOUS at 22:20

## 2021-07-21 RX ADMIN — HEPARIN SODIUM 5000 UNITS: 5000 INJECTION INTRAVENOUS; SUBCUTANEOUS at 07:49

## 2021-07-21 RX ADMIN — SODIUM CHLORIDE, POTASSIUM CHLORIDE, SODIUM LACTATE AND CALCIUM CHLORIDE: 600; 310; 30; 20 INJECTION, SOLUTION INTRAVENOUS at 11:16

## 2021-07-21 RX ADMIN — METRONIDAZOLE 500 MG: 500 INJECTION, SOLUTION INTRAVENOUS at 10:08

## 2021-07-21 RX ADMIN — FENTANYL CITRATE 50 MCG: 50 INJECTION, SOLUTION INTRAMUSCULAR; INTRAVENOUS at 19:50

## 2021-07-21 RX ADMIN — ONDANSETRON 4 MG: 2 INJECTION INTRAMUSCULAR; INTRAVENOUS at 20:24

## 2021-07-21 RX ADMIN — ROCURONIUM BROMIDE 40 MG: 10 INJECTION INTRAVENOUS at 09:44

## 2021-07-21 RX ADMIN — SODIUM CHLORIDE, POTASSIUM CHLORIDE, SODIUM LACTATE AND CALCIUM CHLORIDE: 600; 310; 30; 20 INJECTION, SOLUTION INTRAVENOUS at 20:26

## 2021-07-21 RX ADMIN — Medication 3000 MG: at 13:49

## 2021-07-21 RX ADMIN — FENTANYL CITRATE 50 MCG: 50 INJECTION, SOLUTION INTRAMUSCULAR; INTRAVENOUS at 19:29

## 2021-07-21 RX ADMIN — INDOCYANINE GREEN AND WATER 2 MG: KIT at 15:02

## 2021-07-21 RX ADMIN — Medication 3 MG: at 16:15

## 2021-07-21 RX ADMIN — FENTANYL CITRATE 100 MCG: 50 INJECTION, SOLUTION INTRAMUSCULAR; INTRAVENOUS at 09:07

## 2021-07-21 RX ADMIN — LABETALOL HYDROCHLORIDE 20 MG: 5 INJECTION, SOLUTION INTRAVENOUS at 19:05

## 2021-07-21 RX ADMIN — Medication 100 MCG: at 12:04

## 2021-07-21 RX ADMIN — FENTANYL CITRATE 50 MCG: 50 INJECTION, SOLUTION INTRAMUSCULAR; INTRAVENOUS at 14:48

## 2021-07-21 RX ADMIN — MIDAZOLAM HYDROCHLORIDE 2 MG: 1 INJECTION, SOLUTION INTRAMUSCULAR; INTRAVENOUS at 09:07

## 2021-07-21 RX ADMIN — SODIUM CHLORIDE, POTASSIUM CHLORIDE, SODIUM LACTATE AND CALCIUM CHLORIDE: 600; 310; 30; 20 INJECTION, SOLUTION INTRAVENOUS at 09:37

## 2021-07-21 RX ADMIN — Medication 12.5 MG: at 10:17

## 2021-07-21 RX ADMIN — MEPERIDINE HYDROCHLORIDE 12.5 MG: 50 INJECTION, SOLUTION INTRAMUSCULAR; INTRAVENOUS; SUBCUTANEOUS at 17:15

## 2021-07-21 RX ADMIN — SODIUM CHLORIDE, POTASSIUM CHLORIDE, SODIUM LACTATE AND CALCIUM CHLORIDE: 600; 310; 30; 20 INJECTION, SOLUTION INTRAVENOUS at 14:45

## 2021-07-21 ASSESSMENT — PULMONARY FUNCTION TESTS
PIF_VALUE: 28
PIF_VALUE: 28
PIF_VALUE: 23
PIF_VALUE: 30
PIF_VALUE: 27
PIF_VALUE: 29
PIF_VALUE: 26
PIF_VALUE: 29
PIF_VALUE: 22
PIF_VALUE: 21
PIF_VALUE: 28
PIF_VALUE: 28
PIF_VALUE: 25
PIF_VALUE: 26
PIF_VALUE: 18
PIF_VALUE: 27
PIF_VALUE: 25
PIF_VALUE: 29
PIF_VALUE: 28
PIF_VALUE: 27
PIF_VALUE: 31
PIF_VALUE: 29
PIF_VALUE: 28
PIF_VALUE: 26
PIF_VALUE: 29
PIF_VALUE: 29
PIF_VALUE: 2
PIF_VALUE: 29
PIF_VALUE: 6
PIF_VALUE: 26
PIF_VALUE: 27
PIF_VALUE: 26
PIF_VALUE: 25
PIF_VALUE: 23
PIF_VALUE: 26
PIF_VALUE: 22
PIF_VALUE: 26
PIF_VALUE: 26
PIF_VALUE: 31
PIF_VALUE: 27
PIF_VALUE: 26
PIF_VALUE: 29
PIF_VALUE: 26
PIF_VALUE: 26
PIF_VALUE: 28
PIF_VALUE: 30
PIF_VALUE: 27
PIF_VALUE: 18
PIF_VALUE: 23
PIF_VALUE: 27
PIF_VALUE: 18
PIF_VALUE: 26
PIF_VALUE: 25
PIF_VALUE: 23
PIF_VALUE: 21
PIF_VALUE: 26
PIF_VALUE: 22
PIF_VALUE: 30
PIF_VALUE: 26
PIF_VALUE: 26
PIF_VALUE: 25
PIF_VALUE: 26
PIF_VALUE: 29
PIF_VALUE: 24
PIF_VALUE: 28
PIF_VALUE: 27
PIF_VALUE: 29
PIF_VALUE: 23
PIF_VALUE: 23
PIF_VALUE: 29
PIF_VALUE: 26
PIF_VALUE: 23
PIF_VALUE: 26
PIF_VALUE: 21
PIF_VALUE: 26
PIF_VALUE: 27
PIF_VALUE: 26
PIF_VALUE: 26
PIF_VALUE: 3
PIF_VALUE: 28
PIF_VALUE: 28
PIF_VALUE: 26
PIF_VALUE: 25
PIF_VALUE: 29
PIF_VALUE: 26
PIF_VALUE: 25
PIF_VALUE: 28
PIF_VALUE: 27
PIF_VALUE: 18
PIF_VALUE: 29
PIF_VALUE: 27
PIF_VALUE: 26
PIF_VALUE: 29
PIF_VALUE: 25
PIF_VALUE: 26
PIF_VALUE: 26
PIF_VALUE: 29
PIF_VALUE: 23
PIF_VALUE: 16
PIF_VALUE: 25
PIF_VALUE: 21
PIF_VALUE: 23
PIF_VALUE: 27
PIF_VALUE: 26
PIF_VALUE: 27
PIF_VALUE: 21
PIF_VALUE: 25
PIF_VALUE: 18
PIF_VALUE: 26
PIF_VALUE: 27
PIF_VALUE: 25
PIF_VALUE: 27
PIF_VALUE: 26
PIF_VALUE: 28
PIF_VALUE: 26
PIF_VALUE: 28
PIF_VALUE: 25
PIF_VALUE: 26
PIF_VALUE: 28
PIF_VALUE: 31
PIF_VALUE: 26
PIF_VALUE: 26
PIF_VALUE: 22
PIF_VALUE: 29
PIF_VALUE: 26
PIF_VALUE: 26
PIF_VALUE: 27
PIF_VALUE: 29
PIF_VALUE: 18
PIF_VALUE: 29
PIF_VALUE: 26
PIF_VALUE: 27
PIF_VALUE: 19
PIF_VALUE: 27
PIF_VALUE: 21
PIF_VALUE: 26
PIF_VALUE: 28
PIF_VALUE: 25
PIF_VALUE: 30
PIF_VALUE: 26
PIF_VALUE: 29
PIF_VALUE: 23
PIF_VALUE: 29
PIF_VALUE: 28
PIF_VALUE: 26
PIF_VALUE: 28
PIF_VALUE: 26
PIF_VALUE: 26
PIF_VALUE: 22
PIF_VALUE: 26
PIF_VALUE: 26
PIF_VALUE: 25
PIF_VALUE: 28
PIF_VALUE: 25
PIF_VALUE: 18
PIF_VALUE: 27
PIF_VALUE: 29
PIF_VALUE: 26
PIF_VALUE: 1
PIF_VALUE: 30
PIF_VALUE: 26
PIF_VALUE: 22
PIF_VALUE: 18
PIF_VALUE: 26
PIF_VALUE: 21
PIF_VALUE: 26
PIF_VALUE: 25
PIF_VALUE: 29
PIF_VALUE: 26
PIF_VALUE: 17
PIF_VALUE: 26
PIF_VALUE: 17
PIF_VALUE: 28
PIF_VALUE: 27
PIF_VALUE: 26
PIF_VALUE: 29
PIF_VALUE: 27
PIF_VALUE: 26
PIF_VALUE: 28
PIF_VALUE: 26
PIF_VALUE: 25
PIF_VALUE: 27
PIF_VALUE: 31
PIF_VALUE: 25
PIF_VALUE: 31
PIF_VALUE: 28
PIF_VALUE: 26
PIF_VALUE: 18
PIF_VALUE: 25
PIF_VALUE: 26
PIF_VALUE: 28
PIF_VALUE: 26
PIF_VALUE: 29
PIF_VALUE: 31
PIF_VALUE: 21
PIF_VALUE: 28
PIF_VALUE: 25
PIF_VALUE: 26
PIF_VALUE: 26
PIF_VALUE: 18
PIF_VALUE: 26
PIF_VALUE: 25
PIF_VALUE: 28
PIF_VALUE: 19
PIF_VALUE: 25
PIF_VALUE: 28
PIF_VALUE: 26
PIF_VALUE: 2
PIF_VALUE: 29
PIF_VALUE: 23
PIF_VALUE: 27
PIF_VALUE: 18
PIF_VALUE: 18
PIF_VALUE: 30
PIF_VALUE: 18
PIF_VALUE: 21
PIF_VALUE: 26
PIF_VALUE: 25
PIF_VALUE: 23
PIF_VALUE: 30
PIF_VALUE: 28
PIF_VALUE: 26
PIF_VALUE: 0
PIF_VALUE: 26
PIF_VALUE: 26
PIF_VALUE: 19
PIF_VALUE: 26
PIF_VALUE: 18
PIF_VALUE: 25
PIF_VALUE: 26
PIF_VALUE: 25
PIF_VALUE: 24
PIF_VALUE: 25
PIF_VALUE: 26
PIF_VALUE: 26
PIF_VALUE: 18
PIF_VALUE: 25
PIF_VALUE: 28
PIF_VALUE: 21
PIF_VALUE: 27
PIF_VALUE: 26
PIF_VALUE: 0
PIF_VALUE: 26
PIF_VALUE: 25
PIF_VALUE: 18
PIF_VALUE: 24
PIF_VALUE: 26
PIF_VALUE: 26
PIF_VALUE: 18
PIF_VALUE: 26
PIF_VALUE: 25
PIF_VALUE: 26
PIF_VALUE: 0
PIF_VALUE: 29
PIF_VALUE: 26
PIF_VALUE: 26
PIF_VALUE: 23
PIF_VALUE: 1
PIF_VALUE: 27
PIF_VALUE: 28
PIF_VALUE: 26
PIF_VALUE: 29
PIF_VALUE: 27
PIF_VALUE: 29
PIF_VALUE: 30
PIF_VALUE: 29
PIF_VALUE: 18
PIF_VALUE: 28
PIF_VALUE: 18
PIF_VALUE: 26
PIF_VALUE: 22
PIF_VALUE: 26
PIF_VALUE: 26
PIF_VALUE: 27
PIF_VALUE: 26
PIF_VALUE: 29
PIF_VALUE: 29
PIF_VALUE: 25
PIF_VALUE: 27
PIF_VALUE: 26
PIF_VALUE: 21
PIF_VALUE: 29
PIF_VALUE: 26
PIF_VALUE: 29
PIF_VALUE: 18
PIF_VALUE: 28
PIF_VALUE: 26
PIF_VALUE: 28
PIF_VALUE: 30
PIF_VALUE: 15
PIF_VALUE: 23
PIF_VALUE: 23
PIF_VALUE: 25
PIF_VALUE: 27
PIF_VALUE: 23
PIF_VALUE: 26
PIF_VALUE: 28
PIF_VALUE: 18
PIF_VALUE: 28
PIF_VALUE: 29
PIF_VALUE: 25
PIF_VALUE: 25
PIF_VALUE: 26
PIF_VALUE: 25
PIF_VALUE: 26
PIF_VALUE: 26
PIF_VALUE: 22
PIF_VALUE: 23
PIF_VALUE: 19
PIF_VALUE: 26
PIF_VALUE: 23
PIF_VALUE: 4
PIF_VALUE: 20
PIF_VALUE: 3
PIF_VALUE: 26
PIF_VALUE: 25
PIF_VALUE: 22
PIF_VALUE: 28
PIF_VALUE: 24
PIF_VALUE: 29
PIF_VALUE: 29
PIF_VALUE: 0
PIF_VALUE: 28
PIF_VALUE: 30
PIF_VALUE: 26
PIF_VALUE: 21
PIF_VALUE: 30
PIF_VALUE: 28
PIF_VALUE: 25
PIF_VALUE: 28
PIF_VALUE: 26
PIF_VALUE: 27
PIF_VALUE: 25
PIF_VALUE: 26
PIF_VALUE: 18
PIF_VALUE: 27
PIF_VALUE: 28
PIF_VALUE: 28
PIF_VALUE: 26
PIF_VALUE: 23
PIF_VALUE: 26
PIF_VALUE: 27
PIF_VALUE: 30
PIF_VALUE: 26
PIF_VALUE: 19
PIF_VALUE: 28
PIF_VALUE: 26
PIF_VALUE: 28
PIF_VALUE: 25
PIF_VALUE: 22
PIF_VALUE: 26
PIF_VALUE: 27
PIF_VALUE: 26
PIF_VALUE: 27
PIF_VALUE: 26
PIF_VALUE: 27
PIF_VALUE: 26
PIF_VALUE: 29
PIF_VALUE: 29
PIF_VALUE: 26
PIF_VALUE: 26
PIF_VALUE: 18
PIF_VALUE: 26
PIF_VALUE: 22
PIF_VALUE: 22
PIF_VALUE: 28
PIF_VALUE: 26
PIF_VALUE: 25
PIF_VALUE: 27
PIF_VALUE: 26
PIF_VALUE: 28
PIF_VALUE: 26
PIF_VALUE: 25
PIF_VALUE: 1
PIF_VALUE: 25
PIF_VALUE: 29
PIF_VALUE: 23
PIF_VALUE: 24
PIF_VALUE: 21
PIF_VALUE: 29
PIF_VALUE: 26
PIF_VALUE: 29
PIF_VALUE: 26
PIF_VALUE: 27
PIF_VALUE: 23
PIF_VALUE: 26
PIF_VALUE: 26
PIF_VALUE: 29
PIF_VALUE: 18
PIF_VALUE: 28
PIF_VALUE: 26
PIF_VALUE: 25
PIF_VALUE: 29
PIF_VALUE: 26
PIF_VALUE: 29
PIF_VALUE: 26
PIF_VALUE: 24
PIF_VALUE: 0
PIF_VALUE: 29
PIF_VALUE: 29
PIF_VALUE: 21
PIF_VALUE: 29
PIF_VALUE: 27
PIF_VALUE: 25
PIF_VALUE: 26
PIF_VALUE: 28
PIF_VALUE: 29
PIF_VALUE: 26
PIF_VALUE: 22
PIF_VALUE: 25
PIF_VALUE: 18
PIF_VALUE: 29
PIF_VALUE: 26

## 2021-07-21 ASSESSMENT — PAIN SCALES - GENERAL
PAINLEVEL_OUTOF10: 0
PAINLEVEL_OUTOF10: 0
PAINLEVEL_OUTOF10: 6
PAINLEVEL_OUTOF10: 8
PAINLEVEL_OUTOF10: 10
PAINLEVEL_OUTOF10: 7
PAINLEVEL_OUTOF10: 10

## 2021-07-21 ASSESSMENT — PAIN - FUNCTIONAL ASSESSMENT: PAIN_FUNCTIONAL_ASSESSMENT: 0-10

## 2021-07-21 ASSESSMENT — COPD QUESTIONNAIRES: CAT_SEVERITY: NO INTERVAL CHANGE

## 2021-07-21 NOTE — FLOWSHEET NOTE
Dr Ronan Yusuf to the bedside, time out performed @ 0907 , Pt monitored, 02, Rectus Sheath nerve block completed using 40 mL 0.5% Bupivacaine, 20 mL 0.5% Bupivacaine given per side,  pt tolerated procedure well,  Site CDI, (see charting)  Versed Given: 2 mg  Fentanyl 100 mcg  Procedure Start: 0907  Procedure End: 2362    Local Used: 1% Lidocaine used.  Approx 2.5 mL injected into each side

## 2021-07-21 NOTE — BRIEF OP NOTE
Brief Postoperative Note      Patient: Neeta Negrete  YOB: 1975  MRN: [id number]    Date of Procedure: 7/21/2021    Pre-Op Diagnosis: MORBID OBESITY, HYPERTENTION, HIATAL HERNIA, OBSTRUCTIVE SLEEP APNEA    Post-Op Diagnosis: Same and adhesions       Procedure(s):  XI ROBOTIC LAPAROSCOPIC GASTRIC BYPASS ADRIAN-EN-Y,  EXTENSIVE LYSIS OF ADHESIONS, EGD, REPAIR OF RECURRENT HIATAL HERNIA , G TUBE INSERTION    Surgeon(s):  DO Djeon Munoz MD    Assistant:  * No surgical staff found *    Anesthesia: General    Estimated Blood Loss (mL): Minimal    Complications: None    Specimens:   ID Type Source Tests Collected by Time Destination   1 : URINE FOR CULTURE Urine Urine, indwelling catheter CULTURE, URINE Ray Valadez DO 7/21/2021 1025        Implants:  * No implants in log *      Drains:   Closed/Suction Drain Right RUQ Bulb 19 Swedish (Active)       Gastrostomy/Enterostomy/Jejunostomy Gastrostomy LUQ 1 22 fr (Active)       [REMOVED] Urethral Catheter Double-lumen;Non-latex 16 fr (Removed)       Findings: See note    Electronically signed by Ray Valadez DO on 7/21/2021 at 4:42 PM

## 2021-07-21 NOTE — INTERVAL H&P NOTE
Pt Name: Domingo Escobedo  MRN: [id number]  YOB: 1975  Date of evaluation: 7/21/2021    I have reviewed the patient's history and physical examination completed in pre-admission testing on 7/8/21. No changes to history or on examination today, unless noted below. Cardiac clearance on file.     Herb Cockayne, APRN - CNP  7/21/21  7:32 AM

## 2021-07-21 NOTE — ANESTHESIA PROCEDURE NOTES
Peripheral Block    Patient location during procedure: pre-op  Start time: 7/21/2021 9:07 AM  End time: 7/21/2021 9:12 AM  Staffing  Performed: anesthesiologist   Anesthesiologist: Bernardino Karimi MD  Preanesthetic Checklist  Completed: patient identified, IV checked, site marked, risks and benefits discussed, surgical consent, monitors and equipment checked, pre-op evaluation, timeout performed, anesthesia consent given, oxygen available and patient being monitored  Peripheral Block  Patient position: supine  Prep: ChloraPrep  Patient monitoring: cardiac monitor, continuous pulse ox, frequent blood pressure checks and IV access  Block type: TAP and Rectus sheath  Laterality: bilateral  Injection technique: single-shot  Guidance: ultrasound guided  Local infiltration: lidocaine  Infiltration strength: 1 %  Dose: 3 mL  Provider prep: mask and sterile gloves  Local infiltration: lidocaine  Needle  Needle type: short-bevel   Needle gauge: 21 G  Needle length: 10 cm  Needle localization: ultrasound guidance  Needle insertion depth: 3 cm  Test dose: negative  Assessment  Injection assessment: negative aspiration for heme, no paresthesia on injection and local visualized surrounding nerve on ultrasound  Paresthesia pain: none  Slow fractionated injection: yes  Hemodynamics: stable  Additional Notes  U/S [id number].  (1) Under ultrasound guidance, a 21 gauge needle was inserted and placed in close proximity to the abdominal nerve.  (2) Ultrasound was also used to visualize the spread of the anesthetic in close proximity to the nerve being blocked. (3) The nerve appeared anatomically normal, and (4 there were no apparent abnormal pathological findings on the image that were readily visible and related to the nerve being blocked. (5) A permanent ultrasound image was saved in the patient's record.         Medications Administered  Bupivacaine (MARCAINE) PF injection 0.5%, 40 mL  Reason for block: post-op pain management and at surgeon's request

## 2021-07-21 NOTE — ANESTHESIA POSTPROCEDURE EVALUATION
Department of Anesthesiology  Postprocedure Note    Patient: Domingo Escobedo  MRN: [id number]  YOB: 1975  Date of evaluation: 7/21/2021  Time:  6:32 PM     Procedure Summary     Date: 07/21/21 Room / Location: [address]    Anesthesia Start: 2681 Anesthesia Stop: 9740    Procedure: XI ROBOTIC LAPAROSCOPIC GASTRIC BYPASS ADRIAN-EN-Y,  EXTENSIVE LYSIS OF ADHESIONS, EGD, REPAIR OF RECURRENT HIATAL HERNIA , G TUBE INSERTION (N/A ) Diagnosis: (MORBID OBESITY, HYPERTENTION, HIATAL HERNIA, OBSTRUCTIVE SLEEP APNEA)    Surgeons: Barb David DO Responsible Provider: Gillian Mclaughlin MD    Anesthesia Type: general, regional ASA Status: 3          Anesthesia Type: general, regional    Jose Angel Phase I: Jose Angel Score: 9    Jose Angel Phase II:      Last vitals: Reviewed and per EMR flowsheets.        Anesthesia Post Evaluation    Patient location during evaluation: PACU  Patient participation: complete - patient participated  Level of consciousness: awake and alert  Airway patency: patent  Nausea & Vomiting: no nausea and no vomiting  Complications: no  Cardiovascular status: blood pressure returned to baseline  Respiratory status: acceptable  Hydration status: euvolemic

## 2021-07-21 NOTE — ANESTHESIA PRE PROCEDURE
Department of Anesthesiology  Preprocedure Note       Name:  Trena Alvarez   Age:  42 y.o.  :  1975                                          MRN:  [id number]         Date:  2021      Surgeon: Gabby Escalante):  Flip Kimbrough DO    Procedure: Procedure(s):  XI ROBOTIC LAPAROSCOPIC GASTRIC BYPASS ADRIAN-EN-Y, LIVER BIOPSY, EGD - GI SCHEDULED    Medications prior to admission:   Prior to Admission medications    Medication Sig Start Date End Date Taking?  Authorizing Provider   Cholecalciferol (VITAMIN D) 50 MCG (2000) CAPS capsule Take by mouth daily   Yes Historical Provider, MD   Ascorbic Acid (VITAMIN C) 250 MG tablet Take 500 mg by mouth 3 times daily Takes with ferrous sulfate   Yes Historical Provider, MD   busPIRone (BUSPAR) 10 MG tablet TAKE 1 TABLET BY MOUTH TWICE A DAY 21  Yes Jesus John MD   pantoprazole (PROTONIX) 40 MG tablet TAKE 1 TABLET BY MOUTH EVERY MORNING BEFORE BREAKFAST 21  Yes Jesus John MD   sertraline (ZOLOFT) 50 MG tablet TAKE 1 TABLET BY MOUTH EVERY DAY 21  Yes Jesus John MD   albuterol sulfate  (90 Base) MCG/ACT inhaler INHALE 2 PUFFS INTO THE LUNGS 4 TIMES DAILY AS NEEDED FOR WHEEZING 21  Yes Jesus John MD   labetalol (NORMODYNE) 100 MG tablet TAKE 1 AND 1/2 TABLET BY MOUTH TWICE DAILY 21  Yes Jesus John MD   calcium carbonate (OSCAL) 500 MG TABS tablet Take 500 mg by mouth daily   Yes Historical Provider, MD   lisinopril (PRINIVIL;ZESTRIL) 40 MG tablet TAKE 1 TABLET BY MOUTH EVERY DAY 21  Yes Jesus John MD   vitamin B-12 (CYANOCOBALAMIN) 100 MCG tablet TAKE 1 TABLET BY MOUTH EVERY DAY 3/15/21  Yes Historical Provider, MD   famotidine (PEPCID) 20 MG tablet Take 1 tablet by mouth nightly 21  Yes TERA Powell - CNP   atorvastatin (LIPITOR) 10 MG tablet Take 1 tablet by mouth daily 3/31/21  Yes Jesus John MD   ferrous sulfate (FE TABS 325) 325 (65 Fe) MG EC tablet Take 1 tablet by mouth 3 times daily (with meals)  Patient taking differently: Take 325 mg by mouth 2 times daily  3/24/21  Yes Momo Guzmán MD   folic acid (FOLVITE) 1 MG tablet Take 1 tablet by mouth daily 1/20/21  Yes Momo Guzmán MD   Multiple Vitamins-Minerals (MULTIVITAMIN ADULT EXTRA C PO) Take 1 tablet by mouth daily   Yes Historical Provider, MD   albuterol sulfate HFA (VENTOLIN HFA) 108 (90 Base) MCG/ACT inhaler Inhale 2 puffs into the lungs 4 times daily as needed for Wheezing 4/21/21   Momo Gumzán MD   SUMAtriptan (IMITREX) 25 MG tablet Take 1 tablet by mouth once as needed for Migraine 11/12/20 7/19/21  Momo Guzmán MD   butalbital-acetaminophen-caffeine (FIORICET, ESGIC) -06 MG per tablet Take 1 tablet by mouth every 4 hours as needed for Headaches 9/22/20   Dontae WINTER Blood, DO   levonorgestrel (MIRENA) IUD 52 mg 1 each by Intrauterine route    Historical Provider, MD       Current medications:    Current Facility-Administered Medications   Medication Dose Route Frequency Provider Last Rate Last Admin    ceFAZolin (ANCEF) 3000 mg in dextrose 5 % 100 mL IVPB  3,000 mg Intravenous Once Lillette Shackleton, DO        metronidazole (FLAGYL) 500 mg in NaCl 100 mL IVPB premix  500 mg Intravenous Once Lillette Shackleton, DO           Allergies: Allergies   Allergen Reactions    Nsaids      Pt had gastric sleeve  Cannot take due to bariatric surgery      Tolmetin      Pt had gastric sleeve    Codeine Itching       Problem List:    Patient Active Problem List   Diagnosis Code    Menometrorrhagia N92.1    IUD (intrauterine device) in place Z97.5    Essential hypertension I10    Hiatal hernia K44.9    Hyperlipidemia E78.5    Iron deficiency anemia D50.9    Morbid obesity due to excess calories (HCC) E66.01    Postgastrectomy malabsorption K91.2, Z90.3    CORNELIA (obstructive sleep apnea) G47.33    Vitamin D deficiency E55.9    Anxiety state F41.1    Hypertensive emergency I16.1    Obesity (BMI 30-39. 9) E66.9    Recurrent major depressive disorder, in partial remission ([facility]) N94.63    Folic acid deficiency Y13.0    Migraine without aura and without status migrainosus, not intractable G43.009    Neoplasm of uncertain behavior of skin D48.5    Hiatal hernia with GERD K21.9, K44.9    Anxiety and depression F41.9, F32.9    History of nicotine use Z87.891       Past Medical History:        Diagnosis Date    Anemia     Anxiety and depression 04/05/2021    Anxiety state 08/12/2020    Asthma     COPD (chronic obstructive pulmonary disease) ([facility] [address]     Essential hypertension 11/04/8534    Folic acid deficiency 98/96/1247    Hiatal hernia 08/12/2020    Hiatal hernia with GERD 04/05/2021    History of abnormal cervical Pap smear     History of nicotine use 04/05/2021    Hyperlipidemia     Hypertension     PCP DR Manjula Basilio    Hypertensive emergency 09/20/2020    Iron deficiency anemia 08/12/2020    IUD (intrauterine device) in place 08/04/2020    Mirena    Menometrorrhagia     Migraine without aura and without status migrainosus, not intractable 09/21/2020    Morbid obesity due to excess calories ([facility] [address] 08/12/2020    Obesity (BMI 30-39.9) 09/20/2020    CORNELIA on CPAP     DR Arthur Sidhu (last visit June 2021)    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 2/22/16 Sleeve Gastrectomy and repair of sm to mod size sliding Hiatal Hernia; 2/23/16 UGI WNL postop    Recurrent major depressive disorder, in partial remission ([facility] [address] 09/20/2020    Vitamin D deficiency 08/12/2020    Vit D low 18.0/18.1; treat with Vitamin D 50,000 iu 3x wk x 6 wks then repeat level       Past Surgical History:        Procedure Laterality Date    BARIATRIC SURGERY  02/22/2016    GASTRIC SLEEVE (IN [place])    BREAST LUMPECTOMY Right     CHOLECYSTECTOMY      COLPOSCOPY      ENDOSCOPY, COLON, DIAGNOSTIC  05/07/2021    HERNIA REPAIR      DONE DURING GASTRIC SLEEVE    INTRAUTERINE DEVICE INSERTION  08/04/2020    Mirena     LIPOMA RESECTION      lt shoulder/upper back    PRE-MALIGNANT / BENIGN SKIN LESION EXCISION  2021    TRUNK LESION BIOPSY EXCISION RIGHT BACK X 2, LEFT BACK AND MID BACK ALL WITH COMPLEX CLOSURE     SKIN BIOPSY N/A 2021    TRUNK LESION BIOPSY EXCISION RIGHT BACK X 6, LEFT, RIGHT, AND MID BACK ALL WITH COMPLEX CLOSURE. performed by Cindy Horne MD at [address] [facility] N/A 2021    EGD BIOPSY performed by Kameron Rhodes DO at [address] [facility] EXTRACTION         Social History:    Social History     Tobacco Use    Smoking status: Former Smoker     Packs/day: 1.00     Years: 22.00     Pack years: 22.00     Types: Cigarettes     Start date: 1/15/2020     Quit date: 2020     Years since quittin.9    Smokeless tobacco: Never Used   Substance Use Topics    Alcohol use: Yes     Comment: social                                Counseling given: Not Answered      Vital Signs (Current):   Vitals:    21 0724   BP: (!) 157/102   Pulse: 62   Resp: 18   Temp: 97 °F (36.1 °C)   TempSrc: Temporal   SpO2: 98%   Weight: 269 lb 2.9 oz (122.1 kg)   Height: 5' 8\" (1.727 m)                                              BP Readings from Last 3 Encounters:   21 (!) 157/102   21 124/76   21 102/80       NPO Status: Time of last liquid consumption: 605 (PRE-OP GATORADE)                        Time of last solid consumption: 1400                        Date of last liquid consumption: 21                        Date of last solid food consumption: 21    BMI:   Wt Readings from Last 3 Encounters:   21 269 lb 2.9 oz (122.1 kg)   21 273 lb 6.4 oz (124 kg)   21 269 lb (122 kg)     Body mass index is 40.93 kg/m².     CBC:   Lab Results   Component Value Date    WBC 7.2 2021    RBC 4.46 2021    HGB 12.7 2021    HCT 39.5 2021    MCV 88.6 2021    RDW 14.4 2021     2021       CMP:   Lab Results   Component Value Date     07/08/2021    K 4.5 07/08/2021     07/08/2021    CO2 22 07/08/2021    BUN 21 07/08/2021    CREATININE 0.64 07/08/2021    GFRAA >60 07/08/2021    LABGLOM >60 07/08/2021    GLUCOSE 84 07/08/2021    PROT 7.3 04/22/2021    CALCIUM 9.8 07/08/2021    BILITOT 0.34 04/22/2021    ALKPHOS 106 04/22/2021    AST 25 04/22/2021    ALT 24 04/22/2021       POC Tests: No results for input(s): POCGLU, POCNA, POCK, POCCL, POCBUN, POCHEMO, POCHCT in the last 72 hours. Coags:   Lab Results   Component Value Date    PROTIME 10.2 07/08/2021    INR 0.9 07/08/2021    APTT 23.0 07/08/2021       HCG (If Applicable):   Lab Results   Component Value Date    HCG NEGATIVE 05/07/2021        ABGs: No results found for: PHART, PO2ART, KZJ6DLD, DTR7VAO, BEART, M5GJSYDY     Type & Screen (If Applicable):  No results found for: LABABO, LABRH    Drug/Infectious Status (If Applicable):  Lab Results   Component Value Date    HEPCAB NONREACTIVE 04/22/2021       COVID-19 Screening (If Applicable):   Lab Results   Component Value Date    COVID19 Not Detected 05/03/2021           Anesthesia Evaluation  Patient summary reviewed no history of anesthetic complications:   Airway: Mallampati: III  TM distance: >3 FB   Neck ROM: full  Mouth opening: > = 3 FB Dental:          Pulmonary:normal exam    (+) COPD: no interval change,  sleep apnea: on CPAP,  asthma: seasonal asthma,                            Cardiovascular:    (+) hypertension: no interval change,       ECG reviewed  Rhythm: regular  Rate: normal                    Neuro/Psych:   (+) neuromuscular disease:, headaches: migraine headaches, psychiatric history:depression/anxiety             GI/Hepatic/Renal:   (+) hiatal hernia, GERD: no interval change, morbid obesity          Endo/Other: Negative Endo/Other ROS                    Abdominal:   (+) obese,           Vascular: negative vascular ROS.          Other Findings:             Anesthesia Plan      general and regional     ASA 3       Induction: intravenous. Anesthetic plan and risks discussed with patient. Plan discussed with CRNA.                   Patt Fuentes MD   7/21/2021

## 2021-07-21 NOTE — PROGRESS NOTES
Report called and given to [facility], RN; awaiting room to be cleaned.

## 2021-07-21 NOTE — ED NOTES
was notified of family in main lobby requesting to see patient in recovery.  contacted recovery and notified them of family's request. Per recovery family is to go to waiting room recovery and \"Yarelis\" will bring them in to see patient\".       Joe Wahl, JACLYN  07/21/21 JACLYN Ibrahim  07/21/21 2001

## 2021-07-22 ENCOUNTER — APPOINTMENT (OUTPATIENT)
Dept: GENERAL RADIOLOGY | Age: 46
DRG: 403 | End: 2021-07-22
Attending: SURGERY
Payer: MEDICARE

## 2021-07-22 LAB
ANION GAP SERPL CALCULATED.3IONS-SCNC: 12 MMOL/L (ref 9–17)
BUN BLDV-MCNC: 15 MG/DL (ref 6–20)
BUN/CREAT BLD: ABNORMAL (ref 9–20)
CALCIUM SERPL-MCNC: 8.4 MG/DL (ref 8.6–10.4)
CHLORIDE BLD-SCNC: 104 MMOL/L (ref 98–107)
CO2: 22 MMOL/L (ref 20–31)
CREAT SERPL-MCNC: 0.73 MG/DL (ref 0.5–0.9)
CULTURE: NO GROWTH
GFR AFRICAN AMERICAN: >60 ML/MIN
GFR NON-AFRICAN AMERICAN: >60 ML/MIN
GFR SERPL CREATININE-BSD FRML MDRD: ABNORMAL ML/MIN/{1.73_M2}
GFR SERPL CREATININE-BSD FRML MDRD: ABNORMAL ML/MIN/{1.73_M2}
GLUCOSE BLD-MCNC: 122 MG/DL (ref 70–99)
HCT VFR BLD CALC: 33.9 % (ref 36.3–47.1)
HEMOGLOBIN: 10.7 G/DL (ref 11.9–15.1)
Lab: NORMAL
MCH RBC QN AUTO: 28.9 PG (ref 25.2–33.5)
MCHC RBC AUTO-ENTMCNC: 31.6 G/DL (ref 28.4–34.8)
MCV RBC AUTO: 91.6 FL (ref 82.6–102.9)
MYOGLOBIN: 129 NG/ML (ref 25–58)
NRBC AUTOMATED: 0 PER 100 WBC
PDW BLD-RTO: 14.6 % (ref 11.8–14.4)
PLATELET # BLD: 219 K/UL (ref 138–453)
PMV BLD AUTO: 9.7 FL (ref 8.1–13.5)
POTASSIUM SERPL-SCNC: 4.1 MMOL/L (ref 3.7–5.3)
RBC # BLD: 3.7 M/UL (ref 3.95–5.11)
SODIUM BLD-SCNC: 138 MMOL/L (ref 135–144)
SPECIMEN DESCRIPTION: NORMAL
TOTAL CK: 403 U/L (ref 26–192)
WBC # BLD: 9.7 K/UL (ref 3.5–11.3)

## 2021-07-22 PROCEDURE — 6360000004 HC RX CONTRAST MEDICATION: Performed by: SURGERY

## 2021-07-22 PROCEDURE — 2580000003 HC RX 258: Performed by: SURGERY

## 2021-07-22 PROCEDURE — 6370000000 HC RX 637 (ALT 250 FOR IP): Performed by: SURGERY

## 2021-07-22 PROCEDURE — 83874 ASSAY OF MYOGLOBIN: CPT

## 2021-07-22 PROCEDURE — 36415 COLL VENOUS BLD VENIPUNCTURE: CPT

## 2021-07-22 PROCEDURE — 80048 BASIC METABOLIC PNL TOTAL CA: CPT

## 2021-07-22 PROCEDURE — 96372 THER/PROPH/DIAG INJ SC/IM: CPT

## 2021-07-22 PROCEDURE — 96376 TX/PRO/DX INJ SAME DRUG ADON: CPT

## 2021-07-22 PROCEDURE — 6360000002 HC RX W HCPCS: Performed by: SURGERY

## 2021-07-22 PROCEDURE — 2500000003 HC RX 250 WO HCPCS: Performed by: SURGERY

## 2021-07-22 PROCEDURE — 6370000000 HC RX 637 (ALT 250 FOR IP)

## 2021-07-22 PROCEDURE — 94761 N-INVAS EAR/PLS OXIMETRY MLT: CPT

## 2021-07-22 PROCEDURE — G0378 HOSPITAL OBSERVATION PER HR: HCPCS

## 2021-07-22 PROCEDURE — 51798 US URINE CAPACITY MEASURE: CPT

## 2021-07-22 PROCEDURE — 2700000000 HC OXYGEN THERAPY PER DAY

## 2021-07-22 PROCEDURE — 2580000003 HC RX 258: Performed by: STUDENT IN AN ORGANIZED HEALTH CARE EDUCATION/TRAINING PROGRAM

## 2021-07-22 PROCEDURE — 74220 X-RAY XM ESOPHAGUS 1CNTRST: CPT

## 2021-07-22 PROCEDURE — 82550 ASSAY OF CK (CPK): CPT

## 2021-07-22 PROCEDURE — 85027 COMPLETE CBC AUTOMATED: CPT

## 2021-07-22 PROCEDURE — 6370000000 HC RX 637 (ALT 250 FOR IP): Performed by: STUDENT IN AN ORGANIZED HEALTH CARE EDUCATION/TRAINING PROGRAM

## 2021-07-22 PROCEDURE — 96375 TX/PRO/DX INJ NEW DRUG ADDON: CPT

## 2021-07-22 PROCEDURE — 94640 AIRWAY INHALATION TREATMENT: CPT

## 2021-07-22 RX ORDER — SODIUM CHLORIDE, SODIUM LACTATE, POTASSIUM CHLORIDE, AND CALCIUM CHLORIDE .6; .31; .03; .02 G/100ML; G/100ML; G/100ML; G/100ML
1000 INJECTION, SOLUTION INTRAVENOUS ONCE
Status: DISCONTINUED | OUTPATIENT
Start: 2021-07-22 | End: 2021-07-22

## 2021-07-22 RX ORDER — OXYCODONE HYDROCHLORIDE AND ACETAMINOPHEN 5; 325 MG/1; MG/1
2 TABLET ORAL EVERY 6 HOURS PRN
Status: DISCONTINUED | OUTPATIENT
Start: 2021-07-22 | End: 2021-07-22

## 2021-07-22 RX ORDER — OXYCODONE HYDROCHLORIDE AND ACETAMINOPHEN 5; 325 MG/1; MG/1
1 TABLET ORAL EVERY 6 HOURS PRN
Status: DISCONTINUED | OUTPATIENT
Start: 2021-07-22 | End: 2021-07-22

## 2021-07-22 RX ORDER — SODIUM CHLORIDE, SODIUM LACTATE, POTASSIUM CHLORIDE, AND CALCIUM CHLORIDE .6; .31; .03; .02 G/100ML; G/100ML; G/100ML; G/100ML
1000 INJECTION, SOLUTION INTRAVENOUS ONCE
Status: COMPLETED | OUTPATIENT
Start: 2021-07-22 | End: 2021-07-22

## 2021-07-22 RX ORDER — HYDROCODONE BITARTRATE AND ACETAMINOPHEN 5; 325 MG/1; MG/1
1 TABLET ORAL EVERY 6 HOURS PRN
Qty: 28 TABLET | Refills: 0 | Status: SHIPPED | OUTPATIENT
Start: 2021-07-22 | End: 2021-07-29

## 2021-07-22 RX ORDER — PANTOPRAZOLE SODIUM 40 MG/1
40 TABLET, DELAYED RELEASE ORAL
Qty: 90 TABLET | Refills: 1 | Status: SHIPPED | OUTPATIENT
Start: 2021-07-22

## 2021-07-22 RX ORDER — HYDROCODONE BITARTRATE AND ACETAMINOPHEN 5; 325 MG/1; MG/1
1 TABLET ORAL EVERY 6 HOURS PRN
Status: DISCONTINUED | OUTPATIENT
Start: 2021-07-22 | End: 2021-07-24 | Stop reason: HOSPADM

## 2021-07-22 RX ORDER — PROMETHAZINE HYDROCHLORIDE 25 MG/1
25 TABLET ORAL EVERY 6 HOURS PRN
Qty: 30 TABLET | Refills: 0 | Status: SHIPPED | OUTPATIENT
Start: 2021-07-22 | End: 2021-07-29

## 2021-07-22 RX ORDER — BUSPIRONE HYDROCHLORIDE 10 MG/1
10 TABLET ORAL 2 TIMES DAILY
Status: DISCONTINUED | OUTPATIENT
Start: 2021-07-22 | End: 2021-07-24 | Stop reason: HOSPADM

## 2021-07-22 RX ORDER — SODIUM CHLORIDE, SODIUM LACTATE, POTASSIUM CHLORIDE, AND CALCIUM CHLORIDE .6; .31; .03; .02 G/100ML; G/100ML; G/100ML; G/100ML
500 INJECTION, SOLUTION INTRAVENOUS ONCE
Status: COMPLETED | OUTPATIENT
Start: 2021-07-22 | End: 2021-07-22

## 2021-07-22 RX ADMIN — IPRATROPIUM BROMIDE AND ALBUTEROL SULFATE 1 AMPULE: .5; 3 SOLUTION RESPIRATORY (INHALATION) at 19:39

## 2021-07-22 RX ADMIN — IPRATROPIUM BROMIDE AND ALBUTEROL SULFATE 1 AMPULE: .5; 3 SOLUTION RESPIRATORY (INHALATION) at 16:06

## 2021-07-22 RX ADMIN — SODIUM CHLORIDE, POTASSIUM CHLORIDE, SODIUM LACTATE AND CALCIUM CHLORIDE: 600; 310; 30; 20 INJECTION, SOLUTION INTRAVENOUS at 17:25

## 2021-07-22 RX ADMIN — HYDROCODONE BITARTRATE AND ACETAMINOPHEN 1 TABLET: 5; 325 TABLET ORAL at 22:41

## 2021-07-22 RX ADMIN — METRONIDAZOLE 500 MG: 500 INJECTION, SOLUTION INTRAVENOUS at 18:26

## 2021-07-22 RX ADMIN — HYDROCODONE BITARTRATE AND ACETAMINOPHEN 1 TABLET: 5; 325 TABLET ORAL at 10:48

## 2021-07-22 RX ADMIN — HEPARIN SODIUM 5000 UNITS: 5000 INJECTION INTRAVENOUS; SUBCUTANEOUS at 06:09

## 2021-07-22 RX ADMIN — SODIUM CHLORIDE, POTASSIUM CHLORIDE, SODIUM LACTATE AND CALCIUM CHLORIDE 500 ML: 600; 310; 30; 20 INJECTION, SOLUTION INTRAVENOUS at 10:49

## 2021-07-22 RX ADMIN — FAMOTIDINE 20 MG: 10 INJECTION INTRAVENOUS at 22:41

## 2021-07-22 RX ADMIN — HYDROMORPHONE HYDROCHLORIDE 1 MG: 1 INJECTION, SOLUTION INTRAMUSCULAR; INTRAVENOUS; SUBCUTANEOUS at 19:56

## 2021-07-22 RX ADMIN — SODIUM CHLORIDE, POTASSIUM CHLORIDE, SODIUM LACTATE AND CALCIUM CHLORIDE 1000 ML: 600; 310; 30; 20 INJECTION, SOLUTION INTRAVENOUS at 01:04

## 2021-07-22 RX ADMIN — HYDROCODONE BITARTRATE AND ACETAMINOPHEN 1 TABLET: 5; 325 TABLET ORAL at 17:17

## 2021-07-22 RX ADMIN — SIMETHICONE 40 MG: 20 EMULSION ORAL at 17:17

## 2021-07-22 RX ADMIN — BENZOCAINE AND MENTHOL 1 LOZENGE: 15; 3.6 LOZENGE ORAL at 03:53

## 2021-07-22 RX ADMIN — FAMOTIDINE 20 MG: 10 INJECTION INTRAVENOUS at 09:48

## 2021-07-22 RX ADMIN — HYDROMORPHONE HYDROCHLORIDE 1 MG: 1 INJECTION, SOLUTION INTRAMUSCULAR; INTRAVENOUS; SUBCUTANEOUS at 14:25

## 2021-07-22 RX ADMIN — SIMETHICONE 40 MG: 20 EMULSION ORAL at 06:09

## 2021-07-22 RX ADMIN — HEPARIN SODIUM 5000 UNITS: 5000 INJECTION INTRAVENOUS; SUBCUTANEOUS at 22:41

## 2021-07-22 RX ADMIN — HYDROMORPHONE HYDROCHLORIDE 1 MG: 1 INJECTION, SOLUTION INTRAMUSCULAR; INTRAVENOUS; SUBCUTANEOUS at 03:38

## 2021-07-22 RX ADMIN — BENZOCAINE AND MENTHOL 1 LOZENGE: 15; 3.6 LOZENGE ORAL at 01:18

## 2021-07-22 RX ADMIN — DEXTROSE MONOHYDRATE 3000 MG: 50 INJECTION, SOLUTION INTRAVENOUS at 06:37

## 2021-07-22 RX ADMIN — SODIUM CHLORIDE, POTASSIUM CHLORIDE, SODIUM LACTATE AND CALCIUM CHLORIDE: 600; 310; 30; 20 INJECTION, SOLUTION INTRAVENOUS at 03:49

## 2021-07-22 RX ADMIN — HYDROMORPHONE HYDROCHLORIDE 1 MG: 1 INJECTION, SOLUTION INTRAMUSCULAR; INTRAVENOUS; SUBCUTANEOUS at 06:37

## 2021-07-22 RX ADMIN — BUSPIRONE HYDROCHLORIDE 10 MG: 10 TABLET ORAL at 22:42

## 2021-07-22 RX ADMIN — METRONIDAZOLE 500 MG: 500 INJECTION, SOLUTION INTRAVENOUS at 09:48

## 2021-07-22 RX ADMIN — METOPROLOL TARTRATE 5 MG: 1 INJECTION, SOLUTION INTRAVENOUS at 14:26

## 2021-07-22 RX ADMIN — HEPARIN SODIUM 5000 UNITS: 5000 INJECTION INTRAVENOUS; SUBCUTANEOUS at 13:53

## 2021-07-22 RX ADMIN — SIMETHICONE 40 MG: 20 EMULSION ORAL at 22:46

## 2021-07-22 RX ADMIN — HYDROMORPHONE HYDROCHLORIDE 1 MG: 1 INJECTION, SOLUTION INTRAMUSCULAR; INTRAVENOUS; SUBCUTANEOUS at 00:10

## 2021-07-22 RX ADMIN — IOHEXOL 30 ML: 240 INJECTION, SOLUTION INTRATHECAL; INTRAVASCULAR; INTRAVENOUS; ORAL at 08:43

## 2021-07-22 ASSESSMENT — PAIN SCALES - GENERAL
PAINLEVEL_OUTOF10: 6
PAINLEVEL_OUTOF10: 9
PAINLEVEL_OUTOF10: 5
PAINLEVEL_OUTOF10: 8
PAINLEVEL_OUTOF10: 5
PAINLEVEL_OUTOF10: 5
PAINLEVEL_OUTOF10: 7
PAINLEVEL_OUTOF10: 6
PAINLEVEL_OUTOF10: 8
PAINLEVEL_OUTOF10: 9
PAINLEVEL_OUTOF10: 6
PAINLEVEL_OUTOF10: 8
PAINLEVEL_OUTOF10: 6
PAINLEVEL_OUTOF10: 5
PAINLEVEL_OUTOF10: 6

## 2021-07-22 NOTE — PROGRESS NOTES
Post Op Note    SUBJECTIVE  Pt s/p robotic laparoscopic gastric bypass rob-en-y, repair of hiatal hernia, g tube insertion    OBJECTIVE  VITALS:  BP (!) 196/109   Pulse 83   Temp 97.9 °F (36.6 °C) (Oral)   Resp 17   Ht 5' 8\" (1.727 m)   Wt 269 lb 2.9 oz (122.1 kg)   SpO2 93%   BMI 40.93 kg/m²         GENERAL:  awake and alert. No acute distress  CARDIOVASCULAR:  regular rate and rhythm   LUNGS:  No increased work of breathing, symmetric chest rise and fall  ABDOMEN:   Abdomen soft, appropriately tender, non-distended, abdominal binder in place  INCISION: Incisions clean/dry/intact, gtube in place snug to skin    ASSESSMENT  1. POD# 0 s/p gastric bypass rob-en-y with gtube placement    PLAN  1. Monitor bowel function  2. Bariatric clear liquid diet  3. Will continue to monitor urine output and followup with bladder scan in the am  4. Pain control  5. Heparin  6. Continue to encourage activity and ambulation  7. Incentive spirometry  8.  Iv metronidazole

## 2021-07-22 NOTE — PROGRESS NOTES
General surgery notified- pt displaying excessive drainage post surgery from g tube- dressing changed 2x and saturated binder and gown each time.

## 2021-07-22 NOTE — PROGRESS NOTES
Bariatric Surgery:  Daily Progress Note          POD # 1 s/p robotic assisted laparoscopic Narendra en Y, hiatal hernia repair, G tube insertion          PATIENT NAME: Mariah Casas     TODAY'S DATE: 7/22/2021   CC:  Postop pain     SUBJECTIVE:     Pt seen and examined at bedside. Afebrile. HTN overnight improved with lopressor. Abdomina pain present but improves with pain regimen. Denies nausea/emesis. Tolerating clears. UOP 250cc overnight. Received 1L LR bolus overnight. Ambulating. OBJECTIVE:   VITALS:  BP (!) 196/109   Pulse 83   Temp 97.9 °F (36.6 °C) (Oral)   Resp 17   Ht 5' 8\" (1.727 m)   Wt 269 lb 2.9 oz (122.1 kg)   SpO2 93%   BMI 40.93 kg/m²      INTAKE/OUTPUT:      Intake/Output Summary (Last 24 hours) at 7/22/2021 0321  Last data filed at 7/21/2021 2005  Gross per 24 hour   Intake 2575 ml   Output 470 ml   Net 2105 ml       PHYSICAL EXAM:  General Appearance:  awake, alert, oriented, in no acute distress  HEENT:  Normocephalic, atraumatic, mucus membranes moist   Skin:  Skin color, texture, turgor normal. No rashes or lesions. Lungs:  Normal effort on RA, no respiratory distress, no accessory muscle use   Heart:  Heart regular rate and rhythm  Abdomen:  Soft, nondistended, appropriately tender to palpation postop, no guarding or peritoneal signs, G tube in place clamped, TRISTIAN with SS drainage   Incisions: CDI, no drainage, induration, or edema noted. Extremities: Extremities warm to touch, pink, with no edema.       Data:  CBC:   Lab Results   Component Value Date    WBC 12.7 07/21/2021    RBC 4.29 07/21/2021    HGB 12.5 07/21/2021    HCT 38.4 07/21/2021    MCV 89.5 07/21/2021    MCH 29.1 07/21/2021    MCHC 32.6 07/21/2021    RDW 14.6 07/21/2021     07/21/2021    MPV 9.4 07/21/2021     BMP:    Lab Results   Component Value Date     07/21/2021    K 4.2 07/21/2021     07/21/2021    CO2 21 07/21/2021    BUN 19 07/21/2021    LABALBU 4.2 04/22/2021    CREATININE 0.87 07/21/2021 CALCIUM 8.9 07/21/2021    GFRAA >60 07/21/2021    LABGLOM >60 07/21/2021    GLUCOSE 148 07/21/2021       ASSESSMENT:  Active Hospital Problems    Diagnosis Date Noted    S/P gastric bypass [Z98.84] 07/21/2021       1. 42 y.o. female with history of morbid obesity, COPD, CORNELIA, HTN   POD#1 s/p robotic assisted laparoscopic Narendra en Y, hiatal hernia repair, G tube     Plan:  1. Continue pain and nausea control  2. Diet: bariatric clears - ok for meds PO   3. Encourage ambulation and IS use. Wean O2 as able. 4. Monitor TRISTIAN drain output  5. Additional 500cc fluid bolus this morning-strict I/O's   6.  Plan for esophagram today     Torrie Hernandez,   General Surgery PGY-3

## 2021-07-22 NOTE — CARE COORDINATION
Case Management Initial Discharge Plan  Karen Fernandez,             Met with:patient to discuss discharge plans. Information verified: address, contacts, phone number, , insurance Yes  Insurance Provider: [employer]    Emergency Contact/Next of Kin name & number:   Rosalinda Larson (mother) [phone]  Who are involved in patient's support system? Mother, daughter    PCP: Theo Butler MD  Date of last visit: 2021      Discharge Planning    Living Arrangements:  [address]   1 stairs to climb to get into front door, 14 stairs to climb to reach second floor  Location of bedroom/bathroom in home 2nd floor    Patient able to perform ADL's:Independent    Current Services (outpatient & in home) none  DME equipment: cpap  DME provider:     Is patient receiving oral anticoagulation therapy? No    If indicated:   Physician managing anticoagulation treatment: n/a  Where does patient obtain lab work for ATC treatment? n/a      Potential Assistance Needed:  N/A    Patient agreeable to home care: No  [place] of choice provided:  n/a    Prior SNF/Rehab Placement and Facility: none  Agreeable to SNF/Rehab: No  [place] of choice provided: n/a     Evaluation: n/a    Expected Discharge date:  21    Patient expects to be discharged to:  HOME    If home: is the family and/or caregiver wiling & able to provide support at home? yes  Who will be providing this support? daughter    Follow Up Appointment: Best Day/ Time: Monday AM    Transportation provider: mother  Transportation arrangements needed for discharge: No    Readmission Risk              Risk of Unplanned Readmission:  0             Does patient have a readmission risk score greater than 14?: No  If yes, follow-up appointment must be made within 7 days of discharge.      Goals of Care:       Educated patient on transitional options, provided freedom of choice and are agreeable with plan      Discharge Plan: Home

## 2021-07-23 ENCOUNTER — APPOINTMENT (OUTPATIENT)
Dept: GENERAL RADIOLOGY | Age: 46
DRG: 403 | End: 2021-07-23
Attending: SURGERY
Payer: MEDICARE

## 2021-07-23 LAB
ANION GAP SERPL CALCULATED.3IONS-SCNC: 14 MMOL/L (ref 9–17)
BUN BLDV-MCNC: 10 MG/DL (ref 6–20)
BUN/CREAT BLD: NORMAL (ref 9–20)
CALCIUM SERPL-MCNC: 8.6 MG/DL (ref 8.6–10.4)
CHLORIDE BLD-SCNC: 102 MMOL/L (ref 98–107)
CO2: 23 MMOL/L (ref 20–31)
CREAT SERPL-MCNC: 0.71 MG/DL (ref 0.5–0.9)
GFR AFRICAN AMERICAN: >60 ML/MIN
GFR NON-AFRICAN AMERICAN: >60 ML/MIN
GFR SERPL CREATININE-BSD FRML MDRD: NORMAL ML/MIN/{1.73_M2}
GFR SERPL CREATININE-BSD FRML MDRD: NORMAL ML/MIN/{1.73_M2}
GLUCOSE BLD-MCNC: 86 MG/DL (ref 70–99)
HCT VFR BLD CALC: 38.1 % (ref 36.3–47.1)
HEMOGLOBIN: 11.9 G/DL (ref 11.9–15.1)
MCH RBC QN AUTO: 29.8 PG (ref 25.2–33.5)
MCHC RBC AUTO-ENTMCNC: 31.2 G/DL (ref 28.4–34.8)
MCV RBC AUTO: 95.5 FL (ref 82.6–102.9)
NRBC AUTOMATED: 0 PER 100 WBC
PDW BLD-RTO: 14.5 % (ref 11.8–14.4)
PLATELET # BLD: 276 K/UL (ref 138–453)
PMV BLD AUTO: 10 FL (ref 8.1–13.5)
POTASSIUM SERPL-SCNC: 4 MMOL/L (ref 3.7–5.3)
RBC # BLD: 3.99 M/UL (ref 3.95–5.11)
SODIUM BLD-SCNC: 139 MMOL/L (ref 135–144)
WBC # BLD: 8.4 K/UL (ref 3.5–11.3)

## 2021-07-23 PROCEDURE — 36415 COLL VENOUS BLD VENIPUNCTURE: CPT

## 2021-07-23 PROCEDURE — 94640 AIRWAY INHALATION TREATMENT: CPT

## 2021-07-23 PROCEDURE — 6370000000 HC RX 637 (ALT 250 FOR IP): Performed by: STUDENT IN AN ORGANIZED HEALTH CARE EDUCATION/TRAINING PROGRAM

## 2021-07-23 PROCEDURE — 80048 BASIC METABOLIC PNL TOTAL CA: CPT

## 2021-07-23 PROCEDURE — 85027 COMPLETE CBC AUTOMATED: CPT

## 2021-07-23 PROCEDURE — 96372 THER/PROPH/DIAG INJ SC/IM: CPT

## 2021-07-23 PROCEDURE — 6370000000 HC RX 637 (ALT 250 FOR IP): Performed by: SURGERY

## 2021-07-23 PROCEDURE — 2500000003 HC RX 250 WO HCPCS: Performed by: SURGERY

## 2021-07-23 PROCEDURE — 96375 TX/PRO/DX INJ NEW DRUG ADDON: CPT

## 2021-07-23 PROCEDURE — 74018 RADEX ABDOMEN 1 VIEW: CPT

## 2021-07-23 PROCEDURE — 94761 N-INVAS EAR/PLS OXIMETRY MLT: CPT

## 2021-07-23 PROCEDURE — 6360000002 HC RX W HCPCS: Performed by: STUDENT IN AN ORGANIZED HEALTH CARE EDUCATION/TRAINING PROGRAM

## 2021-07-23 PROCEDURE — 6360000002 HC RX W HCPCS: Performed by: SURGERY

## 2021-07-23 PROCEDURE — 1200000000 HC SEMI PRIVATE

## 2021-07-23 PROCEDURE — 96376 TX/PRO/DX INJ SAME DRUG ADON: CPT

## 2021-07-23 PROCEDURE — 6370000000 HC RX 637 (ALT 250 FOR IP)

## 2021-07-23 PROCEDURE — 2700000000 HC OXYGEN THERAPY PER DAY

## 2021-07-23 RX ORDER — KETOROLAC TROMETHAMINE 15 MG/ML
15 INJECTION, SOLUTION INTRAMUSCULAR; INTRAVENOUS EVERY 6 HOURS
Status: DISCONTINUED | OUTPATIENT
Start: 2021-07-23 | End: 2021-07-24 | Stop reason: HOSPADM

## 2021-07-23 RX ORDER — LABETALOL 200 MG/1
150 TABLET, FILM COATED ORAL EVERY 12 HOURS SCHEDULED
Status: DISCONTINUED | OUTPATIENT
Start: 2021-07-23 | End: 2021-07-24 | Stop reason: HOSPADM

## 2021-07-23 RX ORDER — LISINOPRIL 20 MG/1
40 TABLET ORAL DAILY
Status: DISCONTINUED | OUTPATIENT
Start: 2021-07-23 | End: 2021-07-24 | Stop reason: HOSPADM

## 2021-07-23 RX ORDER — METHOCARBAMOL 750 MG/1
750 TABLET, FILM COATED ORAL EVERY 6 HOURS
Qty: 28 TABLET | Refills: 0 | Status: SHIPPED | OUTPATIENT
Start: 2021-07-23 | End: 2021-07-30

## 2021-07-23 RX ORDER — ATORVASTATIN CALCIUM 10 MG/1
10 TABLET, FILM COATED ORAL DAILY
Status: DISCONTINUED | OUTPATIENT
Start: 2021-07-23 | End: 2021-07-24 | Stop reason: HOSPADM

## 2021-07-23 RX ORDER — METHOCARBAMOL 750 MG/1
750 TABLET, FILM COATED ORAL EVERY 6 HOURS
Status: DISCONTINUED | OUTPATIENT
Start: 2021-07-23 | End: 2021-07-24 | Stop reason: HOSPADM

## 2021-07-23 RX ADMIN — HYDROMORPHONE HYDROCHLORIDE 1 MG: 1 INJECTION, SOLUTION INTRAMUSCULAR; INTRAVENOUS; SUBCUTANEOUS at 22:19

## 2021-07-23 RX ADMIN — HEPARIN SODIUM 5000 UNITS: 5000 INJECTION INTRAVENOUS; SUBCUTANEOUS at 16:16

## 2021-07-23 RX ADMIN — SIMETHICONE 40 MG: 20 EMULSION ORAL at 11:30

## 2021-07-23 RX ADMIN — HEPARIN SODIUM 5000 UNITS: 5000 INJECTION INTRAVENOUS; SUBCUTANEOUS at 05:20

## 2021-07-23 RX ADMIN — HYDROCODONE BITARTRATE AND ACETAMINOPHEN 1 TABLET: 5; 325 TABLET ORAL at 18:07

## 2021-07-23 RX ADMIN — METHOCARBAMOL TABLETS 750 MG: 750 TABLET, COATED ORAL at 11:32

## 2021-07-23 RX ADMIN — ATORVASTATIN CALCIUM 10 MG: 10 TABLET, FILM COATED ORAL at 20:35

## 2021-07-23 RX ADMIN — HEPARIN SODIUM 5000 UNITS: 5000 INJECTION INTRAVENOUS; SUBCUTANEOUS at 20:35

## 2021-07-23 RX ADMIN — FAMOTIDINE 20 MG: 10 INJECTION INTRAVENOUS at 20:35

## 2021-07-23 RX ADMIN — HYDROMORPHONE HYDROCHLORIDE 1 MG: 1 INJECTION, SOLUTION INTRAMUSCULAR; INTRAVENOUS; SUBCUTANEOUS at 00:12

## 2021-07-23 RX ADMIN — LISINOPRIL 40 MG: 20 TABLET ORAL at 18:07

## 2021-07-23 RX ADMIN — BUSPIRONE HYDROCHLORIDE 10 MG: 10 TABLET ORAL at 20:35

## 2021-07-23 RX ADMIN — METHOCARBAMOL TABLETS 750 MG: 750 TABLET, COATED ORAL at 23:57

## 2021-07-23 RX ADMIN — HYDROMORPHONE HYDROCHLORIDE 1 MG: 1 INJECTION, SOLUTION INTRAMUSCULAR; INTRAVENOUS; SUBCUTANEOUS at 16:07

## 2021-07-23 RX ADMIN — BUSPIRONE HYDROCHLORIDE 10 MG: 10 TABLET ORAL at 08:51

## 2021-07-23 RX ADMIN — LABETALOL HYDROCHLORIDE 150 MG: 200 TABLET, FILM COATED ORAL at 20:36

## 2021-07-23 RX ADMIN — KETOROLAC TROMETHAMINE 15 MG: 15 INJECTION, SOLUTION INTRAMUSCULAR; INTRAVENOUS at 11:26

## 2021-07-23 RX ADMIN — IPRATROPIUM BROMIDE AND ALBUTEROL SULFATE 1 AMPULE: .5; 3 SOLUTION RESPIRATORY (INHALATION) at 09:10

## 2021-07-23 RX ADMIN — SIMETHICONE 40 MG: 20 EMULSION ORAL at 20:32

## 2021-07-23 RX ADMIN — IPRATROPIUM BROMIDE AND ALBUTEROL SULFATE 1 AMPULE: .5; 3 SOLUTION RESPIRATORY (INHALATION) at 12:50

## 2021-07-23 RX ADMIN — METHOCARBAMOL TABLETS 750 MG: 750 TABLET, COATED ORAL at 18:08

## 2021-07-23 RX ADMIN — HYDROMORPHONE HYDROCHLORIDE 1 MG: 1 INJECTION, SOLUTION INTRAMUSCULAR; INTRAVENOUS; SUBCUTANEOUS at 08:47

## 2021-07-23 RX ADMIN — IPRATROPIUM BROMIDE AND ALBUTEROL SULFATE 1 AMPULE: .5; 3 SOLUTION RESPIRATORY (INHALATION) at 20:17

## 2021-07-23 RX ADMIN — KETOROLAC TROMETHAMINE 15 MG: 15 INJECTION, SOLUTION INTRAMUSCULAR; INTRAVENOUS at 23:57

## 2021-07-23 RX ADMIN — SERTRALINE 50 MG: 50 TABLET, FILM COATED ORAL at 08:52

## 2021-07-23 RX ADMIN — METRONIDAZOLE 500 MG: 500 INJECTION, SOLUTION INTRAVENOUS at 02:35

## 2021-07-23 RX ADMIN — KETOROLAC TROMETHAMINE 15 MG: 15 INJECTION, SOLUTION INTRAMUSCULAR; INTRAVENOUS at 18:08

## 2021-07-23 RX ADMIN — HYDROCODONE BITARTRATE AND ACETAMINOPHEN 1 TABLET: 5; 325 TABLET ORAL at 05:16

## 2021-07-23 RX ADMIN — FAMOTIDINE 20 MG: 10 INJECTION INTRAVENOUS at 08:52

## 2021-07-23 RX ADMIN — HYDROCODONE BITARTRATE AND ACETAMINOPHEN 1 TABLET: 5; 325 TABLET ORAL at 11:23

## 2021-07-23 RX ADMIN — METOPROLOL TARTRATE 5 MG: 1 INJECTION, SOLUTION INTRAVENOUS at 11:40

## 2021-07-23 ASSESSMENT — PAIN SCALES - GENERAL
PAINLEVEL_OUTOF10: 7
PAINLEVEL_OUTOF10: 9
PAINLEVEL_OUTOF10: 8
PAINLEVEL_OUTOF10: 6
PAINLEVEL_OUTOF10: 7
PAINLEVEL_OUTOF10: 7
PAINLEVEL_OUTOF10: 8
PAINLEVEL_OUTOF10: 6
PAINLEVEL_OUTOF10: 7
PAINLEVEL_OUTOF10: 7
PAINLEVEL_OUTOF10: 4
PAINLEVEL_OUTOF10: 9
PAINLEVEL_OUTOF10: 7

## 2021-07-23 NOTE — CARE COORDINATION
Transitional planning. Spoke with Purnima Lee at [facility] and he is aware pt in room 250 at 4L/NC. Faxed face to face, face sheet, respiratory testing, DME order and MAR to [facility] [phone].

## 2021-07-23 NOTE — PROGRESS NOTES
Patient was evaluated today for the diagnosis of COPD, CORNELIA, morbid obesity. I entered a DME order for home oxygen because the diagnosis and testing requires the patient to have supplemental oxygen. Condition will improve or be benefited by oxygen use. The patient is  able to perform good mobility in a home setting and therefore does require the use of a portable oxygen system. The need for this equipment was discussed with the patient and she understands and is in agreement.     Sun Odell, DO  General Surgery PGY-3

## 2021-07-23 NOTE — PROGRESS NOTES
Bariatric Surgery:  Daily Progress Note          POD # 2 s/p robotic assisted laparoscopic Narendra en Y, hiatal hernia repair, G tube insertion          PATIENT NAME: Kiah Hu     TODAY'S DATE: 7/23/2021   CC:  Postop pain     SUBJECTIVE:     Pt seen and examined at bedside. Afebrile, VSS. Abdomina pain present but improves with pain regimen. Denies nausea/emesis. Tolerating jellos, some broth, clear liquids. Urinating more frequently. +ambulation. OBJECTIVE:   VITALS:  BP (!) 141/70   Pulse 74   Temp 99.5 °F (37.5 °C) (Oral)   Resp 12   Ht 5' 8\" (1.727 m)   Wt 269 lb 2.9 oz (122.1 kg)   SpO2 91%   BMI 40.93 kg/m²      INTAKE/OUTPUT:      Intake/Output Summary (Last 24 hours) at 7/23/2021 5904  Last data filed at 7/22/2021 1500  Gross per 24 hour   Intake 1516 ml   Output 750 ml   Net 766 ml       PHYSICAL EXAM:  General Appearance:  awake, alert, oriented, in no acute distress  HEENT:  Normocephalic, atraumatic, mucus membranes moist   Skin:  Skin color, texture, turgor normal. No rashes or lesions. Lungs:  Normal effort on RA, no respiratory distress, no accessory muscle use   Heart:  Heart regular rate and rhythm  Abdomen:  Soft, nondistended, appropriately tender to palpation postop, no guarding or peritoneal signs, G tube in place clamped, TRISTIAN with SS drainage   Incisions: CDI, no drainage, induration, or edema noted. Extremities: Extremities warm to touch, pink, with no edema.       Data:  CBC:   Lab Results   Component Value Date    WBC 8.4 07/23/2021    RBC 3.99 07/23/2021    HGB 11.9 07/23/2021    HCT 38.1 07/23/2021    MCV 95.5 07/23/2021    MCH 29.8 07/23/2021    MCHC 31.2 07/23/2021    RDW 14.5 07/23/2021     07/23/2021    MPV 10.0 07/23/2021     BMP:    Lab Results   Component Value Date     07/23/2021    K 4.0 07/23/2021     07/23/2021    CO2 23 07/23/2021    BUN 10 07/23/2021    LABALBU 4.2 04/22/2021    CREATININE 0.71 07/23/2021    CALCIUM 8.6 07/23/2021    GFRAA >60 07/23/2021    LABGLOM >60 07/23/2021    GLUCOSE 86 07/23/2021       ASSESSMENT:  Active Hospital Problems    Diagnosis Date Noted    S/P gastric bypass [Z98.84] 07/21/2021       1. 42 y.o. female with history of morbid obesity, COPD, CORNELIA, HTN   POD#1 s/p robotic assisted laparoscopic Narendra en Y, hiatal hernia repair, G tube     Plan:  1. Continue pain and nausea control  2. Diet: bariatric clears - ok for meds PO   3. Encourage ambulation and IS use. Wean O2 as able. 4. Monitor TRISTIAN drain output  5. Strict I/O's   6. Esophagram negative for leak 7/23. KUB today to follow course of contrast through small bowel into colon. 7. Morning labs reviewed-HgB stable, electrolytes wnl  8.  Possible dc in next 24-48 hours      Lia Brown, DO  General Surgery PGY-3

## 2021-07-23 NOTE — OP NOTE
Operative Note      Patient: Jorje Castro  YOB: 1975  MRN: [id number]    Date of Procedure: 7/21/2021    Pre-Op Diagnosis: MORBID OBESITY BMI 40, HYPERTENTION, HIATAL HERNIA WITH GERD, OBSTRUCTIVE SLEEP APNEA    Post-Op Diagnosis: Same       Procedure(s):  XI ROBOTIC LAPAROSCOPIC GASTRIC BYPASS NARENDRA-EN-Y,  EXTENSIVE LYSIS OF ADHESIONS, EGD, REPAIR OF RECURRENT HIATAL HERNIA , G TUBE INSERTION    Surgeon:  Galilea Cespedes DO    Assistant:   * No surgical staff found *    Anesthesia: General    Estimated Blood Loss (mL): Minimal    Complications: None    Specimens:   ID Type Source Tests Collected by Time Destination   1 : URINE FOR CULTURE Urine Urine, indwelling catheter CULTURE, URINE Galilea Cespedes DO 7/21/2021 1025        Implants:  * No implants in log *      Drains:   Closed/Suction Drain Right RUQ Bulb 19 Telugu (Active)   Site Description Unable to view 07/22/21 0800   Dressing Status Intact 07/21/21 2000   Drainage Appearance Serosanguinous 07/22/21 1500   Status To bulb suction 07/22/21 1500   Output (ml) 35 ml 07/22/21 1500       Gastrostomy/Enterostomy/Jejunostomy Gastrostomy LUQ 1 22 fr (Active)   Site Description Healing 07/22/21 0800   Drain Status Clamped 07/21/21 2000   Surrounding Skin Unable to view 07/21/21 2000   Dressing Status Intact; New drainage; Changed 07/22/21 1500   Dressing Type Dry dressing 07/22/21 1500       [REMOVED] Urethral Catheter Double-lumen;Non-latex 16 fr (Removed)       Findings:   Extensive Infra-colic adhesions below the transverse colon  Extensive adhesions around sleeve  Recurrent Hiatal Hernia with 3-4 cm of sleeve intrathoracic  Large left replaced hepatic artery  Negative leak test   Hemostatic staple lines  Anatomy confirmed by using the terminal ileum  Each limb ran twice at end of procedure to confirm anatomy. Narendra limb lead to common channel which lead to terminal ileum    Indications for procedure: The patient is a pleasant 42 y. o. female with morbid obesity, weight recidivism and a hiatal hernia with reflux after sleeve gastrectomy at separate facility. They have a BMI of Body mass index is 40 kg/m². They've completed medical risk stratification and are scheduled for a Narendra-en-Y gastric bypass. Consent: The patient was seen and evaluated in the office setting where the procedure was explained to the patient and all questions were answered. Discussion was held between Dr. Mathieu Diggs and the patient. Viable alternatives to the proposed procedure including but not limited to medical observation under the care of a physician exercise programs and other weight reductive operative procedures were explained to the patient. Risks of the proposed procedure including but not limited to hemorrhage requiring transfusion, infection, nerve injury, death, DVT/PE, conversion to open procedure, anastomotic disruption, anastomotic stricture, pneumonia, pulmonary embolus, airway complications and death were explained to the patient. The patient understands the above alternatives and risks and has elected chosen laparoscopic Narendra-en-Y gastric bypass and wishes to proceed with the procedure. Description of procedure: The patient was taken to the operating room and placed in supine position. After successful induction of general endotracheal anesthesia by the anesthesia department a Causey catheter and orogastric tube was placed to decompress the bladder and stomach respectively. The patient was placed in split leg lithotomy position and care was taken to pad the exposed extremities. Then was prepped and draped in normal sterile fashion. A 12 mm Optiview trocar was placed in the left sub-costal position in the midclavicular line and access to the abdominal cavity was obtained under visualization. Pneumoperitoneum was then established without complications.   After evaluation of the abdominal contents from this trocar position 5 other ports were placed under direct visualization. One at the umbilicus, one 2-3 cm above the umbilicus, one at the subxiphoid area, one in the right subcostal margin in the midclavicular line, and the last one further lateral to the original Optiview port. A solution of 0.5% Marcaine was used to infiltrate the subcutaneous tissues prior to trocar placement. There was a hiatal hernia and the liver normal sized. We lysed adhesions in the left upper quadrant for over 15 minutes to free the sleeve and omentum from the liver and abdominal wall to allow for placement of a liver retractor and port placement. I then attempted to lift the omentum to verify anatomy prior to commencing bypass. The omentum was about 1 inch thick, long, redundant and large. I lifted this over the transverse colon until I could see the transverse colon. There was for unknown reason massive adhesions between the omentum and transverse colon and proximal small bowel. This made visualization of the ligament of treitz very difficult. I then docked the robot. I lysed adhesions for over 1 hour to attempt to find the ligament of treitz. I could identify the space below the transverse colon splenic flexure and dissected the bowel free of adhesions into the space just below the transverse colon. As I dissected more proximal on the bowel the adhesion burden became dense. I suspect she may have had pancreatitis prior that caused this. I could not find the inferior mesenteric vein, but I could see the root of the mesentery well and the bowel coming under the transverse colon. Although, I could find the root of the mesentery well and felt that to properly orient the bowel I needed to start at the terminal ileum to identify distal bowel with certainty. I undocked the robot. I then ran the region that I suspected to be the most proximal jejunum at the region of the ligament of treitz where the bowel met the transverse colon mesentery.   This was ran to the terminal ileum where the cecum and appendix were identified to be in proper location in the right lower quadrant. This confirmed proximal and distal bowel. I then oriented the proximal bowel based on the terminal ileum and where the proximal small bowel passed under the transverse colon mesentery at the ligament of treitz. I counted 50 cm distal on the bowel from the ligament of treitz. I then rechecked the bowel based on the terminal ileum and root of the mesentery. After decompression of the stomach with the orogastric tube, the orogastric tube and any esophageal probes were removed from the patient. This was reviewed with the anesthesia team.      We then lysed adhesions between the sleeve and the liver. These adhesions had pulled the sleeve into somewhat or a corkscrew configuration. There was a hiatal hernia and about 3-4 cm of sleeve in the thorax. I lysed adhesions around the sleeve to free it up for over an hour. Total lysis of adhesions in this case was about 2.5 hours. I was able to identify a very large left replaced hepatic artery as well as the vena cava and the left gastric artery. The sleeve dissected off the pancreas and splenic artery so we could create a gastric pouch. I then turned to repair of the hiatal hernia. At this time due to a hiatal hernia decision was made to perform hiatal hernia repair. We mobilized the pars flaccida until the right kenyatta was noted and we could begin our dissection. Using accommodation of blunt and sharp dissection as well as electrocautery a window was created along the right kenyatta along the esophagus. The avascular plane was entered and this facilitated our dissection. This was a fibrotic plane as she had prior hiatal hernia repair. Careful dissection circumferentially around the esophagus to expose the left kenyatta. The right and left vagus nerves were identified.   Upon completion of full dissection around the esophagus we then used O Ethibond suture to reapproximate the anterior and posterior kenyatta. A 50 Bruneian bougie was then passed by anesthesia under direct visualization until we could see this within the lumen of the stomach and this was across the crura in satisfactory position. Using the Ethibond we then closed the the hiatal hernia defect. A blunt probe could easily be passed between the crural defect closure and the esophagus while the bougie was in place. Satisfactory repair noted. We made a window along the lesser curvature through the pars flaccida with the Vessel Sealer and began the dissection. This allowed entry into the lesser sac. Once the lesser sac was entered a 60 mm x 2.5 mm CAYDEN stapler was used to come across the neurovascular bundle. A green load 60 mm stapler then fired across the stomach, followed by a green load to complete transection. We did have to take down some more adhesions to free up the new gastric pouch. A 20-30 mL gastric pouch was created. After this was done the Orvil anvil for the EEA stapler was placed transorally. This anvil was attached to the an 18 Western Brandie orogastric tube and was placed by anesthesia without complications. The orogastric tube was seen in the gastric pouch. The scissors was used to make a gastrotomy in the gastric pouch and the orogastric tube was pulled out of the gastric pouch. At this point the orogastric tube was detached from the anvil and removed, leaving the anvil in the gastric pouch. Having successfully completed our small gastric pouch and anvil placement, we turned our attention to dividing the greater omentum. This was accomplished with the Vessel Sealer. After this we identified the proximal bowel and ligament Treitz, and went 50 cm distal.  A 3-0 silk suture was then placed to mari the proximal bowel and then we divided the bowel using a 60 mm × 2.5 mm CAYDEN stapler. The mesentery was also transected using a Vessel Sealer.        After this was done we brought the efferent limb all the way up to the gastric pouch in between our previously created defect in the greater omentum. The staple line and the jejunum was opened using electrocautery and after enlarging the lateral trocar site the 25 mm EEA stapler was introduced into the abdomen and into the open jejunum. Using the EEA stapler we performed our gastrojejunostomy. The stapler was removed and 2 complete donuts were identified. The open ended jejunum was closed using a 60 mm x 2.5 mm CAYDEN stapler and bowel continuity reestablished. This amputated piece of jejunum was placed in an Endopouch and removed from the abdomen. We then measured distal on the small bowel and oriented our bowel 120 cm to create the jejunojejunostomy. An enterotomy was created in each limb with the cautery and using the triple stapling technique created a side-to-side jejunojejunostomy. 60 mm x 2.5 mm staple loads were utilized. The opening was evaluated for hemostasis and care was taken to make sure the posterior wall of the anastomosis was free. This was  directly visualized under laparoscopic visualization. The remaining free defect was closed with one or more firings of a 60 mm x 2.5 mm linear stapler. Numerous sutures of 3-0 Silk sutures were used to close the defect of the mesentery at the jejunojejunostomy. The piece of small bowel from the triple staple technique was then withdrawn from the abdomen. The staple line was noted to be intact and to have captured serosa the entire length of the anastomosis. The gastric remnant without signs of bleeding or ischemia. Having restored bowel continuity we placed multiple interrupted sutures to reinforce the gastrojejunal anastomosis. Interrupted 3-0 Vicryl sutures were placed laterally, posterior laterally and anteriorly so that we could circumferentially reinforce the anastomosis. The bowel was pink and viable, and there was no tension on the anastomosis.   Having completed our reinforced sutures we then proceeded with checking the anastomosis for leakage. After completing the gastrojejunostomy, upper GI endoscopy was performed in order to evaluate the integrity of the anastomosis. The Olympus gastroscope was introduced through the mouth, through the esophagus and into the small gastric pouch. The anastomosis was noted to be patent widely. The scope passed through the anastomosis into the jejunum in the rob limb and blind pouch. The anastomosis was submerged under irrigation placed in the abdomen. There was no evidence of air extravasation on the intraoperative operative leak test performed. The air was evacuated and the scope removed from the patient. I then ran the bowel from the gastrojejunostomy to the terminal ileum. I also ran the bowel from the terminal ileum to the gastrojejunostomy. I also ran the biliopancreatic limb back to the ligament of treitz from the jejunojejunostomy. A 19 perforated Sameer drain was left posterior to the anastomosis and brought out through the right upper quadrant incision. This was secured with a 2-0 silk suture. I then placed a G tube in the remnant sleeve. Two purse string sutures of 0 silk placed. The G tube passed through a small incision in the abdominal wall. The balloon tested. It was passed into the gastrotomy in the sleeve remnant. The sutures tied down. The balloon filled with 10 cc sterile saline. The abdomen was then copiously irrigated and checked for hemostasis. The effluent was evacuated from the abdomen and then we then turned our attention to closure of the trocar sites. At this point prior to evacuation of the pneumoperitoneum we closed all our 12 mm ports with laparoscopic suture fascial closure device using 0 Vicryl. Counts reported to me as correct. The pneumoperitoneum was then evacuated. The wounds were irrigated and found to be hemostatic.   The skin was closed using 4-0 Vicryl in a running subcuticular fashion. Steri-Strips were applied to the wounds the patient was awakened from anesthesia extubated and taken to recovery in stable condition.     Electronically signed by John Perez DO on 7/22/2021 at 10:31 PM

## 2021-07-23 NOTE — PLAN OF CARE
BRONCHOSPASM/BRONCHOCONSTRICTION   [x]        IMPROVE AERATION/BREATH SOUNDS  [x]   ADMINISTER BRONCHODILATOR THERAPY AS APPROPRIATE  [x]   ASSESS BREATH SOUNDS  [x]   PATIENT EDUCATION AS NEEDED

## 2021-07-23 NOTE — PROGRESS NOTES
Home Oxygen Evaluation    Home Oxygen Evaluation completed. Patient is on RA.   Resting SpO2 on room air = 87%    SpO2 on room air with exercise = 78%  SpO2 on 2L oxygen as above with exercise = 83%  SpO2 on 3L oxygen as above with exercise = 90%      Donovan Mathew  3:43 PM

## 2021-07-24 VITALS
DIASTOLIC BLOOD PRESSURE: 87 MMHG | RESPIRATION RATE: 18 BRPM | BODY MASS INDEX: 40.8 KG/M2 | OXYGEN SATURATION: 94 % | SYSTOLIC BLOOD PRESSURE: 172 MMHG | TEMPERATURE: 98.4 F | WEIGHT: 269.18 LBS | HEART RATE: 77 BPM | HEIGHT: 68 IN

## 2021-07-24 PROCEDURE — 2580000003 HC RX 258: Performed by: SURGERY

## 2021-07-24 PROCEDURE — 6370000000 HC RX 637 (ALT 250 FOR IP): Performed by: STUDENT IN AN ORGANIZED HEALTH CARE EDUCATION/TRAINING PROGRAM

## 2021-07-24 PROCEDURE — 6360000002 HC RX W HCPCS: Performed by: STUDENT IN AN ORGANIZED HEALTH CARE EDUCATION/TRAINING PROGRAM

## 2021-07-24 PROCEDURE — 94640 AIRWAY INHALATION TREATMENT: CPT

## 2021-07-24 PROCEDURE — 2500000003 HC RX 250 WO HCPCS: Performed by: SURGERY

## 2021-07-24 PROCEDURE — 6370000000 HC RX 637 (ALT 250 FOR IP): Performed by: SURGERY

## 2021-07-24 PROCEDURE — 6360000002 HC RX W HCPCS: Performed by: SURGERY

## 2021-07-24 RX ADMIN — HEPARIN SODIUM 5000 UNITS: 5000 INJECTION INTRAVENOUS; SUBCUTANEOUS at 05:59

## 2021-07-24 RX ADMIN — SODIUM CHLORIDE, PRESERVATIVE FREE 10 ML: 5 INJECTION INTRAVENOUS at 14:44

## 2021-07-24 RX ADMIN — SERTRALINE 50 MG: 50 TABLET, FILM COATED ORAL at 09:10

## 2021-07-24 RX ADMIN — HYDROCODONE BITARTRATE AND ACETAMINOPHEN 1 TABLET: 5; 325 TABLET ORAL at 09:13

## 2021-07-24 RX ADMIN — HYDROMORPHONE HYDROCHLORIDE 1 MG: 1 INJECTION, SOLUTION INTRAMUSCULAR; INTRAVENOUS; SUBCUTANEOUS at 14:44

## 2021-07-24 RX ADMIN — METHOCARBAMOL TABLETS 750 MG: 750 TABLET, COATED ORAL at 12:33

## 2021-07-24 RX ADMIN — IPRATROPIUM BROMIDE AND ALBUTEROL SULFATE 1 AMPULE: .5; 3 SOLUTION RESPIRATORY (INHALATION) at 08:22

## 2021-07-24 RX ADMIN — METHOCARBAMOL TABLETS 750 MG: 750 TABLET, COATED ORAL at 05:59

## 2021-07-24 RX ADMIN — FAMOTIDINE 20 MG: 10 INJECTION INTRAVENOUS at 08:20

## 2021-07-24 RX ADMIN — KETOROLAC TROMETHAMINE 15 MG: 15 INJECTION, SOLUTION INTRAMUSCULAR; INTRAVENOUS at 12:33

## 2021-07-24 RX ADMIN — LISINOPRIL 40 MG: 20 TABLET ORAL at 08:42

## 2021-07-24 RX ADMIN — LABETALOL HYDROCHLORIDE 150 MG: 200 TABLET, FILM COATED ORAL at 08:20

## 2021-07-24 RX ADMIN — KETOROLAC TROMETHAMINE 15 MG: 15 INJECTION, SOLUTION INTRAMUSCULAR; INTRAVENOUS at 05:59

## 2021-07-24 RX ADMIN — BUSPIRONE HYDROCHLORIDE 10 MG: 10 TABLET ORAL at 09:10

## 2021-07-24 RX ADMIN — SODIUM CHLORIDE, PRESERVATIVE FREE 10 ML: 5 INJECTION INTRAVENOUS at 08:23

## 2021-07-24 ASSESSMENT — PAIN SCALES - GENERAL
PAINLEVEL_OUTOF10: 7
PAINLEVEL_OUTOF10: 6
PAINLEVEL_OUTOF10: 7
PAINLEVEL_OUTOF10: 6
PAINLEVEL_OUTOF10: 6

## 2021-07-24 NOTE — PROGRESS NOTES
Hospital Problems    Diagnosis Date Noted    S/P gastric bypass [Z98.84] 07/21/2021       1. 42 y.o. female with history of morbid obesity, COPD, obstructive sleep apnea, hypertension, postop day 2 status post robotic assisted laparoscopic Narendra-en-Y, G-tube placement, hiatal hernia repair    Plan:  1. Patient seen examined at bedside this morning. 2. Tolerating bariatric clear liquid diet  3. Encourage patient to be out of bed to ambulate/up to chair. 4. Wean O2 as able, did wear CPAP overnight, feels much better today. 5. Esophagram negative for leak. 6. Scripts in chart. 7. We will plan for discharge home later today. 8. Discharge instructions in chart, follow-up in the clinic with Dr. Selene Harrington next week. Electronically signed by Myrtle Araujo DO  on 7/24/2021 at 10:22 AM     Attending Note      I have reviewed the above [facility] note(s) and I either performed the key elements of the medical history and physical exam or was present with the surgery resident when the key elements of the medical history and physical exam were performed. I have discussed the findings, established the care plan and recommendations with the surgical team.  Discharge planning.     Félix Stephenson MD  7/24/2021  11:05 AM

## 2021-07-24 NOTE — PROGRESS NOTES
CLINICAL PHARMACY NOTE: MEDS TO BEDS    Total # of Prescriptions Filled: 5   The following medications were delivered to the patient:  · Methocarbamol 750 MG  · Norco 5-325 MG  · Protonix 40 MG  · Promethazine 25 MG  · Lovenox 40 MG/0.4ML    Additional Documentation: M2B delivered to the patient in her room.

## 2021-07-25 PROBLEM — K21.00 HIATAL HERNIA WITH GERD AND ESOPHAGITIS: Status: ACTIVE | Noted: 2021-04-05

## 2021-07-26 ENCOUNTER — TELEPHONE (OUTPATIENT)
Dept: BARIATRICS/WEIGHT MGMT | Age: 46
End: 2021-07-26

## 2021-07-26 NOTE — DISCHARGE SUMMARY
and was w/o evidence of gastric leak. Bariatric liquid diet started and continued on DC. Pt was ambulatory w/o assistance. At time of discharge, Jorje Castro was tolerating a bariatric liquid diet, ambulating on her own accord, had adequate analgesia on oral pain medications, and had no signs of symptoms of complications. Patient was deemed medically stable and was DC to home on 7/24/2021  3:09 PM w/ instructions to f/u in office in 1 week  Pt expressed understanding of and agreement w/ DC instructions. DISCHARGE INSTRUCTIONS     Discharge Medications:        Medication List      START taking these medications    enoxaparin 40 MG/0.4ML injection  Commonly known as: Lovenox  Inject 0.4 mLs into the skin 2 times daily for 28 days     HYDROcodone-acetaminophen 5-325 MG per tablet  Commonly known as: NORCO  Take 1 tablet by mouth every 6 hours as needed for Pain for up to 7 days. Notes to patient: Your last dose was 7/24/2021 @ 9:13 am     methocarbamol 750 MG tablet  Commonly known as: Robaxin-750  Take 1 tablet by mouth every 6 hours for 7 days     promethazine 25 MG tablet  Commonly known as: PHENERGAN  Take 1 tablet by mouth every 6 hours as needed for Nausea        CHANGE how you take these medications    ferrous sulfate 325 (65 Fe) MG EC tablet  Commonly known as: FE TABS 325  Take 1 tablet by mouth 3 times daily (with meals)  What changed: when to take this  Notes to patient: Do not take until your follow up appointment with Dr. Tyesha Bazan     pantoprazole 40 MG tablet  Commonly known as: PROTONIX  Take 1 tablet by mouth every morning (before breakfast)  What changed: See the new instructions.         CONTINUE taking these medications    albuterol sulfate  (90 Base) MCG/ACT inhaler  INHALE 2 PUFFS INTO THE LUNGS 4 TIMES DAILY AS NEEDED FOR WHEEZING     atorvastatin 10 MG tablet  Commonly known as: LIPITOR  Take 1 tablet by mouth daily     busPIRone 10 MG tablet  Commonly known as: BUSPAR  TAKE 1 TABLET BY MOUTH TWICE A DAY     butalbital-acetaminophen-caffeine -40 MG per tablet  Commonly known as: FIORICET, ESGIC  Take 1 tablet by mouth every 4 hours as needed for Headaches     calcium carbonate 500 MG Tabs tablet  Commonly known as: OSCAL  Notes to patient: Do not take until your follow up appointment with Dr. Paula Hanna     famotidine 20 MG tablet  Commonly known as: PEPCID  Take 1 tablet by mouth nightly     folic acid 1 MG tablet  Commonly known as: FOLVITE  Take 1 tablet by mouth daily  Notes to patient: Do not take until your follow up appointment with Dr. Paula Hanna     labetalol 100 MG tablet  Commonly known as: NORMODYNE  TAKE 1 AND 1/2 TABLET BY MOUTH TWICE DAILY     levonorgestrel IUD 52 mg  Commonly known as: MIRENA     lisinopril 40 MG tablet  Commonly known as: PRINIVIL;ZESTRIL  TAKE 1 TABLET BY MOUTH EVERY DAY     MULTIVITAMIN ADULT EXTRA C PO  Notes to patient: Do not take until your follow up appointment with Dr. Paula Hanna     sertraline 50 MG tablet  Commonly known as: ZOLOFT  TAKE 1 TABLET BY MOUTH EVERY DAY     SUMAtriptan 25 MG tablet  Commonly known as: IMITREX  Take 1 tablet by mouth once as needed for Migraine     vitamin B-12 100 MCG tablet  Commonly known as: CYANOCOBALAMIN  Notes to patient: Do not take until your follow up appointment with Dr. Danielle White taking these medications    vitamin C 250 MG tablet     vitamin D 50 MCG (2000 UT) Caps capsule           Where to Get Your Medications      These medications were sent to [address] [place] [id number] [facility] [place] [phone] - f [phone] [address] [place] [address]    Phone: [phone]   · pantoprazole 40 MG tablet     You can get these medications from any pharmacy    Bring a paper prescription for each of these medications  · enoxaparin 40 MG/0.4ML injection  · HYDROcodone-acetaminophen 5-325 MG per tablet  · methocarbamol 750 MG tablet  · promethazine 25 MG tablet       Diet: Bariatric clears - follow postop instructions on diet  Activity: - Avoid strenuous activity or exercise until cleared during follow-up appointment  - No driving or operating heavy machinery while taking narcotics   Wound Care: Daily and as needed. Keep skin glue in place to incisions. Wash gently with soap and water daily. Follow-up:   1. Dr. Deandra Guerra in 1 week for your scheduled appointment  2.  Follow up in  the next few weeks with PCP: Patrick Castellanos MD,      Godfrey Salgado,    General Surgery Resident

## 2021-07-26 NOTE — TELEPHONE ENCOUNTER
Post-op outreach call made to pt. Pt reports frequent ambulation around home. Pt wearing abd binder. No complaints of SOB or tachycardia. On oxygen    Laparoscopic incisional sites without problems. Drain serosang      Pt has  Not  had a BM since surgery. Passing flatus. Agrees to use Milk of Magnesia or Miriaax and monitor for BM. Pt reports urine output of at least 4 times in a 24 hour period. Intake is described as  water. Rates pain as 7 on a 0-10 scale. Pt using pain medication as directed. Allowed pt the opportunity to ask questions. Reminded patient to reference the patient education materials as needed. Reminded pt that they may phone the office or the \"after hours telephone number\"  that is listed in the patient education binder as needed. Pt verbalized understanding. Pt without concerns at this time    Post op office appt date and time reviewed with pt.     Has Lovenox is going well    Checking blood pressure

## 2021-07-27 ENCOUNTER — HOSPITAL ENCOUNTER (OUTPATIENT)
Dept: ONCOLOGY | Age: 46
Discharge: HOME | End: 2021-07-27
Attending: SURGERY | Admitting: SURGERY
Payer: MEDICARE

## 2021-07-27 ENCOUNTER — TELEPHONE (OUTPATIENT)
Dept: BARIATRICS/WEIGHT MGMT | Age: 46
End: 2021-07-27

## 2021-07-27 ENCOUNTER — NURSE TRIAGE (OUTPATIENT)
Dept: OTHER | Age: 46
End: 2021-07-27

## 2021-07-27 VITALS
TEMPERATURE: 98.1 F | HEART RATE: 75 BPM | OXYGEN SATURATION: 98 % | DIASTOLIC BLOOD PRESSURE: 101 MMHG | SYSTOLIC BLOOD PRESSURE: 191 MMHG | RESPIRATION RATE: 18 BRPM

## 2021-07-27 DIAGNOSIS — E86.0 DEHYDRATION: ICD-10-CM

## 2021-07-27 PROCEDURE — 2580000003 HC RX 258: Performed by: SURGERY

## 2021-07-27 PROCEDURE — 96361 HYDRATE IV INFUSION ADD-ON: CPT

## 2021-07-27 PROCEDURE — 96360 HYDRATION IV INFUSION INIT: CPT

## 2021-07-27 RX ORDER — 0.9 % SODIUM CHLORIDE 0.9 %
1000 INTRAVENOUS SOLUTION INTRAVENOUS ONCE
Status: COMPLETED | OUTPATIENT
Start: 2021-07-27 | End: 2021-07-27

## 2021-07-27 RX ADMIN — SODIUM CHLORIDE 1000 ML: 9 INJECTION, SOLUTION INTRAVENOUS at 15:38

## 2021-07-27 NOTE — TELEPHONE ENCOUNTER
Patient of Dr Iva Nunn. Patient had gastric bypass surgery on 7/21/21 and states last night when changing her dressing she noticed that her skin is healing over her two drains. Patient denies pain more than her normal postop pain and denies any bleeding or leaking around sight. Per care guidelines, writer instructs patient to call back when office opens at 8:30am and discuss with office. Patient verbalizes understanding.      Reason for Disposition   [1] Caller has NON-URGENT question AND [2] triager unable to answer question    Protocols used: POST-OP SYMPTOMS AND QUESTIONS-ADULT-

## 2021-07-27 NOTE — TELEPHONE ENCOUNTER
Next Visit Date:  7/28/2021    Patient Surgery Date  7/21/21    Type of  Surgery  : bypass    Patient calls complaining of : patient states the two drain sites on L side under feeding tube has closed up, she states skin has grown over the tubes that were sticking out  Previously. Patient is asking should she wait to see Dr Enid Argueta for her regular appointment tomorrow or go to ER? Onset: 1 day(s) ago  Timing: constant, intermittent  Severity: Mild    Progression: stable    Associated Symptoms:   Patient advised:   If  Symptoms worsen seek treatment at  Emergency Room. You will receive a call back from the office within 24-48 hours.     Is it ok to leave a message if we call back: yes

## 2021-07-28 ENCOUNTER — OFFICE VISIT (OUTPATIENT)
Dept: BARIATRICS/WEIGHT MGMT | Age: 46
End: 2021-07-28

## 2021-07-28 VITALS
SYSTOLIC BLOOD PRESSURE: 126 MMHG | HEIGHT: 69 IN | WEIGHT: 268 LBS | DIASTOLIC BLOOD PRESSURE: 84 MMHG | BODY MASS INDEX: 39.69 KG/M2 | HEART RATE: 66 BPM | RESPIRATION RATE: 20 BRPM | TEMPERATURE: 97 F

## 2021-07-28 DIAGNOSIS — Z98.84 H/O GASTRIC BYPASS: Primary | ICD-10-CM

## 2021-07-28 PROCEDURE — 99024 POSTOP FOLLOW-UP VISIT: CPT | Performed by: SURGERY

## 2021-07-28 NOTE — Clinical Note
She is doing well after revision from sleeve gastrectomy to gastric bypass.     Thanks    Juliana Mckeon

## 2021-07-28 NOTE — PROGRESS NOTES
Medical Nutrition Therapy  Metabolic and Bariatric surgery  1 week Post operative follow up         Pt reports:         Vitals:   Vitals:    07/28/21 0858   BP: 126/84   Site: Left Upper Arm   Position: Sitting   Cuff Size: Large Adult   Pulse: 66   Resp: 20   Temp: 97 °F (36.1 °C)   Weight: 268 lb (121.6 kg)   Height: 5' 8.5\" (1.74 m)      Body mass index is 40.16 kg/m². Labs reviewed:              Nutrition Assessment:   PES: Inadequate food and beverage intake r/t WLS as evidenced by loss of excess body weight 1 lb.       Goals   60-80gm of protein  48-64oz of fluid       [x] met     []  Not met        Plan:  Pt questions answered re diet advancement;  F/u 5 weeks post-op      David Armenta RD, LD

## 2021-07-31 DIAGNOSIS — E78.5 HYPERLIPIDEMIA, UNSPECIFIED HYPERLIPIDEMIA TYPE: ICD-10-CM

## 2021-07-31 DIAGNOSIS — I10 ESSENTIAL HYPERTENSION: ICD-10-CM

## 2021-07-31 DIAGNOSIS — E66.9 OBESITY (BMI 30-39.9): ICD-10-CM

## 2021-07-31 DIAGNOSIS — K21.9 GASTROESOPHAGEAL REFLUX DISEASE WITHOUT ESOPHAGITIS: ICD-10-CM

## 2021-07-31 DIAGNOSIS — G43.009 MIGRAINE WITHOUT AURA AND WITHOUT STATUS MIGRAINOSUS, NOT INTRACTABLE: ICD-10-CM

## 2021-07-31 DIAGNOSIS — G47.33 OSA (OBSTRUCTIVE SLEEP APNEA): ICD-10-CM

## 2021-07-31 DIAGNOSIS — D50.9 IRON DEFICIENCY ANEMIA, UNSPECIFIED IRON DEFICIENCY ANEMIA TYPE: ICD-10-CM

## 2021-08-01 NOTE — PROGRESS NOTES
[facility]  [facility]  [address] [place] [address] [place]  [address] [place] [id number]  Dept: [phone]    SURGICAL WEIGHT LOSS MANAGEMENT PROGRAM  PROGRESS NOTE     CC: Weight loss    Patient: Fani Albert      Service Date: 7/28/2021  Visit:   1 week    Medical Record #: [id number]  Date of Surgery:       Reason for Visit: Routine Post-Operative:  [] New Problem /   [] FU of existing problem    Patient here for 1 week visit after bypass. No nausea, vomiting, fevers/chills. Tolerating diet. Had a BM. Pain controlled. Height: 5' 8.5\" (1.74 m)  Highest Weight:   269lbs    Current Visit Weight Information  Weight: 268 lb (121.6 kg)   BMI: Body mass index is 40.16 kg/m². Weight loss since surgery:     1    1 wk - D/C'd home on VTE Prohylaxis? [x] Yes   [] No   On VTE Proph as directed? [x] Yes   [] No      Liver pathology:    [] NA    [] No Gross path    [] Liver Steatosis   [x] Discussed w/ pt   [] Referred to GI    Exercising?    [] Yes    [x] No       Review of Systems:     General  Negative for: [] Weight Change   [x] Fatigue   [x] Fevers & Chills    [] Appetite change [] Other:    Positive for: [x] Weight Change   [] Fatigue   [] Fevers & Chills    [] Appetite change [] Other:   Cardiac  Negative for: [x] Chest Pain   [x] Difficulty Breathing   [x] Leg Cramps [x] Edema of Lower Extremeties    [] Left   [] Right      Positive for: [] Chest Pain   [] Difficulty Breathing   [] Leg Cramps [] Edema of Lower Extremeties    [] Left   [] Right   Pulmonary  Negative for: [x] Shortness of Breath [] Wheeze [x] Cough  [x] Calf Pain     Positive for: [] Shortness of Breath [] Wheeze [] Cough  [] Calf Pain   Gastro-Intestinal Negative for: [x] Heartburn   [x] Reflux   [x] Dysphagia   [x] Melena   [x] BRBPR  [x] Vomiting   [x] Abdominal Pain   [x] Diarrhea     [x] Constipation  [] Other:     Positive for: [] Heartburn   [] Reflux   [] Dysphagia   [] Melena   [] BRBPR  [] Vomiting   [] Abdominal Pain   [] Diarrhea     [] Constipation  [] Other:    Muskuloskeletal Negative for: [] Joint pain   [] Back pain   [] Knee Pain   [x] Muscle weakness [x] Hernia   [] Edema [] Other:     Positive for: [] Joint pain   [] Back pain   [] Knee Pain   [] Muscle weakness [] Hernia   [] Edema [] Other:    Neurologic Negative for: [x] Syncope   [x] Insomnia   [] Being treated for depression  [] Other:     Positive for: [] Syncope   [] Insomnia   [] Being treated for depression  [] Other:    Skin Negative for: [] Wound:   [] Open   [] Draining   [] Incisional     [x] Rash   [x] Hair Loss  [] Other:     Positive for: [] Wound:   [] Open   [] Draining    [] Incisional  [] Rash   [] Hair Loss  [] Other:          Physical Assessment:     /84 (Site: Left Upper Arm, Position: Sitting, Cuff Size: Large Adult)   Pulse 66   Temp 97 °F (36.1 °C)   Resp 20   Ht 5' 8.5\" (1.74 m)   Wt 268 lb (121.6 kg)   BMI 40.16 kg/m²   General Negative for:  [x] Lapses in memory   [x] Unusual Stress   [] Diffic Concen [] Unable to sleep   [] Eating in response to stress   [] Other:    Positive for:  [] Lapses in memory   [] Unusual Stress   [] Diffic Concen [] Unable to sleep   [] Eating in response to stress   [] Other:   Cardio-Pulmonary   [x] Heart RRR   [x] No murmurs   [] Pulses nl x4 extrem    [x] Good inspiratory effort   [x] No wheezing     [x] Lungs clear to auscultation bilaterally    [] Other:    Gastro-Intestinal   [x] Abd S/NT/ND/Benign   [x] No abdominal mass/hernia    [x] No Splenomegaly    [] Other:    Muskuloskeletal   [x] Good muscle strength x4 extremities   [x] nl gait and ambul    [x] Nl ROM x4 extremities    [] Other:    Neurologic   [x] Alert and oriented x3    [] Other:   Skin   [x] Intact w/ no open wounds   [x] Incisions C/D/I    [] Steri strips removed   [x] No drainage or Infection    [] Other:      Assessment & Plan:      1.  H/O gastric bypass              [x] Advance Diet    [x] Daily protein

## 2021-08-02 RX ORDER — FAMOTIDINE 20 MG/1
TABLET, FILM COATED ORAL
Qty: 30 TABLET | Refills: 3 | Status: SHIPPED | OUTPATIENT
Start: 2021-08-02 | End: 2022-04-25

## 2021-08-06 ENCOUNTER — OFFICE VISIT (OUTPATIENT)
Dept: BARIATRICS/WEIGHT MGMT | Age: 46
End: 2021-08-06

## 2021-08-06 VITALS
HEIGHT: 69 IN | SYSTOLIC BLOOD PRESSURE: 99 MMHG | OXYGEN SATURATION: 99 % | BODY MASS INDEX: 37.62 KG/M2 | WEIGHT: 254 LBS | DIASTOLIC BLOOD PRESSURE: 74 MMHG | HEART RATE: 78 BPM

## 2021-08-06 DIAGNOSIS — Z98.84 S/P GASTRIC BYPASS: Primary | ICD-10-CM

## 2021-08-06 PROCEDURE — 99024 POSTOP FOLLOW-UP VISIT: CPT | Performed by: SURGERY

## 2021-08-08 NOTE — PROGRESS NOTES
Abdominal Pain   [] Diarrhea     [] Constipation  [] Other:    Muskuloskeletal Negative for: [] Joint pain   [] Back pain   [] Knee Pain   [x] Muscle weakness [x] Hernia   [] Edema [] Other:     Positive for: [] Joint pain   [] Back pain   [] Knee Pain   [] Muscle weakness [] Hernia   [] Edema [] Other:    Neurologic Negative for: [x] Syncope   [x] Insomnia   [] Being treated for depression  [] Other:     Positive for: [] Syncope   [] Insomnia   [] Being treated for depression  [] Other:    Skin Negative for: [] Wound:   [] Open   [] Draining   [] Incisional     [x] Rash   [x] Hair Loss  [] Other:     Positive for: [] Wound:   [] Open   [] Draining    [] Incisional  [] Rash   [] Hair Loss  [] Other:          Physical Assessment:     BP 99/74 (Site: Right Upper Arm, Position: Sitting, Cuff Size: Large Adult)   Pulse 78   Ht 5' 8.5\" (1.74 m)   Wt 254 lb (115.2 kg)   SpO2 99% Comment: on room air  BMI 38.06 kg/m²   General Negative for:  [x] Lapses in memory   [x] Unusual Stress   [] Diffic Concen [] Unable to sleep   [] Eating in response to stress   [] Other:    Positive for:  [] Lapses in memory   [] Unusual Stress   [] Diffic Concen [] Unable to sleep   [] Eating in response to stress   [] Other:   Cardio-Pulmonary   [x] Heart RRR   [x] No murmurs   [] Pulses nl x4 extrem    [x] Good inspiratory effort   [x] No wheezing     [x] Lungs clear to auscultation bilaterally    [] Other:    Gastro-Intestinal   [x] Abd S/NT/ND/Benign   [x] No abdominal mass/hernia    [x] No Splenomegaly    [] Other:    Muskuloskeletal   [x] Good muscle strength x4 extremities   [x] nl gait and ambul    [x] Nl ROM x4 extremities    [] Other:    Neurologic   [x] Alert and oriented x3    [] Other:   Skin   [x] Intact w/ no open wounds   [x] Incisions C/D/I    [] Steri strips removed   [x] No drainage or Infection    [] Other:      Assessment & Plan:      1.  S/P gastric bypass              [x] Advance Diet    [x] Daily protein (65-75gm/day)   [x] Take Vitamins   [x] Attend Support Group    [x] Exercise Regularly     [x] Use contraception:    [] NA    [] s/p Hysterectomy   [] s/p Tubal ligation   [] Other:     Sutures removed    Incisions CDI    Ambulation    G tube removal 6 weeks    PPI    Case reviewed with patient    Lovenox    Pureed    Overall doing well    Follow up: No follow-ups on file. Orders placed this encounter:   No orders of the defined types were placed in this encounter. New Prescriptions:   No orders of the defined types were placed in this encounter. Electronically signed by Susy Hinojosa DO on 8/8/2021 at 5:02 PM    Please note that this chart was generated using voice recognition Dragon dictation software. Although every effort was made to ensure the accuracy of this automated transcription, some errors in transcription may have occurred.

## 2021-08-09 ENCOUNTER — OFFICE VISIT (OUTPATIENT)
Dept: PULMONOLOGY | Age: 46
End: 2021-08-09
Payer: MEDICARE

## 2021-08-09 VITALS
TEMPERATURE: 97.5 F | OXYGEN SATURATION: 97 % | WEIGHT: 250 LBS | DIASTOLIC BLOOD PRESSURE: 73 MMHG | BODY MASS INDEX: 37.03 KG/M2 | HEART RATE: 64 BPM | RESPIRATION RATE: 16 BRPM | HEIGHT: 69 IN | SYSTOLIC BLOOD PRESSURE: 118 MMHG

## 2021-08-09 DIAGNOSIS — G47.33 OSA ON CPAP: Primary | ICD-10-CM

## 2021-08-09 DIAGNOSIS — Z99.89 OSA ON CPAP: Primary | ICD-10-CM

## 2021-08-09 DIAGNOSIS — J44.9 STAGE 1 MILD COPD BY GOLD CLASSIFICATION (HCC): ICD-10-CM

## 2021-08-09 DIAGNOSIS — Z87.891 FORMER MODERATE CIGARETTE SMOKER (10-19 PER DAY): ICD-10-CM

## 2021-08-09 PROCEDURE — G8427 DOCREV CUR MEDS BY ELIG CLIN: HCPCS | Performed by: INTERNAL MEDICINE

## 2021-08-09 PROCEDURE — 3023F SPIROM DOC REV: CPT | Performed by: INTERNAL MEDICINE

## 2021-08-09 PROCEDURE — 1036F TOBACCO NON-USER: CPT | Performed by: INTERNAL MEDICINE

## 2021-08-09 PROCEDURE — G8417 CALC BMI ABV UP PARAM F/U: HCPCS | Performed by: INTERNAL MEDICINE

## 2021-08-09 PROCEDURE — 1111F DSCHRG MED/CURRENT MED MERGE: CPT | Performed by: INTERNAL MEDICINE

## 2021-08-09 PROCEDURE — G8926 SPIRO NO PERF OR DOC: HCPCS | Performed by: INTERNAL MEDICINE

## 2021-08-09 PROCEDURE — 99214 OFFICE O/P EST MOD 30 MIN: CPT | Performed by: INTERNAL MEDICINE

## 2021-08-09 RX ORDER — ASCORBIC ACID 250 MG
500 TABLET ORAL DAILY
COMMUNITY

## 2021-08-09 ASSESSMENT — SLEEP AND FATIGUE QUESTIONNAIRES
HOW LIKELY ARE YOU TO NOD OFF OR FALL ASLEEP WHILE SITTING AND READING: 2
HOW LIKELY ARE YOU TO NOD OFF OR FALL ASLEEP IN A CAR, WHILE STOPPED FOR A FEW MINUTES IN TRAFFIC: 0
HOW LIKELY ARE YOU TO NOD OFF OR FALL ASLEEP WHILE SITTING AND TALKING TO SOMEONE: 0
HOW LIKELY ARE YOU TO NOD OFF OR FALL ASLEEP WHILE LYING DOWN TO REST IN THE AFTERNOON WHEN CIRCUMSTANCES PERMIT: 0
HOW LIKELY ARE YOU TO NOD OFF OR FALL ASLEEP WHILE SITTING INACTIVE IN A PUBLIC PLACE: 0
HOW LIKELY ARE YOU TO NOD OFF OR FALL ASLEEP WHILE SITTING QUIETLY AFTER LUNCH WITHOUT ALCOHOL: 0
HOW LIKELY ARE YOU TO NOD OFF OR FALL ASLEEP WHILE WATCHING TV: 2
HOW LIKELY ARE YOU TO NOD OFF OR FALL ASLEEP WHEN YOU ARE A PASSENGER IN A CAR FOR AN HOUR WITHOUT A BREAK: 0
ESS TOTAL SCORE: 4

## 2021-08-09 NOTE — PROGRESS NOTES
PULMONARY MEDICINE OUTPATIENT FOLLOW UP NOTE                                                                       PATIENT:  Miriam Díaz OF BIRTH: 1975  MRN: [id number]     REFERRED BY: Patrick Castellanos MD   CHIEF COMPLIANT: Sleep Apnea (follow up)       HISTORY     HISTORY OF PRESENT ILLNESS:   Kiah Hu is a 42y.o. year old female here for follow-up    Obstructive sleep apnea:  Patient was initially diagnosed obstructive sleep apnea in 2011 and reportedly had \"very bad symptoms\". She subsequently had a gastric sleeve surgery in 2016 after which she lost about 83 pounds. Patient quit using CPAP on her own. She recently underwent a repeat sleep study for preop evaluation for gastric sleeve surgery and was noted to have severe obstructive sleep apnea, with an AHI of 82.6. She was subsequently recommended in an AutoPap on a follow-up titration study. She using it with a full facemask every day. She is returning after gastric sleeve surgery last month. Reports that postoperatively she required supplemental oxygen which was recently discontinued. She reports good compliance to her CPAP machine, however compliance data is unavailable at the time of clinic visit. Chronic bronchitis:  She is also a former smoker and  reports that she quit smoking about a year ago. Her smoking use included cigarettes. She started smoking about 18 months ago. She has a 22.00 pack-year smoking history. She has never used smokeless tobacco.  She reports diagnosis of COPD. Her recent PFT showed mild obstructive ventilatory impairment with moderate reduction diffusion capacity. Her pulmonary symptoms seems to be fairly well-controlled and she currently only uses as needed albuterol.         PAST MEDICAL HISTORY:      Diagnosis Date    Anemia     Anxiety and depression 04/05/2021    Anxiety state 08/12/2020    Asthma     COPD (chronic obstructive pulmonary disease) ([facility]     Essential hypertension 91/49/3552    Folic acid deficiency 20/57/3810    Hiatal hernia 08/12/2020    Hiatal hernia with GERD 04/05/2021    History of abnormal cervical Pap smear     History of nicotine use 04/05/2021    Hyperlipidemia     Hypertension     PCP DR Libby Ortiz    Hypertensive emergency 09/20/2020    Iron deficiency anemia 08/12/2020    IUD (intrauterine device) in place 08/04/2020    Mirena    Menometrorrhagia     Migraine without aura and without status migrainosus, not intractable 09/21/2020    Morbid obesity due to excess calories ([facility] 08/12/2020    Obesity (BMI 30-39.9) 09/20/2020    CORNELIA on CPAP     DR Harman Rausch (last visit June 2021)    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 2/22/16 Sleeve Gastrectomy and repair of sm to mod size sliding Hiatal Hernia; 2/23/16 UGI WNL postop    Recurrent major depressive disorder, in partial remission ([facility] 09/20/2020    Vitamin D deficiency 08/12/2020    Vit D low 18.0/18.1; treat with Vitamin D 50,000 iu 3x wk x 6 wks then repeat level     PAST SURGICAL HISTORY:      Procedure Laterality Date    BARIATRIC SURGERY  02/22/2016    GASTRIC SLEEVE (IN [place])    BREAST LUMPECTOMY Right     CHOLECYSTECTOMY      COLPOSCOPY      GASTROSTOMY TUBE PLACEMENT  07/21/2021    HERNIA REPAIR      DONE DURING GASTRIC SLEEVE    HERNIA REPAIR  07/21/2021    REPAIR OF RECURRENT HIATAL HERNIA     INTRAUTERINE DEVICE INSERTION  08/04/2020    Mirena     LIPOMA RESECTION      lt shoulder/upper back    ADRIAN-EN-Y GASTRIC BYPASS  07/21/2021    ROBOTIC LAPAROSCOPIC GASTRIC BYPASS ADRIAN-EN-Y,  EXTENSIVE LYSIS OF ADHESIONS, EGD, REPAIR OF RECURRENT HIATAL HERNIA , G TUBE INSERTION    ADRIAN-EN-Y GASTRIC BYPASS N/A 7/21/2021    XI ROBOTIC LAPAROSCOPIC GASTRIC BYPASS ADRIAN-EN-Y,  EXTENSIVE LYSIS OF ADHESIONS, EGD, REPAIR OF RECURRENT HIATAL HERNIA , G TUBE INSERTION performed by Ray Valadez DO at [address] [place] [address] 04/01/2021    TRUNK LESION BIOPSY EXCISION RIGHT BACK X 6, LEFT, RIGHT, AND MID BACK ALL WITH COMPLEX CLOSURE. performed by Malathi Rich MD at [address] 05/07/2021    EGD BIOPSY performed by Osei Fernández DO at [address]:  TOBACCO:   reports that she quit smoking about a year ago. Her smoking use included cigarettes. She started smoking about 18 months ago. She has a 22.00 pack-year smoking history. She has never used smokeless tobacco.  ETOH:   reports current alcohol use. DRUGS: reports no history of drug use.      AVOCATION/OCCUPATIONAL EXPOSURE:  [] Asbestos      [] Hot tub  [] Silica dust    [] Pets  [] Coal            [] Exotic birds  [] [employer]       [] Tuberculosis  [] Terald Sox         [] Others  [] [place]          PHYSICAL EXAMINATION      VITAL SIGNS:   /73 (Site: Left Upper Arm, Position: Sitting)   Pulse 64   Temp 97.5 °F (36.4 °C) (Temporal)   Resp 16   Ht 5' 8.5\" (1.74 m)   Wt 250 lb (113.4 kg)   SpO2 97%   BMI 37.46 kg/m²   Wt Readings from Last 3 Encounters:   08/09/21 250 lb (113.4 kg)   08/06/21 254 lb (115.2 kg)   07/28/21 268 lb (121.6 kg)     SYSTEMIC EXAMINATION:   General appearance -  [x] well appearing  [] overweight  [x] morbidly obese [] cachectic [x] comfortable  [x] in no acute distress  [] chronically ill-appearing  [] in mild to moderate respiratory distress   Mental status -  [x] alert  [x] oriented to person, place, and time  [] anxious  [] depressed mood    Head-  [x] atraumatic  [x] normocephalic   Eyes -  [] pupils equal and reactive, extraocular eye movements intact  [] sclera anicteric   Ears -  [x] hearing grossly normal bilaterally [] bilateral TM's and external ear canals normal   Nose -  [x] normal and patent [] no erythema  [] discharge   Mouth -  [x] mucous membranes moist  [] pharynx normal without lesions [] erythematous  [] exudate noted   Mallampati Airway Score -  [] I (soft palate, uvula, fauces, tonsillar pillars visible) [] II (soft palate, uvula, fauces visible) [x]  III (soft palate, base of uvula visible) [] IV (only hard palate visible)   Neck -  [] supple  [] no significant adenopathy  [] carotids upstroke normal bilaterally [] no JVD  [] no bruit   Lymphatics -  [x] no palpable lymphadenopathy  [] no hepatosplenomegaly   Chest -   [x] normal chest excursion  [x] no chest wall tenderness  [] increased AP diameter[] pectus noted [] scoliosis noted [x] no tachypnea retraction or cyanosis [x] clear to auscultation, no wheezes, rales or rhonchi, symmetric air entry  [] wheezing noted  [] rales noted  []rhonchi noted [] decreased air entry noted bilaterally   Heart -  [x] normal rate,  [x] regular rhythm  [] irregularly irregular rhythm [x] normal S1  [x] normal S2  [x] no murmurs, rubs, clicks or gallops  [] S3 present  [] S4 present  [] systolic murmur  [] diastolic murmur  [] midsystolic click []pericardial rub present    Abdomen -  [] soft [] nontender  [] nondistended  [] no masses or organomegaly   Neurological -  [x] normal speech  [x] no focal findings or movement disorder noted  [] cranial nerves II through XII intact  [] DTR's normal and symmetric  [] Babinski sign negative,  [x] motor and sensory grossly normal bilaterally  [] normal muscle tone [x] no tremors  [] strength 5/5  [] Romberg sign negative [] normal gait    Musculoskeletal -  [x] no joint tenderness [x] no deformity or swelling   Extremities - [x] no clubbing or cyanosis,  [x] no pedal edema [] pedal edema  [] intact peripheral pulses   Skin -  [x] normal coloration and turgor  [x] no rashes  [] no suspicious skin lesions noted          MEDICAL DECISION MAKING     1. PROBLEMS ADDRESSED (NUMBER AND COMPLEXITY)       ICD-10-CM    1. CORNELIA on CPAP  G47.33     Z99.89    2. Stage 1 mild COPD by GOLD classification ([facility]  J44.9    3. Former moderate cigarette smoker (10-19 per day)  Z87.891         2.  DATA REVIEWED AND ANALYZED (AMOUNT AND/OR COMPLEXITY)   LABS:  ABG:   No results found for: PH, PHART, PCO2, FUZ6DTK, PO2, PO2ART, HCO3, ZIL7SPA, BE, BEART, THGB, X2KWERWH  VBG:  No results found for: PHVEN, NRW7VMF, BEVEN, K5NWFMDG  CBC:   Lab Results   Component Value Date    WBC 8.4 07/23/2021    RBC 3.99 07/23/2021    HGB 11.9 07/23/2021    HCT 38.1 07/23/2021     07/23/2021    MCV 95.5 07/23/2021    MCH 29.8 07/23/2021    MCHC 31.2 07/23/2021    RDW 14.5 (H) 07/23/2021    LYMPHOPCT 27 04/22/2021    MONOPCT 5 04/22/2021    BASOPCT 1 04/22/2021    MONOSABS 0.28 04/22/2021    LYMPHSABS 1.47 04/22/2021    EOSABS 0.28 04/22/2021    BASOSABS 0.03 04/22/2021    DIFFTYPE NOT REPORTED 04/22/2021     Eosinophils/IgE:   Lab Results   Component Value Date    EOSABS 0.28 04/22/2021     CMP:   Lab Results   Component Value Date     07/23/2021    K 4.0 07/23/2021     07/23/2021    CO2 23 07/23/2021    BUN 10 07/23/2021    CREATININE 0.71 07/23/2021    GLUCOSE 86 07/23/2021    MG 2.0 04/22/2021    CALCIUM 8.6 07/23/2021    PROT 7.3 04/22/2021    LABALBU 4.2 04/22/2021    BILITOT 0.34 04/22/2021    ALT 24 04/22/2021    AST 25 04/22/2021    ALKPHOS 106 (H) 04/22/2021     Coagulation Profile:   Lab Results   Component Value Date    INR 0.9 07/08/2021    PROTIME 10.2 07/08/2021    APTT 23.0 07/08/2021     BNP:   Lab Results   Component Value Date    PROBNP 504 (H) 09/20/2020     D-Dimer/Fibrinogen:  No results found for: DDIMER  Others labs:  Lab Results   Component Value Date    TSH 2.35 04/22/2021     No results found for: JAD, ANATITER, RHEUMFACTOR, RF, C3, C4, MPO, PR3, SEDRATE, CRP  Lab Results   Component Value Date    IRON 65 04/22/2021    TIBC 432 04/22/2021    FERRITIN 12 (L) 03/24/2021    FOLATE >20.0 04/22/2021     No results found for: SPEP, UPEP, PSA, CEA, , YF3026,   No results found for: RPR, HIV    RADIOLOGY:  [] Ordered  [] Unavailable  [x] Reviewed     Chest x-ray 7/8/2021  FINDINGS: Normal cardiopericardial silhouette       There are no significant pleural, parenchymal, mediastinal or osseous findings           Impression   No acute cardiopulmonary findings     Chest x-ray: 4/18/2019  Impression   No acute process.         CT chest:  PET scan:    ECHOCARDIOGRAM:  No results found for this or any previous visit. PULMONARY FUNCTION TEST:  [] Ordered  [] Unavailable  [x] Reviewed  07/07/2021     Spirometry shows FVC is 4.25, 99% predicted. FEV1 is 3.05, 89%  predicted. Both are within normal limits. FEV1/FVC ratio is slightly  reduced, suggestive of mild obstructive ventilatory impairment. Post  bronchodilator, there was no significant improvement in FEV1 or FVC to  suggest bronchospasticity or airway reversibility, but clinical response  is possible. Lung volume shows residual volume is 2.98, 155% predicted. Total lung capacity is 7.35, 126% predicted, both consistent with airway  trapping/hyperinflation. Diffusion capacity is 26.87, 86%, which is  within normal limits.     IMPRESSION:  This pulmonary function test is consistent with mild  obstructive ventilatory impairment according to FEV1/FVC ratio. There  is no significant response to bronchodilator but clinical response is  possible. Lung volume is consistent with airway trapping or  hyperinflation. Diffusion capacity is within normal limits. Clinical  correlation is recommended. 6 MINUTE WALK TEST:  [] Ordered  [x] Unavailable  [] Reviewed  No results found for this or any previous visit.     Distance Walk Predicted Distance LLN** Predicted % Duration (min) Duration of Stops [place] Dyspnea [place] Fatigue               POLYSOMNOGRAM:  BASELINE SLEEP STUDY:   [] Ordered  [] Unavailable  [x] Reviewed  3/15/2021  AHI 82.6, lowest SaO2 75%    TITRATION STUDY:   [] Ordered  [] Unavailable  [x] Reviewed   4/13/2021, recommended AutoPap    CPAP/BIPAP COMPLIANCE DATA:   [x] Unavailable    ASSESSMENT OF EXCESSIVE DAYTIME SLEEPINESS (BY INDEPENDENT HISTORIAN):  [place] Sleepiness Scale:  [] Not obtained  [x] Obtained  [place] Sleepiness Scale:  Sleep Medicine 8/9/2021 6/9/2021 2/25/2021   Sitting and reading 2 2 -   Watching TV 2 2 -   Sitting, inactive in a public place (e.g. a theatre or a meeting) 0 0 -   As a passenger in a car for an hour without a break 0 2 -   Lying down to rest in the afternoon when circumstances permit 0 1 -   Sitting and talking to someone 0 0 -   Sitting quietly after a lunch without alcohol 0 0 -   In a car, while stopped for a few minutes in traffic 0 0 -   Total score 4 7 -   Neck circumference - - 19           PRIOR EXTERNAL NOTES:  [] Unavailable  [x] Reviewed    ORDERS PLACED:  No orders of the defined types were placed in this encounter.        3. RISK OF COMPLICATIONS AND/OR MORBIDITY OR MORTALITY:     ALLERGIES:  Allergies   Allergen Reactions    Nsaids      Pt had gastric sleeve  Cannot take due to bariatric surgery      Tolmetin      Pt had gastric sleeve    Codeine Itching      MEDICATIONS:  Outpatient Medications Prior to Visit   Medication Sig Dispense Refill    famotidine (PEPCID) 20 MG tablet TAKE 1 TABLET BY MOUTH EVERY DAY AT NIGHT 30 tablet 3    pantoprazole (PROTONIX) 40 MG tablet Take 1 tablet by mouth every morning (before breakfast) 90 tablet 1    enoxaparin (LOVENOX) 40 MG/0.4ML injection Inject 0.4 mLs into the skin 2 times daily for 28 days 22.4 mL 0    busPIRone (BUSPAR) 10 MG tablet TAKE 1 TABLET BY MOUTH TWICE A  tablet 1    sertraline (ZOLOFT) 50 MG tablet TAKE 1 TABLET BY MOUTH EVERY DAY 30 tablet 2    albuterol sulfate  (90 Base) MCG/ACT inhaler INHALE 2 PUFFS INTO THE LUNGS 4 TIMES DAILY AS NEEDED FOR WHEEZING 6.7 Inhaler 2    labetalol (NORMODYNE) 100 MG tablet TAKE 1 AND 1/2 TABLET BY MOUTH TWICE DAILY (Patient taking differently: 100 mg ) 270 tablet 0    calcium carbonate (OSCAL) 500 MG TABS tablet Take 500 mg by mouth daily      lisinopril (PRINIVIL;ZESTRIL) 40 MG tablet TAKE 1 TABLET BY MOUTH EVERY DAY 90 tablet 0    vitamin B-12 (CYANOCOBALAMIN) 100 MCG tablet TAKE 1 TABLET BY MOUTH EVERY DAY      atorvastatin (LIPITOR) 10 MG tablet Take 1 tablet by mouth daily 90 tablet 1    folic acid (FOLVITE) 1 MG tablet Take 1 tablet by mouth daily 90 tablet 1    SUMAtriptan (IMITREX) 25 MG tablet Take 1 tablet by mouth once as needed for Migraine 9 tablet 0    butalbital-acetaminophen-caffeine (FIORICET, ESGIC) -40 MG per tablet Take 1 tablet by mouth every 4 hours as needed for Headaches 20 tablet 0    Multiple Vitamins-Minerals (MULTIVITAMIN ADULT EXTRA C PO) Take 1 tablet by mouth daily      levonorgestrel (MIRENA) IUD 52 mg 1 each by Intrauterine route      ascorbic acid (VITAMIN C) 250 MG tablet Take 500 mg by mouth daily (Patient not taking: Reported on 8/9/2021)      ferrous sulfate (FE TABS 325) 325 (65 Fe) MG EC tablet Take 1 tablet by mouth 3 times daily (with meals) (Patient not taking: Reported on 8/9/2021) 90 tablet 0     No facility-administered medications prior to visit. PRESCRIPTION DRUG MANAGEMENT/RECOMMENDATIONS:   PFT reviewed, findings discussed with patient   Patient remains minimally symptomatic from respiratory standpoint   Currently using as only needed albuterol    SUPPLEMENTAL OXYGEN/NIV RECOMMENDATIONS:   Patient is not on home oxygen therapy.    Reports that she required supplemental oxygen preoperatively for several days and the oxygen was discontinued recently    SLEEP DISORDER RECOMMENDATIONS:  Patient denies any excessive daytime sleepiness and reports refreshing and restorative sleep  Patient is using PAP as recommended and is benefiting from the therapy  Compliance data was not reviewed  Continue to use CPAP/BiPAP for at least 4 hours every night  Use heated humidification, if needed  Weight loss recommended  Recommend good sleep hygiene and instructions given  Patient not to drive or operate heavy machinery, if sleepy    SMOKING CESSATION RECOMMENDATIONS/COUNSELING:   Recommended to continue smoking cessation    LIFESTYLE MODIFICATION RECOMMENDATIONS/COUNSELING:   Follow healthy behaviors: nutrition, exercise and medication adherence.  Maintain an active lifestyle. LUNG CANCER SCREENING/OTHER IMAGING RECOMMENDATIONS:   After reviewing the patient's smoking history, age and other clinical criteria, the low dose CT is not indicated (Nonsmoker/Smoking <30 pack-years)     IMMUNIZATION HISTORY/RECOMMENDATIONS:    Immunization History   Administered Date(s) Administered    COVID-19, J&J, PF, 0.5 mL 06/24/2021    Tdap (Boostrix, Adacel) 09/01/2013     All the questions that the patient had were answered to her satisfaction  We'll see the patient back in 6 months  Thank you for having us involved in the care of your patient. Please call us if you have any questions or concerns. Sujatha Monet MD  Pulmonary and Critical Care Medicine            Please note that this chart was generated using voice recognition Dragon dictation software. Although every effort was made to ensure the accuracy of this automated transcription, some errors in transcription may have occurred.

## 2021-08-12 ENCOUNTER — TELEPHONE (OUTPATIENT)
Dept: FAMILY MEDICINE CLINIC | Age: 46
End: 2021-08-12

## 2021-08-17 DIAGNOSIS — E86.0 DEHYDRATION: ICD-10-CM

## 2021-08-17 DIAGNOSIS — R11.2 INTRACTABLE VOMITING WITH NAUSEA, UNSPECIFIED VOMITING TYPE: Primary | ICD-10-CM

## 2021-08-17 DIAGNOSIS — Z98.84 S/P GASTRIC BYPASS: ICD-10-CM

## 2021-08-17 RX ORDER — SUCRALFATE 1 G/1
1 TABLET ORAL 4 TIMES DAILY
Qty: 120 TABLET | Refills: 3 | Status: SHIPPED | OUTPATIENT
Start: 2021-08-17

## 2021-08-17 RX ORDER — ONDANSETRON 4 MG/1
4 TABLET, FILM COATED ORAL EVERY 8 HOURS PRN
Qty: 30 TABLET | Refills: 2 | Status: SHIPPED | OUTPATIENT
Start: 2021-08-17

## 2021-08-18 ENCOUNTER — HOSPITAL ENCOUNTER (OUTPATIENT)
Dept: ONCOLOGY | Age: 46
Discharge: HOME | End: 2021-08-18
Attending: SURGERY | Admitting: SURGERY
Payer: MEDICARE

## 2021-08-18 VITALS
DIASTOLIC BLOOD PRESSURE: 78 MMHG | OXYGEN SATURATION: 98 % | SYSTOLIC BLOOD PRESSURE: 132 MMHG | RESPIRATION RATE: 18 BRPM | HEART RATE: 61 BPM | TEMPERATURE: 97 F

## 2021-08-18 DIAGNOSIS — I10 ESSENTIAL HYPERTENSION: ICD-10-CM

## 2021-08-18 PROCEDURE — 96361 HYDRATE IV INFUSION ADD-ON: CPT

## 2021-08-18 PROCEDURE — 76937 US GUIDE VASCULAR ACCESS: CPT

## 2021-08-18 PROCEDURE — 96360 HYDRATION IV INFUSION INIT: CPT

## 2021-08-18 PROCEDURE — 2580000003 HC RX 258: Performed by: SURGERY

## 2021-08-18 RX ORDER — 0.9 % SODIUM CHLORIDE 0.9 %
1000 INTRAVENOUS SOLUTION INTRAVENOUS ONCE
Status: COMPLETED | OUTPATIENT
Start: 2021-08-18 | End: 2021-08-18

## 2021-08-18 RX ORDER — LISINOPRIL 40 MG/1
TABLET ORAL
Qty: 90 TABLET | Refills: 0 | Status: SHIPPED | OUTPATIENT
Start: 2021-08-18 | End: 2021-10-20

## 2021-08-18 RX ADMIN — SODIUM CHLORIDE 1000 ML: 9 INJECTION, SOLUTION INTRAVENOUS at 14:39

## 2021-08-18 RX ADMIN — SODIUM CHLORIDE 1000 ML: 9 INJECTION, SOLUTION INTRAVENOUS at 15:33

## 2021-08-18 NOTE — TELEPHONE ENCOUNTER
depressive disorder, in partial remission (HCC)     Folic acid deficiency     Migraine without aura and without status migrainosus, not intractable     Neoplasm of uncertain behavior of skin     Hiatal hernia with GERD and esophagitis     Anxiety and depression     Former moderate cigarette smoker (10-19 per day)     S/P gastric bypass     Dehydration     Stage 1 mild COPD by GOLD classification ([facility]         '

## 2021-08-23 ENCOUNTER — TELEPHONE (OUTPATIENT)
Dept: BARIATRICS/WEIGHT MGMT | Age: 46
End: 2021-08-23

## 2021-09-02 DIAGNOSIS — J44.9 CHRONIC OBSTRUCTIVE PULMONARY DISEASE, UNSPECIFIED COPD TYPE (HCC): ICD-10-CM

## 2021-09-02 RX ORDER — ALBUTEROL SULFATE 90 UG/1
2 AEROSOL, METERED RESPIRATORY (INHALATION) 4 TIMES DAILY PRN
Qty: 18 G | Refills: 2 | Status: SHIPPED | OUTPATIENT
Start: 2021-09-02

## 2021-09-02 NOTE — TELEPHONE ENCOUNTER
Electronic medication refill request. Pharmacy on file. Please advise.       Next Visit Date:  Future Appointments   Date Time Provider Bayron Varmaisti   9/8/2021  9:45 AM Fara France, DO bariatric [facility] [place]   10/21/2021 10:45 AM MD Luz Vicente  [facility]   2/7/2022  1:00 PM Mojgan Miller MD Resp [facility] Maintenance   Topic Date Due    Pneumococcal 0-64 years Vaccine (1 of 2 - PPSV23) Never done    Flu vaccine (1) Never done    Cervical cancer screen  01/20/2022 (Originally 6/20/1998)    Lipid screen  04/22/2022    Potassium monitoring  07/23/2022    Creatinine monitoring  07/23/2022    DTaP/Tdap/Td vaccine (2 - Td or Tdap) 09/01/2023    COVID-19 Vaccine  Completed    Hepatitis C screen  Completed    HIV screen  Completed    Hepatitis A vaccine  Aged Out    Hepatitis B vaccine  Aged Out    Hib vaccine  Aged Out    Meningococcal (ACWY) vaccine  Aged Out       Hemoglobin A1C (%)   Date Value   04/22/2021 5.1             ( goal A1C is < 7)   No results found for: LABMICR  LDL Cholesterol (mg/dL)   Date Value   04/22/2021 109   03/24/2021 95       (goal LDL is <100)   AST (U/L)   Date Value   04/22/2021 25     ALT (U/L)   Date Value   04/22/2021 24     BUN (mg/dL)   Date Value   07/23/2021 10     BP Readings from Last 3 Encounters:   08/18/21 132/78   08/09/21 118/73   08/06/21 99/74          (goal 120/80)    All Future Testing planned in [facility]  Lab Frequency Next Occurrence   Metanephrines Urine Once 09/28/2020   Metanephrines Plasma Free Once 09/28/2020   Aldosterone Once 09/28/2020   Renin Once 09/28/2020               Patient Active Problem List:     Menometrorrhagia     IUD (intrauterine device) in place     Essential hypertension     Hiatal hernia     Hyperlipidemia     Iron deficiency anemia     Morbid obesity with body mass index of 40.0-44.9 in adult ([address]     Postgastrectomy malabsorption     CORNELIA on CPAP     Vitamin D deficiency     Anxiety state Hypertensive emergency     Obesity (BMI 30-39. 9)     Recurrent major depressive disorder, in partial remission (HCC)     Folic acid deficiency     Migraine without aura and without status migrainosus, not intractable     Neoplasm of uncertain behavior of skin     Hiatal hernia with GERD and esophagitis     Anxiety and depression     Former moderate cigarette smoker (10-19 per day)     S/P gastric bypass     Dehydration     Stage 1 mild COPD by GOLD classification ([facility]

## 2021-09-05 DIAGNOSIS — I16.1 HYPERTENSIVE EMERGENCY: ICD-10-CM

## 2021-09-06 ENCOUNTER — HOSPITAL ENCOUNTER (OUTPATIENT)
Age: 46
Setting detail: OBSERVATION
Discharge: HOME | End: 2021-09-08
Attending: EMERGENCY MEDICINE | Admitting: SURGERY
Payer: MEDICARE

## 2021-09-06 ENCOUNTER — NURSE TRIAGE (OUTPATIENT)
Dept: OTHER | Age: 46
End: 2021-09-06

## 2021-09-06 DIAGNOSIS — R10.13 ABDOMINAL PAIN, EPIGASTRIC: Primary | ICD-10-CM

## 2021-09-06 LAB
ABSOLUTE EOS #: 0.31 K/UL (ref 0–0.44)
ABSOLUTE IMMATURE GRANULOCYTE: <0.03 K/UL (ref 0–0.3)
ABSOLUTE LYMPH #: 2.04 K/UL (ref 1.1–3.7)
ABSOLUTE MONO #: 0.34 K/UL (ref 0.1–1.2)
ALBUMIN SERPL-MCNC: 4.2 G/DL (ref 3.5–5.2)
ALBUMIN/GLOBULIN RATIO: 1.4 (ref 1–2.5)
ALP BLD-CCNC: 164 U/L (ref 35–104)
ALT SERPL-CCNC: 28 U/L (ref 5–33)
ANION GAP SERPL CALCULATED.3IONS-SCNC: 13 MMOL/L (ref 9–17)
AST SERPL-CCNC: 27 U/L
BASOPHILS # BLD: 1 % (ref 0–2)
BASOPHILS ABSOLUTE: 0.03 K/UL (ref 0–0.2)
BILIRUB SERPL-MCNC: 0.5 MG/DL (ref 0.3–1.2)
BUN BLDV-MCNC: 15 MG/DL (ref 6–20)
BUN/CREAT BLD: ABNORMAL (ref 9–20)
CALCIUM SERPL-MCNC: 9.3 MG/DL (ref 8.6–10.4)
CHLORIDE BLD-SCNC: 105 MMOL/L (ref 98–107)
CO2: 24 MMOL/L (ref 20–31)
CREAT SERPL-MCNC: 0.58 MG/DL (ref 0.5–0.9)
DIFFERENTIAL TYPE: ABNORMAL
EOSINOPHILS RELATIVE PERCENT: 6 % (ref 1–4)
GFR AFRICAN AMERICAN: >60 ML/MIN
GFR NON-AFRICAN AMERICAN: >60 ML/MIN
GFR SERPL CREATININE-BSD FRML MDRD: ABNORMAL ML/MIN/{1.73_M2}
GFR SERPL CREATININE-BSD FRML MDRD: ABNORMAL ML/MIN/{1.73_M2}
GLUCOSE BLD-MCNC: 95 MG/DL (ref 70–99)
HCG QUALITATIVE: NEGATIVE
HCT VFR BLD CALC: 39.1 % (ref 36.3–47.1)
HEMOGLOBIN: 12.8 G/DL (ref 11.9–15.1)
IMMATURE GRANULOCYTES: 0 %
LIPASE: 25 U/L (ref 13–60)
LYMPHOCYTES # BLD: 36 % (ref 24–43)
MCH RBC QN AUTO: 29 PG (ref 25.2–33.5)
MCHC RBC AUTO-ENTMCNC: 32.7 G/DL (ref 28.4–34.8)
MCV RBC AUTO: 88.5 FL (ref 82.6–102.9)
MONOCYTES # BLD: 6 % (ref 3–12)
NRBC AUTOMATED: 0 PER 100 WBC
PDW BLD-RTO: 12.9 % (ref 11.8–14.4)
PLATELET # BLD: 249 K/UL (ref 138–453)
PLATELET ESTIMATE: ABNORMAL
PMV BLD AUTO: 9.7 FL (ref 8.1–13.5)
POTASSIUM SERPL-SCNC: 4.1 MMOL/L (ref 3.7–5.3)
RBC # BLD: 4.42 M/UL (ref 3.95–5.11)
RBC # BLD: ABNORMAL 10*6/UL
SEG NEUTROPHILS: 51 % (ref 36–65)
SEGMENTED NEUTROPHILS ABSOLUTE COUNT: 2.88 K/UL (ref 1.5–8.1)
SODIUM BLD-SCNC: 142 MMOL/L (ref 135–144)
TOTAL PROTEIN: 7.3 G/DL (ref 6.4–8.3)
WBC # BLD: 5.6 K/UL (ref 3.5–11.3)
WBC # BLD: ABNORMAL 10*3/UL

## 2021-09-06 PROCEDURE — 96365 THER/PROPH/DIAG IV INF INIT: CPT

## 2021-09-06 PROCEDURE — 80053 COMPREHEN METABOLIC PANEL: CPT

## 2021-09-06 PROCEDURE — 2580000003 HC RX 258: Performed by: STUDENT IN AN ORGANIZED HEALTH CARE EDUCATION/TRAINING PROGRAM

## 2021-09-06 PROCEDURE — 96375 TX/PRO/DX INJ NEW DRUG ADDON: CPT

## 2021-09-06 PROCEDURE — C9113 INJ PANTOPRAZOLE SODIUM, VIA: HCPCS | Performed by: STUDENT IN AN ORGANIZED HEALTH CARE EDUCATION/TRAINING PROGRAM

## 2021-09-06 PROCEDURE — 6360000002 HC RX W HCPCS: Performed by: STUDENT IN AN ORGANIZED HEALTH CARE EDUCATION/TRAINING PROGRAM

## 2021-09-06 PROCEDURE — 99283 EMERGENCY DEPT VISIT LOW MDM: CPT

## 2021-09-06 PROCEDURE — 85025 COMPLETE CBC W/AUTO DIFF WBC: CPT

## 2021-09-06 PROCEDURE — 96366 THER/PROPH/DIAG IV INF ADDON: CPT

## 2021-09-06 PROCEDURE — 84703 CHORIONIC GONADOTROPIN ASSAY: CPT

## 2021-09-06 PROCEDURE — 83690 ASSAY OF LIPASE: CPT

## 2021-09-06 RX ORDER — ONDANSETRON 2 MG/ML
4 INJECTION INTRAMUSCULAR; INTRAVENOUS ONCE
Status: COMPLETED | OUTPATIENT
Start: 2021-09-06 | End: 2021-09-06

## 2021-09-06 RX ORDER — SODIUM CHLORIDE, SODIUM LACTATE, POTASSIUM CHLORIDE, AND CALCIUM CHLORIDE .6; .31; .03; .02 G/100ML; G/100ML; G/100ML; G/100ML
1000 INJECTION, SOLUTION INTRAVENOUS ONCE
Status: COMPLETED | OUTPATIENT
Start: 2021-09-06 | End: 2021-09-07

## 2021-09-06 RX ADMIN — SODIUM CHLORIDE, POTASSIUM CHLORIDE, SODIUM LACTATE AND CALCIUM CHLORIDE 1000 ML: 600; 310; 30; 20 INJECTION, SOLUTION INTRAVENOUS at 23:04

## 2021-09-06 RX ADMIN — ONDANSETRON 4 MG: 2 INJECTION, SOLUTION INTRAMUSCULAR; INTRAVENOUS at 21:59

## 2021-09-06 RX ADMIN — SODIUM CHLORIDE 40 MG: 9 INJECTION, SOLUTION INTRAVENOUS at 22:26

## 2021-09-07 ENCOUNTER — ANESTHESIA (OUTPATIENT)
Dept: OPERATING ROOM | Age: 46
End: 2021-09-07
Payer: MEDICARE

## 2021-09-07 ENCOUNTER — APPOINTMENT (OUTPATIENT)
Dept: CT IMAGING | Age: 46
End: 2021-09-07
Payer: MEDICARE

## 2021-09-07 ENCOUNTER — ANESTHESIA EVENT (OUTPATIENT)
Dept: OPERATING ROOM | Age: 46
End: 2021-09-07
Payer: MEDICARE

## 2021-09-07 VITALS — OXYGEN SATURATION: 95 % | DIASTOLIC BLOOD PRESSURE: 106 MMHG | SYSTOLIC BLOOD PRESSURE: 204 MMHG

## 2021-09-07 PROBLEM — R10.13 EPIGASTRIC PAIN: Status: ACTIVE | Noted: 2021-09-07

## 2021-09-07 PROBLEM — K92.2 ACUTE UPPER GI BLEED: Status: ACTIVE | Noted: 2021-09-07

## 2021-09-07 PROCEDURE — C1726 CATH, BAL DIL, NON-VASCULAR: HCPCS | Performed by: SURGERY

## 2021-09-07 PROCEDURE — 2500000003 HC RX 250 WO HCPCS: Performed by: NURSE ANESTHETIST, CERTIFIED REGISTERED

## 2021-09-07 PROCEDURE — 6370000000 HC RX 637 (ALT 250 FOR IP): Performed by: STUDENT IN AN ORGANIZED HEALTH CARE EDUCATION/TRAINING PROGRAM

## 2021-09-07 PROCEDURE — 96372 THER/PROPH/DIAG INJ SC/IM: CPT

## 2021-09-07 PROCEDURE — 43249 ESOPH EGD DILATION <30 MM: CPT | Performed by: SURGERY

## 2021-09-07 PROCEDURE — 96375 TX/PRO/DX INJ NEW DRUG ADDON: CPT

## 2021-09-07 PROCEDURE — 96366 THER/PROPH/DIAG IV INF ADDON: CPT

## 2021-09-07 PROCEDURE — G0378 HOSPITAL OBSERVATION PER HR: HCPCS

## 2021-09-07 PROCEDURE — 2580000003 HC RX 258: Performed by: SURGERY

## 2021-09-07 PROCEDURE — 3700000000 HC ANESTHESIA ATTENDED CARE: Performed by: SURGERY

## 2021-09-07 PROCEDURE — 36415 COLL VENOUS BLD VENIPUNCTURE: CPT

## 2021-09-07 PROCEDURE — 74177 CT ABD & PELVIS W/CONTRAST: CPT

## 2021-09-07 PROCEDURE — 6360000004 HC RX CONTRAST MEDICATION: Performed by: STUDENT IN AN ORGANIZED HEALTH CARE EDUCATION/TRAINING PROGRAM

## 2021-09-07 PROCEDURE — 3609017700 HC EGD DILATION GASTRIC/DUODENAL STRICTURE: Performed by: SURGERY

## 2021-09-07 PROCEDURE — 6360000002 HC RX W HCPCS: Performed by: NURSE ANESTHETIST, CERTIFIED REGISTERED

## 2021-09-07 PROCEDURE — G0480 DRUG TEST DEF 1-7 CLASSES: HCPCS

## 2021-09-07 PROCEDURE — 96376 TX/PRO/DX INJ SAME DRUG ADON: CPT

## 2021-09-07 PROCEDURE — 6360000002 HC RX W HCPCS: Performed by: SURGERY

## 2021-09-07 PROCEDURE — 7100000011 HC PHASE II RECOVERY - ADDTL 15 MIN: Performed by: SURGERY

## 2021-09-07 PROCEDURE — 2580000003 HC RX 258: Performed by: STUDENT IN AN ORGANIZED HEALTH CARE EDUCATION/TRAINING PROGRAM

## 2021-09-07 PROCEDURE — 7100000010 HC PHASE II RECOVERY - FIRST 15 MIN: Performed by: SURGERY

## 2021-09-07 PROCEDURE — 6360000002 HC RX W HCPCS: Performed by: STUDENT IN AN ORGANIZED HEALTH CARE EDUCATION/TRAINING PROGRAM

## 2021-09-07 PROCEDURE — 2709999900 HC NON-CHARGEABLE SUPPLY: Performed by: SURGERY

## 2021-09-07 PROCEDURE — 6370000000 HC RX 637 (ALT 250 FOR IP): Performed by: SURGERY

## 2021-09-07 PROCEDURE — 3700000001 HC ADD 15 MINUTES (ANESTHESIA): Performed by: SURGERY

## 2021-09-07 RX ORDER — BUSPIRONE HYDROCHLORIDE 10 MG/1
10 TABLET ORAL 2 TIMES DAILY
Status: DISCONTINUED | OUTPATIENT
Start: 2021-09-07 | End: 2021-09-08 | Stop reason: HOSPADM

## 2021-09-07 RX ORDER — LIDOCAINE HYDROCHLORIDE 10 MG/ML
INJECTION, SOLUTION EPIDURAL; INFILTRATION; INTRACAUDAL; PERINEURAL PRN
Status: DISCONTINUED | OUTPATIENT
Start: 2021-09-07 | End: 2021-09-07 | Stop reason: SDUPTHER

## 2021-09-07 RX ORDER — METOPROLOL TARTRATE 5 MG/5ML
5 INJECTION INTRAVENOUS EVERY 6 HOURS PRN
Status: DISCONTINUED | OUTPATIENT
Start: 2021-09-07 | End: 2021-09-08 | Stop reason: HOSPADM

## 2021-09-07 RX ORDER — SUMATRIPTAN 25 MG/1
25 TABLET, FILM COATED ORAL ONCE
Status: COMPLETED | OUTPATIENT
Start: 2021-09-07 | End: 2021-09-07

## 2021-09-07 RX ORDER — LABETALOL 100 MG/1
100 TABLET, FILM COATED ORAL DAILY
Status: DISCONTINUED | OUTPATIENT
Start: 2021-09-07 | End: 2021-09-08 | Stop reason: HOSPADM

## 2021-09-07 RX ORDER — PROPOFOL 10 MG/ML
INJECTION, EMULSION INTRAVENOUS PRN
Status: DISCONTINUED | OUTPATIENT
Start: 2021-09-07 | End: 2021-09-07 | Stop reason: SDUPTHER

## 2021-09-07 RX ORDER — BUTALBITAL, ACETAMINOPHEN AND CAFFEINE 50; 325; 40 MG/1; MG/1; MG/1
1 TABLET ORAL EVERY 4 HOURS PRN
Status: DISCONTINUED | OUTPATIENT
Start: 2021-09-07 | End: 2021-09-08 | Stop reason: HOSPADM

## 2021-09-07 RX ORDER — PANTOPRAZOLE SODIUM 40 MG/1
40 TABLET, DELAYED RELEASE ORAL
Status: DISCONTINUED | OUTPATIENT
Start: 2021-09-07 | End: 2021-09-08 | Stop reason: HOSPADM

## 2021-09-07 RX ORDER — PROMETHAZINE HYDROCHLORIDE 12.5 MG/1
12.5 TABLET ORAL EVERY 6 HOURS PRN
Status: DISCONTINUED | OUTPATIENT
Start: 2021-09-07 | End: 2021-09-08 | Stop reason: HOSPADM

## 2021-09-07 RX ORDER — FAMOTIDINE 20 MG/1
20 TABLET, FILM COATED ORAL NIGHTLY
Status: DISCONTINUED | OUTPATIENT
Start: 2021-09-07 | End: 2021-09-08 | Stop reason: HOSPADM

## 2021-09-07 RX ORDER — ONDANSETRON 2 MG/ML
4 INJECTION INTRAMUSCULAR; INTRAVENOUS EVERY 6 HOURS PRN
Status: DISCONTINUED | OUTPATIENT
Start: 2021-09-07 | End: 2021-09-08 | Stop reason: HOSPADM

## 2021-09-07 RX ORDER — ATORVASTATIN CALCIUM 10 MG/1
10 TABLET, FILM COATED ORAL DAILY
Status: DISCONTINUED | OUTPATIENT
Start: 2021-09-07 | End: 2021-09-08 | Stop reason: HOSPADM

## 2021-09-07 RX ORDER — ONDANSETRON 4 MG/1
4 TABLET, FILM COATED ORAL EVERY 8 HOURS PRN
Status: DISCONTINUED | OUTPATIENT
Start: 2021-09-07 | End: 2021-09-08 | Stop reason: HOSPADM

## 2021-09-07 RX ORDER — SODIUM CHLORIDE 0.9 % (FLUSH) 0.9 %
5-40 SYRINGE (ML) INJECTION PRN
Status: DISCONTINUED | OUTPATIENT
Start: 2021-09-07 | End: 2021-09-08 | Stop reason: HOSPADM

## 2021-09-07 RX ORDER — ACETAMINOPHEN 500 MG
1000 TABLET ORAL EVERY 8 HOURS PRN
Status: DISCONTINUED | OUTPATIENT
Start: 2021-09-07 | End: 2021-09-08 | Stop reason: HOSPADM

## 2021-09-07 RX ORDER — HYDRALAZINE HYDROCHLORIDE 20 MG/ML
10 INJECTION INTRAMUSCULAR; INTRAVENOUS ONCE
Status: COMPLETED | OUTPATIENT
Start: 2021-09-07 | End: 2021-09-07

## 2021-09-07 RX ORDER — SODIUM CHLORIDE 0.9 % (FLUSH) 0.9 %
5-40 SYRINGE (ML) INJECTION EVERY 12 HOURS SCHEDULED
Status: DISCONTINUED | OUTPATIENT
Start: 2021-09-07 | End: 2021-09-08 | Stop reason: HOSPADM

## 2021-09-07 RX ORDER — LABETALOL 100 MG/1
TABLET, FILM COATED ORAL
Qty: 90 TABLET | Refills: 2 | Status: SHIPPED | OUTPATIENT
Start: 2021-09-07 | End: 2021-10-21 | Stop reason: SDUPTHER

## 2021-09-07 RX ORDER — SUCRALFATE 1 G/1
1 TABLET ORAL 4 TIMES DAILY
Status: DISCONTINUED | OUTPATIENT
Start: 2021-09-07 | End: 2021-09-08 | Stop reason: HOSPADM

## 2021-09-07 RX ORDER — ALBUTEROL SULFATE 90 UG/1
2 AEROSOL, METERED RESPIRATORY (INHALATION) 4 TIMES DAILY PRN
Status: DISCONTINUED | OUTPATIENT
Start: 2021-09-07 | End: 2021-09-08 | Stop reason: HOSPADM

## 2021-09-07 RX ORDER — SODIUM CHLORIDE, SODIUM LACTATE, POTASSIUM CHLORIDE, CALCIUM CHLORIDE 600; 310; 30; 20 MG/100ML; MG/100ML; MG/100ML; MG/100ML
INJECTION, SOLUTION INTRAVENOUS CONTINUOUS
Status: DISCONTINUED | OUTPATIENT
Start: 2021-09-07 | End: 2021-09-08 | Stop reason: HOSPADM

## 2021-09-07 RX ORDER — LISINOPRIL 20 MG/1
40 TABLET ORAL DAILY
Status: DISCONTINUED | OUTPATIENT
Start: 2021-09-07 | End: 2021-09-08 | Stop reason: HOSPADM

## 2021-09-07 RX ORDER — HEPARIN SODIUM 5000 [USP'U]/ML
5000 INJECTION, SOLUTION INTRAVENOUS; SUBCUTANEOUS EVERY 8 HOURS SCHEDULED
Status: DISCONTINUED | OUTPATIENT
Start: 2021-09-07 | End: 2021-09-08 | Stop reason: HOSPADM

## 2021-09-07 RX ORDER — SODIUM CHLORIDE 9 MG/ML
25 INJECTION, SOLUTION INTRAVENOUS PRN
Status: DISCONTINUED | OUTPATIENT
Start: 2021-09-07 | End: 2021-09-08 | Stop reason: HOSPADM

## 2021-09-07 RX ORDER — SUMATRIPTAN 25 MG/1
25 TABLET, FILM COATED ORAL
Status: COMPLETED | OUTPATIENT
Start: 2021-09-07 | End: 2021-09-07

## 2021-09-07 RX ADMIN — SODIUM CHLORIDE, POTASSIUM CHLORIDE, SODIUM LACTATE AND CALCIUM CHLORIDE: 600; 310; 30; 20 INJECTION, SOLUTION INTRAVENOUS at 11:36

## 2021-09-07 RX ADMIN — SODIUM CHLORIDE, PRESERVATIVE FREE 10 ML: 5 INJECTION INTRAVENOUS at 10:14

## 2021-09-07 RX ADMIN — ACETAMINOPHEN 1000 MG: 500 TABLET ORAL at 12:48

## 2021-09-07 RX ADMIN — LABETALOL HCL 100 MG: 100 TABLET, FILM COATED ORAL at 10:15

## 2021-09-07 RX ADMIN — SODIUM CHLORIDE, POTASSIUM CHLORIDE, SODIUM LACTATE AND CALCIUM CHLORIDE: 600; 310; 30; 20 INJECTION, SOLUTION INTRAVENOUS at 05:09

## 2021-09-07 RX ADMIN — SUCRALFATE 1 G: 1 TABLET ORAL at 13:35

## 2021-09-07 RX ADMIN — SUCRALFATE 1 G: 1 TABLET ORAL at 21:53

## 2021-09-07 RX ADMIN — SUMATRIPTAN SUCCINATE 25 MG: 25 TABLET ORAL at 13:35

## 2021-09-07 RX ADMIN — FAMOTIDINE 20 MG: 20 TABLET, FILM COATED ORAL at 21:54

## 2021-09-07 RX ADMIN — SERTRALINE 50 MG: 50 TABLET, FILM COATED ORAL at 08:20

## 2021-09-07 RX ADMIN — HEPARIN SODIUM 5000 UNITS: 5000 INJECTION INTRAVENOUS; SUBCUTANEOUS at 22:57

## 2021-09-07 RX ADMIN — IOPAMIDOL 75 ML: 755 INJECTION, SOLUTION INTRAVENOUS at 03:00

## 2021-09-07 RX ADMIN — PROPOFOL 130 MG: 10 INJECTION, EMULSION INTRAVENOUS at 17:22

## 2021-09-07 RX ADMIN — ONDANSETRON 4 MG: 2 INJECTION INTRAMUSCULAR; INTRAVENOUS at 08:12

## 2021-09-07 RX ADMIN — SUMATRIPTAN SUCCINATE 25 MG: 25 TABLET ORAL at 22:57

## 2021-09-07 RX ADMIN — LIDOCAINE HYDROCHLORIDE 50 MG: 10 INJECTION, SOLUTION EPIDURAL; INFILTRATION; INTRACAUDAL; PERINEURAL at 17:21

## 2021-09-07 RX ADMIN — PROMETHAZINE HYDROCHLORIDE 12.5 MG: 12.5 TABLET ORAL at 12:48

## 2021-09-07 RX ADMIN — SODIUM CHLORIDE, POTASSIUM CHLORIDE, SODIUM LACTATE AND CALCIUM CHLORIDE: 600; 310; 30; 20 INJECTION, SOLUTION INTRAVENOUS at 22:58

## 2021-09-07 RX ADMIN — PROPOFOL 100 MG: 10 INJECTION, EMULSION INTRAVENOUS at 17:21

## 2021-09-07 RX ADMIN — HYDRALAZINE HYDROCHLORIDE 10 MG: 20 INJECTION INTRAMUSCULAR; INTRAVENOUS at 11:36

## 2021-09-07 RX ADMIN — BUSPIRONE HYDROCHLORIDE 10 MG: 10 TABLET ORAL at 08:21

## 2021-09-07 RX ADMIN — IOHEXOL 50 ML: 240 INJECTION, SOLUTION INTRATHECAL; INTRAVASCULAR; INTRAVENOUS; ORAL at 03:05

## 2021-09-07 RX ADMIN — BUSPIRONE HYDROCHLORIDE 10 MG: 10 TABLET ORAL at 21:54

## 2021-09-07 RX ADMIN — LISINOPRIL 40 MG: 20 TABLET ORAL at 08:21

## 2021-09-07 RX ADMIN — SUCRALFATE 1 G: 1 TABLET ORAL at 08:21

## 2021-09-07 RX ADMIN — ATORVASTATIN CALCIUM 10 MG: 10 TABLET, FILM COATED ORAL at 08:21

## 2021-09-07 ASSESSMENT — PAIN - FUNCTIONAL ASSESSMENT: PAIN_FUNCTIONAL_ASSESSMENT: 0-10

## 2021-09-07 ASSESSMENT — ENCOUNTER SYMPTOMS
DIARRHEA: 0
ABDOMINAL PAIN: 1
COUGH: 0
BLOOD IN STOOL: 0
VOMITING: 1
RHINORRHEA: 0
WHEEZING: 0
NAUSEA: 1
SHORTNESS OF BREATH: 0
SORE THROAT: 0
CHEST TIGHTNESS: 1
CONSTIPATION: 0

## 2021-09-07 ASSESSMENT — PAIN SCALES - GENERAL
PAINLEVEL_OUTOF10: 6
PAINLEVEL_OUTOF10: 6
PAINLEVEL_OUTOF10: 0
PAINLEVEL_OUTOF10: 6
PAINLEVEL_OUTOF10: 0
PAINLEVEL_OUTOF10: 0
PAINLEVEL_OUTOF10: 7
PAINLEVEL_OUTOF10: 1
PAINLEVEL_OUTOF10: 1
PAINLEVEL_OUTOF10: 4
PAINLEVEL_OUTOF10: 4

## 2021-09-07 ASSESSMENT — PAIN DESCRIPTION - PAIN TYPE
TYPE: ACUTE PAIN

## 2021-09-07 ASSESSMENT — PAIN DESCRIPTION - DESCRIPTORS
DESCRIPTORS: DISCOMFORT
DESCRIPTORS: HEADACHE
DESCRIPTORS: ACHING;DISCOMFORT
DESCRIPTORS: DISCOMFORT

## 2021-09-07 ASSESSMENT — PAIN DESCRIPTION - LOCATION
LOCATION: ABDOMEN
LOCATION: HEAD
LOCATION: ABDOMEN
LOCATION: HEAD;ABDOMEN

## 2021-09-07 ASSESSMENT — PULMONARY FUNCTION TESTS
PIF_VALUE: 0
PIF_VALUE: 2
PIF_VALUE: 0

## 2021-09-07 ASSESSMENT — PAIN DESCRIPTION - PROGRESSION: CLINICAL_PROGRESSION: NOT CHANGED

## 2021-09-07 ASSESSMENT — PAIN DESCRIPTION - FREQUENCY
FREQUENCY: INTERMITTENT
FREQUENCY: INTERMITTENT
FREQUENCY: CONTINUOUS
FREQUENCY: INTERMITTENT

## 2021-09-07 ASSESSMENT — PAIN DESCRIPTION - ORIENTATION
ORIENTATION: RIGHT;UPPER

## 2021-09-07 ASSESSMENT — COPD QUESTIONNAIRES: CAT_SEVERITY: NO INTERVAL CHANGE

## 2021-09-07 ASSESSMENT — PAIN DESCRIPTION - ONSET
ONSET: ON-GOING
ONSET: GRADUAL
ONSET: ON-GOING
ONSET: ON-GOING

## 2021-09-07 NOTE — ANESTHESIA PRE PROCEDURE
Department of Anesthesiology  Preprocedure Note       Name:  Kallie Bauer   Age:  42 y.o.  :  1975                                          MRN:  [id number]         Date:  2021      Surgeon: Tom Pacheco):  Fara France DO    Procedure: Procedure(s):  EGD ESOPHAGOGASTRODUODENOSCOPY    Medications prior to admission:   Prior to Admission medications    Medication Sig Start Date End Date Taking?  Authorizing Provider   labetalol (NORMODYNE) 100 MG tablet Take 1 and 1/2 tablets by mouth twice daily 21   Pura Garces MD   albuterol sulfate  (90 Base) MCG/ACT inhaler INHALE 2 PUFFS INTO THE LUNGS 4 TIMES DAILY AS NEEDED FOR WHEEZING 21   Daly Ragland MD   lisinopril (PRINIVIL;ZESTRIL) 40 MG tablet TAKE 1 TABLET BY MOUTH EVERY DAY 21   Daly Ragland MD   sucralfate (CARAFATE) 1 GM tablet Take 1 tablet by mouth 4 times daily 21   TERA Larry CNP   ondansetron (ZOFRAN) 4 MG tablet Take 1 tablet by mouth every 8 hours as needed for Nausea or Vomiting 21   TERA Larry CNP   ascorbic acid (VITAMIN C) 250 MG tablet Take 500 mg by mouth daily  Patient not taking: Reported on 2021    Historical Provider, MD   famotidine (PEPCID) 20 MG tablet TAKE 1 TABLET BY MOUTH EVERY DAY AT NIGHT 21   TERA Larry CNP   pantoprazole (PROTONIX) 40 MG tablet Take 1 tablet by mouth every morning (before breakfast) 21   Kenya Ferguson DO   busPIRone (BUSPAR) 10 MG tablet TAKE 1 TABLET BY MOUTH TWICE A DAY 21   Daly Ragland MD   sertraline (ZOLOFT) 50 MG tablet TAKE 1 TABLET BY MOUTH EVERY DAY 21   Daly Ragland MD   calcium carbonate (OSCAL) 500 MG TABS tablet Take 500 mg by mouth daily    Historical Provider, MD   albuterol sulfate HFA (VENTOLIN HFA) 108 (90 Base) MCG/ACT inhaler Inhale 2 puffs into the lungs 4 times daily as needed for Wheezing 21   Daly Ragland MD   vitamin B-12 (CYANOCOBALAMIN) 100 MCG tablet TAKE 1 TABLET BY MOUTH EVERY DAY 3/15/21   Historical Provider, MD   atorvastatin (LIPITOR) 10 MG tablet Take 1 tablet by mouth daily 3/31/21   Clare Cash MD   ferrous sulfate (FE TABS 325) 325 (65 Fe) MG EC tablet Take 1 tablet by mouth 3 times daily (with meals)  Patient not taking: Reported on 8/9/2021 3/24/21   Clare Cash MD   folic acid (FOLVITE) 1 MG tablet Take 1 tablet by mouth daily 1/20/21   Clare Cash MD   SUMAtriptan (IMITREX) 25 MG tablet Take 1 tablet by mouth once as needed for Migraine 11/12/20 8/9/21  Clare Cash MD   butalbital-acetaminophen-caffeine (FIORICET, ESGIC) -47 MG per tablet Take 1 tablet by mouth every 4 hours as needed for Headaches 9/22/20   Maribell Duel P Blood, DO   Multiple Vitamins-Minerals (MULTIVITAMIN ADULT EXTRA C PO) Take 1 tablet by mouth daily    Historical Provider, MD   levonorgestrel (MIRENA) IUD 52 mg 1 each by Intrauterine route    Historical Provider, MD       Current medications:    No current facility-administered medications for this visit.      Current Outpatient Medications   Medication Sig Dispense Refill    labetalol (NORMODYNE) 100 MG tablet Take 1 and 1/2 tablets by mouth twice daily 90 tablet 2     Facility-Administered Medications Ordered in Other Visits   Medication Dose Route Frequency Provider Last Rate Last Admin    albuterol sulfate  (90 Base) MCG/ACT inhaler 2 puff  2 puff Inhalation 4x Daily PRN Cristina Goode, DO        atorvastatin (LIPITOR) tablet 10 mg  10 mg Oral Daily Cristina Goode, DO   10 mg at 09/07/21 7387    busPIRone (BUSPAR) tablet 10 mg  10 mg Oral BID Cristina Goode, DO   10 mg at 09/07/21 8021    butalbital-acetaminophen-caffeine (FIORICET, ESGIC) per tablet 1 tablet  1 tablet Oral Q4H PRN [place] Goode, DO        famotidine (PEPCID) tablet 20 mg  20 mg Oral Nightly [place] Goode, DO        labetalol (NORMODYNE) tablet 100 mg  100 mg Oral Daily Katrina Obando DO   100 mg at 09/07/21 1015    lisinopril Obesity (BMI 30-39. 9) E66.9    Recurrent major depressive disorder, in partial remission (HCC) F58.16    Folic acid deficiency L16.6    Migraine without aura and without status migrainosus, not intractable G43.009    Neoplasm of uncertain behavior of skin D48.5    Hiatal hernia with GERD and esophagitis K44.9, K21.00    Anxiety and depression F41.9, F32.9    Former moderate cigarette smoker (10-19 per day) Z87.891    S/P gastric bypass Z98.84    Dehydration E86.0    Stage 1 mild COPD by GOLD classification ([facility] J44.9    Epigastric pain R10.13       Past Medical History:        Diagnosis Date    Anemia     Anxiety and depression 04/05/2021    Anxiety state 08/12/2020    Asthma     COPD [facility] obstructive [facility] disease) ([facility]     Essential hypertension 34/76/3274    Folic acid deficiency 71/29/3714    Hiatal hernia 08/12/2020    Hiatal hernia with GERD 04/05/2021    History of abnormal cervical Pap smear     History of nicotine use 04/05/2021    Hyperlipidemia     Hypertension     PCP DR Jude Pierre    Hypertensive emergency 09/20/2020    Iron deficiency anemia 08/12/2020    IUD (intrauterine device) in place 08/04/2020    Mirena    Menometrorrhagia     Migraine without aura and without status migrainosus, not intractable 09/21/2020    Morbid obesity due to excess calories ([facility] 08/12/2020    Obesity (BMI 30-39.9) 09/20/2020    CORNELIA on CPAP     DR Frey Friday (last visit June 2021)    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 2/22/16 Sleeve Gastrectomy and repair of sm to mod size sliding Hiatal Hernia; 2/23/16 UGI WNL postop    Recurrent major depressive disorder, in partial remission ([facility] 09/20/2020    Vitamin D deficiency 08/12/2020    Vit D low 18.0/18.1; treat with Vitamin D 50,000 iu 3x wk x 6 wks then repeat level       Past Surgical History:        Procedure Laterality Date    BARIATRIC SURGERY  02/22/2016    GASTRIC SLEEVE (IN [place])    BREAST LUMPECTOMY Right  CHOLECYSTECTOMY      COLPOSCOPY      GASTROSTOMY TUBE PLACEMENT  2021    HERNIA REPAIR      DONE DURING GASTRIC SLEEVE    HERNIA REPAIR  2021    REPAIR OF RECURRENT HIATAL HERNIA     INTRAUTERINE DEVICE INSERTION  2020    Mirena     LIPOMA RESECTION      lt shoulder/upper back    ADRIAN-EN-Y GASTRIC BYPASS  2021    ROBOTIC LAPAROSCOPIC GASTRIC BYPASS ADRIAN-EN-Y,  EXTENSIVE LYSIS OF ADHESIONS, EGD, REPAIR OF RECURRENT HIATAL HERNIA , G TUBE INSERTION    ADRIAN-EN-Y GASTRIC BYPASS N/A 2021    XI ROBOTIC LAPAROSCOPIC GASTRIC BYPASS ADRIAN-EN-Y,  EXTENSIVE LYSIS OF ADHESIONS, EGD, REPAIR OF RECURRENT HIATAL HERNIA , G TUBE INSERTION performed by Osei Fernández DO at [facility] N/A 2021    TRUNK LESION BIOPSY EXCISION RIGHT BACK X 6, LEFT, RIGHT, AND MID BACK ALL WITH COMPLEX CLOSURE. performed by Malathi Rich MD at [facility] N/A 2021    EGD BIOPSY performed by Osei Fernández DO at [facility] EXTRACTION         Social History:    Social History     Tobacco Use    Smoking status: Former Smoker     Packs/day: 1.00     Years: 22.00     Pack years: 22.00     Types: Cigarettes     Start date: 1/15/2020     Quit date: 2020     Years since quittin.1    Smokeless tobacco: Never Used   Substance Use Topics    Alcohol use: Yes     Comment: social                                Counseling given: Not Answered      Vital Signs (Current): There were no vitals filed for this visit.                                            BP Readings from Last 3 Encounters:   21 (!) 163/95   21 132/78   21 118/73       NPO Status:                                                                                 BMI:   Wt Readings from Last 3 Encounters:   21 239 lb (108.4 kg)   21 250 lb (113.4 kg)   21 254 lb (115.2 kg)     There is no height or weight on file to hernia, GERD: no interval change, morbid obesity          Endo/Other: Negative Endo/Other ROS                    Abdominal:   (+) obese,           Vascular: negative vascular ROS. Other Findings:               Anesthesia Plan      MAC     ASA 3       Induction: intravenous. Anesthetic plan and risks discussed with patient. Plan discussed with CRNA.                   Jaci Campbell MD   9/7/2021

## 2021-09-07 NOTE — ED PROVIDER NOTES
[facility]  Emergency Department  Faculty Attestation     I performed a history and physical examination of the patient and discussed management with the resident. I reviewed the residents note and agree with the documented findings and plan of care. Any areas of disagreement are noted on the chart. I was personally present for the key portions of any procedures. I have documented in the chart those procedures where I was not present during the key portions. I have reviewed the emergency nurses triage note. I agree with the chief complaint, past medical history, past surgical history, allergies, medications, social and family history as documented unless otherwise noted below. For Physician Assistant/ Nurse Practitioner cases/documentation I have personally evaluated this patient and have completed at least one if not all key elements of the E/M (history, physical exam, and MDM). Additional findings are as noted. Primary Care Physician:  Serena Phipps MD    Screenings:  [unfilled]    CHIEF COMPLAINT       Chief Complaint   Patient presents with    Hematemesis     PT presents via triage with coffee ground emesis. Had surgery on 7/21 for gastric bypass revision with vomiting since of clear or liquid bile. Coffee ground emesis x 1 today. RECENT VITALS:   Temp: 98.6 °F (37 °C),  Pulse: 72, Resp: 19, BP: (!) 203/139    LABS:  Labs Reviewed   CBC WITH AUTO DIFFERENTIAL   COMPREHENSIVE METABOLIC PANEL W/ REFLEX TO MG FOR LOW K   LIPASE       Radiology  No orders to display         Attending Physician Additional  Notes    Patient had a prior gastric sleeve that was converted to a Narendra-en-Y surgery in July of this year. She also has a G-tube in place for supplemental feedings with protein shakes. She has been vomiting ever since the surgery. She vomited multiple times today.   Typically this is biliary type emesis but the most recent emesis was large amount of coffee-ground. She has heartburn type of sensation. No fever chills sweats or difficulty breathing. She has mild epigastric abdominal discomfort. She is unable to get relief with Zofran tablet. On exam she is hypertensive afebrile anicteric and appears mildly uncomfortable, pain score is 2/10. Abdomen is soft and without peritoneal findings. There is no distention or tympany. No respiratory distress. Lungs are clear. No Hamman's crunch. Skin is warm and dry. Mucous membranes moist.  Impression is vomiting, upper GI bleeding, recent bariatric surgery, mild dehydration. Plan is IV access, fluids, antiemetics, antacids, CT imaging, surgical consultation. Susie Grey.  Perla Bradley MD, [facility]  Attending Emergency  Physician               William Ovalles MD  09/06/21 [id number]

## 2021-09-07 NOTE — ED PROVIDER NOTES
[facility] [address] [facility]  Emergency Department  Emergency Medicine Resident Sign-out     Care of Nina Bradley was assumed from Dr. Bayron Jansen and is being seen for Hematemesis (PT presents via triage with coffee ground emesis. Had surgery on 7/21 for gastric bypass revision with vomiting since of clear or liquid bile. Coffee ground emesis x 1 today.)  . The patient's initial evaluation and plan have been discussed with the prior provider who initially evaluated the patient. EMERGENCY DEPARTMENT COURSE / MEDICAL DECISION MAKING:       MEDICATIONS GIVEN:  Orders Placed This Encounter   Medications    ondansetron (ZOFRAN) injection 4 mg    pantoprazole (PROTONIX) 40 mg in sodium chloride 0.9 % 50 mL bolus    lactated ringers bolus       LABS / RADIOLOGY:     Labs Reviewed   CBC WITH AUTO DIFFERENTIAL - Abnormal; Notable for the following components:       Result Value    Eosinophils % 6 (*)     All other components within normal limits   COMPREHENSIVE METABOLIC PANEL W/ REFLEX TO MG FOR LOW K - Abnormal; Notable for the following components:    Alkaline Phosphatase 164 (*)     All other components within normal limits   LIPASE   HCG, SERUM, QUALITATIVE       No results found. RECENT VITALS:     Temp: 98.6 °F (37 °C),  Pulse: 72, Resp: 19, BP: (!) 203/139, SpO2: 97 %    This patient is a 42 y.o. Female with history of Narendra-en-Y this year, abdominal pain, hematemesis. Consulted surgery. Planning for CT with p.o. contrast.  Surgery will admit. OUTSTANDING TASKS / RECOMMENDATIONS:    1. Bed pending     FINAL IMPRESSION:     1. Abdominal pain, epigastric        DISPOSITION:         DISPOSITION:  []  Discharge   []  Transfer -    [x]  Admission -   surgery   []  Against Medical Advice   []  Eloped   FOLLOW-UP: No follow-up provider specified.    DISCHARGE MEDICATIONS: New Prescriptions    No medications on file           Brittani Smith MD  Emergency Medicine Resident  Ramana Pam Jj MD  Resident  09/07/21 0838

## 2021-09-07 NOTE — TELEPHONE ENCOUNTER
Patient called stating that she had a gastric sleeve on July 21. She reports starting to eat dinner at about 5 PM tonight and vomiting the bite of food she ate. She has had at least 10 episodes of vomiting in the last 3.5 hours. She is vomiting everything up, including her nausea medications. Patient reports Yellow vomit and severe heartburn pain. Patient directed to the [facility] ED. She stated that she will call her mom now to come and take her.

## 2021-09-07 NOTE — TELEPHONE ENCOUNTER
Reason for Disposition   Patient sounds very sick or weak to the triager    Answer Assessment - Initial Assessment Questions  1. VOMITING SEVERITY: \"How many times have you vomited in the past 24 hours? \"      - MILD:  1 - 2 times/day     - MODERATE: 3 - 5 times/day, decreased oral intake without significant weight loss or symptoms of dehydration     - SEVERE: 6 or more times/day, vomits everything or nearly everything, with significant weight loss, symptoms of dehydration       10 times In the last 3.5 hours. 2. ONSET: \"When did the vomiting begin? \"       5 PM    3. FLUIDS: \"What fluids or food have you vomited up today? \" \"Have you been able to keep any fluids down? \"      Fluids,    4. ABDOMINAL PAIN: Gabriel Niece your having any abdominal pain? \" If yes : \"How bad is it and what does it feel like? \" (e.g., crampy, dull, intermittent, constant)       No.    5. DIARRHEA: \"Is there any diarrhea? \" If so, ask: \"How many times today? \"       No.    6. CONTACTS: \"Is there anyone else in the family with the same symptoms? \"       *No Answer*  7. CAUSE: \"What do you think is causing your vomiting? \"      *No Answer*  8. HYDRATION STATUS: \"Any signs of dehydration? \" (e.g., dry mouth [not only dry lips], too weak to stand) \"When did you last urinate? \"      Normal until 5 PM.    9. OTHER SYMPTOMS: \"Do you have any other symptoms? \" (e.g., fever, headache, vertigo, vomiting blood or coffee grounds, recent head injury)      *No Answer*  10. PREGNANCY: \"Is there any chance you are pregnant? \" \"When was your last menstrual period? \"        *No Answer*    Protocols used: VOMITING-ADULT-

## 2021-09-07 NOTE — CONSULTS
General Surgery:  Consult Note        PATIENT NAME: Toño Freeman   YOB: 1975    ADMISSION DATE: 2021  9:22 PM     Admitting Provider: Rosalinda Sen Physician: Dr. Chivo Kumar DATE: 2021    Chief Complaint:  Coffee ground emesis  Consult Regardin months s/p sleeve converted to RYGB 2021    HISTORY OF PRESENT ILLNESS:  The patient is a 42 y.o. female  who was admitted on 21 c/o new onset coffee ground emesis. Pt is 6.5 weeks s/p sleeve converted to RYGB for management of severe GERD, and hiatal hernia repair with G-tube placement. Pt reports she has had bilious N/V since surgery but yesterday she had her first episode of coffee-ground emesis. Denies other symptoms including CP, SOB, F/C, dizziness. Reports pain in the epigastric region with burning sensation in her chest with reflux. Pt does take protonix, pepcid and carafate daily. Denies smoking, ETOH, or drug use. Pt does report she has intolerance to solid foods and has N/V after every meal. Does use G-tube for protein replacement.        Past Medical History:        Diagnosis Date    Anemia     Anxiety and depression 2021    Anxiety state 2020    Asthma     COPD (chronic obstructive pulmonary disease) ([address]     Essential hypertension 2066    Folic acid deficiency     Hiatal hernia 2020    Hiatal hernia with GERD 2021    History of abnormal cervical Pap smear     History of nicotine use 2021    Hyperlipidemia     Hypertension     PCP DR Lizandro Zhang    Hypertensive emergency 2020    Iron deficiency anemia 2020    IUD (intrauterine device) in place 2020    Mirena    Menometrorrhagia     Migraine without aura and without status migrainosus, not intractable 2020    Morbid obesity due to excess calories ([address] 2020    Obesity (BMI 30-39.9) 2020    CORNELIA on CPAP     DR Gali Kessler (last visit 2021)    Postgastrectomy malabsorption Packs/day: 1.00     Years: 22.00     Pack years: 22.00     Types: Cigarettes     Start date: 1/15/2020     Quit date: 2020     Years since quittin.1    Smokeless tobacco: Never Used   Vaping Use    Vaping Use: Former    Substances: Nicotine    Devices: Disposable   Substance and Sexual Activity    Alcohol use: Yes     Comment: social    Drug use: No    Sexual activity: Not on file   Other Topics Concern    Not on file   Social History Narrative    Not on file     Social Determinants of Health     Financial Resource Strain: Low Risk     Difficulty of Paying Living Expenses: Not hard at all   Food Insecurity: No Food Insecurity    Worried About [address] [employer] in the Last Year: Never true    [address]  [employer] in the Last Year: Never true   Transportation Needs:     Lack of Transportation (Medical):     Lack of Transportation (Non-Medical):    Physical Activity:     Days of Exercise per Week:     Minutes of Exercise per Session:    Stress:     Feeling of Stress :    Social Connections:     Frequency of Communication with Friends and Family:     Frequency of Social Gatherings with Friends and Family:     Attends [employer] Services: Active Member of Clubs or Organizations:     Attends Club or Organization Meetings:     Marital Status:    Intimate Partner Violence:     Fear of Current or Ex-Partner:     Emotionally Abused:     Physically Abused:     Sexually Abused:        Family History:       Problem Relation Age of Onset    High Blood Pressure Father     Heart Disease Father     Hypertension Maternal Grandmother     Heart Disease Maternal Grandmother     Stroke Maternal Uncle        REVIEW OF SYSTEMS:    As reviewed in HPI, all other systems were reviewed and were negative.      PHYSICAL EXAM:    VITALS:  BP (!) 203/139   Pulse 72   Temp 98.6 °F (37 °C) (Oral)   Resp 19   SpO2 97%   INTAKE/OUTPUT:   No intake or output data in the 24 hours ending 217    CONSTITUTIONAL:  awake, alert, not distressed and mildly obese  HEENT: Normocephalic/atraumatic, without obvious abnormality. NECK:  Supple, symmetrical, trachea midline   CARDIOVASCULAR: Regular rate and rhythm without murmurs. LUNGS: Clear to auscultation bilaterally without evidence of wheezing or tachypnea. ABDOMEN: soft, ND, mild TTP epigastric region, G tube in place   MUSCULOSKELETAL: Muscle strength intact in all extremities bilaterally. NEUROLOGIC: CN II- XII intact. Gross motor intact without focal weakness. SKIN: No cyanosis, rashes, or edema noted.    Orientation:   oriented to person, place, and time    CBC with Differential:    Lab Results   Component Value Date    WBC 5.6 09/06/2021    RBC 4.42 09/06/2021    HGB 12.8 09/06/2021    HCT 39.1 09/06/2021     09/06/2021    MCV 88.5 09/06/2021    MCH 29.0 09/06/2021    MCHC 32.7 09/06/2021    RDW 12.9 09/06/2021    LYMPHOPCT 36 09/06/2021    MONOPCT 6 09/06/2021    BASOPCT 1 09/06/2021    MONOSABS 0.34 09/06/2021    LYMPHSABS 2.04 09/06/2021    EOSABS 0.31 09/06/2021    BASOSABS 0.03 09/06/2021    DIFFTYPE NOT REPORTED 09/06/2021     CMP:    Lab Results   Component Value Date     09/06/2021    K 4.1 09/06/2021     09/06/2021    CO2 24 09/06/2021    BUN 15 09/06/2021    CREATININE 0.58 09/06/2021    GFRAA >60 09/06/2021    LABGLOM >60 09/06/2021    GLUCOSE 95 09/06/2021    PROT 7.3 09/06/2021    LABALBU 4.2 09/06/2021    CALCIUM 9.3 09/06/2021    BILITOT 0.50 09/06/2021    ALKPHOS 164 09/06/2021    AST 27 09/06/2021    ALT 28 09/06/2021     BMP:    Lab Results   Component Value Date     09/06/2021    K 4.1 09/06/2021     09/06/2021    CO2 24 09/06/2021    BUN 15 09/06/2021    LABALBU 4.2 09/06/2021    CREATININE 0.58 09/06/2021    CALCIUM 9.3 09/06/2021    GFRAA >60 09/06/2021    LABGLOM >60 09/06/2021    GLUCOSE 95 09/06/2021     Hepatic Function Panel:    Lab Results   Component Value Date    ALKPHOS 164 09/06/2021    ALT 28 09/06/2021    AST 27

## 2021-09-07 NOTE — ED PROVIDER NOTES
[facility] ED  Emergency Department Encounter  EmergencyMedicine Resident     Pt Kayleen Goddard  MRN: [id number]  [place] 1975  Date of evaluation: 9/6/21  PCP:  Patrick Castellanos MD    This patient was evaluated in the Emergency Department for symptoms described in the history of present illness. The patient was evaluated in the context of the global COVID-19 pandemic, which necessitated consideration that the patient might be at risk for infection with the SARS-CoV-2 virus that causes COVID-19. Institutional protocols and algorithms that pertain to the evaluation of patients at risk for COVID-19 are in a state of rapid change based on information released by regulatory bodies including the CDC and federal and state organizations. These policies and algorithms were followed during the patient's care in the ED. CHIEF COMPLAINT       Chief Complaint   Patient presents with    Hematemesis     PT presents via triage with coffee ground emesis. Had surgery on 7/21 for gastric bypass revision with vomiting since of clear or liquid bile. Coffee ground emesis x 1 today. HISTORY OF PRESENT ILLNESS  (Location/Symptom, Timing/Onset, Context/Setting, Quality, Duration, Modifying Factors, Severity.)      Kiah Hu is a 42 y.o. female who presents with abdominal pain. Patient presents to the emergency department with epigastric pain, radiating up into her anterior chest, mild to moderate severity, burning sensation, associated with nausea, vomiting, hematemesis, constipation. Patient denies fevers, chills, cough, congestion, shortness of breath, palpitations, dysuria, frequency, urgency, vaginal bleeding, vaginal discharge, diarrhea. Patient has history of sleeve gastrectomy, underwent conversion to Narendra-en-Y on 7/21/2021 for bariatric surgery. Patient has been having nausea and vomiting since then, has G-tube in place for which she has been supplementing her oral intake.   Patient developed abdominal pain, hematemesis, came to the emergency department for evaluation after being instructed to by the bariatric surgery clinic. Patient did not take anything for symptoms prior to arrival.    PAST MEDICAL / SURGICAL / SOCIAL / FAMILY HISTORY      has a past medical history of Anemia, Anxiety and depression, Anxiety state, Asthma, COPD (chronic obstructive pulmonary disease) ([facility] [address] Essential hypertension, Folic acid deficiency, Hiatal hernia, Hiatal hernia with GERD, History of abnormal cervical Pap smear, History of nicotine use, Hyperlipidemia, Hypertension, Hypertensive emergency, Iron deficiency anemia, IUD (intrauterine device) in place, Menometrorrhagia, Migraine without aura and without status migrainosus, not intractable, Morbid obesity due to excess calories ([facility] [address] Obesity (BMI 30-39.9), CORNELIA on CPAP, Postgastrectomy malabsorption, Recurrent major depressive disorder, in partial remission ([facility] [address] and Vitamin D deficiency. has a past surgical history that includes Cholecystectomy; lipoma resection; Colposcopy; intrauterine device insertion (2020); skin biopsy (N/A, 2021); Upper gastrointestinal endoscopy (N/A, 2021); Bariatric Surgery (2016); Breast lumpectomy (Right); Busby tooth extraction; Narendra-en-Y Gastric Bypass (2021); Gastrostomy tube placement (2021); hernia repair; hernia repair (2021); and Narendra-en-Y Gastric Bypass (N/A, 2021). Social History     Socioeconomic History    Marital status:       Spouse name: Not on file    Number of children: Not on file    Years of education: Not on file    Highest education level: Not on file   Occupational History    Not on file   Tobacco Use    Smoking status: Former Smoker     Packs/day: 1.00     Years: 22.00     Pack years: 22.00     Types: Cigarettes     Start date: 1/15/2020     Quit date: 2020     Years since quittin.1    Smokeless tobacco: Never Used   Vaping Use  Vaping Use: Former    Substances: Nicotine    Devices: Disposable   Substance and Sexual Activity    Alcohol use: Yes     Comment: social    Drug use: No    Sexual activity: Not on file   Other Topics Concern    Not on file   Social History Narrative    Not on file     Social Determinants of Health     Financial Resource Strain: Low Risk     Difficulty of Paying Living Expenses: Not hard at all   Food Insecurity: No Food Insecurity    Worried About [address] in the Last Year: Never true    [facility] in the Last Year: Never true   Transportation Needs:     Lack of Transportation (Medical):  Lack of Transportation (Non-Medical):    Physical Activity:     Days of Exercise per Week:     Minutes of Exercise per Session:    Stress:     Feeling of Stress :    Social Connections:     Frequency of Communication with Friends and Family:     Frequency of Social Gatherings with Friends and Family:     Attends Religion Services:     Active Member of Clubs or Organizations:     Attends Club or Organization Meetings:     Marital Status:    Intimate Partner Violence:     Fear of Current or Ex-Partner:     Emotionally Abused:     Physically Abused:     Sexually Abused:        Family History   Problem Relation Age of Onset    High Blood Pressure Father     Heart Disease Father     Hypertension Maternal Grandmother     Heart Disease Maternal Grandmother     Stroke Maternal Uncle        Allergies:  Nsaids, Tolmetin, and Codeine    Home Medications:  Prior to Admission medications    Medication Sig Start Date End Date Taking?  Authorizing Provider   albuterol sulfate  (90 Base) MCG/ACT inhaler INHALE 2 PUFFS INTO THE LUNGS 4 TIMES DAILY AS NEEDED FOR WHEEZING 9/2/21   Christopher Wynne MD   lisinopril (PRINIVIL;ZESTRIL) 40 MG tablet TAKE 1 TABLET BY MOUTH EVERY DAY 8/18/21   Christopher Wynne MD   sucralfate (CARAFATE) 1 GM tablet Take 1 tablet by mouth 4 times daily 8/17/21   Adele MOROCHO ADULT EXTRA C PO) Take 1 tablet by mouth daily    Historical Provider, MD   levonorgestrel (MIRENA) IUD 52 mg 1 each by Intrauterine route    Historical Provider, MD       REVIEW OF SYSTEMS    (2-9 systems for level 4, 10 or more for level 5)      Review of Systems   Constitutional: Negative for chills, diaphoresis, fatigue and fever. HENT: Negative for congestion, rhinorrhea and sore throat. Eyes: Negative for visual disturbance. Respiratory: Positive for chest tightness. Negative for cough, shortness of breath and wheezing. Cardiovascular: Negative for chest pain, palpitations and leg swelling. Gastrointestinal: Positive for abdominal pain, nausea and vomiting (Hematemesis). Negative for blood in stool, constipation and diarrhea. Genitourinary: Negative for dysuria, frequency, hematuria and urgency. Musculoskeletal: Negative for neck pain and neck stiffness. Skin: Negative for pallor and rash. Neurological: Negative for dizziness, syncope, weakness, light-headedness and headaches. Psychiatric/Behavioral: Negative for confusion. PHYSICAL EXAM   (up to 7 for level 4, 8 or more for level 5)      INITIAL VITALS:   BP (!) 203/139   Pulse 72   Temp 98.6 °F (37 °C) (Oral)   Resp 19   SpO2 97%     Physical Exam  Constitutional:       General: She is not in acute distress. Appearance: She is well-developed. She is not ill-appearing, toxic-appearing or diaphoretic. Comments: Patient is alert and oriented, openly communicative, does not appear to be in a significant amount of distress or pain   HENT:      Head: Normocephalic and atraumatic. Eyes:      General:         Right eye: No discharge. Left eye: No discharge. Extraocular Movements: Extraocular movements intact. Pupils: Pupils are equal, round, and reactive to light. Neck:      Vascular: No JVD. Trachea: No tracheal deviation. Cardiovascular:      Rate and Rhythm: Normal rate and regular rhythm. Pulses: Normal pulses. Heart sounds: Normal heart sounds. No murmur heard. No friction rub. No gallop. Pulmonary:      Effort: Pulmonary effort is normal. No respiratory distress. Breath sounds: Normal breath sounds. No stridor. No wheezing, rhonchi or rales. Chest:      Chest wall: No tenderness. Abdominal:      General: There is no distension. Palpations: Abdomen is soft. There is no mass. Tenderness: There is abdominal tenderness. There is no right CVA tenderness, left CVA tenderness or guarding. Comments: Abdomen soft, nondistended, tender to the epigastrium, G-tube in place, no surrounding erythema, edema, no ecchymosis, no CVA tenderness bilaterally. Musculoskeletal:         General: No tenderness. Normal range of motion. Cervical back: Normal range of motion and neck supple. No rigidity or tenderness. Right lower leg: No edema. Left lower leg: No edema. Skin:     General: Skin is warm. Capillary Refill: Capillary refill takes less than 2 seconds. Findings: No rash. Neurological:      Mental Status: She is alert and oriented to person, place, and time. Cranial Nerves: No cranial nerve deficit. Sensory: No sensory deficit. Motor: No weakness.    Psychiatric:         Mood and Affect: Mood normal.         Behavior: Behavior normal.         DIFFERENTIAL  DIAGNOSIS     PLAN (LABS / IMAGING / EKG):  Orders Placed This Encounter   Procedures    CT ABDOMEN PELVIS W IV CONTRAST Additional Contrast? Oral    CBC Auto Differential    Comprehensive Metabolic Panel w/ Reflex to MG    Lipase    HCG Qualitative, Serum    Inpatient Consult to Bariatrics       MEDICATIONS ORDERED:  Orders Placed This Encounter   Medications    ondansetron (ZOFRAN) injection 4 mg    pantoprazole (PROTONIX) 40 mg in sodium chloride 0.9 % 50 mL bolus    lactated ringers bolus       DDX: Perforation, marginal ulcer, gastritis, Lizzy-Mejia tear, pancreatitis    DIAGNOSTIC RESULTS / EMERGENCY DEPARTMENT COURSE / MDM   LAB RESULTS:  Results for orders placed or performed during the hospital encounter of 09/06/21   CBC Auto Differential   Result Value Ref Range    WBC 5.6 3.5 - 11.3 k/uL    RBC 4.42 3.95 - 5.11 m/uL    Hemoglobin 12.8 11.9 - 15.1 g/dL    Hematocrit 39.1 36.3 - 47.1 %    MCV 88.5 82.6 - 102.9 fL    MCH 29.0 25.2 - 33.5 pg    MCHC 32.7 28.4 - 34.8 g/dL    RDW 12.9 11.8 - 14.4 %    Platelets 472 212 - 443 k/uL    MPV 9.7 8.1 - 13.5 fL    NRBC Automated 0.0 0.0 per 100 WBC    Differential Type NOT REPORTED     Seg Neutrophils 51 36 - 65 %    Lymphocytes 36 24 - 43 %    Monocytes 6 3 - 12 %    Eosinophils % 6 (H) 1 - 4 %    Basophils 1 0 - 2 %    Immature Granulocytes 0 0 %    Segs Absolute 2.88 1.50 - 8.10 k/uL    Absolute Lymph # 2.04 1.10 - 3.70 k/uL    Absolute Mono # 0.34 0.10 - 1.20 k/uL    Absolute Eos # 0.31 0.00 - 0.44 k/uL    Basophils Absolute 0.03 0.00 - 0.20 k/uL    Absolute Immature Granulocyte <0.03 0.00 - 0.30 k/uL    WBC Morphology NOT REPORTED     RBC Morphology NOT REPORTED     Platelet Estimate NOT REPORTED    Comprehensive Metabolic Panel w/ Reflex to MG   Result Value Ref Range    Glucose 95 70 - 99 mg/dL    BUN 15 6 - 20 mg/dL    CREATININE 0.58 0.50 - 0.90 mg/dL    Bun/Cre Ratio NOT REPORTED 9 - 20    Calcium 9.3 8.6 - 10.4 mg/dL    Sodium 142 135 - 144 mmol/L    Potassium 4.1 3.7 - 5.3 mmol/L    Chloride 105 98 - 107 mmol/L    CO2 24 20 - 31 mmol/L    Anion Gap 13 9 - 17 mmol/L    Alkaline Phosphatase 164 (H) 35 - 104 U/L    ALT 28 5 - 33 U/L    AST 27 <32 U/L    Total Bilirubin 0.50 0.3 - 1.2 mg/dL    Total Protein 7.3 6.4 - 8.3 g/dL    Albumin 4.2 3.5 - 5.2 g/dL    Albumin/Globulin Ratio 1.4 1.0 - 2.5    GFR Non-African American >60 >60 mL/min    GFR African American >60 >60 mL/min    GFR Comment          GFR Staging NOT REPORTED    Lipase   Result Value Ref Range    Lipase 25 13 - 60 U/L   HCG Qualitative, Serum   Result Value Ref Range    hCG Qual NEGATIVE NEGATIVE       IMPRESSION: 59-year-old female with Narendra-en-Y surgery on 7/24/2021, presenting to the emergency department with epigastric abdominal pain, nausea, vomiting, minimal hematemesis. Concern for complication from surgery, marginal ulcer, will obtain CT abdomen pelvis. Will consult with bariatrics regarding whether they would prefer with oral contrast or  only with IV contrast.  Will obtain abdominal labs to evaluate for pancreatitis, liver/gallbladder etiology, anemia, electrolyte abnormality. Will treat symptoms with Zofran, Protonix, reassess. RADIOLOGY:  Pending    EKG      All EKG's are interpreted by the Emergency Department Physician who either signs or Co-signs this chart in the absence of a cardiologist.    EMERGENCY DEPARTMENT COURSE:  Patient came to emergency department, HPI and physical exam were conducted. All nursing notes were reviewed. Labs largely unremarkable. On reassessment, patient has improvement in symptoms. Consulted with bariatric surgery who recommended CT with oral contrast, will reevaluate once imaging is done. Care patient is handed over to Dr. Siva Bello pending CT imaging. PROCEDURES:      CONSULTS:  IP CONSULT TO BARIATRICS    CRITICAL CARE:      FINAL IMPRESSION      1. Abdominal pain, epigastric          DISPOSITION / PLAN     DISPOSITION Decision To Admit 09/06/2021 11:00:50 PM      PATIENT REFERRED TO:  No follow-up provider specified.     DISCHARGE MEDICATIONS:  New Prescriptions    No medications on file       Red Lee MD  Emergency Medicine Resident    (Please note that portions of thisnote were completed with a voice recognition program.  Efforts were made to edit the dictations but occasionally words are mis-transcribed.)        Red Lee MD  Resident  09/07/21 1986

## 2021-09-07 NOTE — ANESTHESIA POSTPROCEDURE EVALUATION
Department of Anesthesiology  Postprocedure Note    Patient: Salvador Ragland  MRN: [id number]  YOB: 1975  Date of evaluation: 9/7/2021  Time:  6:20 PM     Procedure Summary     Date: 09/07/21 Room / Location: [address]    Anesthesia Start: 7556 Anesthesia Stop: 7303    Procedure: EGD ESOPHAGOGASTRODUODENOSCOPY WITH BALLOON DILITATION (N/A ) Diagnosis: (EPIGASTRIC PAIN)    Surgeons: Yvrose Galeana DO Responsible Provider: Charly Bradley MD    Anesthesia Type: MAC ASA Status: 3          Anesthesia Type: MAC    Jose Angel Phase I: Jose Angel Score: 10    Jose Angel Phase II: Jose Angel Score: 10    Last vitals: Reviewed and per EMR flowsheets.        Anesthesia Post Evaluation    Patient location during evaluation: PACU  Patient participation: complete - patient participated  Level of consciousness: awake and alert  Pain score: 1  Airway patency: patent  Nausea & Vomiting: no nausea and no vomiting  Complications: no  Cardiovascular status: hemodynamically stable  Respiratory status: acceptable  Hydration status: euvolemic

## 2021-09-07 NOTE — CARE COORDINATION
Case Management Initial Discharge Plan  Yesenia Him,             Met with:patient to discuss discharge plans. Information verified: address, contacts, phone number, , insurance Yes  Insurance Provider: Raf Johns    Emergency Contact/Next of Kin name & number: Mathew Serrano [phone]  Who are involved in patient's support system? Dtr, Mom, sister in law    PCP: Dat Armendariz MD  Date of last visit: 2 months ago      Discharge Planning    Living Arrangements:  [address]  1 stairs to climb to get into front door, 13 stairs to climb to reach second floor  Location of bedroom/bathroom in home second floor    Patient able to perform ADL's:Independent    Current Services (outpatient & in home) none  DME equipment: CPAP  DME provider:      Is patient receiving oral anticoagulation therapy? No    If indicated:   Physician managing anticoagulation treatment:    Where does patient obtain lab work for ATC treatment? Potential Assistance Needed:  N/A    Patient agreeable to home care: No  [place] of choice provided:  n/a    Prior SNF/Rehab Placement and Facility:    Agreeable to SNF/Rehab: No  [place] of choice provided: n/a     Evaluation: no    Expected Discharge date:  21    Patient expects to be discharged to: If home: is the family and/or caregiver wiling & able to provide support at home? yes  Who will be providing this support? dtr (13 years old)    Follow Up Appointment: Best Day/ Time: Tuesday AM    Transportation provider: self, she drove herself here, can get a ride if she is unable to drive herself  Transportation arrangements needed for discharge: No     Readmission Risk              Risk of Unplanned Readmission:  0             Does patient have a readmission risk score greater than 14?: No  If yes, follow-up appointment must be made within 7 days of discharge.      Goals of Care:       Educated patient on transitional options, provided freedom of choice and are agreeable with plan      Discharge Plan: home with family support, drove herself here,will drive herself home          Electronically signed by Mohsen Ruffin RN on 9/7/21 at 8:45 AM EDT

## 2021-09-07 NOTE — OP NOTE
Operative Note      Patient: Arabella Moss  YOB: 1975  MRN: [id number]    Date of Procedure: 9/7/2021    Pre-Op Diagnosis: EPIGASTRIC PAIN    Post-Op Diagnosis: Gastrojejunal ulcer, mild to moderate       Procedure(s):  EGD ESOPHAGOGASTRODUODENOSCOPY WITH BALLOON DILITATION    Surgeon(s):  John Perez DO    Assistant:   * No surgical staff found *    Anesthesia: Monitor Anesthesia Care    Estimated Blood Loss (mL): Minimal    Complications: None    Specimens:   * No specimens in log *    Implants:  * No implants in log *      Drains:   Closed/Suction Drain Right RUQ Bulb 19 Indian (Active)       Gastrostomy/Enterostomy/Jejunostomy Gastrostomy LUQ 1 22 fr (Active)   Drainage Appearance Bile 09/07/21 1710   Site Description Healing 09/07/21 1710   Drain Status Clamped 09/07/21 1710   Surrounding Skin Dry 09/07/21 1710       Findings:   Mild to moderate Gastrojejunal ulcer  No significant stricture on dilation    Detailed Description of Procedure:     Operative Narrative: The risks and benefits of the procedure were explained to the patient in detail, including but not limited to: bleeding, infection, need for further surgery, damage to surrounding structures and perforation. The patient consented with the procedure. The patient was taken to the endoscopic suite and placed in lateral position. Oxygen was administered via nasal cannula and a mouth guard placed. MAC was administered via the anesthesia team.  The endoscope was then advanced into the oropharynx and passed into the esophagus under direct visualization. The scope was further advanced under direct visualization into the esophageal lumen and down into the gastric pouch. Evidence of stricture was not noted. The scope was advanced past the stricture and the rob limb surveyed. The rob limb appeared patent and without signs of ischemia. The scope was withdrawn and the blind pouch surveyed, without lesion.   There was a gastrojejunal ulcer at the right lateral region just after the anastomosis. Tissues friable and oozing if touched. Due to her symptoms I placed a 15 mm balloon across the anastomosis which was dilated at 15 mm for 30 seconds. No significant dilation or bleeding. Narendra limb without evidence of injury. The gastric pouch without lesion. The esophagus without lesion.        Plan   Carafate   PPI  Check Nicotine      Electronically signed by Osei Fernández DO on 9/7/2021 at 5:40 PM

## 2021-09-07 NOTE — ED PROVIDER NOTES
[facility]  FACULTY HANDOFF       Handoff taken on the following patient from prior Attending Physician:  Pt Name: Yesenia Siegel  PCP:  Dat Armendariz MD    Attestation  I was available and discussed any additional care issues that arose and coordinated the management plans with the resident(s) caring for the patient during my duty period. Any areas of disagreement with resident's documentation of care or procedures are noted on the chart. I was personally present for the key portions of any/all procedures during my duty period. I have documented in the chart those procedures where I was not present during the key portions. CHIEF COMPLAINT       Chief Complaint   Patient presents with    Hematemesis     PT presents via triage with coffee ground emesis. Had surgery on 7/21 for gastric bypass revision with vomiting since of clear or liquid bile. Coffee ground emesis x 1 today.          CURRENT MEDICATIONS     Previous Medications  Previous Medications    ALBUTEROL SULFATE  (90 BASE) MCG/ACT INHALER    INHALE 2 PUFFS INTO THE LUNGS 4 TIMES DAILY AS NEEDED FOR WHEEZING    ASCORBIC ACID (VITAMIN C) 250 MG TABLET    Take 500 mg by mouth daily    ATORVASTATIN (LIPITOR) 10 MG TABLET    Take 1 tablet by mouth daily    BUSPIRONE (BUSPAR) 10 MG TABLET    TAKE 1 TABLET BY MOUTH TWICE A DAY    BUTALBITAL-ACETAMINOPHEN-CAFFEINE (FIORICET, ESGIC) -40 MG PER TABLET    Take 1 tablet by mouth every 4 hours as needed for Headaches    CALCIUM CARBONATE (OSCAL) 500 MG TABS TABLET    Take 500 mg by mouth daily    FAMOTIDINE (PEPCID) 20 MG TABLET    TAKE 1 TABLET BY MOUTH EVERY DAY AT NIGHT    FERROUS SULFATE (FE TABS 325) 325 (65 FE) MG EC TABLET    Take 1 tablet by mouth 3 times daily (with meals)    FOLIC ACID (FOLVITE) 1 MG TABLET    Take 1 tablet by mouth daily    LABETALOL (NORMODYNE) 100 MG TABLET    TAKE 1 AND 1/2 TABLET BY MOUTH TWICE DAILY    LEVONORGESTREL (MIRENA) IUD 52 MG    1 each by Intrauterine route    LISINOPRIL (PRINIVIL;ZESTRIL) 40 MG TABLET    TAKE 1 TABLET BY MOUTH EVERY DAY    MULTIPLE VITAMINS-MINERALS (MULTIVITAMIN ADULT EXTRA C PO)    Take 1 tablet by mouth daily    ONDANSETRON (ZOFRAN) 4 MG TABLET    Take 1 tablet by mouth every 8 hours as needed for Nausea or Vomiting    PANTOPRAZOLE (PROTONIX) 40 MG TABLET    Take 1 tablet by mouth every morning (before breakfast)    SERTRALINE (ZOLOFT) 50 MG TABLET    TAKE 1 TABLET BY MOUTH EVERY DAY    SUCRALFATE (CARAFATE) 1 GM TABLET    Take 1 tablet by mouth 4 times daily    SUMATRIPTAN (IMITREX) 25 MG TABLET    Take 1 tablet by mouth once as needed for Migraine    VITAMIN B-12 (CYANOCOBALAMIN) 100 MCG TABLET    TAKE 1 TABLET BY MOUTH EVERY DAY       Encounter Medications  Orders Placed This Encounter   Medications    ondansetron (ZOFRAN) injection 4 mg    pantoprazole (PROTONIX) 40 mg in sodium chloride 0.9 % 50 mL bolus    lactated ringers bolus       ALLERGIES     is allergic to nsaids, tolmetin, and codeine. RECENT VITALS:   Temp: 98.6 °F (37 °C),  Pulse: 72, Resp: 19, BP: (!) 203/139    RADIOLOGY:   CT ABDOMEN PELVIS W IV CONTRAST Additional Contrast? Oral    (Results Pending)       LABS:  Labs Reviewed   CBC WITH AUTO DIFFERENTIAL - Abnormal; Notable for the following components:       Result Value    Eosinophils % 6 (*)     All other components within normal limits   COMPREHENSIVE METABOLIC PANEL W/ REFLEX TO MG FOR LOW K - Abnormal; Notable for the following components:    Alkaline Phosphatase 164 (*)     All other components within normal limits   LIPASE   HCG, SERUM, QUALITATIVE           PLAN/ TASKS OUTSTANDING     This patient is a 42 y.o. Female. 44-year-old with a sleeve gastrectomy recently converted to Narendra-en-Y, vomiting coffee-ground emesis. Does still have a G-tube in place. Labs, CT, fluids, PPI, bariatric consult.   Disposition per bariatric team.    Hgb 12.8, approx baseline       (Please note that portions of this note were completed with a voice recognition program.  Efforts were made to edit the dictations but occasionally words are mis-transcribed.)    Marisol Orona MD,, MD  Attending Emergency Physician       Marisol Orona MD  09/06/21 5108

## 2021-09-08 VITALS
RESPIRATION RATE: 16 BRPM | WEIGHT: 239 LBS | SYSTOLIC BLOOD PRESSURE: 168 MMHG | BODY MASS INDEX: 36.22 KG/M2 | OXYGEN SATURATION: 99 % | TEMPERATURE: 98.2 F | HEIGHT: 68 IN | DIASTOLIC BLOOD PRESSURE: 98 MMHG | HEART RATE: 65 BPM

## 2021-09-08 PROCEDURE — 2580000003 HC RX 258: Performed by: SURGERY

## 2021-09-08 PROCEDURE — 99024 POSTOP FOLLOW-UP VISIT: CPT | Performed by: SURGERY

## 2021-09-08 PROCEDURE — 96372 THER/PROPH/DIAG INJ SC/IM: CPT

## 2021-09-08 PROCEDURE — 6360000002 HC RX W HCPCS: Performed by: SURGERY

## 2021-09-08 PROCEDURE — 96376 TX/PRO/DX INJ SAME DRUG ADON: CPT

## 2021-09-08 PROCEDURE — G0378 HOSPITAL OBSERVATION PER HR: HCPCS

## 2021-09-08 PROCEDURE — 6370000000 HC RX 637 (ALT 250 FOR IP): Performed by: SURGERY

## 2021-09-08 RX ORDER — ONDANSETRON 4 MG/1
4 TABLET, ORALLY DISINTEGRATING ORAL EVERY 8 HOURS PRN
Qty: 30 TABLET | Refills: 1 | Status: SHIPPED | OUTPATIENT
Start: 2021-09-08 | End: 2021-10-20

## 2021-09-08 RX ADMIN — HEPARIN SODIUM 5000 UNITS: 5000 INJECTION INTRAVENOUS; SUBCUTANEOUS at 06:24

## 2021-09-08 RX ADMIN — PANTOPRAZOLE SODIUM 40 MG: 40 TABLET, DELAYED RELEASE ORAL at 06:24

## 2021-09-08 RX ADMIN — ONDANSETRON 4 MG: 2 INJECTION INTRAMUSCULAR; INTRAVENOUS at 08:10

## 2021-09-08 RX ADMIN — ATORVASTATIN CALCIUM 10 MG: 10 TABLET, FILM COATED ORAL at 08:11

## 2021-09-08 RX ADMIN — SUCRALFATE 1 G: 1 TABLET ORAL at 08:10

## 2021-09-08 RX ADMIN — SODIUM CHLORIDE, POTASSIUM CHLORIDE, SODIUM LACTATE AND CALCIUM CHLORIDE: 600; 310; 30; 20 INJECTION, SOLUTION INTRAVENOUS at 06:26

## 2021-09-08 RX ADMIN — SERTRALINE 50 MG: 50 TABLET, FILM COATED ORAL at 08:10

## 2021-09-08 RX ADMIN — LABETALOL HCL 100 MG: 100 TABLET, FILM COATED ORAL at 08:11

## 2021-09-08 RX ADMIN — BUSPIRONE HYDROCHLORIDE 10 MG: 10 TABLET ORAL at 08:11

## 2021-09-08 RX ADMIN — HEPARIN SODIUM 5000 UNITS: 5000 INJECTION INTRAVENOUS; SUBCUTANEOUS at 13:54

## 2021-09-08 RX ADMIN — LISINOPRIL 40 MG: 20 TABLET ORAL at 08:10

## 2021-09-08 RX ADMIN — ONDANSETRON 4 MG: 2 INJECTION INTRAMUSCULAR; INTRAVENOUS at 13:31

## 2021-09-08 RX ADMIN — SUCRALFATE 1 G: 1 TABLET ORAL at 13:54

## 2021-09-08 NOTE — PROGRESS NOTES
Bariatric Surgery:  Daily Progress Note                    PATIENT NAME: Julia Guerrero     TODAY'S DATE: 9/8/2021, 10:39 AM  CC:  Continues to have nausea    SUBJECTIVE:     Pt seen and examined at bedside. Pt states overall pain is about the same. Still has nausea this morning, not vomiting bile. Denies fever, chills, chest pain, and SOB. She tolerated a bowl of mashed potatoes last night. EGD showed a small GJ ulcer without bleeding, no bile or acidic contents.    + Activity. + flatus, - BM. OBJECTIVE:   VITALS:  BP (!) 168/98   Pulse 65   Temp 98.2 °F (36.8 °C) (Oral)   Resp 16   Ht 5' 8\" (1.727 m)   Wt 239 lb (108.4 kg)   SpO2 99%   BMI 36.34 kg/m²      INTAKE/OUTPUT:      Intake/Output Summary (Last 24 hours) at 9/8/2021 1039  Last data filed at 9/7/2021 1829  Gross per 24 hour   Intake 550 ml   Output --   Net 550 ml       PHYSICAL EXAM:  General Appearance: awake, alert, oriented, in no acute distress  HEENT:  Normocephalic, atraumatic, mucus membranes moist   No NGT/OGT  Heart: Heart sounds are normal.  Regular rate and rhythm without murmur, gallop or rub. Lungs: Normal expansion. Clear to auscultation. No rales, rhonchi, or wheezing. Abdomen: soft, tender in the gastric region nondistended  Extremities: No cyanosis, pitting edema, rashes noted. Skin: Skin color, texture, turgor normal. No rashes or lesions.     Data:  CBC:   Lab Results   Component Value Date    WBC 5.6 09/06/2021    RBC 4.42 09/06/2021    HGB 12.8 09/06/2021    HCT 39.1 09/06/2021    MCV 88.5 09/06/2021    MCH 29.0 09/06/2021    MCHC 32.7 09/06/2021    RDW 12.9 09/06/2021     09/06/2021    MPV 9.7 09/06/2021     CMP:    Lab Results   Component Value Date     09/06/2021    K 4.1 09/06/2021     09/06/2021    CO2 24 09/06/2021    BUN 15 09/06/2021    CREATININE 0.58 09/06/2021    GFRAA >60 09/06/2021    LABGLOM >60 09/06/2021    GLUCOSE 95 09/06/2021    PROT 7.3 09/06/2021    LABALBU 4.2

## 2021-09-08 NOTE — CARE COORDINATION
Met with pt to discuss transitional planning. Her plan is home independently.  She denies needs and will drive herself home    Discharge [address] Case Management Department  Written by: Maricel Romero RN    Patient Name: Karen Fernandez  Attending Provider: Anita Bonilla DO  Admit Date: 2021  9:22 PM  MRN: [id number]  Account: [de-identified]                     : 1975  Discharge Date: 2021      Disposition: home independently    Maricel Romero RN

## 2021-09-11 LAB
3-OH-COTININE: <2 NG/ML
COTININE: <2 NG/ML
NICOTINE: <2 NG/ML

## 2021-09-17 PROBLEM — E86.0 DEHYDRATION: Status: RESOLVED | Noted: 2021-07-27 | Resolved: 2021-09-17

## 2021-09-21 ENCOUNTER — E-VISIT (OUTPATIENT)
Dept: FAMILY MEDICINE CLINIC | Age: 46
End: 2021-09-21
Payer: MEDICARE

## 2021-09-21 DIAGNOSIS — R30.0 DYSURIA: Primary | ICD-10-CM

## 2021-09-21 PROCEDURE — 99421 OL DIG E/M SVC 5-10 MIN: CPT | Performed by: FAMILY MEDICINE

## 2021-09-22 RX ORDER — SULFAMETHOXAZOLE AND TRIMETHOPRIM 800; 160 MG/1; MG/1
1 TABLET ORAL 2 TIMES DAILY
Qty: 14 TABLET | Refills: 0 | Status: SHIPPED | OUTPATIENT
Start: 2021-09-22 | End: 2021-09-29

## 2021-09-22 NOTE — PROGRESS NOTES
Reviewed patient's questionnaire. Symptoms consistent with UTI. Will treat with Bactrim. Get urine culture if symptoms persist.     Time Spent:  5 minutes    My chart message sent to patient.

## 2021-09-23 ENCOUNTER — OFFICE VISIT (OUTPATIENT)
Dept: BARIATRICS/WEIGHT MGMT | Age: 46
End: 2021-09-23

## 2021-09-23 VITALS
DIASTOLIC BLOOD PRESSURE: 86 MMHG | WEIGHT: 234 LBS | RESPIRATION RATE: 20 BRPM | BODY MASS INDEX: 34.66 KG/M2 | SYSTOLIC BLOOD PRESSURE: 122 MMHG | HEIGHT: 69 IN | HEART RATE: 68 BPM

## 2021-09-23 DIAGNOSIS — Z98.84 S/P GASTRIC BYPASS: Primary | ICD-10-CM

## 2021-09-23 PROCEDURE — 99024 POSTOP FOLLOW-UP VISIT: CPT | Performed by: SURGERY

## 2021-09-23 NOTE — PROGRESS NOTES
[facility]  [facility]  [address] [place] [address] [address] [facility] [place]  [address] [address]  Dept: [phone]    SURGICAL WEIGHT LOSS MANAGEMENT PROGRAM  PROGRESS NOTE     CC: Weight loss    Patient: Toño Freeman      Service Date: 9/23/2021  Visit:   2 month   Medical Record #: [id number]  Date of Surgery:       Reason for Visit: Routine Post-Operative:  [] New Problem /   [] FU of existing problem    Patient here for 2 month visit after bypass. No nausea, vomiting, fevers/chills. Tolerating diet. Had a BM. Pain controlled. Vomited once since leaving hospital and was piece of beef. Height: 5' 8.5\" (1.74 m)  Highest Weight:   269lbs    Current Visit Weight Information  Weight: 234 lb (106.1 kg)   BMI: Body mass index is 35.06 kg/m². Weight loss since surgery:     35    1 wk - D/C'd home on VTE Prohylaxis? [x] Yes   [] No   On VTE Proph as directed? [x] Yes   [] No      Liver pathology:    [] NA    [] No Gross path    [] Liver Steatosis   [x] Discussed w/ pt   [] Referred to GI    Exercising?    [] Yes    [x] No       Review of Systems:     General  Negative for: [] Weight Change   [x] Fatigue   [x] Fevers & Chills    [] Appetite change [] Other:    Positive for: [x] Weight Change   [] Fatigue   [] Fevers & Chills    [] Appetite change [] Other:   Cardiac  Negative for: [x] Chest Pain   [x] Difficulty Breathing   [x] Leg Cramps [x] Edema of Lower Extremeties    [] Left   [] Right      Positive for: [] Chest Pain   [] Difficulty Breathing   [] Leg Cramps [] Edema of Lower Extremeties    [] Left   [] Right   Pulmonary  Negative for: [x] Shortness of Breath [] Wheeze [x] Cough  [x] Calf Pain     Positive for: [] Shortness of Breath [] Wheeze [] Cough  [] Calf Pain   Gastro-Intestinal Negative for: [x] Heartburn   [x] Reflux   [x] Dysphagia   [x] Melena   [x] BRBPR  [x] Vomiting   [x] Abdominal Pain   [x] Diarrhea     [x] Constipation  [] Other:     Positive for: [] Heartburn   [] Reflux [] Dysphagia   [] Melena   [] BRBPR  [] Vomiting   [] Abdominal Pain   [] Diarrhea     [] Constipation  [] Other:    Muskuloskeletal Negative for: [] Joint pain   [] Back pain   [] Knee Pain   [x] Muscle weakness [x] Hernia   [] Edema [] Other:     Positive for: [] Joint pain   [] Back pain   [] Knee Pain   [] Muscle weakness [] Hernia   [] Edema [] Other:    Neurologic Negative for: [x] Syncope   [x] Insomnia   [] Being treated for depression  [] Other:     Positive for: [] Syncope   [] Insomnia   [] Being treated for depression  [] Other:    Skin Negative for: [] Wound:   [] Open   [] Draining   [] Incisional     [x] Rash   [x] Hair Loss  [] Other:     Positive for: [] Wound:   [] Open   [] Draining    [] Incisional  [] Rash   [] Hair Loss  [] Other:          Physical Assessment:     /86 (Site: Right Upper Arm, Position: Sitting, Cuff Size: Large Adult)   Pulse 68   Resp 20   Ht 5' 8.5\" (1.74 m)   Wt 234 lb (106.1 kg)   BMI 35.06 kg/m²   General Negative for:  [x] Lapses in memory   [x] Unusual Stress   [] Diffic Concen [] Unable to sleep   [] Eating in response to stress   [] Other:    Positive for:  [] Lapses in memory   [] Unusual Stress   [] Diffic Concen [] Unable to sleep   [] Eating in response to stress   [] Other:   Cardio-Pulmonary   [x] Heart RRR   [x] No murmurs   [] Pulses nl x4 extrem    [x] Good inspiratory effort   [x] No wheezing     [x] Lungs clear to auscultation bilaterally    [] Other:    Gastro-Intestinal   [x] Abd S/NT/ND/Benign   [x] No abdominal mass/hernia    [x] No Splenomegaly    [] Other:    Muskuloskeletal   [x] Good muscle strength x4 extremities   [x] nl gait and ambul    [x] Nl ROM x4 extremities    [] Other:    Neurologic   [x] Alert and oriented x3    [] Other:   Skin   [x] Intact w/ no open wounds   [x] Incisions C/D/I    [] Steri strips removed   [x] No drainage or Infection    [] Other:      Assessment & Plan:      1.  S/P gastric bypass              [x] Advance Diet [x] Daily protein (65-75gm/day)   [x] Take Vitamins   [x] Attend Support Group    [x] Exercise Regularly     [x] Use contraception:    [] NA    [] s/p Hysterectomy   [] s/p Tubal ligation   [] Other:     G tube removed    Incisions CDI    Ambulation      PPI    Carafate    Exercise    Nausea and dysphagia resolved      Follow up: No follow-ups on file. Orders placed this encounter:   No orders of the defined types were placed in this encounter. New Prescriptions:   No orders of the defined types were placed in this encounter. Electronically signed by Crystal Wasserman DO on 9/23/2021 at 9:54 AM    Please note that this chart was generated using voice recognition Dragon dictation software. Although every effort was made to ensure the accuracy of this automated transcription, some errors in transcription may have occurred.

## 2021-10-05 DIAGNOSIS — F33.0 MILD EPISODE OF RECURRENT MAJOR DEPRESSIVE DISORDER (HCC): ICD-10-CM

## 2021-10-05 DIAGNOSIS — F41.9 ANXIETY: ICD-10-CM

## 2021-10-21 ENCOUNTER — PATIENT MESSAGE (OUTPATIENT)
Dept: BARIATRICS/WEIGHT MGMT | Age: 46
End: 2021-10-21

## 2021-10-21 ENCOUNTER — OFFICE VISIT (OUTPATIENT)
Dept: BARIATRICS/WEIGHT MGMT | Age: 46
End: 2021-10-21
Payer: MEDICARE

## 2021-10-21 ENCOUNTER — OFFICE VISIT (OUTPATIENT)
Dept: FAMILY MEDICINE CLINIC | Age: 46
End: 2021-10-21
Payer: MEDICARE

## 2021-10-21 VITALS
WEIGHT: 226 LBS | HEART RATE: 70 BPM | DIASTOLIC BLOOD PRESSURE: 100 MMHG | BODY MASS INDEX: 33.47 KG/M2 | HEIGHT: 69 IN | SYSTOLIC BLOOD PRESSURE: 160 MMHG

## 2021-10-21 DIAGNOSIS — Z98.84 S/P GASTRIC BYPASS: ICD-10-CM

## 2021-10-21 DIAGNOSIS — F41.9 ANXIETY AND DEPRESSION: ICD-10-CM

## 2021-10-21 DIAGNOSIS — Z99.89 OSA ON CPAP: ICD-10-CM

## 2021-10-21 DIAGNOSIS — G47.33 OSA ON CPAP: ICD-10-CM

## 2021-10-21 DIAGNOSIS — F32.A ANXIETY AND DEPRESSION: ICD-10-CM

## 2021-10-21 DIAGNOSIS — I16.1 HYPERTENSIVE EMERGENCY: ICD-10-CM

## 2021-10-21 DIAGNOSIS — I10 ESSENTIAL HYPERTENSION: Primary | ICD-10-CM

## 2021-10-21 DIAGNOSIS — J44.9 STAGE 1 MILD COPD BY GOLD CLASSIFICATION (HCC): ICD-10-CM

## 2021-10-21 DIAGNOSIS — K21.9 GASTROESOPHAGEAL REFLUX DISEASE WITHOUT ESOPHAGITIS: ICD-10-CM

## 2021-10-21 DIAGNOSIS — K90.89 OTHER SPECIFIED INTESTINAL MALABSORPTION: Primary | ICD-10-CM

## 2021-10-21 DIAGNOSIS — G43.009 MIGRAINE WITHOUT AURA AND WITHOUT STATUS MIGRAINOSUS, NOT INTRACTABLE: ICD-10-CM

## 2021-10-21 PROBLEM — R10.13 EPIGASTRIC PAIN: Status: RESOLVED | Noted: 2021-09-07 | Resolved: 2021-10-21

## 2021-10-21 PROCEDURE — G8427 DOCREV CUR MEDS BY ELIG CLIN: HCPCS | Performed by: SURGERY

## 2021-10-21 PROCEDURE — 99213 OFFICE O/P EST LOW 20 MIN: CPT | Performed by: SURGERY

## 2021-10-21 PROCEDURE — 99214 OFFICE O/P EST MOD 30 MIN: CPT | Performed by: FAMILY MEDICINE

## 2021-10-21 PROCEDURE — G8427 DOCREV CUR MEDS BY ELIG CLIN: HCPCS | Performed by: FAMILY MEDICINE

## 2021-10-21 PROCEDURE — G8484 FLU IMMUNIZE NO ADMIN: HCPCS | Performed by: SURGERY

## 2021-10-21 PROCEDURE — G8417 CALC BMI ABV UP PARAM F/U: HCPCS | Performed by: SURGERY

## 2021-10-21 PROCEDURE — 1036F TOBACCO NON-USER: CPT | Performed by: SURGERY

## 2021-10-21 RX ORDER — SUMATRIPTAN 25 MG/1
25 TABLET, FILM COATED ORAL
Qty: 9 TABLET | Refills: 5 | Status: SHIPPED | OUTPATIENT
Start: 2021-10-21 | End: 2022-07-18

## 2021-10-21 RX ORDER — LABETALOL 100 MG/1
TABLET, FILM COATED ORAL
Qty: 180 TABLET | Refills: 1 | Status: SHIPPED | OUTPATIENT
Start: 2021-10-21 | End: 2022-01-04 | Stop reason: SDUPTHER

## 2021-10-21 ASSESSMENT — ENCOUNTER SYMPTOMS
RESPIRATORY NEGATIVE: 1
GASTROINTESTINAL NEGATIVE: 1
EYES NEGATIVE: 1

## 2021-10-21 NOTE — TELEPHONE ENCOUNTER
From: Gilford Girt  To: Jean Marie Restrepo DO  Sent: 10/21/2021 2:00 PM EDT  Subject: Visit Follow-Up Question    Hi, I had a visit with Dr. Carey (PCP) after our visit today and I had talked to her about my stomach skin hanging low and told her I use anti fungal powder because it gets wet, red, and burns. She said she was documenting it and I needed to tell you as well to have you document it also. I asked if I should stop using it so it can get bad and take pictures and she said no. Are you able to document it and is there a prescription powder I can use that insurance will maybe pay for?

## 2021-10-21 NOTE — PROGRESS NOTES
Medical Nutrition Therapy  Metabolic and Bariatric surgery  3 month follow up        Pt reports:         Vitals:   Vitals:    10/21/21 0759   BP: (!) 140/90   Site: Right Upper Arm   Position: Sitting   Cuff Size: Large Adult   Pulse: 70   Weight: 226 lb (102.5 kg)   Height: 5' 8.5\" (1.74 m)      Body mass index is 33.86 kg/m². Labs reviewed:     Multivitamin/mineral intake:one daily  Calcium intake:   one daily  Other:            Nutrition Assessment:   PES: Inadequate food and beverage intake r/t WLS as evidenced by loss of excess body weight 43lb. Goals   60-80gm of protein  48-64oz of fluid  Vitamin adherance  Basic adherance to WLS behavious and this document has been scanned into the chart.        [] met     [x]  Not met        Plan: Add an additional calcium daily  F/u 6 months after surgery         Jyothi Elmore RD, LD

## 2021-10-21 NOTE — PROGRESS NOTES
10/21/2021    TELEHEALTH EVALUATION -- Audio/Visual (During NYGGD-41 public health emergency)    HPI:    Digna Euceda (:  1975) has requested an audio/video evaluation for the following concern(s):    The patient is a 42year old female with a past medical history significant for obesity, hypertension, hyperlipidemia, CORNELIA    The patient had a recent revision from sleeve to full on gastric bypass. She was taken off of her bp medications in the hospital.  The patient's bp remained elevated in the hospital so she was started back on the medication. She had to go back to the hospital for esophagus to be stretched. Her blood pressure was extremely low at that time so she was pulled off of the medications again. BP increased again so she was put back on labetalol 100 mg once daily but her blood pressures have been increasing again. She was advised today to increase her calcium to 2 pills. She is on vitamin C now as well. She was taken off of the cholesterol medicine as well. The redundant skin/pannus does get frequent yeast infections, skin irritation. The patient does have a history of CORNELIA and had been using her cpap machine but her hose has been broken. She has been in the process of attempting to reach out to our CPAP company. The patient's mood has been stable on the medications. Review of Systems   Constitutional: Negative. HENT: Negative. Eyes: Negative. Respiratory: Negative. Cardiovascular: Negative. Gastrointestinal: Negative. Genitourinary: Negative. Musculoskeletal: Negative. Skin: Negative. Allergic/Immunologic: Negative for environmental allergies, food allergies and immunocompromised state. Neurological: Negative. Psychiatric/Behavioral: Negative. Prior to Visit Medications    Medication Sig Taking?  Authorizing Provider   SUMAtriptan (IMITREX) 25 MG tablet Take 1 tablet by mouth once as needed for Migraine Yes Kenia Vallejo MD sertraline (ZOLOFT) 50 MG tablet TAKE 1 TABLET BY MOUTH EVERY DAY Yes Chiara Noel MD   labetalol (NORMODYNE) 100 MG tablet Take 1 and 1/2 tablets by mouth twice daily  Patient taking differently: Take 1 tablet by mouth once daily Yes Jordana Bassett MD   albuterol sulfate  (90 Base) MCG/ACT inhaler INHALE 2 PUFFS INTO THE LUNGS 4 TIMES DAILY AS NEEDED FOR WHEEZING Yes Chiara Noel MD   sucralfate (CARAFATE) 1 GM tablet Take 1 tablet by mouth 4 times daily Yes TERA Spring CNP   ondansetron (ZOFRAN) 4 MG tablet Take 1 tablet by mouth every 8 hours as needed for Nausea or Vomiting Yes TERA Spring CNP   ascorbic acid (VITAMIN C) 250 MG tablet Take 500 mg by mouth daily   Patient not taking: Reported on 10/21/2021 Yes Historical Provider, MD   famotidine (PEPCID) 20 MG tablet TAKE 1 TABLET BY MOUTH EVERY DAY AT NIGHT Yes TERA Spring CNP   pantoprazole (PROTONIX) 40 MG tablet Take 1 tablet by mouth every morning (before breakfast) Yes Kenya Ferguson DO   busPIRone (BUSPAR) 10 MG tablet TAKE 1 TABLET BY MOUTH TWICE A DAY Yes Chiara Noel MD   calcium carbonate (OSCAL) 500 MG TABS tablet Take 500 mg by mouth daily Yes Historical Provider, MD   vitamin B-12 (CYANOCOBALAMIN) 100 MCG tablet TAKE 1 TABLET BY MOUTH EVERY DAY Yes Historical Provider, MD   ferrous sulfate (FE TABS 325) 325 (65 Fe) MG EC tablet Take 1 tablet by mouth 3 times daily (with meals) Yes Chiara Noel MD   folic acid (FOLVITE) 1 MG tablet Take 1 tablet by mouth daily Yes Chiara Noel MD   Multiple Vitamins-Minerals (MULTIVITAMIN ADULT EXTRA C PO) Take 1 tablet by mouth daily Yes Historical Provider, MD   levonorgestrel (MIRENA) IUD 52 mg 1 each by Intrauterine route Yes Historical Provider, MD   albuterol sulfate HFA (VENTOLIN HFA) 108 (90 Base) MCG/ACT inhaler Inhale 2 puffs into the lungs 4 times daily as needed for Wheezing  Chiara Noel MD       Social History     Tobacco Use    Smoking status: Former Smoker     Packs/day: 1.00     Years: 22.00     Pack years: 22.00     Types: Cigarettes     Start date: 1/15/2020     Quit date: 2020     Years since quittin.2    Smokeless tobacco: Never Used   Vaping Use    Vaping Use: Former    Substances: Nicotine    Devices: Disposable   Substance Use Topics    Alcohol use: Yes     Comment: social    Drug use: No        Allergies   Allergen Reactions    Nsaids      Pt had gastric sleeve  Cannot take due to bariatric surgery      Tolmetin      Pt had gastric sleeve    Codeine Itching   ,   Past Medical History:   Diagnosis Date    Anemia     Anxiety and depression 2021    Anxiety state 2020    Asthma     COPD [facility]ronic obstructive [facility] disease) [facility]     Epigastric pain 2021    Essential hypertension     Folic acid deficiency     Hiatal hernia 2020    Hiatal hernia with GERD 2021    History of abnormal cervical Pap smear     History of nicotine use 2021    Hyperlipidemia     Hypertension     PCP DR Dontrell Tohmas    Hypertensive emergency 2020    Iron deficiency anemia 2020    IUD (intrauterine device) in place 2020    Mirena    Menometrorrhagia     Migraine without aura and without status migrainosus, not intractable 2020    Morbid obesity due to excess calories ([facility] 2020    Obesity (BMI 30-39.9) 2020    CORNELIA on CPAP     DR Loni Beavers (last visit 2021)    Postgastrectomy malabsorption 2016 Sleeve Gastrectomy and repair of sm to mod size sliding Hiatal Hernia; 16 UGI WNL postop    Recurrent major depressive disorder, in partial remission ([facility] 2020    Vitamin D deficiency 2020    Vit D low 18.0/18.1; treat with Vitamin D 50,000 iu 3x wk x 6 wks then repeat level   ,   Past Surgical History:   Procedure Laterality Date    BARIATRIC SURGERY  2016    GASTRIC SLEEVE (IN [place])    BREAST LUMPECTOMY Right     CHOLECYSTECTOMY      COLPOSCOPY      HERNIA REPAIR      DONE DURING GASTRIC SLEEVE    INTRAUTERINE DEVICE INSERTION  08/04/2020    Mirena     LIPOMA RESECTION      lt shoulder/upper back    ADRIAN-EN-Y GASTRIC BYPASS N/A 07/21/2021    XI ROBOTIC LAPAROSCOPIC GASTRIC BYPASS ADRIAN-EN-Y,  EXTENSIVE LYSIS OF ADHESIONS, EGD, REPAIR OF RECURRENT HIATAL HERNIA , G TUBE INSERTION performed by Kelsea Cardozo DO at [address] N/A 04/01/2021    TRUNK LESION BIOPSY EXCISION RIGHT BACK X 6, LEFT, RIGHT, AND MID BACK ALL WITH COMPLEX CLOSURE. performed by Ana Sanders MD at [address] 05/07/2021    EGD BIOPSY performed by Kelsea Cardozo DO at [address] [place] [address] 13  09/07/2021    EGD ESOPHAGOGASTRODUODENOSCOPY WITH BALLOON DILITATION     UPPER GASTROINTESTINAL ENDOSCOPY N/A 9/7/2021    EGD DILATION BALLOON performed by Kelsea Cardozo DO at [facility]:  [ INSTRUCTIONS:  \"[x]\" Indicates a positive item  \"[]\" Indicates a negative item  -- DELETE ALL ITEMS NOT EXAMINED]  Vital Signs: (As obtained by patient/caregiver or practitioner observation)    Blood pressure-  Heart rate-    Respiratory rate-    Temperature-  Pulse oximetry-     Constitutional: [x] Appears well-developed and well-nourished [x] No apparent distress      [] Abnormal-   Mental status  [x] Alert and awake  [x] Oriented to person/place/time [x]Able to follow commands      Eyes:  EOM    [x]  Normal  [] Abnormal-  Sclera  [x]  Normal  [] Abnormal -         Discharge [x]  None visible  [] Abnormal -    HENT:   [x] Normocephalic, atraumatic.   [] Abnormal   [x] Mouth/Throat: Mucous membranes are moist.     External Ears [x] Normal  [] Abnormal-     Neck: [x] No visualized mass     Pulmonary/Chest: [x] Respiratory effort normal.  [x] No visualized signs of difficulty breathing or respiratory distress        [] Abnormal-      Musculoskeletal:   [x] Normal gait with no signs of ataxia         [x] Normal range of motion of neck        [] Abnormal-       Neurological:        [x] No Facial Asymmetry (Cranial nerve 7 motor function) (limited exam to video visit)          [x] No gaze palsy        [] Abnormal-         Skin:        [x] No significant exanthematous lesions or discoloration noted on facial skin         [] Abnormal-            Psychiatric:       [x] Normal Affect [x] No Hallucinations        [] Abnormal-     Other pertinent observable physical exam findings-     ASSESSMENT/PLAN:  1. Essential hypertension  Patient's blood pressure is still been difficult to control after having bariatric surgery. I would like her to go on labetalol 100 mg twice a day. Monitor blood pressure closely as she is losing weight and may drop further. Discussed the importance of patient following her blood pressures closely. 2. Anxiety and depression  Stay on Zoloft and BuSpar. No current concerns. She is doing well overall. 3. Migraine without aura and without status migrainosus, not intractable  Take Imitrex as needed. No increase in symptoms recently. - SUMAtriptan (IMITREX) 25 MG tablet; Take 1 tablet by mouth once as needed for Migraine  Dispense: 9 tablet; Refill: 5    4. CORNELIA on CPAP  Patient is losing weight but still needs her CPAP machine. Advised her to get in contact with the company and ask for replacement hose. Advised her to reach out to us that they will not replace the hose. - [facility] Order for (Specify) as OP    5. Stage 1 mild COPD by GOLD classification [facility])  Patient encouraged to continue with smoking cessation. As needed albuterol. Consider adding anticholinergic and long-acting beta. 6. S/P gastric bypass  Patient encouraged to follow-up with bariatric surgeon regularly. Labs were ordered by them. No follow-ups on file.     Neeta Flanagan, was evaluated through a synchronous (real-time) audio-video encounter. The patient (or guardian if applicable) is aware that this is a billable service. Verbal consent to proceed has been obtained within the past 12 months. The visit was conducted pursuant to the emergency declaration under the [facility], [address] [place] [address] authority and the [employer] General Act. Patient identification was verified, and a caregiver was present when appropriate. The patient was located in a state where the provider was credentialed to provide care. Total time spent on this encounter: Not billed by time    --Vero Gamble MD on 10/21/2021 at 11:13 AM    An electronic signature was used to authenticate this note.

## 2021-10-22 RX ORDER — NYSTATIN 100000 [USP'U]/G
POWDER TOPICAL
Qty: 60 G | Refills: 1 | Status: SHIPPED | OUTPATIENT
Start: 2021-10-22 | End: 2022-07-18 | Stop reason: SDUPTHER

## 2021-10-24 NOTE — PROGRESS NOTES
[facility]  [facility]  [address] [place] [address] [place]  [address] [facility] [address]  Dept: [phone]    SURGICAL WEIGHT LOSS MANAGEMENT PROGRAM  PROGRESS NOTE     CC: Weight loss    Patient: Bharat Bravo      Service Date: 10/21/2021  Visit:   3 month   Medical Record #: [id number]  Date of Surgery:   7/21/2021    Reason for Visit: Routine Post-Operative:  [] New Problem /   [] FU of existing problem    Patient here for 3 month visit after bypass. No nausea, fevers/chills. Tolerating diet. Having stools. Pain controlled. Feels pain and dysphagia are improved, but struggles with meats and has vomiting occasionally. No bilious vomit. No other complaints and states feels much improved since last visit. GERD much improved. Happy with weight loss    Height: 5' 8.5\" (1.74 m)  Highest Weight:   269lbs     Current Visit Weight Information  Weight: 226 lb (102.5 kg)   BMI: Body mass index is 33.86 kg/m². Weight loss since surgery:     43    1 wk - D/C'd home on VTE Prohylaxis? [x] Yes   [] No   On VTE Proph as directed? [x] Yes   [] No      Liver pathology:    [] NA    [] No Gross path    [] Liver Steatosis   [x] Discussed w/ pt   [] Referred to GI    Exercising?    [] Yes    [x] No       Review of Systems:     General  Negative for: [] Weight Change   [x] Fatigue   [x] Fevers & Chills    [] Appetite change [] Other:    Positive for: [x] Weight Change   [] Fatigue   [] Fevers & Chills    [] Appetite change [] Other:   Cardiac  Negative for: [x] Chest Pain   [x] Difficulty Breathing   [] Leg Cramps [x] Edema of Lower Extremeties    [] Left   [] Right      Positive for: [] Chest Pain   [] Difficulty Breathing   [] Leg Cramps [] Edema of Lower Extremeties    [] Left   [] Right   Pulmonary  Negative for: [x] Shortness of Breath [] Wheeze [x] Cough  [x] Calf Pain     Positive for: [] Shortness of Breath [] Wheeze [] Cough  [] Calf Pain   Gastro-Intestinal Negative for: [x] Heartburn   [x] Reflux   [] Dysphagia   [x] Melena   [x] BRBPR  [x] Vomiting   [x] Abdominal Pain   [x] Diarrhea     [x] Constipation  [] Other:     Positive for: [] Heartburn   [] Reflux   [x] Dysphagia   [] Melena   [] BRBPR  [] Vomiting   [] Abdominal Pain   [] Diarrhea     [] Constipation  [] Other:    Muskuloskeletal Negative for: [] Joint pain   [] Back pain   [] Knee Pain   [x] Muscle weakness [x] Hernia   [] Edema [] Other:     Positive for: [] Joint pain   [] Back pain   [] Knee Pain   [] Muscle weakness [] Hernia   [] Edema [] Other:    Neurologic Negative for: [x] Syncope   [x] Insomnia   [] Being treated for depression  [] Other:     Positive for: [] Syncope   [] Insomnia   [] Being treated for depression  [] Other:    Skin Negative for: [] Wound:   [] Open   [] Draining   [] Incisional     [x] Rash   [x] Hair Loss  [] Other:     Positive for: [] Wound:   [] Open   [] Draining    [] Incisional  [] Rash   [] Hair Loss  [] Other:            Physical Assessment:     BP (!) 160/100   Pulse 70   Ht 5' 8.5\" (1.74 m)   Wt 226 lb (102.5 kg)   BMI 33.86 kg/m²   Constitutional:  Vital signs are normal. The patient appears well-developed and well-nourished. HEENT:   Head: Normocephalic. Atraumatic  Eyes: pupils are equal and reactive. No scleral icterus is present. Neck: No mass and no thyromegaly present. Cardiovascular: Normal rate, regular rhythm, S1 normal and S2 normal.  Radial pulses present   Pulmonary/Chest: Effort normal and breath sounds normal. No retractions  Abdominal: Soft. Normal appearance. There is no organomegaly. No tenderness. There is no rigidity, no rebound, no guarding and no Harris's sign. Musculoskeletal:        Right lower leg: Normal. No tenderness and no edema. Left lower leg: Normal. No tenderness and no edema. Neurological: The patient is alert and oriented. Moving all 4 extremities, sensation grossly intact bilateral  Skin: Skin is warm, dry and intact.    Psychiatric: The patient has a normal mood and affect. Speech is normal and behavior is normal. Judgment and thought content normal. Cognition and memory are normal.     Assessment & Plan:      1. Other specified intestinal malabsorption    2. Gastroesophageal reflux disease without esophagitis    3. S/P gastric bypass              [x] Advance Diet    [x] Daily protein (65-75gm/day)   [x] Take Vitamins   [x] Attend Support Group    [x] Exercise Regularly     [x] Use contraception:    [] NA    [] s/p Hysterectomy   [] s/p Tubal ligation   [] Other:     GERD:  Much improved with bypass. She does have some dysphagia with meats. Will continue PPI    Intestinal malabsorption, expected after bypass  Vitamin replacement discussed  Labs ordered to monitor vitamin levels    S/P Gastric bypass  Increase exercise  Increase protein intake  Need for lifelong diet and exercise discussed    Nausea and dysphagia resolved    Rash under pannus, continue nystatin      Follow up: No follow-ups on file. Orders placed this encounter:   Orders Placed This Encounter   Procedures    CBC Auto Differential    Comprehensive Metabolic Panel    Hemoglobin A1C    Iron and TIBC    Lipid Panel    Magnesium    PTH, Intact    TSH without Reflex    Vitamin A    Vitamin B1    Vitamin B12 & Folate    Vitamin D 25 Hydroxy    Zinc    Comprehensive Metabolic Panel    TSH without Reflex    Hemoglobin A1C    Vitamin B12 & Folate    Vitamin B1    Vitamin D 25 Hydroxy    Magnesium    Iron and TIBC    Vitamin A    PTH, Intact    CBC Auto Differential    Zinc       New Prescriptions:   No orders of the defined types were placed in this encounter. Electronically signed by Safia Welsh DO on 10/24/2021 at 11:09 AM    Please note that this chart was generated using voice recognition Dragon dictation software. Although every effort was made to ensure the accuracy of this automated transcription, some errors in transcription may have occurred.

## 2021-12-01 ENCOUNTER — HOSPITAL ENCOUNTER (OUTPATIENT)
Age: 46
Discharge: HOME | End: 2021-12-01
Payer: MEDICARE

## 2021-12-01 DIAGNOSIS — Z98.84 S/P GASTRIC BYPASS: ICD-10-CM

## 2021-12-01 LAB
ABSOLUTE EOS #: 0.29 K/UL (ref 0–0.44)
ABSOLUTE IMMATURE GRANULOCYTE: <0.03 K/UL (ref 0–0.3)
ABSOLUTE LYMPH #: 1.8 K/UL (ref 1.1–3.7)
ABSOLUTE MONO #: 0.28 K/UL (ref 0.1–1.2)
ALBUMIN SERPL-MCNC: 4.2 G/DL (ref 3.5–5.2)
ALBUMIN/GLOBULIN RATIO: 1.5 (ref 1–2.5)
ALP BLD-CCNC: 162 U/L (ref 35–104)
ALT SERPL-CCNC: 37 U/L (ref 5–33)
ANION GAP SERPL CALCULATED.3IONS-SCNC: 13 MMOL/L (ref 9–17)
AST SERPL-CCNC: 35 U/L
BASOPHILS # BLD: 1 % (ref 0–2)
BASOPHILS ABSOLUTE: 0.04 K/UL (ref 0–0.2)
BILIRUB SERPL-MCNC: 0.36 MG/DL (ref 0.3–1.2)
BUN BLDV-MCNC: 12 MG/DL (ref 6–20)
BUN/CREAT BLD: ABNORMAL (ref 9–20)
CALCIUM SERPL-MCNC: 9.6 MG/DL (ref 8.6–10.4)
CHLORIDE BLD-SCNC: 105 MMOL/L (ref 98–107)
CHOLESTEROL/HDL RATIO: 3.8
CHOLESTEROL: 152 MG/DL
CO2: 24 MMOL/L (ref 20–31)
CREAT SERPL-MCNC: 0.66 MG/DL (ref 0.5–0.9)
DIFFERENTIAL TYPE: ABNORMAL
EOSINOPHILS RELATIVE PERCENT: 6 % (ref 1–4)
ESTIMATED AVERAGE GLUCOSE: 108 MG/DL
FOLATE: 17.5 NG/ML
GFR AFRICAN AMERICAN: >60 ML/MIN
GFR NON-AFRICAN AMERICAN: >60 ML/MIN
GFR SERPL CREATININE-BSD FRML MDRD: ABNORMAL ML/MIN/{1.73_M2}
GFR SERPL CREATININE-BSD FRML MDRD: ABNORMAL ML/MIN/{1.73_M2}
GLUCOSE BLD-MCNC: 93 MG/DL (ref 70–99)
HBA1C MFR BLD: 5.4 % (ref 4–6)
HCT VFR BLD CALC: 40 % (ref 36.3–47.1)
HDLC SERPL-MCNC: 40 MG/DL
HEMOGLOBIN: 13.5 G/DL (ref 11.9–15.1)
IMMATURE GRANULOCYTES: 0 %
IRON SATURATION: 26 % (ref 20–55)
IRON: 89 UG/DL (ref 37–145)
LDL CHOLESTEROL: 94 MG/DL (ref 0–130)
LYMPHOCYTES # BLD: 34 % (ref 24–43)
MAGNESIUM: 2.1 MG/DL (ref 1.6–2.6)
MCH RBC QN AUTO: 29.3 PG (ref 25.2–33.5)
MCHC RBC AUTO-ENTMCNC: 33.8 G/DL (ref 28.4–34.8)
MCV RBC AUTO: 86.8 FL (ref 82.6–102.9)
MONOCYTES # BLD: 5 % (ref 3–12)
NRBC AUTOMATED: 0 PER 100 WBC
PDW BLD-RTO: 14 % (ref 11.8–14.4)
PLATELET # BLD: 242 K/UL (ref 138–453)
PLATELET ESTIMATE: ABNORMAL
PMV BLD AUTO: 10.2 FL (ref 8.1–13.5)
POTASSIUM SERPL-SCNC: 4.2 MMOL/L (ref 3.7–5.3)
PTH INTACT: 55.85 PG/ML (ref 15–65)
RBC # BLD: 4.61 M/UL (ref 3.95–5.11)
RBC # BLD: ABNORMAL 10*6/UL
SEG NEUTROPHILS: 54 % (ref 36–65)
SEGMENTED NEUTROPHILS ABSOLUTE COUNT: 2.89 K/UL (ref 1.5–8.1)
SODIUM BLD-SCNC: 142 MMOL/L (ref 135–144)
TOTAL IRON BINDING CAPACITY: 341 UG/DL (ref 250–450)
TOTAL PROTEIN: 7 G/DL (ref 6.4–8.3)
TRIGL SERPL-MCNC: 90 MG/DL
TSH SERPL DL<=0.05 MIU/L-ACNC: 3.28 MIU/L (ref 0.3–5)
UNSATURATED IRON BINDING CAPACITY: 252 UG/DL (ref 112–347)
VITAMIN B-12: 1129 PG/ML (ref 232–1245)
VITAMIN D 25-HYDROXY: 47.1 NG/ML (ref 30–100)
VLDLC SERPL CALC-MCNC: ABNORMAL MG/DL (ref 1–30)
WBC # BLD: 5.3 K/UL (ref 3.5–11.3)
WBC # BLD: ABNORMAL 10*3/UL

## 2021-12-01 PROCEDURE — 84630 ASSAY OF ZINC: CPT

## 2021-12-01 PROCEDURE — 83970 ASSAY OF PARATHORMONE: CPT

## 2021-12-01 PROCEDURE — 80061 LIPID PANEL: CPT

## 2021-12-01 PROCEDURE — 85025 COMPLETE CBC W/AUTO DIFF WBC: CPT

## 2021-12-01 PROCEDURE — 84590 ASSAY OF VITAMIN A: CPT

## 2021-12-01 PROCEDURE — 83540 ASSAY OF IRON: CPT

## 2021-12-01 PROCEDURE — 83036 HEMOGLOBIN GLYCOSYLATED A1C: CPT

## 2021-12-01 PROCEDURE — 36415 COLL VENOUS BLD VENIPUNCTURE: CPT

## 2021-12-01 PROCEDURE — 84425 ASSAY OF VITAMIN B-1: CPT

## 2021-12-01 PROCEDURE — 82746 ASSAY OF FOLIC ACID SERUM: CPT

## 2021-12-01 PROCEDURE — 83550 IRON BINDING TEST: CPT

## 2021-12-01 PROCEDURE — 82607 VITAMIN B-12: CPT

## 2021-12-01 PROCEDURE — 82306 VITAMIN D 25 HYDROXY: CPT

## 2021-12-01 PROCEDURE — 84443 ASSAY THYROID STIM HORMONE: CPT

## 2021-12-01 PROCEDURE — 80053 COMPREHEN METABOLIC PANEL: CPT

## 2021-12-01 PROCEDURE — 83735 ASSAY OF MAGNESIUM: CPT

## 2021-12-03 LAB
RETINYL PALMITATE: <0.02 MG/L (ref 0–0.1)
VITAMIN A LEVEL: 0.47 MG/L (ref 0.3–1.2)
VITAMIN A, INTERP: NORMAL

## 2021-12-07 LAB
VITAMIN B1 WHOLE BLOOD: 109 NMOL/L (ref 70–180)
ZINC: 79.1 UG/DL (ref 60–120)

## 2021-12-21 DIAGNOSIS — E53.8 FOLIC ACID DEFICIENCY: ICD-10-CM

## 2021-12-21 NOTE — TELEPHONE ENCOUNTER
Mago Roa is calling to request a refill on the following medication(s):    Medication Request:  Requested Prescriptions     Pending Prescriptions Disp Refills    folic acid (FOLVITE) 1 MG tablet [Pharmacy Med Name: FOLIC ACID 1 MG TABLET] 30 tablet 5     Sig: TAKE 1 TABLET BY MOUTH EVERY DAY       Last Visit Date (If Applicable):  67/35/2021    Next Visit Date:    Visit date not found

## 2021-12-22 RX ORDER — FOLIC ACID 1 MG/1
TABLET ORAL
Qty: 30 TABLET | Refills: 5 | Status: SHIPPED | OUTPATIENT
Start: 2021-12-22

## 2021-12-23 NOTE — PROGRESS NOTES
[facility]  Office: [address], DO, Nael Cotter, DO, Nikki Arciniega, DO, Tung Hernandez Blood, DO, Dominic Cortez MD, Iwona Pollack MD, Phyllis Hampton MD, Mat Christine MD, Eder Chavez MD, Dana Virk MD, Randee Serra MD, Rosalee Araujo MD, Andria Larson MD, Freida Henriquez DO, Fabi Lozano MD, Demar Perez MD, Josefina Perkins DO, Deloris Villalba MD,  Kevin Tsai DO, Deneen Horn MD, Lamonte Patel MD, Genia Germain [facility], [address], [facility], Pedro Hernandez, CNP, Grace Kingston, CNS, Lincoln Alert, CNP, [place], CNP, Pamella Epley, CNP, Donna Velez, CNP, Josie Smith, CNP, Kanchan Bowens PA-C, Frazier Schlatter, [facility], Joseline Chase, CNP, Song Shin, CNP, Mounika Fairbanks, CNP, Kaylee Davis, [facility], Sandra Bingham, Sina Lauraside    Progress Note    9/21/2020    8:00 AM    Name:   Radha Collins  MRN:     [id number]     Acct:      [de-identified]   Room:   [address] [place] [address] Day:  1  Admit Date:  9/20/2020 10:55 AM    PCP:   Basilio Cruz MD  Code Status:  Full Code    Subjective:     C/C:   Chief Complaint   Patient presents with    Hypertension    Headache     Interval History Status: not changed. Patient seen and examined this morning. Overnight, nicardipine was weaned, but was restarted this morning secondary to hypotension. She reports a significant headache over the past 12 hours. Fentanyl was minimally helpful. Otherwise, she is feeling unchanged. Blood pressure improved. Restarted medication. No nausea, vomiting. Brief History:     Radha Collins is a 39 y.o.  male who presented to [facility] on 9/20/2020 for evaluation of elevated blood pressure readings. Patient was at home and noted that her systolic blood pressures were in the 230s overnight. Her initial symptom that tipped her off was the beginnings of a migraine headache.   She reports that the last time she ferrous sulfate  325 mg Oral Daily with breakfast    atorvastatin  10 mg Oral Daily    busPIRone  5 mg Oral BID    cyanocobalamin  100 mcg Oral Daily    folic acid  1 mg Oral Daily    therapeutic multivitamin-minerals  1 tablet Oral Daily    pantoprazole  40 mg Oral QAM AC    sertraline  50 mg Oral Daily    sodium chloride flush  10 mL Intravenous 2 times per day     Continuous Infusions:    niCARdipene (CARDENE) infusion 7.5 mg/hr (20 0756)     PRN Meds: albuterol sulfate HFA, sodium chloride flush, acetaminophen **OR** acetaminophen, promethazine **OR** ondansetron, nicotine, traMADol    Data:     Past Medical History:   has a past medical history of Anemia, History of abnormal cervical Pap smear, Hypertension, IUD (intrauterine device) in place, and Menometrorrhagia. Social History:   reports that she quit smoking about 8 weeks ago. Her smoking use included cigarettes. She started smoking about 8 months ago. She has a 22.00 pack-year smoking history. She has never used smokeless tobacco. She reports current alcohol use. She reports that she does not use drugs. Family History:   Family History   Problem Relation Age of Onset    High Blood Pressure Father     Heart Disease Father     Hypertension Maternal Grandmother     Heart Disease Maternal Grandmother     Stroke Maternal Uncle        Vitals:  BP (!) 155/96   Pulse 81   Temp 98.4 °F (36.9 °C) (Oral)   Resp 18   Ht 5' 9\" (1.753 m)   Wt 255 lb (115.7 kg)   SpO2 99%   BMI 37.66 kg/m²   Temp (24hrs), Av.1 °F (36.7 °C), Min:97.9 °F (36.6 °C), Max:98.4 °F (36.9 °C)    No results for input(s): POCGLU in the last 72 hours. I/O (24Hr):   No intake or output data in the 24 hours ending 20 0800    Labs:  Hematology:  Recent Labs     20  1135 20  0556   WBC 7.6 7.3   RBC 4.62 4.57   HGB 10.4* 10.5*   HCT 34.9* 35.3*   MCV 75.5* 77.2*   MCH 22.5* 23.0*   MCHC 29.8 29.7   RDW 18.0* 18.3*    301   MPV 9.1 9.4 Chemistry:  Recent Labs     09/20/20  1135 09/20/20  1715 09/21/20  0556     --  141   K 3.9  --  4.3     --  103   CO2 26  --  25   GLUCOSE 100*  --  83   BUN 12  --  13   CREATININE 0.70  --  0.81   ANIONGAP 11  --  13   LABGLOM >60  --  >60   GFRAA >60  --  >60   CALCIUM 9.6  --  9.2   PROBNP 504*  --   --    TROPHS 13 13  --    MYOGLOBIN 31  --   --      Recent Labs     09/21/20  0556   PROT 7.0   LABALBU 4.0   AST 16   ALT 14   ALKPHOS 104   BILITOT 0.36   CHOL 164   HDL 51   LDLCHOLESTEROL 80   CHOLHDLRATIO 3.2   TRIG 166*   VLDL NOT REPORTED*     ABG:No results found for: POCPH, PHART, PH, POCPCO2, MEK5UXE, PCO2, POCPO2, PO2ART, PO2, POCHCO3, WOX1LIB, HCO3, NBEA, PBEA, BEART, BE, THGBART, THB, VEZ6HRT, TYWW6WYW, F4ZLUEMO, O2SAT, FIO2  Lab Results   Component Value Date/Time    SPECIAL NOT REPORTED 02/04/2018 11:40 AM     No results found for: CULTURE    Radiology:  Ct Head Wo Contrast    Result Date: 9/20/2020  No acute intracranial abnormality.        Physical Examination:        General appearance:  alert, cooperative and no distress, morbidly obese  female sitting up in bed  Mental Status:  oriented to person, place and time and normal affect  Lungs:  clear to auscultation bilaterally, normal effort  Heart:  regular rate and rhythm, no murmur  Abdomen:  soft, nontender, nondistended, normal bowel sounds, no masses, hepatomegaly, splenomegaly  Extremities:  no edema, redness, tenderness in the calves  Neuro: No gross motor or sensory deficits is noted on neurologic exam.  Skin:  no gross lesions, rashes, induration    Assessment:        Hospital Problems           Last Modified POA    * (Principal) Hypertensive emergency 9/20/2020 Yes    Essential hypertension 9/20/2020 Yes    Iron deficiency anemia 9/21/2020 Yes    Obesity (BMI 30-39.9) 9/20/2020 Yes    Recurrent major depressive disorder, in partial remission ([facility] [address] 7/46/4222 Yes    Folic acid deficiency 4/48/0245 Yes    Migraine without aura and without status migrainosus, not intractable 9/21/2020 Yes          Plan:        1. Restart lisinopril today  2. Wean cardene as able  3. Will continue to adjust anti-hypertensives; my thought is likely adding procardia or Norvasc as her second medication. Perhaps splitting lisinopril into 20 mg BID dosing  4. Await 2D echo  5. FeSo4 qam for MILLER  6. Continue folic acid supplementation  7. Weight loss strongly encouraged  8. Needs secondary hypertension workup in o/p setting  9. Toradol is okay for patient, she just is refusing all oral NSAIDs secondary to history of gastric bypass  10.  Give Toradol/Phenergan/Benadryl for migraine this morning    LUC MARSH,   9/21/2020  8:00 AM Alternatives Discussed Intro (Do Not Add Period): I discussed alternative treatments to Mohs surgery and specifically discussed the risks and benefits of

## 2022-01-03 DIAGNOSIS — F33.0 MILD EPISODE OF RECURRENT MAJOR DEPRESSIVE DISORDER (HCC): ICD-10-CM

## 2022-01-03 DIAGNOSIS — F41.9 ANXIETY: ICD-10-CM

## 2022-01-03 RX ORDER — BUSPIRONE HYDROCHLORIDE 10 MG/1
TABLET ORAL
Qty: 60 TABLET | Refills: 5 | Status: SHIPPED | OUTPATIENT
Start: 2022-01-03 | End: 2022-08-15 | Stop reason: SDUPTHER

## 2022-01-03 NOTE — TELEPHONE ENCOUNTER
Gerard Coy is calling to request a refill on the following medication(s):    Medication Request:  Requested Prescriptions     Pending Prescriptions Disp Refills    busPIRone (BUSPAR) 10 MG tablet [Pharmacy Med Name: BUSPIRONE HCL 10 MG TABLET] 60 tablet 5     Sig: TAKE 1 TABLET BY MOUTH TWICE A DAY    sertraline (ZOLOFT) 50 MG tablet [Pharmacy Med Name: SERTRALINE HCL 50 MG TABLET] 30 tablet 2     Sig: TAKE 1 TABLET BY MOUTH EVERY DAY       Last Visit Date (If Applicable):  41/93/4794    Next Visit Date:    Visit date not found

## 2022-01-04 ENCOUNTER — E-VISIT (OUTPATIENT)
Dept: FAMILY MEDICINE CLINIC | Age: 47
End: 2022-01-04

## 2022-01-04 ENCOUNTER — TELEPHONE (OUTPATIENT)
Dept: FAMILY MEDICINE CLINIC | Age: 47
End: 2022-01-04

## 2022-01-04 DIAGNOSIS — N30.00 ACUTE CYSTITIS WITHOUT HEMATURIA: Primary | ICD-10-CM

## 2022-01-04 DIAGNOSIS — I16.1 HYPERTENSIVE EMERGENCY: ICD-10-CM

## 2022-01-04 DIAGNOSIS — N30.00 ACUTE CYSTITIS WITHOUT HEMATURIA: ICD-10-CM

## 2022-01-04 DIAGNOSIS — I10 ESSENTIAL HYPERTENSION: ICD-10-CM

## 2022-01-04 DIAGNOSIS — R30.0 DYSURIA: Primary | ICD-10-CM

## 2022-01-04 PROCEDURE — 99999 PR OFFICE/OUTPT VISIT,PROCEDURE ONLY: CPT | Performed by: FAMILY MEDICINE

## 2022-01-04 RX ORDER — LABETALOL 100 MG/1
150 TABLET, FILM COATED ORAL 2 TIMES DAILY
Qty: 180 TABLET | Refills: 1
Start: 2022-01-04 | End: 2022-07-18 | Stop reason: SDUPTHER

## 2022-01-04 RX ORDER — SULFAMETHOXAZOLE AND TRIMETHOPRIM 800; 160 MG/1; MG/1
1 TABLET ORAL 2 TIMES DAILY
Qty: 6 TABLET | Refills: 0 | Status: SHIPPED | OUTPATIENT
Start: 2022-01-04 | End: 2022-01-04 | Stop reason: SDUPTHER

## 2022-01-04 RX ORDER — LISINOPRIL 40 MG/1
TABLET ORAL
Qty: 90 TABLET | Refills: 0
Start: 2022-01-04

## 2022-01-04 RX ORDER — SULFAMETHOXAZOLE AND TRIMETHOPRIM 800; 160 MG/1; MG/1
1 TABLET ORAL 2 TIMES DAILY
Qty: 10 TABLET | Refills: 0 | Status: SHIPPED | OUTPATIENT
Start: 2022-01-04 | End: 2022-01-09

## 2022-01-04 NOTE — TELEPHONE ENCOUNTER
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

## 2022-01-04 NOTE — TELEPHONE ENCOUNTER
Pt wants to know if she can send in bactrim to pharmacy she has a UTI. She states she has a history of UTI's     She is on her way back from driving a truck     Please advise thank you!

## 2022-01-04 NOTE — TELEPHONE ENCOUNTER
Please let her know that I have sent in prescription for Bactrim. I have also ordered a urine culture and would like her to give a sample soon as possible.

## 2022-04-15 ENCOUNTER — TELEPHONE (OUTPATIENT)
Dept: BARIATRICS/WEIGHT MGMT | Age: 47
End: 2022-04-15

## 2022-04-23 DIAGNOSIS — D50.9 IRON DEFICIENCY ANEMIA, UNSPECIFIED IRON DEFICIENCY ANEMIA TYPE: ICD-10-CM

## 2022-04-23 DIAGNOSIS — G47.33 OSA (OBSTRUCTIVE SLEEP APNEA): ICD-10-CM

## 2022-04-23 DIAGNOSIS — G43.009 MIGRAINE WITHOUT AURA AND WITHOUT STATUS MIGRAINOSUS, NOT INTRACTABLE: ICD-10-CM

## 2022-04-23 DIAGNOSIS — K21.9 GASTROESOPHAGEAL REFLUX DISEASE WITHOUT ESOPHAGITIS: ICD-10-CM

## 2022-04-23 DIAGNOSIS — E66.9 OBESITY (BMI 30-39.9): ICD-10-CM

## 2022-04-23 DIAGNOSIS — I10 ESSENTIAL HYPERTENSION: ICD-10-CM

## 2022-04-23 DIAGNOSIS — E78.5 HYPERLIPIDEMIA, UNSPECIFIED HYPERLIPIDEMIA TYPE: ICD-10-CM

## 2022-04-25 RX ORDER — FAMOTIDINE 20 MG/1
TABLET, FILM COATED ORAL
Qty: 30 TABLET | Refills: 3 | Status: SHIPPED | OUTPATIENT
Start: 2022-04-25

## 2022-05-27 ENCOUNTER — TELEPHONE (OUTPATIENT)
Dept: BARIATRICS/WEIGHT MGMT | Age: 47
End: 2022-05-27

## 2022-05-27 NOTE — TELEPHONE ENCOUNTER
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 Patient is agreeable at being seeing in [place] and seeing us next week.

## 2022-05-31 ENCOUNTER — OFFICE VISIT (OUTPATIENT)
Dept: BARIATRICS/WEIGHT MGMT | Age: 47
End: 2022-05-31
Payer: COMMERCIAL

## 2022-05-31 VITALS
TEMPERATURE: 98.1 F | RESPIRATION RATE: 20 BRPM | SYSTOLIC BLOOD PRESSURE: 181 MMHG | WEIGHT: 214 LBS | HEIGHT: 69 IN | BODY MASS INDEX: 31.7 KG/M2 | DIASTOLIC BLOOD PRESSURE: 90 MMHG | HEART RATE: 74 BPM

## 2022-05-31 DIAGNOSIS — Z98.84 S/P GASTRIC BYPASS: ICD-10-CM

## 2022-05-31 DIAGNOSIS — R10.10 UPPER ABDOMINAL PAIN: ICD-10-CM

## 2022-05-31 DIAGNOSIS — S39.011A STRAIN OF ABDOMINAL MUSCLE, INITIAL ENCOUNTER: Primary | ICD-10-CM

## 2022-05-31 DIAGNOSIS — I10 ESSENTIAL HYPERTENSION: ICD-10-CM

## 2022-05-31 DIAGNOSIS — E66.9 OBESITY (BMI 30-39.9): ICD-10-CM

## 2022-05-31 PROCEDURE — G8427 DOCREV CUR MEDS BY ELIG CLIN: HCPCS | Performed by: NURSE PRACTITIONER

## 2022-05-31 PROCEDURE — G8417 CALC BMI ABV UP PARAM F/U: HCPCS | Performed by: NURSE PRACTITIONER

## 2022-05-31 PROCEDURE — 1036F TOBACCO NON-USER: CPT | Performed by: NURSE PRACTITIONER

## 2022-05-31 PROCEDURE — 99213 OFFICE O/P EST LOW 20 MIN: CPT | Performed by: NURSE PRACTITIONER

## 2022-05-31 RX ORDER — CYCLOBENZAPRINE HCL 10 MG
10 TABLET ORAL NIGHTLY
Qty: 10 TABLET | Refills: 0 | Status: SHIPPED | OUTPATIENT
Start: 2022-05-31 | End: 2022-06-10

## 2022-05-31 RX ORDER — LIDOCAINE 4 G/G
1 PATCH TOPICAL DAILY
Qty: 10 PATCH | Refills: 0 | Status: SHIPPED | OUTPATIENT
Start: 2022-05-31 | End: 2022-06-10

## 2022-05-31 NOTE — PROGRESS NOTES
Post-op Bariatric Surgery Note    Subjective     Patient is 10 months s/p revision of sleeve gastrectomy to gastric bypass, hiatal hernia repair and extensive lysis of adhesions, down 55 lbs. Here for problem focused visit and ER f/u. 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 Evaluated at [facility] in [place]. Had some initial nausea, but no fever/chills, vomiting, blood in stools. CT abdomen and labs done. CT revealed fat necrosis in RUQ with appearance most compatible with omental infarct. No involvement bowel loops or other acute abnormality. She was stable and discharged to f/u in bariatric clinic. Since then, she continues to have a pulling sensation and burning sensation upper abdomen. No accompanying symptoms. BMs have been soft brown and regular. States does not feel like her previous ulcer pain. No reflux symptoms. Taking Protonix and Pepcid. Allergies:   Allergies   Allergen Reactions    Nsaids      Pt had gastric sleeve  Cannot take due to bariatric surgery      Tolmetin      Pt had gastric sleeve    Codeine Itching       Past Medical History:     Past Medical History:   Diagnosis Date    Anemia     Anxiety and depression 04/05/2021    Anxiety state 08/12/2020    Asthma     COPD (chronic obstructive pulmonary disease) ([facility] [address]     Epigastric pain 9/7/2021    Essential hypertension 67/86/5654    Folic acid deficiency 16/55/2629    Hiatal hernia 08/12/2020    Hiatal hernia with GERD 04/05/2021    History of abnormal cervical Pap smear     History of nicotine use 04/05/2021    Hyperlipidemia     Hypertension     PCP DR Queenie Pearson    Hypertensive emergency 09/20/2020    Iron deficiency anemia 08/12/2020    IUD (intrauterine device) in place 08/04/2020    Mirena    Menometrorrhagia     Migraine without aura and without status migrainosus, not intractable 09/21/2020    Morbid obesity due to excess calories ([facility] 08/12/2020    Obesity (BMI 30-39.9) 09/20/2020    CORNELIA on CPAP     DR Dorota Corona (last visit June 2021)    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 2/22/16 Sleeve Gastrectomy and repair of sm to mod size sliding Hiatal Hernia; 2/23/16 UGI WNL postop    Recurrent major depressive disorder, in partial remission ([facility] [facility] 09/20/2020    Vitamin D deficiency 08/12/2020    Vit D low 18.0/18.1; treat with Vitamin D 50,000 iu 3x wk x 6 wks then repeat level   .     Past Surgical History:  Past Surgical History:   Procedure Laterality Date    BARIATRIC SURGERY  02/22/2016    GASTRIC SLEEVE (IN [place])    BREAST LUMPECTOMY Right     CHOLECYSTECTOMY      COLPOSCOPY      HERNIA REPAIR      DONE DURING GASTRIC SLEEVE    INTRAUTERINE DEVICE INSERTION  08/04/2020    Mirena     LIPOMA RESECTION      lt shoulder/upper back    ADRIAN-EN-Y GASTRIC BYPASS N/A 07/21/2021    XI ROBOTIC LAPAROSCOPIC GASTRIC BYPASS ADRIAN-EN-Y,  EXTENSIVE LYSIS OF ADHESIONS, EGD, REPAIR OF RECURRENT HIATAL HERNIA , G TUBE INSERTION performed by Jannette Gutierrez DO at [address] N/A 04/01/2021    TRUNK LESION BIOPSY EXCISION RIGHT BACK X 6, LEFT, RIGHT, AND MID BACK ALL WITH COMPLEX CLOSURE. performed by Geeta Joe MD at [address] [facility] N/A 05/07/2021    EGD BIOPSY performed by Jannette Gutierrez DO at [address] [facility]  09/07/2021    EGD ESOPHAGOGASTRODUODENOSCOPY WITH BALLOON DILITATION     UPPER GASTROINTESTINAL ENDOSCOPY N/A 9/7/2021    EGD DILATION BALLOON performed by Jannette Gutierrez DO at [address] EXTRACTION         Family History:  Family History   Problem Relation Age of Onset    High Blood Pressure Father     Heart Disease Father     Hypertension Maternal Grandmother     Heart Disease Maternal Grandmother     Stroke Maternal Uncle        Social History:  Social History     Socioeconomic History    Marital status:      Spouse name: Not on file    Number of children: Not on file    Years of education: Not on file    Highest education level: Not on file   Occupational History    Not on file   Tobacco Use    Smoking status: Former Smoker     Packs/day: 1.00     Years: 22.00     Pack years: 22.00     Types: Cigarettes     Start date: 1/15/2020     Quit date: 2020     Years since quittin.8    Smokeless tobacco: Never Used   Vaping Use    Vaping Use: Former    Substances: Nicotine    Devices: Disposable   Substance and Sexual Activity    Alcohol use: Yes     Comment: social    Drug use: No    Sexual activity: Not on file   Other Topics Concern    Not on file   Social History Narrative    Not on file     Social Determinants of Health     Financial Resource Strain: Low Risk     Difficulty of Paying Living Expenses: Not hard at all   Food Insecurity: No Food Insecurity    Worried About [address] in the Last Year: Never true    [facility] in the Last Year: Never true   Transportation Needs:     Lack of Transportation (Medical): Not on file    Lack of Transportation (Non-Medical):  Not on file   Physical Activity:     Days of Exercise per Week: Not on file    Minutes of Exercise per Session: Not on file   Stress:     Feeling of Stress : Not on file   Social Connections:     Frequency of Communication with Friends and Family: Not on file    Frequency of Social Gatherings with Friends and Family: Not on file    Attends Congregational Services: Not on file    Active Member of Clubs or Organizations: Not on file    Attends Club or Organization Meetings: Not on file    Marital Status: Not on file   Intimate Partner Violence:     Fear of Current or Ex-Partner: Not on file    Emotionally Abused: Not on file    Physically Abused: Not on file    Sexually Abused: Not on file   Housing Stability:     Unable to Pay for Housing in the Last Year: Not on file    Number of Places Lived in the Last Year: Not on file    Unstable Housing in the Last Year: Not on file       Current Medications:  Current Outpatient Medications   Medication Sig Dispense Refill    lidocaine 4 % external patch Place 1 patch onto the skin daily for 10 days 12 hours on, 12 hours off daily 10 patch 0    cyclobenzaprine (FLEXERIL) 10 mg tablet Take 1 tablet by mouth nightly for 10 days 10 tablet 0    famotidine (PEPCID) 20 MG tablet TAKE 1 TABLET BY MOUTH EVERY DAY AT NIGHT 30 tablet 3    ondansetron (ZOFRAN-ODT) 4 MG disintegrating tablet TAKE 1 TABLET BY MOUTH EVERY 8 HOURS AS NEEDED FOR NAUSEA AND VOMITING      labetalol (NORMODYNE) 100 MG tablet Take 1.5 tablets by mouth 2 times daily Take 1 tablet PO  tablet 1    lisinopril (PRINIVIL;ZESTRIL) 40 MG tablet TAKE 1 TABLET BY MOUTH EVERY DAY 90 tablet 0    busPIRone (BUSPAR) 10 MG tablet TAKE 1 TABLET BY MOUTH TWICE A DAY 60 tablet 5    sertraline (ZOLOFT) 50 MG tablet TAKE 1 TABLET BY MOUTH EVERY DAY 30 tablet 2    folic acid (FOLVITE) 1 MG tablet TAKE 1 TABLET BY MOUTH EVERY DAY 30 tablet 5    nystatin (MYCOSTATIN) 117380 UNIT/GM powder Apply 3 times daily.  60 g 1    albuterol sulfate  (90 Base) MCG/ACT inhaler INHALE 2 PUFFS INTO THE LUNGS 4 TIMES DAILY AS NEEDED FOR WHEEZING 18 g 2    sucralfate (CARAFATE) 1 GM tablet Take 1 tablet by mouth 4 times daily 120 tablet 3    ondansetron (ZOFRAN) 4 MG tablet Take 1 tablet by mouth every 8 hours as needed for Nausea or Vomiting 30 tablet 2    pantoprazole (PROTONIX) 40 MG tablet Take 1 tablet by mouth every morning (before breakfast) 90 tablet 1    calcium carbonate (OSCAL) 500 MG TABS tablet Take 500 mg by mouth daily      vitamin B-12 (CYANOCOBALAMIN) 100 MCG tablet TAKE 1 TABLET BY MOUTH EVERY DAY      ferrous sulfate (FE TABS 325) 325 (65 Fe) MG EC tablet Take 1 tablet by mouth 3 times daily (with meals) 90 tablet 0    Multiple Vitamins-Minerals (MULTIVITAMIN ADULT EXTRA C PO) Take 1 tablet by mouth daily      levonorgestrel (MIRENA) IUD 52 mg 1 each by Intrauterine route      SUMAtriptan (IMITREX) 25 MG tablet Take 1 tablet by mouth once as needed for Migraine 9 tablet 5    ascorbic acid (VITAMIN C) 250 MG tablet Take 500 mg by mouth daily  (Patient not taking: Reported on 5/31/2022)       No current facility-administered medications for this visit. Vital Signs:  BP (!) 181/90 (Site: Left Upper Arm, Position: Sitting, Cuff Size: Large Adult)   Pulse 74   Temp 98.1 °F (36.7 °C) (Temporal)   Resp 20   Ht 5' 8.5\" (1.74 m)   Wt 214 lb (97.1 kg)   BMI 32.07 kg/m²     BMI/Height/Weight:  Body mass index is 32.07 kg/m². Review of Systems - A review of systems was performed. All was negative unless otherwise documented in HPI. Constitutional: Negative for fever, chills. HENT: Negative for trouble swallowing. Eyes: Negative for visual disturbance. Respiratory: Negative for cough, shortness of breath. Cardiovascular: Negative for chest pain and palpitations. Gastrointestinal: Negative for nausea, vomiting, diarrhea, constipation, blood in stool and abdominal distention. Positive for abdominal pain. Endocrine: Negative for polydipsia, polyphagia and polyuria. Genitourinary: Negative for dysuria, frequency, hematuria and difficulty urinating. Musculoskeletal: Negative for myalgias, joint swelling. Skin: Negative for pallor and rash. Neurological: Negative for dizziness, tremors, light-headedness and headaches. Psychiatric/Behavioral: Negative for sleep disturbance and dysphoric mood. Objective:      Physical Exam   Vital signs reviewed. General: Well-developed and well-nourished. No acute distress. Skin: Warm, dry and intact. HEENT: Normocephalic. EOMs intact. Conjunctivae normal. Neck supple. Cardiovascular: Normal rate, regular rhythm. Pulmonary/Chest: Normal effort.  Lungs clear to

## 2022-06-03 ENCOUNTER — OFFICE VISIT (OUTPATIENT)
Dept: BARIATRICS/WEIGHT MGMT | Age: 47
End: 2022-06-03
Payer: COMMERCIAL

## 2022-06-03 VITALS
WEIGHT: 214 LBS | DIASTOLIC BLOOD PRESSURE: 110 MMHG | HEIGHT: 69 IN | HEART RATE: 80 BPM | BODY MASS INDEX: 31.7 KG/M2 | RESPIRATION RATE: 20 BRPM | SYSTOLIC BLOOD PRESSURE: 174 MMHG

## 2022-06-03 DIAGNOSIS — Z98.84 S/P GASTRIC BYPASS: ICD-10-CM

## 2022-06-03 DIAGNOSIS — K55.069 OMENTAL INFARCTION (HCC): Primary | ICD-10-CM

## 2022-06-03 PROCEDURE — 99213 OFFICE O/P EST LOW 20 MIN: CPT | Performed by: SURGERY

## 2022-06-03 PROCEDURE — G8417 CALC BMI ABV UP PARAM F/U: HCPCS | Performed by: SURGERY

## 2022-06-03 PROCEDURE — 1036F TOBACCO NON-USER: CPT | Performed by: SURGERY

## 2022-06-03 PROCEDURE — G8427 DOCREV CUR MEDS BY ELIG CLIN: HCPCS | Performed by: SURGERY

## 2022-06-03 NOTE — LETTER
[facility]  [address]  [address] [place] [address]  Phone: [phone]  Fax: [phone]    John Perez DO        Tatiana 3, 2022     Patient: Arabella Moss   YOB: 1975   Date of Visit: 6/3/2022       To Whom it May Concern:    Aura Pugh was seen in my clinic on 6/3/2022. She may return to work on 6-18-22. Bula Dayhoff No lifting over 20 lbs for two weeks. If you have any questions or concerns, please don't hesitate to call.     Sincerely,         John Perez DO

## 2022-06-08 ENCOUNTER — TELEPHONE (OUTPATIENT)
Dept: BARIATRICS/WEIGHT MGMT | Age: 47
End: 2022-06-08

## 2022-06-08 NOTE — TELEPHONE ENCOUNTER
[facility] paperwork was received. Blank copy of forms has been scanned. Patient notified it will take up to 3-5 business days for completion.  Upon completion faxed to company and original mailed back to patient

## 2022-06-10 NOTE — PROGRESS NOTES
[address] INVASIVE BARIATRIC SURG  [address] [place] [address] [place]  [address] [facility] [address]  Dept: [phone]    SURGICAL WEIGHT LOSS MANAGEMENT PROGRAM  PROGRESS NOTE     CC: Weight loss    Patient: Salvador Ragland      Service Date: 6/3/2022  Visit:   6 month   Medical Record #: [id number]  Date of Surgery:   7/21/2021    Reason for Visit: Routine Post-Operative:  [] New Problem /   [] FU of existing problem    Patient here for follow up after ER visit. States she was lifting/pulling an object on her truck and had sudden pain. Was seen in ER and told she had an omental infarction. No other new complaints and pain nearly resolved. Having stools, tolerating diet and no nausea or vomiting. Height: 5' 8.5\" (1.74 m)  Highest Weight:   269lbs     Current Visit Weight Information  Weight: 214 lb (97.1 kg)   BMI: Body mass index is 32.07 kg/m².     Weight loss since surgery:     55      Review of Systems:     General  Negative for: [] Weight Change   [x] Fatigue   [x] Fevers & Chills    [] Appetite change [] Other:    Positive for: [x] Weight Change   [] Fatigue   [] Fevers & Chills    [] Appetite change [] Other:   Cardiac  Negative for: [x] Chest Pain   [x] Difficulty Breathing   [] Leg Cramps [x] Edema of Lower Extremeties    [] Left   [] Right      Positive for: [] Chest Pain   [] Difficulty Breathing   [] Leg Cramps [] Edema of Lower Extremeties    [] Left   [] Right   Pulmonary  Negative for: [x] Shortness of Breath [] Wheeze [x] Cough  [] Calf Pain     Positive for: [] Shortness of Breath [] Wheeze [] Cough  [] Calf Pain   Gastro-Intestinal Negative for: [] Heartburn   [] Reflux   [] Dysphagia   [] Melena   [] BRBPR  [x] Vomiting   [x] Abdominal Pain   [] Diarrhea     [] Constipation  [] Other:     Positive for: [] Heartburn   [] Reflux   [] Dysphagia   [] Melena   [] BRBPR  [] Vomiting   [] Abdominal Pain   [] Diarrhea     [] Constipation  [] Other: Muskuloskeletal Negative for: [] Joint pain   [] Back pain   [] Knee Pain   [x] Muscle weakness [x] Hernia   [] Edema [] Other:     Positive for: [] Joint pain   [] Back pain   [] Knee Pain   [] Muscle weakness [] Hernia   [] Edema [] Other:    Neurologic Negative for: [x] Syncope   [x] Insomnia   [] Being treated for depression  [] Other:     Positive for: [] Syncope   [] Insomnia   [] Being treated for depression  [] Other:    Skin Negative for: [] Wound:   [] Open   [] Draining   [] Incisional     [x] Rash   [x] Hair Loss  [] Other:     Positive for: [] Wound:   [] Open   [] Draining    [] Incisional  [] Rash   [] Hair Loss  [] Other:            Physical Assessment:     BP (!) 174/110 (Site: Left Upper Arm, Position: Sitting, Cuff Size: Large Adult)   Pulse 80   Resp 20   Ht 5' 8.5\" (1.74 m)   Wt 214 lb (97.1 kg)   BMI 32.07 kg/m²   Constitutional:  Vital signs are normal. The patient appears well-developed and well-nourished. HEENT:   Head: Normocephalic. Atraumatic  Eyes: pupils are equal and reactive. No scleral icterus is present. Neck: No mass and no thyromegaly present. Cardiovascular: Normal rate, regular rhythm, S1 normal and S2 normal.  Radial pulses present   Pulmonary/Chest: Effort normal and breath sounds normal. No retractions  Abdominal: Soft. Normal appearance. There is no organomegaly. No tenderness. There is no rigidity, no rebound, no guarding and no Harris's sign. Musculoskeletal:        Right lower leg: Normal. No tenderness and no edema. Left lower leg: Normal. No tenderness and no edema. Neurological: The patient is alert and oriented. Moving all 4 extremities, sensation grossly intact bilateral  Skin: Skin is warm, dry and intact. Psychiatric: The patient has a normal mood and affect. Speech is normal and behavior is normal. Judgment and thought content normal. Cognition and memory are normal.       Assessment & Plan:      1. Omental infarction ([facility] [address]    2.  S/P gastric bypass          [x] Advance Diet    [x] Daily protein (65-75gm/day)   [x] Take Vitamins   [x] Attend Support Group    [x] Exercise Regularly     [x] Use contraception:    [] NA    [] s/p Hysterectomy   [] s/p Tubal ligation   [] Other:       Intestinal malabsorption, expected after bypass  Vitamin replacement discussed  Labs ordered to monitor vitamin levels  Currently taking vitamins    Right sided abdominal pain  Pain and nausea have now nearly resolved  Appears she had an epiploic appendigitis or omental infarct, but this has improved with conservative management. Given history she may have strained her abdominal wall and CT was a coincidental finding  Return if worsening pain    Follow up: No follow-ups on file. Orders placed this encounter:   Orders Placed This Encounter   Procedures    Comprehensive Metabolic Panel    CBC with Auto Differential    Ferritin    Iron and TIBC    TSH with Reflex    Hemoglobin A1C    PTH, Intact    Zinc    Vitamin A    Vitamin D 25 Hydroxy    Vitamin B12 & Folate    Vitamin B1       New Prescriptions:   No orders of the defined types were placed in this encounter. Electronically signed by Ronda Mazariegos DO on 6/9/2022 at 10:12 PM    Please note that this chart was generated using voice recognition Dragon dictation software. Although every effort was made to ensure the accuracy of this automated transcription, some errors in transcription may have occurred.

## 2022-07-18 ENCOUNTER — OFFICE VISIT (OUTPATIENT)
Dept: FAMILY MEDICINE CLINIC | Age: 47
End: 2022-07-18
Payer: MEDICARE

## 2022-07-18 ENCOUNTER — OFFICE VISIT (OUTPATIENT)
Dept: BARIATRICS/WEIGHT MGMT | Age: 47
End: 2022-07-18
Payer: MEDICARE

## 2022-07-18 VITALS
DIASTOLIC BLOOD PRESSURE: 86 MMHG | WEIGHT: 214 LBS | BODY MASS INDEX: 31.7 KG/M2 | HEART RATE: 61 BPM | SYSTOLIC BLOOD PRESSURE: 137 MMHG | HEIGHT: 69 IN

## 2022-07-18 VITALS
DIASTOLIC BLOOD PRESSURE: 100 MMHG | HEIGHT: 69 IN | SYSTOLIC BLOOD PRESSURE: 140 MMHG | BODY MASS INDEX: 31.7 KG/M2 | WEIGHT: 214 LBS | HEART RATE: 56 BPM

## 2022-07-18 DIAGNOSIS — D50.9 IRON DEFICIENCY ANEMIA, UNSPECIFIED IRON DEFICIENCY ANEMIA TYPE: ICD-10-CM

## 2022-07-18 DIAGNOSIS — F32.A ANXIETY AND DEPRESSION: ICD-10-CM

## 2022-07-18 DIAGNOSIS — E78.5 HYPERLIPIDEMIA, UNSPECIFIED HYPERLIPIDEMIA TYPE: ICD-10-CM

## 2022-07-18 DIAGNOSIS — I16.1 HYPERTENSIVE EMERGENCY: ICD-10-CM

## 2022-07-18 DIAGNOSIS — L98.7 EXCESS SKIN OF ABDOMEN: ICD-10-CM

## 2022-07-18 DIAGNOSIS — J44.9 STAGE 1 MILD COPD BY GOLD CLASSIFICATION (HCC): ICD-10-CM

## 2022-07-18 DIAGNOSIS — Z98.84 S/P GASTRIC BYPASS: ICD-10-CM

## 2022-07-18 DIAGNOSIS — E66.9 OBESITY (BMI 30-39.9): ICD-10-CM

## 2022-07-18 DIAGNOSIS — F41.9 ANXIETY AND DEPRESSION: ICD-10-CM

## 2022-07-18 DIAGNOSIS — Z99.89 OSA ON CPAP: ICD-10-CM

## 2022-07-18 DIAGNOSIS — G47.33 OSA ON CPAP: ICD-10-CM

## 2022-07-18 DIAGNOSIS — I10 ESSENTIAL HYPERTENSION: Primary | ICD-10-CM

## 2022-07-18 DIAGNOSIS — G43.009 MIGRAINE WITHOUT AURA AND WITHOUT STATUS MIGRAINOSUS, NOT INTRACTABLE: ICD-10-CM

## 2022-07-18 DIAGNOSIS — K21.00 HIATAL HERNIA WITH GERD AND ESOPHAGITIS: ICD-10-CM

## 2022-07-18 DIAGNOSIS — K44.9 HIATAL HERNIA WITH GERD AND ESOPHAGITIS: ICD-10-CM

## 2022-07-18 DIAGNOSIS — I10 ESSENTIAL HYPERTENSION: ICD-10-CM

## 2022-07-18 PROBLEM — R10.10 UPPER ABDOMINAL PAIN: Status: RESOLVED | Noted: 2022-05-31 | Resolved: 2022-07-18

## 2022-07-18 PROBLEM — E66.01 MORBID OBESITY WITH BODY MASS INDEX OF 40.0-44.9 IN ADULT (HCC): Status: RESOLVED | Noted: 2020-08-12 | Resolved: 2022-07-18

## 2022-07-18 PROCEDURE — 99213 OFFICE O/P EST LOW 20 MIN: CPT | Performed by: NURSE PRACTITIONER

## 2022-07-18 PROCEDURE — 99204 OFFICE O/P NEW MOD 45 MIN: CPT

## 2022-07-18 PROCEDURE — G8417 CALC BMI ABV UP PARAM F/U: HCPCS

## 2022-07-18 PROCEDURE — 1036F TOBACCO NON-USER: CPT

## 2022-07-18 PROCEDURE — G8427 DOCREV CUR MEDS BY ELIG CLIN: HCPCS

## 2022-07-18 RX ORDER — NYSTATIN 100000 [USP'U]/G
POWDER TOPICAL
Qty: 60 G | Refills: 1 | Status: SHIPPED | OUTPATIENT
Start: 2022-07-18

## 2022-07-18 RX ORDER — LABETALOL 300 MG/1
300 TABLET, FILM COATED ORAL 2 TIMES DAILY
Qty: 60 TABLET | Refills: 1 | Status: SHIPPED | OUTPATIENT
Start: 2022-07-18 | End: 2022-08-09

## 2022-07-18 ASSESSMENT — ENCOUNTER SYMPTOMS
COUGH: 0
SORE THROAT: 0
DIARRHEA: 0
SINUS PAIN: 0
NAUSEA: 0
SINUS PRESSURE: 0
EYE DISCHARGE: 0
EYE REDNESS: 0
VOMITING: 0
SHORTNESS OF BREATH: 0
CHEST TIGHTNESS: 0
WHEEZING: 0
TROUBLE SWALLOWING: 0
EYE ITCHING: 0
ABDOMINAL PAIN: 0

## 2022-07-18 ASSESSMENT — PATIENT HEALTH QUESTIONNAIRE - PHQ9
6. FEELING BAD ABOUT YOURSELF - OR THAT YOU ARE A FAILURE OR HAVE LET YOURSELF OR YOUR FAMILY DOWN: 0
8. MOVING OR SPEAKING SO SLOWLY THAT OTHER PEOPLE COULD HAVE NOTICED. OR THE OPPOSITE, BEING SO FIGETY OR RESTLESS THAT YOU HAVE BEEN MOVING AROUND A LOT MORE THAN USUAL: 0
3. TROUBLE FALLING OR STAYING ASLEEP: 0
SUM OF ALL RESPONSES TO PHQ QUESTIONS 1-9: 0
7. TROUBLE CONCENTRATING ON THINGS, SUCH AS READING THE NEWSPAPER OR WATCHING TELEVISION: 0
SUM OF ALL RESPONSES TO PHQ QUESTIONS 1-9: 0
1. LITTLE INTEREST OR PLEASURE IN DOING THINGS: 0
SUM OF ALL RESPONSES TO PHQ QUESTIONS 1-9: 0
SUM OF ALL RESPONSES TO PHQ QUESTIONS 1-9: 0
9. THOUGHTS THAT YOU WOULD BE BETTER OFF DEAD, OR OF HURTING YOURSELF: 0
10. IF YOU CHECKED OFF ANY PROBLEMS, HOW DIFFICULT HAVE THESE PROBLEMS MADE IT FOR YOU TO DO YOUR WORK, TAKE CARE OF THINGS AT HOME, OR GET ALONG WITH OTHER PEOPLE: 0
2. FEELING DOWN, DEPRESSED OR HOPELESS: 0
5. POOR APPETITE OR OVEREATING: 0
SUM OF ALL RESPONSES TO PHQ9 QUESTIONS 1 & 2: 0
4. FEELING TIRED OR HAVING LITTLE ENERGY: 0

## 2022-07-18 NOTE — PROGRESS NOTES
[facility],[address]  [address]  [address] [place]  [address]  7/18/2022    Jeannie Raines is a 41 y.o. female who presents today for her medical conditions and/or complaints as noted below. Jeannie Raines is scheduled today for Established New Doctor and Hypertension  . HPI:     This is a 62-year-old female known to the office but new to provider here to discuss hypertension, anxiety and depression. 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 At today's visit her blood pressure was 137/86 which is at goal.  She has been taking 300 mg of labetalol twice daily. The original prescription however was 150 mg twice daily, and this change was made by the patient without medical advice. Patient states that her anxiety and depression are well controlled with her current medications. She denies any suicidal thoughts or ideations. Other potentially unknown health concerns were not able to be discussed due to the patient calling the [facility] physical exam during her office visit and beginning a conversation with the provider at the [facility] physical exam station. Previous notes reviewed in the patients chart. Previous results reviewed to include CBC, CMP, A1c.     Vitals:    07/18/22 1111   BP: 137/86   Site: Left Upper Arm   Position: Sitting   Cuff Size: Medium Adult   Pulse: 61   Weight: 214 lb (97.1 kg)   Height: 5' 8.5\" (1.74 m)      Past Medical History:   Diagnosis Date    Anemia     Anxiety and depression 04/05/2021    Anxiety state 08/12/2020    Asthma     COPD (chronic obstructive pulmonary disease) ([facility]     Epigastric pain 9/7/2021    Essential hypertension 77/21/6198    Folic acid deficiency 94/78/1532    Hiatal hernia 08/12/2020    Hiatal hernia with GERD 04/05/2021    History of abnormal cervical Pap smear     History of nicotine use 04/05/2021    Hyperlipidemia     Hypertension     PCP DR Lucrecia Schneider    Hypertensive emergency 09/20/2020    Iron deficiency anemia 08/12/2020    IUD (intrauterine device) in place 08/04/2020    Mirena    Menometrorrhagia     Migraine without aura and without status migrainosus, not intractable 09/21/2020    Morbid obesity due to excess calories ([facility] 08/12/2020    Obesity (BMI 30-39.9) 09/20/2020    CORNELIA on CPAP     DR Chata Stephenson (last visit June 2021)    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 2/22/16 Sleeve Gastrectomy and repair of sm to mod size sliding Hiatal Hernia; 2/23/16 UGI WNL postop    Recurrent major depressive disorder, in partial remission ([facility] 09/20/2020    Vitamin D deficiency 08/12/2020    Vit D low 18.0/18.1; treat with Vitamin D 50,000 iu 3x wk x 6 wks then repeat level      Past Surgical History:   Procedure Laterality Date    BARIATRIC SURGERY  02/22/2016    GASTRIC SLEEVE (IN [place])    BREAST LUMPECTOMY Right     CHOLECYSTECTOMY      COLPOSCOPY      HERNIA REPAIR      DONE DURING GASTRIC SLEEVE    INTRAUTERINE DEVICE INSERTION  08/04/2020    Mirena     LIPOMA RESECTION      lt shoulder/upper back    ADRIAN-EN-Y GASTRIC BYPASS N/A 07/21/2021    XI ROBOTIC LAPAROSCOPIC GASTRIC BYPASS ADRIAN-EN-Y,  EXTENSIVE LYSIS OF ADHESIONS, EGD, REPAIR OF RECURRENT HIATAL HERNIA , G TUBE INSERTION performed by Cheyenne Marinelli DO at [address] N/A 04/01/2021    TRUNK LESION BIOPSY EXCISION RIGHT BACK X 6, LEFT, RIGHT, AND MID BACK ALL WITH COMPLEX CLOSURE. performed by James Singh MD at [facility] 05/07/2021    EGD BIOPSY performed by Cheyenne Marinelli DO at [facility]  09/07/2021    EGD ESOPHAGOGASTRODUODENOSCOPY WITH BALLOON DILITATION     UPPER GASTROINTESTINAL ENDOSCOPY N/A 9/7/2021    EGD DILATION BALLOON performed by Cheyenne Marinelli DO at [facility] Family History   Problem Relation Age of Onset    High Blood Pressure Father     Heart Disease Father     Hypertension Maternal Grandmother     Heart Disease Maternal Grandmother     Stroke Maternal Uncle      Social History     Tobacco Use    Smoking status: Former     Packs/day: 1.00     Years: 22.00     Pack years: 22.00     Types: Cigarettes     Start date: 1/15/2020     Quit date: 2020     Years since quittin.9    Smokeless tobacco: Never   Substance Use Topics    Alcohol use: Yes     Comment: social      Current Outpatient Medications   Medication Sig Dispense Refill    labetalol (NORMODYNE) 300 MG tablet Take 1 tablet by mouth in the morning and 1 tablet before bedtime. Take 1 tablet PO BID. 60 tablet 1    famotidine (PEPCID) 20 MG tablet TAKE 1 TABLET BY MOUTH EVERY DAY AT NIGHT 30 tablet 3    ondansetron (ZOFRAN-ODT) 4 MG disintegrating tablet TAKE 1 TABLET BY MOUTH EVERY 8 HOURS AS NEEDED FOR NAUSEA AND VOMITING      lisinopril (PRINIVIL;ZESTRIL) 40 MG tablet TAKE 1 TABLET BY MOUTH EVERY DAY 90 tablet 0    busPIRone (BUSPAR) 10 MG tablet TAKE 1 TABLET BY MOUTH TWICE A DAY 60 tablet 5    sertraline (ZOLOFT) 50 MG tablet TAKE 1 TABLET BY MOUTH EVERY DAY 30 tablet 2    folic acid (FOLVITE) 1 MG tablet TAKE 1 TABLET BY MOUTH EVERY DAY 30 tablet 5    nystatin (MYCOSTATIN) 212323 UNIT/GM powder Apply 3 times daily. 60 g 1    SUMAtriptan (IMITREX) 25 MG tablet Take 1 tablet by mouth once as needed for Migraine 9 tablet 5    albuterol sulfate  (90 Base) MCG/ACT inhaler INHALE 2 PUFFS INTO THE LUNGS 4 TIMES DAILY AS NEEDED FOR WHEEZING 18 g 2    ondansetron (ZOFRAN) 4 MG tablet Take 1 tablet by mouth every 8 hours as needed for Nausea or Vomiting 30 tablet 2    ascorbic acid (VITAMIN C) 250 MG tablet Take 500 mg by mouth in the morning.       pantoprazole (PROTONIX) 40 MG tablet Take 1 tablet by mouth every morning (before breakfast) 90 tablet 1    calcium carbonate (OSCAL) 500 MG TABS tablet rash.   Neurological:  Negative for dizziness, weakness, light-headedness and headaches. All other systems reviewed and are negative. Objective:     Physical Exam  Vitals reviewed. Constitutional:       General: She is not in acute distress. Appearance: Normal appearance. She is obese. She is not ill-appearing. HENT:      Head: Normocephalic and atraumatic. Nose: Nose normal.   Eyes:      Extraocular Movements: Extraocular movements intact. Conjunctiva/sclera: Conjunctivae normal.      Pupils: Pupils are equal, round, and reactive to light. Cardiovascular:      Rate and Rhythm: Normal rate and regular rhythm. Pulses: Normal pulses. Heart sounds: Normal heart sounds. Pulmonary:      Effort: Pulmonary effort is normal. No respiratory distress. Breath sounds: Normal breath sounds. Abdominal:      General: Abdomen is flat. Palpations: Abdomen is soft. Musculoskeletal:         General: Normal range of motion. Cervical back: Neck supple. Skin:     General: Skin is warm and dry. Capillary Refill: Capillary refill takes less than 2 seconds. Neurological:      General: No focal deficit present. Mental Status: She is alert and oriented to person, place, and time. Psychiatric:         Mood and Affect: Mood normal.        Assessment/Plan:      1. Hypertensive emergency  -     labetalol (NORMODYNE) 300 MG tablet; Take 1 tablet by mouth in the morning and 1 tablet before bedtime. Take 1 tablet PO BID., Disp-60 tablet, R-1Normal  2. Essential hypertension  Assessment & Plan:   At goal, continue current medications, continue current treatment plan, medication adherence emphasized and lifestyle modifications recommended  3. Anxiety and depression  Assessment & Plan:   Well-controlled, continue current medications, continue current treatment plan and medication adherence emphasized       Return in about 6 months (around 1/18/2023) for HTN f/u.   No orders of the defined types were placed in this encounter. Orders Placed This Encounter   Medications    labetalol (NORMODYNE) 300 MG tablet     Sig: Take 1 tablet by mouth in the morning and 1 tablet before bedtime. Take 1 tablet PO BID. Dispense:  60 tablet     Refill:  1       Reviewed health maintenance, prior labs and imaging. Patient given educational materials - see patient instructions. Discussed use, benefit, and side effects of prescribed medications. Barriers to medication compliance addressed. All patient questions answered. Pt voiced understanding to plan of care. Instructed to continue medications as discussed, healthy diet and exercise. Patient agreed with treatment plan. Follow up as directed below. This note is created with the assistance of a speech-recognition program. While intending to generate a document that actually reflects the content of the visit, no guarantees can be provided that every mistake has been identified and corrected by editing.     Electronically signed by TERA Knapp CNP, APRN-CNP on 7/18/2022 at 12:26 PM

## 2022-07-18 NOTE — PROGRESS NOTES
Post-op Bariatric Surgery Note    Subjective     Patient is 1 year s/p laparoscopic GASTRIC BYPASS ADRIAN-EN-Y,  EXTENSIVE LYSIS OF ADHESIONS, REPAIR OF RECURRENT HIATAL HERNIA, down 55 lbs. Overall, doing well. Incisions well healed. Consistent use of MVI and calcium. Physical activity includes walking and swimming. Has not had labs drawn yet which were ordered on 6/3/22. Bothered by excess abdominal skin and intermittent skin rashes. Allergies: Allergies   Allergen Reactions    Nsaids      Pt had gastric sleeve  Cannot take due to bariatric surgery      Tolmetin      Pt had gastric sleeve    Codeine Itching       Past Medical History:     Past Medical History:   Diagnosis Date    Anemia     Anxiety and depression 04/05/2021    Anxiety state 08/12/2020    Asthma     COPD (chronic obstructive pulmonary disease) ([address]     Epigastric pain 9/7/2021    Essential hypertension 64/47/8382    Folic acid deficiency 45/43/8077    Hiatal hernia 08/12/2020    Hiatal hernia with GERD 04/05/2021    History of abnormal cervical Pap smear     History of nicotine use 04/05/2021    Hyperlipidemia     Hypertension     PCP DR Willi CRAWLEY    Hypertensive emergency 09/20/2020    Iron deficiency anemia 08/12/2020    IUD (intrauterine device) in place 08/04/2020    Mirena    Menometrorrhagia     Migraine without aura and without status migrainosus, not intractable 09/21/2020    Morbid obesity due to excess calories ([address] 08/12/2020    Obesity (BMI 30-39.9) 09/20/2020    CORNELIA on CPAP     DR Gordy Steiner (last visit June 2021)    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 2/22/16 Sleeve Gastrectomy and repair of sm to mod size sliding Hiatal Hernia; 2/23/16 UGI WNL postop    Recurrent major depressive disorder, in partial remission ([address] 09/20/2020    Vitamin D deficiency 08/12/2020    Vit D low 18.0/18.1; treat with Vitamin D 50,000 iu 3x wk x 6 wks then repeat level   .     Past Surgical History:  Past Surgical History:   Procedure Laterality Date    BARIATRIC SURGERY  2016    GASTRIC SLEEVE (IN [place])    BREAST LUMPECTOMY Right     CHOLECYSTECTOMY      COLPOSCOPY      HERNIA REPAIR      DONE DURING GASTRIC SLEEVE    INTRAUTERINE DEVICE INSERTION  2020    Mirena     LIPOMA RESECTION      lt shoulder/upper back    ADRIAN-EN-Y GASTRIC BYPASS N/A 2021    XI ROBOTIC LAPAROSCOPIC GASTRIC BYPASS ADRIAN-EN-Y,  EXTENSIVE LYSIS OF ADHESIONS, EGD, REPAIR OF RECURRENT HIATAL HERNIA , G TUBE INSERTION performed by Safia Welsh DO at [facility] N/A 2021    TRUNK LESION BIOPSY EXCISION RIGHT BACK X 6, LEFT, RIGHT, AND MID BACK ALL WITH COMPLEX CLOSURE. performed by Valencia Sandifer, MD at [facility] 2021    EGD BIOPSY performed by Safia Welsh DO at [facility]  2021    EGD ESOPHAGOGASTRODUODENOSCOPY WITH BALLOON DILITATION     UPPER GASTROINTESTINAL ENDOSCOPY N/A 2021    EGD DILATION BALLOON performed by Safia Welsh DO at [address] EXTRACTION         Family History:  Family History   Problem Relation Age of Onset    High Blood Pressure Father     Heart Disease Father     Hypertension Maternal Grandmother     Heart Disease Maternal Grandmother     Stroke Maternal Uncle        Social History:  Social History     Socioeconomic History    Marital status:       Spouse name: Not on file    Number of children: Not on file    Years of education: Not on file    Highest education level: Not on file   Occupational History    Not on file   Tobacco Use    Smoking status: Former     Packs/day: 1.00     Years: 22.00     Pack years: 22.00     Types: Cigarettes     Start date: 1/15/2020     Quit date: 2020     Years since quittin.9    Smokeless tobacco: Never   Vaping Use    Vaping Use: Former    Substances: Nicotine    Devices: Disposable   Substance and Sexual Activity    Alcohol use: Yes     Comment: social    Drug use: No    Sexual activity: Not on file   Other Topics Concern    Not on file   Social History Narrative    Not on file     Social Determinants of Health     Financial Resource Strain: Low Risk     Difficulty of Paying Living Expenses: Not hard at all   Food Insecurity: No Food Insecurity    Worried About Running Out of Food in the Last Year: Never true    Ran Out of Food in the Last Year: Never true   Transportation Needs: Not on file   Physical Activity: Not on file   Stress: Not on file   Social Connections: Not on file   Intimate Partner Violence: Not on file   Housing Stability: Not on file       Current Medications:  Current Outpatient Medications   Medication Sig Dispense Refill    labetalol (NORMODYNE) 300 MG tablet Take 1 tablet by mouth in the morning and 1 tablet before bedtime. Take 1 tablet PO BID. 60 tablet 1    nystatin (MYCOSTATIN) 059044 UNIT/GM powder Apply 3 times daily. 60 g 1    famotidine (PEPCID) 20 MG tablet TAKE 1 TABLET BY MOUTH EVERY DAY AT NIGHT 30 tablet 3    ondansetron (ZOFRAN-ODT) 4 MG disintegrating tablet TAKE 1 TABLET BY MOUTH EVERY 8 HOURS AS NEEDED FOR NAUSEA AND VOMITING      lisinopril (PRINIVIL;ZESTRIL) 40 MG tablet TAKE 1 TABLET BY MOUTH EVERY DAY 90 tablet 0    busPIRone (BUSPAR) 10 MG tablet TAKE 1 TABLET BY MOUTH TWICE A DAY 60 tablet 5    sertraline (ZOLOFT) 50 MG tablet TAKE 1 TABLET BY MOUTH EVERY DAY 30 tablet 2    folic acid (FOLVITE) 1 MG tablet TAKE 1 TABLET BY MOUTH EVERY DAY 30 tablet 5    SUMAtriptan (IMITREX) 25 MG tablet Take 1 tablet by mouth once as needed for Migraine 9 tablet 5    albuterol sulfate  (90 Base) MCG/ACT inhaler INHALE 2 PUFFS INTO THE LUNGS 4 TIMES DAILY AS NEEDED FOR WHEEZING 18 g 2    ondansetron (ZOFRAN) 4 MG tablet Take 1 tablet by mouth every 8 hours as needed for Nausea or Vomiting 30 tablet 2    ascorbic acid (VITAMIN C) 250 MG tablet Take 500 mg by mouth in the morning.       pantoprazole (PROTONIX) 40 MG tablet Take 1 tablet by mouth every morning (before breakfast) 90 tablet 1    calcium carbonate (OSCAL) 500 MG TABS tablet Take 500 mg by mouth daily      vitamin B-12 (CYANOCOBALAMIN) 100 MCG tablet TAKE 1 TABLET BY MOUTH EVERY DAY      ferrous sulfate (FE TABS 325) 325 (65 Fe) MG EC tablet Take 1 tablet by mouth 3 times daily (with meals) 90 tablet 0    Multiple Vitamins-Minerals (MULTIVITAMIN ADULT EXTRA C PO) Take 1 tablet by mouth daily      levonorgestrel (MIRENA) IUD 52 mg 1 each by Intrauterine route      sucralfate (CARAFATE) 1 GM tablet Take 1 tablet by mouth 4 times daily (Patient not taking: No sig reported) 120 tablet 3     No current facility-administered medications for this visit. Vital Signs:  BP (!) 140/100 (Site: Right Upper Arm, Position: Sitting, Cuff Size: Small Adult)   Pulse 56   Ht 5' 8.5\" (1.74 m)   Wt 214 lb (97.1 kg)   BMI 32.07 kg/m²     BMI/Height/Weight:  Body mass index is 32.07 kg/m². Review of Systems - A review of systems was performed. All was negative unless otherwise documented in HPI. Constitutional: Negative for fever, chills. HENT: Negative for trouble swallowing. Eyes: Negative for visual disturbance. Respiratory: Negative for cough, shortness of breath. Cardiovascular: Negative for chest pain and palpitations. Gastrointestinal: Negative for nausea, vomiting, abdominal pain, diarrhea, constipation, blood in stool and abdominal distention. Endocrine: Negative for polydipsia, polyphagia and polyuria. Genitourinary: Negative for dysuria, frequency, hematuria and difficulty urinating. Musculoskeletal: Negative for myalgias, joint swelling. Skin: Negative for pallor and rash. Neurological: Negative for dizziness, tremors, light-headedness and headaches. Psychiatric/Behavioral: Negative for sleep disturbance and dysphoric mood. Objective:      Physical Exam   Vital signs reviewed.   General: Well-developed and well-nourished. No acute distress. Skin: Warm, dry and intact. HEENT: Normocephalic. EOMs intact. Conjunctivae normal. Neck supple. Cardiovascular: Normal rate, regular rhythm. ? Murmur. Pulmonary/Chest: Normal effort. Lungs clear to auscultation. No rales, rhonchi or wheezing. Abdominal: Positive bowel sounds. Soft, nontender. Nondistended. No rigidity, rebound, or guarding. Musculoskeletal: Movement x4. No edema. Neurological: Gait normal. Alert and oriented to person, place, and time. Psychiatric: Normal mood and affect. Speech and behavior normal. Judgment and thought content normal. Cognition and memory intact. Assessment:       Diagnosis Orders   1. Essential hypertension  Coby Pizano MD, Plastic Surgery, [place]    nystatin (MYCOSTATIN) 363692 UNIT/GM powder      2. Hyperlipidemia, unspecified hyperlipidemia type  Michelle Major MD, Plastic Surgery, [place]    nystatin (MYCOSTATIN) 948180 UNIT/GM powder      3. CORNELIA on CPAP  Coby Pizano MD, Plastic Surgery, [place]    nystatin (MYCOSTATIN) 188712 UNIT/GM powder      4. Hiatal hernia with GERD and esophagitis  Coby Pizano MD, Plastic Surgery, [place]    nystatin (MYCOSTATIN) 909831 UNIT/GM powder      5. Anxiety and depression  Michelle Major MD, Plastic Surgery, [place]    nystatin (MYCOSTATIN) 465950 UNIT/GM powder      6. S/P gastric bypass  Michelle Major MD, Plastic Surgery, [place]    nystatin (MYCOSTATIN) 927257 UNIT/GM powder      7. Obesity (BMI 30-39. 9)  Michelle Major MD, Plastic Surgery, [place]    nystatin (MYCOSTATIN) 808253 UNIT/GM powder      8. Migraine without aura and without status migrainosus, not intractable  Michelle Major MD, Plastic Surgery, [place]    nystatin (MYCOSTATIN) 649673 UNIT/GM powder      9.  Iron deficiency anemia, unspecified iron deficiency anemia type  [facility] (MY[facility]STATIN) 502138 UNIT/GM powder      10. Stage 1 mild COPD by GOLD classification [facility])  Parrish Ricardo MD, Plastic Surgery, [place]    nystatin (MYCOSTATIN) 675059 UNIT/GM powder      11. Excess skin of abdomen  Parrish Ricardo MD, Plastic Surgery, [place]    nystatin (MYCOSTATIN) 652071 UNIT/GM powder          Plan:    No dietitian visit today. Patient to continue to increase protein to obtain 60-80g/day, at least 48-64oz of fluid daily, and gradually increase exercise regimen. Nystatin refilled. Referral to plastics for excess abdominal skin. Advised to f/u with PCP re: possible heart murmur. BP elevated today. Asymptomatic. Strongly encouraged to have labs drawn which were previously ordered. Follow-up  Return in about 6 months (around 1/18/2023). Orders this encounter:  Orders Placed This Encounter   Procedures    Parrish Ricardo MD, Plastic Surgery, [place]     Referral Priority:   Routine     Referral Type:   Eval and Treat     Referral Reason:   Specialty Services Required     Referred to Provider:   Monae Bernal MD     Requested Specialty:   Plastic Surgery     Number of Visits Requested:   1       Prescriptions this encounter:  Orders Placed This Encounter   Medications    nystatin (MYCOSTATIN) 844991 UNIT/GM powder     Sig: Apply 3 times daily.      Dispense:  60 g     Refill:  1       Electronically signed by:  Dutch Alegria CNP

## 2022-08-09 DIAGNOSIS — I16.1 HYPERTENSIVE EMERGENCY: ICD-10-CM

## 2022-08-09 RX ORDER — LABETALOL 300 MG/1
TABLET, FILM COATED ORAL
Qty: 60 TABLET | Refills: 1 | Status: SHIPPED | OUTPATIENT
Start: 2022-08-09 | End: 2022-09-15

## 2022-08-09 NOTE — TELEPHONE ENCOUNTER
Umm Soto is calling to request a refill on the following medication(s):    Medication Request:  Requested Prescriptions     Pending Prescriptions Disp Refills    labetalol (NORMODYNE) 300 MG tablet [Pharmacy Med Name: LABETALOL  MG TABLET] 60 tablet 1     Sig: TAKE 1 TABLET BY MOUTH IN THE MORNING AND 1 TABLET BEFORE BEDTIME.        Last Visit Date (If Applicable):  8/10/8953    Next Visit Date:    1/19/2023

## 2022-08-15 DIAGNOSIS — F33.0 MILD EPISODE OF RECURRENT MAJOR DEPRESSIVE DISORDER (HCC): ICD-10-CM

## 2022-08-15 DIAGNOSIS — F41.9 ANXIETY: ICD-10-CM

## 2022-08-15 RX ORDER — BUSPIRONE HYDROCHLORIDE 10 MG/1
TABLET ORAL
Qty: 60 TABLET | Refills: 5 | Status: SHIPPED | OUTPATIENT
Start: 2022-08-15

## 2022-08-15 NOTE — TELEPHONE ENCOUNTER
Zee Young is calling to request a refill on the following medication(s):    Medication Request:  Requested Prescriptions     Pending Prescriptions Disp Refills    busPIRone (BUSPAR) 10 MG tablet 60 tablet 5     Sig: TAKE 1 TABLET BY MOUTH TWICE A DAY       Last Visit Date (If Applicable):  6/75/2902    Next Visit Date:    1/19/2023

## 2022-09-07 ENCOUNTER — PATIENT MESSAGE (OUTPATIENT)
Dept: FAMILY MEDICINE CLINIC | Age: 47
End: 2022-09-07

## 2022-09-07 DIAGNOSIS — J02.0 STREP THROAT: Primary | ICD-10-CM

## 2022-09-08 RX ORDER — AMOXICILLIN 500 MG/1
500 CAPSULE ORAL 2 TIMES DAILY
Qty: 20 CAPSULE | Refills: 0 | Status: SHIPPED | OUTPATIENT
Start: 2022-09-08 | End: 2022-09-18

## 2022-09-08 NOTE — TELEPHONE ENCOUNTER
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 I am attaching a couple photos of my throat Im pretty positive I have strep and wanted to know if someone can call me in an antibiotic I can get over the weekend. I tried to do an evisit but it wont let me maybe because [place]. If u have any ideas as to what can help out here u til that would be great. Remember I cant take NSAIDs.  Thank you

## 2022-09-08 NOTE — TELEPHONE ENCOUNTER
All the allergies that's in her chart are the only ones she have. [facility] [address] is [facility].   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

## 2022-09-15 DIAGNOSIS — I16.1 HYPERTENSIVE EMERGENCY: ICD-10-CM

## 2022-09-15 RX ORDER — LABETALOL 300 MG/1
TABLET, FILM COATED ORAL
Qty: 60 TABLET | Refills: 1 | Status: SHIPPED | OUTPATIENT
Start: 2022-09-15

## 2022-09-15 NOTE — TELEPHONE ENCOUNTER
Umm Soto is calling to request a refill on the following medication(s):    Medication Request:  Requested Prescriptions     Pending Prescriptions Disp Refills    labetalol (NORMODYNE) 300 MG tablet [Pharmacy Med Name: LABETALOL  MG TABLET] 60 tablet 1     Sig: TAKE 1 TABLET BY MOUTH IN THE MORNING AND 1 TABLET BEFORE BEDTIME.        Last Visit Date (If Applicable):  7/53/5224    Next Visit Date:    1/19/2023

## 2023-01-17 ENCOUNTER — TELEPHONE (OUTPATIENT)
Dept: FAMILY MEDICINE CLINIC | Age: 48
End: 2023-01-17

## 2023-01-17 DIAGNOSIS — I10 ESSENTIAL HYPERTENSION: Primary | ICD-10-CM

## 2023-01-17 NOTE — TELEPHONE ENCOUNTER
----- Message from Carmel Jurado sent at 1/17/2023  9:51 AM EST -----  Subject: Medication Problem    Medication: labetalol (NORMODYNE) 300 MG tablet  Dosage: TAKE 1 TABLET BY MOUTH IN THE MORNING AND 1 TABLET BEFORE BEDTIME. Ordering Provider: Michelle Rene    Question/Problem: pt. states that since started medication the medication   make her feel weird and would like to have a adjustment to the medication   dosage back to the 100 mg tablet and would like a call back to discuss   also pt. 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        Pharmacy: [id number] [address]   [phone] Antonieta Carvajal [phone]    ---------------------------------------------------------------------------  --------------  Osorio Sterling INFO  [phone]; OK to leave message on voicemail  ---------------------------------------------------------------------------  --------------    SCRIPT ANSWERS  Relationship to Patient: Self

## 2023-01-18 NOTE — TELEPHONE ENCOUNTER
She isnt trying to change her dose she said the 300 mg makes her feels weird if you takes the 1 300 mg pill but if she takes 3 100mg tablets it doesn't do that to her and she is able to drive please advise

## 2023-01-20 RX ORDER — LABETALOL 100 MG/1
300 TABLET, FILM COATED ORAL 2 TIMES DAILY
Qty: 180 TABLET | Refills: 2 | Status: SHIPPED | OUTPATIENT
Start: 2023-01-20 | End: 2023-04-20

## 2023-01-20 NOTE — TELEPHONE ENCOUNTER
Switching patient to the 100 mg preparation as the 300 mg tablets make the patient reportedly \"feel funny\".

## 2023-02-24 DIAGNOSIS — I10 ESSENTIAL HYPERTENSION: ICD-10-CM

## 2023-02-24 RX ORDER — LABETALOL 100 MG/1
TABLET, FILM COATED ORAL
Qty: 180 TABLET | Refills: 2 | Status: SHIPPED | OUTPATIENT
Start: 2023-02-24

## 2023-02-24 NOTE — TELEPHONE ENCOUNTER
Requested Prescriptions     Pending Prescriptions Disp Refills    labetalol (NORMODYNE) 100 MG tablet [Pharmacy Med Name: LABETALOL  MG TABLET] 180 tablet 2     Sig: TAKE 3 TABLETS BY MOUTH TWICE A DAY

## 2023-06-02 ENCOUNTER — TELEPHONE (OUTPATIENT)
Dept: FAMILY MEDICINE CLINIC | Age: 48
End: 2023-06-02

## 2023-06-09 ENCOUNTER — OFFICE VISIT (OUTPATIENT)
Dept: FAMILY MEDICINE CLINIC | Age: 48
End: 2023-06-09
Payer: MEDICARE

## 2023-06-09 VITALS
HEIGHT: 69 IN | WEIGHT: 215 LBS | HEART RATE: 76 BPM | DIASTOLIC BLOOD PRESSURE: 80 MMHG | TEMPERATURE: 97.4 F | BODY MASS INDEX: 31.84 KG/M2 | SYSTOLIC BLOOD PRESSURE: 124 MMHG | OXYGEN SATURATION: 97 %

## 2023-06-09 DIAGNOSIS — I10 ESSENTIAL HYPERTENSION: Primary | ICD-10-CM

## 2023-06-09 DIAGNOSIS — F33.0 MILD EPISODE OF RECURRENT MAJOR DEPRESSIVE DISORDER (HCC): ICD-10-CM

## 2023-06-09 DIAGNOSIS — F41.9 ANXIETY: ICD-10-CM

## 2023-06-09 PROCEDURE — 3079F DIAST BP 80-89 MM HG: CPT

## 2023-06-09 PROCEDURE — 3074F SYST BP LT 130 MM HG: CPT

## 2023-06-09 PROCEDURE — 1036F TOBACCO NON-USER: CPT

## 2023-06-09 PROCEDURE — G8417 CALC BMI ABV UP PARAM F/U: HCPCS

## 2023-06-09 PROCEDURE — 99214 OFFICE O/P EST MOD 30 MIN: CPT

## 2023-06-09 PROCEDURE — G8427 DOCREV CUR MEDS BY ELIG CLIN: HCPCS

## 2023-06-09 RX ORDER — CLONIDINE HYDROCHLORIDE 0.1 MG/1
0.1 TABLET ORAL 2 TIMES DAILY
Qty: 60 TABLET | Refills: 3 | Status: SHIPPED | OUTPATIENT
Start: 2023-06-09

## 2023-06-09 RX ORDER — LABETALOL 100 MG/1
300 TABLET, FILM COATED ORAL 2 TIMES DAILY
Qty: 540 TABLET | Refills: 0 | Status: SHIPPED | OUTPATIENT
Start: 2023-06-09 | End: 2023-09-07

## 2023-06-09 RX ORDER — BUSPIRONE HYDROCHLORIDE 10 MG/1
10 TABLET ORAL 2 TIMES DAILY
Qty: 180 TABLET | Refills: 0 | Status: SHIPPED | OUTPATIENT
Start: 2023-06-09 | End: 2023-09-07

## 2023-06-09 SDOH — ECONOMIC STABILITY: HOUSING INSECURITY
IN THE LAST 12 MONTHS, WAS THERE A TIME WHEN YOU DID NOT HAVE A STEADY PLACE TO SLEEP OR SLEPT IN A SHELTER (INCLUDING NOW)?: NO

## 2023-06-09 SDOH — ECONOMIC STABILITY: FOOD INSECURITY: WITHIN THE PAST 12 MONTHS, YOU WORRIED THAT YOUR FOOD WOULD RUN OUT BEFORE YOU GOT MONEY TO BUY MORE.: NEVER TRUE

## 2023-06-09 SDOH — ECONOMIC STABILITY: TRANSPORTATION INSECURITY
IN THE PAST 12 MONTHS, HAS THE LACK OF TRANSPORTATION KEPT YOU FROM MEDICAL APPOINTMENTS OR FROM GETTING MEDICATIONS?: NO

## 2023-06-09 SDOH — ECONOMIC STABILITY: TRANSPORTATION INSECURITY
IN THE PAST 12 MONTHS, HAS LACK OF TRANSPORTATION KEPT YOU FROM MEETINGS, WORK, OR FROM GETTING THINGS NEEDED FOR DAILY LIVING?: NO

## 2023-06-09 SDOH — ECONOMIC STABILITY: FOOD INSECURITY: WITHIN THE PAST 12 MONTHS, THE FOOD YOU BOUGHT JUST DIDN'T LAST AND YOU DIDN'T HAVE MONEY TO GET MORE.: NEVER TRUE

## 2023-06-09 SDOH — ECONOMIC STABILITY: INCOME INSECURITY: IN THE LAST 12 MONTHS, WAS THERE A TIME WHEN YOU WERE NOT ABLE TO PAY THE MORTGAGE OR RENT ON TIME?: NO

## 2023-06-09 ASSESSMENT — PATIENT HEALTH QUESTIONNAIRE - PHQ9
SUM OF ALL RESPONSES TO PHQ QUESTIONS 1-9: 8
4. FEELING TIRED OR HAVING LITTLE ENERGY: 0
SUM OF ALL RESPONSES TO PHQ9 QUESTIONS 1 & 2: 4
1. LITTLE INTEREST OR PLEASURE IN DOING THINGS: 2
SUM OF ALL RESPONSES TO PHQ QUESTIONS 1-9: 8
SUM OF ALL RESPONSES TO PHQ QUESTIONS 1-9: 8
9. THOUGHTS THAT YOU WOULD BE BETTER OFF DEAD, OR OF HURTING YOURSELF: 0
5. POOR APPETITE OR OVEREATING: 0
2. FEELING DOWN, DEPRESSED OR HOPELESS: 2
10. IF YOU CHECKED OFF ANY PROBLEMS, HOW DIFFICULT HAVE THESE PROBLEMS MADE IT FOR YOU TO DO YOUR WORK, TAKE CARE OF THINGS AT HOME, OR GET ALONG WITH OTHER PEOPLE: 0
7. TROUBLE CONCENTRATING ON THINGS, SUCH AS READING THE NEWSPAPER OR WATCHING TELEVISION: 0
SUM OF ALL RESPONSES TO PHQ QUESTIONS 1-9: 8
8. MOVING OR SPEAKING SO SLOWLY THAT OTHER PEOPLE COULD HAVE NOTICED. OR THE OPPOSITE, BEING SO FIGETY OR RESTLESS THAT YOU HAVE BEEN MOVING AROUND A LOT MORE THAN USUAL: 0
6. FEELING BAD ABOUT YOURSELF - OR THAT YOU ARE A FAILURE OR HAVE LET YOURSELF OR YOUR FAMILY DOWN: 2
3. TROUBLE FALLING OR STAYING ASLEEP: 2

## 2023-06-09 ASSESSMENT — ENCOUNTER SYMPTOMS
SINUS PRESSURE: 0
ABDOMINAL PAIN: 0
EYE REDNESS: 0
VOMITING: 0
SHORTNESS OF BREATH: 0
COUGH: 0
TROUBLE SWALLOWING: 0
EYE ITCHING: 0
NAUSEA: 0
DIARRHEA: 0
SORE THROAT: 0
EYE DISCHARGE: 0
CHEST TIGHTNESS: 0
SINUS PAIN: 0
WHEEZING: 0

## 2023-06-09 ASSESSMENT — SOCIAL DETERMINANTS OF HEALTH (SDOH): HOW HARD IS IT FOR YOU TO PAY FOR THE VERY BASICS LIKE FOOD, HOUSING, MEDICAL CARE, AND HEATING?: NOT HARD AT ALL

## 2023-06-09 NOTE — ASSESSMENT & PLAN NOTE
At goal, continue current medications, continue current treatment plan, medication adherence emphasized and lifestyle modifications recommended.  Add clonidine 0.1 mg for systolic greater than 708 and diastolic greater than 90

## 2023-06-09 NOTE — PATIENT INSTRUCTIONS
New Updates for My [facility] RAYMUNDO    Thank you for choosing US to give you the best care! [facility] ([facility]) is always trying to think of new ways to help their patients. We are asking all patients to try out the new digital registration that is now available through the new [facility] RAYMUNDO, feel free to download today. Via the raymundo you're now able to update your personal and registration information prior to your upcoming appointment. This will save you time once you arrive at the office to check-in, not to mention your information remains safe!! Many other perks come from signing up for an account, such as:  Requesting refills  Scheduling an appointment  Completing an E-Visit  Sending a message to the office/provider  Having access to your medication list  Paying your bill/copay prior to your appointment  Scheduling your yearly mammogram  Review your test results    If you are not familiar with the [facility] RAYMUNDO, please ask one of us and we will be happy to answer any questions or help you set-up your account.

## 2023-06-09 NOTE — PROGRESS NOTES
[address] [facility],[address]  [address] [facility]  [address]  6/9/2023    Leo Montalvo is a 41 y.o. female who presents today for her medical conditions and/or complaints as noted below. Leo Montalvo is scheduled today for Hypertension and Discuss Labs (Wants to talk about getting labs done to check if shes going through menopause )  . HPI:     BP is ok today. She states recently it was systolic in the 950'T at the dentist office recently. She has been taking her meds daily. She has a long history of blood pressures that swing drastically. She does occasionally take her BP at home and occasionally titrate's her labetalol based on those readings. She denies any recent hypotensive moments. She denies any symptomatic hypertensive moments as well. She is requesting 90 day refills on some of her medications today. Her depression and anxiety are adequately controlled at this time with no SI or HI noted.       Vitals:    06/09/23 1322 06/09/23 1356   BP: (!) 140/80 124/80   Pulse: 76    Temp: 97.4 °F (36.3 °C)    SpO2: 97%    Weight: 215 lb (97.5 kg)    Height: 5' 8.5\" (1.74 m)       Past Medical History:   Diagnosis Date    Anemia     Anxiety and depression 04/05/2021    Anxiety state 08/12/2020    Asthma     COPD (chronic obstructive pulmonary disease) ([address]     Epigastric pain 9/7/2021    Essential hypertension 43/00/2995    Folic acid deficiency 42/59/6000    Hiatal hernia 08/12/2020    Hiatal hernia with GERD 04/05/2021    History of abnormal cervical Pap smear     History of nicotine use 04/05/2021    Hyperlipidemia     Hypertension     PCP DR Rolf CRAWLEY    Hypertensive emergency 09/20/2020    Iron deficiency anemia 08/12/2020    IUD (intrauterine device) in place 08/04/2020    Mirena    Menometrorrhagia     Migraine without aura and without status migrainosus, not intractable 09/21/2020    Morbid obesity due to excess calories ([address] 08/12/2020    Obesity (BMI 30-39.9) 09/20/2020    CORNELIA on CPAP

## 2023-06-26 ENCOUNTER — PATIENT MESSAGE (OUTPATIENT)
Dept: FAMILY MEDICINE CLINIC | Age: 48
End: 2023-06-26

## 2023-06-26 DIAGNOSIS — I10 ESSENTIAL HYPERTENSION: Primary | ICD-10-CM

## 2023-07-03 DIAGNOSIS — I10 ESSENTIAL HYPERTENSION: ICD-10-CM

## 2023-07-03 NOTE — TELEPHONE ENCOUNTER
Teresa Colvin is calling to request a refill on the following medication(s):    Medication Request:  Requested Prescriptions     Pending Prescriptions Disp Refills    cloNIDine (CATAPRES) 0.1 MG tablet [Pharmacy Med Name: CLONIDINE HCL 0.1 MG TABLET] 60 tablet 3     Sig: TAKE 1 TABLET BY MOUTH TWICE A DAY       Last Visit Date (If Applicable):  6/1/2986    Next Visit Date:    9/15/2023

## 2023-07-06 ENCOUNTER — TELEPHONE (OUTPATIENT)
Dept: FAMILY MEDICINE CLINIC | Age: 48
End: 2023-07-06

## 2023-07-06 DIAGNOSIS — I10 ESSENTIAL HYPERTENSION: Primary | ICD-10-CM

## 2023-07-06 RX ORDER — CLONIDINE HYDROCHLORIDE 0.1 MG/1
TABLET ORAL
Qty: 60 TABLET | Refills: 3 | Status: SHIPPED | OUTPATIENT
Start: 2023-07-06

## 2023-07-06 RX ORDER — AMLODIPINE BESYLATE 10 MG/1
10 TABLET ORAL DAILY
Qty: 30 TABLET | Refills: 0 | Status: SHIPPED | OUTPATIENT
Start: 2023-07-06

## 2023-07-06 NOTE — TELEPHONE ENCOUNTER
Patient can try to come Monday at 8am if that's okay with you, due to work. Patient doesn't recall ever being on Amlodipine.

## 2023-07-06 NOTE — TELEPHONE ENCOUNTER
Patient states that she has gotten her blood pressure under control this morning it is 164/81. Last night it was 126/76. Patient states that she has her physical 7/14/23 coming up for work, and she would like to know if she needs an appt, so that she can get a letter in regards to BP and her medications?

## 2023-07-10 ENCOUNTER — OFFICE VISIT (OUTPATIENT)
Dept: FAMILY MEDICINE CLINIC | Age: 48
End: 2023-07-10
Payer: MEDICAID

## 2023-07-10 VITALS
BODY MASS INDEX: 30.81 KG/M2 | WEIGHT: 208 LBS | SYSTOLIC BLOOD PRESSURE: 110 MMHG | HEART RATE: 71 BPM | DIASTOLIC BLOOD PRESSURE: 60 MMHG | TEMPERATURE: 97.5 F | HEIGHT: 69 IN | OXYGEN SATURATION: 98 %

## 2023-07-10 DIAGNOSIS — I10 ESSENTIAL HYPERTENSION: Primary | ICD-10-CM

## 2023-07-10 DIAGNOSIS — F33.0 MILD EPISODE OF RECURRENT MAJOR DEPRESSIVE DISORDER (HCC): ICD-10-CM

## 2023-07-10 PROCEDURE — 3074F SYST BP LT 130 MM HG: CPT

## 2023-07-10 PROCEDURE — 3078F DIAST BP <80 MM HG: CPT

## 2023-07-10 PROCEDURE — 99214 OFFICE O/P EST MOD 30 MIN: CPT

## 2023-07-10 SDOH — ECONOMIC STABILITY: FOOD INSECURITY: WITHIN THE PAST 12 MONTHS, YOU WORRIED THAT YOUR FOOD WOULD RUN OUT BEFORE YOU GOT MONEY TO BUY MORE.: NEVER TRUE

## 2023-07-10 SDOH — ECONOMIC STABILITY: INCOME INSECURITY: IN THE LAST 12 MONTHS, WAS THERE A TIME WHEN YOU WERE NOT ABLE TO PAY THE MORTGAGE OR RENT ON TIME?: NO

## 2023-07-10 SDOH — ECONOMIC STABILITY: FOOD INSECURITY: WITHIN THE PAST 12 MONTHS, THE FOOD YOU BOUGHT JUST DIDN'T LAST AND YOU DIDN'T HAVE MONEY TO GET MORE.: NEVER TRUE

## 2023-07-10 ASSESSMENT — ENCOUNTER SYMPTOMS
EYE REDNESS: 0
EYE DISCHARGE: 0
SINUS PAIN: 0
WHEEZING: 0
ABDOMINAL PAIN: 0
CHEST TIGHTNESS: 0
DIARRHEA: 0
SORE THROAT: 0
EYE ITCHING: 0
TROUBLE SWALLOWING: 0
SHORTNESS OF BREATH: 0
COUGH: 0
SINUS PRESSURE: 0
VOMITING: 0
NAUSEA: 0

## 2023-07-10 ASSESSMENT — PATIENT HEALTH QUESTIONNAIRE - PHQ9
SUM OF ALL RESPONSES TO PHQ QUESTIONS 1-9: 0
9. THOUGHTS THAT YOU WOULD BE BETTER OFF DEAD, OR OF HURTING YOURSELF: 0
4. FEELING TIRED OR HAVING LITTLE ENERGY: 0
SUM OF ALL RESPONSES TO PHQ QUESTIONS 1-9: 0
10. IF YOU CHECKED OFF ANY PROBLEMS, HOW DIFFICULT HAVE THESE PROBLEMS MADE IT FOR YOU TO DO YOUR WORK, TAKE CARE OF THINGS AT HOME, OR GET ALONG WITH OTHER PEOPLE: 0
SUM OF ALL RESPONSES TO PHQ9 QUESTIONS 1 & 2: 0
3. TROUBLE FALLING OR STAYING ASLEEP: 0
6. FEELING BAD ABOUT YOURSELF - OR THAT YOU ARE A FAILURE OR HAVE LET YOURSELF OR YOUR FAMILY DOWN: 0
1. LITTLE INTEREST OR PLEASURE IN DOING THINGS: 0
8. MOVING OR SPEAKING SO SLOWLY THAT OTHER PEOPLE COULD HAVE NOTICED. OR THE OPPOSITE, BEING SO FIGETY OR RESTLESS THAT YOU HAVE BEEN MOVING AROUND A LOT MORE THAN USUAL: 0
SUM OF ALL RESPONSES TO PHQ QUESTIONS 1-9: 0
5. POOR APPETITE OR OVEREATING: 0
7. TROUBLE CONCENTRATING ON THINGS, SUCH AS READING THE NEWSPAPER OR WATCHING TELEVISION: 0
2. FEELING DOWN, DEPRESSED OR HOPELESS: 0
SUM OF ALL RESPONSES TO PHQ QUESTIONS 1-9: 0

## 2023-07-10 ASSESSMENT — SOCIAL DETERMINANTS OF HEALTH (SDOH): HOW HARD IS IT FOR YOU TO PAY FOR THE VERY BASICS LIKE FOOD, HOUSING, MEDICAL CARE, AND HEATING?: NOT HARD AT ALL

## 2023-07-10 NOTE — PATIENT INSTRUCTIONS
New Updates for My [facility] RAYMUNDO    Thank you for choosing US to give you the best care! [address] [place] [address] is always trying to think of new ways to help their patients. We are asking all patients to try out the new digital registration that is now available through the new [facility] RAYMUNDO, feel free to download today. Via the raymundo you're now able to update your personal and registration information prior to your upcoming appointment. This will save you time once you arrive at the office to check-in, not to mention your information remains safe!! Many other perks come from signing up for an account, such as:  Requesting refills  Scheduling an appointment  Completing an E-Visit  Sending a message to the office/provider  Having access to your medication list  Paying your bill/copay prior to your appointment  Scheduling your yearly mammogram  Review your test results    If you are not familiar with the [facility] RAYMUNDO, please ask one of us and we will be happy to answer any questions or help you set-up your account.

## 2023-07-31 DIAGNOSIS — I10 ESSENTIAL HYPERTENSION: ICD-10-CM

## 2023-07-31 RX ORDER — AMLODIPINE BESYLATE 10 MG/1
TABLET ORAL
Qty: 30 TABLET | Refills: 5 | Status: SHIPPED | OUTPATIENT
Start: 2023-07-31 | End: 2023-08-22 | Stop reason: SDUPTHER

## 2023-07-31 NOTE — TELEPHONE ENCOUNTER
Lashonda Pham is calling to request a refill on the following medication(s):    Medication Request:  Requested Prescriptions     Pending Prescriptions Disp Refills    amLODIPine (NORVASC) 10 MG tablet [Pharmacy Med Name: AMLODIPINE BESYLATE 10 MG TAB] 30 tablet 0     Sig: TAKE 1 TABLET BY MOUTH EVERY DAY       Last Visit Date (If Applicable):  3/05/1552    Next Visit Date:    1/12/2024

## 2023-08-05 DIAGNOSIS — I10 ESSENTIAL HYPERTENSION: ICD-10-CM

## 2023-08-07 RX ORDER — CLONIDINE HYDROCHLORIDE 0.1 MG/1
TABLET ORAL
Qty: 180 TABLET | Refills: 1 | Status: SHIPPED | OUTPATIENT
Start: 2023-08-07

## 2023-08-07 NOTE — TELEPHONE ENCOUNTER
Anjelica Mi is calling to request a refill on the following medication(s):    Medication Request:  Requested Prescriptions     Pending Prescriptions Disp Refills    cloNIDine (CATAPRES) 0.1 MG tablet [Pharmacy Med Name: CLONIDINE HCL 0.1 MG TABLET] 180 tablet 1     Sig: TAKE 1 TABLET BY MOUTH TWICE A DAY       Last Visit Date (If Applicable):  4/27/5332    Next Visit Date:    1/12/2024

## 2023-08-11 NOTE — TELEPHONE ENCOUNTER
Patient called office back she will have [employer] insurance as of February 1,2021. Advised patient per previous telephone encounter that on February 1 to re do online info session . Put in new insurance so it can be verified . Advised patient  will reach out to her with in 5 business days to schedule. No

## 2023-08-22 DIAGNOSIS — I10 ESSENTIAL HYPERTENSION: ICD-10-CM

## 2023-08-22 DIAGNOSIS — G43.009 MIGRAINE WITHOUT AURA AND WITHOUT STATUS MIGRAINOSUS, NOT INTRACTABLE: ICD-10-CM

## 2023-08-22 RX ORDER — SUMATRIPTAN 25 MG/1
25 TABLET, FILM COATED ORAL DAILY PRN
Qty: 9 TABLET | Refills: 5 | Status: SHIPPED | OUTPATIENT
Start: 2023-08-22

## 2023-08-22 RX ORDER — AMLODIPINE BESYLATE 10 MG/1
10 TABLET ORAL DAILY
Qty: 90 TABLET | Refills: 1 | Status: SHIPPED | OUTPATIENT
Start: 2023-08-22

## 2023-08-22 NOTE — TELEPHONE ENCOUNTER
Johana Chávez is calling to request a refill on the following medication(s):    Medication Request:  Requested Prescriptions     Pending Prescriptions Disp Refills    amLODIPine (NORVASC) 10 MG tablet 90 tablet 1     Sig: Take 1 tablet by mouth daily    SUMAtriptan (IMITREX) 25 MG tablet 9 tablet 5     Sig: Take 1 tablet by mouth once as needed for Migraine       Last Visit Date (If Applicable):  2/02/1641    Next Visit Date:    1/12/2024

## 2023-09-06 DIAGNOSIS — F33.0 MILD EPISODE OF RECURRENT MAJOR DEPRESSIVE DISORDER (HCC): ICD-10-CM

## 2023-09-06 DIAGNOSIS — F41.9 ANXIETY: ICD-10-CM

## 2023-09-06 NOTE — TELEPHONE ENCOUNTER
Jennifer Bean is calling to request a refill on the following medication(s):    Medication Request:  Requested Prescriptions     Pending Prescriptions Disp Refills    sertraline (ZOLOFT) 50 MG tablet [Pharmacy Med Name: SERTRALINE HCL 50 MG TABLET] 90 tablet 0     Sig: TAKE 1 TABLET BY MOUTH EVERY DAY    busPIRone (BUSPAR) 10 MG tablet [Pharmacy Med Name: BUSPIRONE HCL 10 MG TABLET] 180 tablet 0     Sig: TAKE 1 TABLET BY MOUTH IN THE MORNING AND AT BEDTIME TAKE 1 TABLET BY MOUTH TWICE A DAY       Last Visit Date (If Applicable):  3/98/5410    Next Visit Date:    1/12/2024

## 2023-09-07 RX ORDER — BUSPIRONE HYDROCHLORIDE 10 MG/1
10 TABLET ORAL 2 TIMES DAILY
Qty: 180 TABLET | Refills: 0 | Status: SHIPPED | OUTPATIENT
Start: 2023-09-07 | End: 2023-12-06

## 2023-09-20 ENCOUNTER — PATIENT MESSAGE (OUTPATIENT)
Dept: FAMILY MEDICINE CLINIC | Age: 48
End: 2023-09-20

## 2023-09-20 DIAGNOSIS — Z13.6 SCREENING FOR CARDIOVASCULAR CONDITION: ICD-10-CM

## 2023-09-20 DIAGNOSIS — Z13.220 SCREENING FOR HYPERLIPIDEMIA: ICD-10-CM

## 2023-09-20 DIAGNOSIS — Z13.0 SCREENING FOR IRON DEFICIENCY ANEMIA: Primary | ICD-10-CM

## 2023-09-20 DIAGNOSIS — Z86.39 HISTORY OF HYPERGLYCEMIA: ICD-10-CM

## 2023-09-20 DIAGNOSIS — E55.9 VITAMIN D DEFICIENCY: ICD-10-CM

## 2023-09-20 DIAGNOSIS — Z13.29 THYROID DISORDER SCREEN: ICD-10-CM

## 2023-09-20 DIAGNOSIS — E53.8 VITAMIN B12 DEFICIENCY: ICD-10-CM

## 2023-10-07 ENCOUNTER — HOSPITAL ENCOUNTER (OUTPATIENT)
Age: 48
Discharge: HOME | End: 2023-10-07
Payer: COMMERCIAL

## 2023-10-07 DIAGNOSIS — Z13.0 SCREENING FOR IRON DEFICIENCY ANEMIA: ICD-10-CM

## 2023-10-07 DIAGNOSIS — E53.8 VITAMIN B12 DEFICIENCY: ICD-10-CM

## 2023-10-07 DIAGNOSIS — E55.9 VITAMIN D DEFICIENCY: ICD-10-CM

## 2023-10-07 DIAGNOSIS — Z13.29 THYROID DISORDER SCREEN: ICD-10-CM

## 2023-10-07 DIAGNOSIS — Z86.39 HISTORY OF HYPERGLYCEMIA: ICD-10-CM

## 2023-10-07 DIAGNOSIS — Z13.220 SCREENING FOR HYPERLIPIDEMIA: ICD-10-CM

## 2023-10-07 DIAGNOSIS — Z13.6 SCREENING FOR CARDIOVASCULAR CONDITION: ICD-10-CM

## 2023-10-07 LAB
25(OH)D3 SERPL-MCNC: 27.5 NG/ML (ref 30–100)
ALBUMIN SERPL-MCNC: 4.2 G/DL (ref 3.5–5.2)
ALBUMIN/GLOB SERPL: 2 {RATIO} (ref 1–2.5)
ALP SERPL-CCNC: 155 U/L (ref 35–104)
ALT SERPL-CCNC: 59 U/L (ref 10–35)
ANION GAP SERPL CALCULATED.3IONS-SCNC: 10 MMOL/L (ref 9–16)
AST SERPL-CCNC: 49 U/L (ref 10–35)
BASOPHILS # BLD: 0.04 K/UL (ref 0–0.2)
BASOPHILS NFR BLD: 1 % (ref 0–2)
BILIRUB SERPL-MCNC: 0.3 MG/DL (ref 0–1.2)
BUN SERPL-MCNC: 16 MG/DL (ref 6–20)
CALCIUM SERPL-MCNC: 8.8 MG/DL (ref 8.6–10.4)
CHLORIDE SERPL-SCNC: 105 MMOL/L (ref 98–107)
CHOLEST SERPL-MCNC: 174 MG/DL (ref 0–199)
CHOLESTEROL/HDL RATIO: 3
CO2 SERPL-SCNC: 26 MMOL/L (ref 20–31)
CREAT SERPL-MCNC: 0.8 MG/DL (ref 0.5–0.9)
EOSINOPHIL # BLD: 0.28 K/UL (ref 0–0.44)
EOSINOPHILS RELATIVE PERCENT: 6 % (ref 1–4)
ERYTHROCYTE [DISTWIDTH] IN BLOOD BY AUTOMATED COUNT: 15.2 % (ref 11.8–14.4)
GFR SERPL CREATININE-BSD FRML MDRD: >60 ML/MIN/1.73M2
GLUCOSE SERPL-MCNC: 90 MG/DL (ref 74–99)
HCT VFR BLD AUTO: 35.2 % (ref 36.3–47.1)
HDLC SERPL-MCNC: 57 MG/DL
HGB BLD-MCNC: 11 G/DL (ref 11.9–15.1)
IMM GRANULOCYTES # BLD AUTO: <0.03 K/UL (ref 0–0.3)
IMM GRANULOCYTES NFR BLD: 0 %
LDLC SERPL CALC-MCNC: 104 MG/DL (ref 0–100)
LYMPHOCYTES NFR BLD: 1.35 K/UL (ref 1.1–3.7)
LYMPHOCYTES RELATIVE PERCENT: 31 % (ref 24–43)
MCH RBC QN AUTO: 25.6 PG (ref 25.2–33.5)
MCHC RBC AUTO-ENTMCNC: 31.3 G/DL (ref 28.4–34.8)
MCV RBC AUTO: 82.1 FL (ref 82.6–102.9)
MONOCYTES NFR BLD: 0.25 K/UL (ref 0.1–1.2)
MONOCYTES NFR BLD: 6 % (ref 3–12)
NEUTROPHILS NFR BLD: 56 % (ref 36–65)
NEUTS SEG NFR BLD: 2.42 K/UL (ref 1.5–8.1)
NRBC BLD-RTO: 0 PER 100 WBC
PLATELET # BLD AUTO: 248 K/UL (ref 138–453)
PMV BLD AUTO: 9.2 FL (ref 8.1–13.5)
POTASSIUM SERPL-SCNC: 4.4 MMOL/L (ref 3.7–5.3)
PROT SERPL-MCNC: 6.8 G/DL (ref 6.6–8.7)
RBC # BLD AUTO: 4.29 M/UL (ref 3.95–5.11)
RBC # BLD: ABNORMAL 10*6/UL
SODIUM SERPL-SCNC: 141 MMOL/L (ref 136–145)
TRIGL SERPL-MCNC: 68 MG/DL (ref 0–149)
TSH SERPL DL<=0.05 MIU/L-ACNC: 2.4 UIU/ML (ref 0.27–4.2)
VIT B12 SERPL-MCNC: 759 PG/ML (ref 232–1245)
VLDLC SERPL CALC-MCNC: 14 MG/DL
WBC OTHER # BLD: 4.4 K/UL (ref 3.5–11.3)

## 2023-10-07 PROCEDURE — 82607 VITAMIN B-12: CPT

## 2023-10-07 PROCEDURE — 80061 LIPID PANEL: CPT

## 2023-10-07 PROCEDURE — 83036 HEMOGLOBIN GLYCOSYLATED A1C: CPT

## 2023-10-07 PROCEDURE — 84443 ASSAY THYROID STIM HORMONE: CPT

## 2023-10-07 PROCEDURE — 85025 COMPLETE CBC W/AUTO DIFF WBC: CPT

## 2023-10-07 PROCEDURE — 80053 COMPREHEN METABOLIC PANEL: CPT

## 2023-10-07 PROCEDURE — 82306 VITAMIN D 25 HYDROXY: CPT

## 2023-10-07 PROCEDURE — 36415 COLL VENOUS BLD VENIPUNCTURE: CPT

## 2023-10-08 LAB
EST. AVERAGE GLUCOSE BLD GHB EST-MCNC: 97 MG/DL
HBA1C MFR BLD: 5 % (ref 4–6)

## 2023-10-11 ENCOUNTER — PATIENT MESSAGE (OUTPATIENT)
Dept: FAMILY MEDICINE CLINIC | Age: 48
End: 2023-10-11

## 2023-10-11 DIAGNOSIS — Z12.11 COLON CANCER SCREENING: Primary | ICD-10-CM

## 2023-10-11 NOTE — TELEPHONE ENCOUNTER
From: Tori Pretty  Sent: 10/11/2023 10:12 AM EDT  To: [facility] Clinical Support Pool  Subject: Colon Concern    Continued. I am 49 and I would like to know if you could set me up to get a colonoscopy to get things checked out and make sure all is what it should be. Also, I want to check with you to see the results of my labs if anything looks crazy on there. if you can call me, that would be great. Thanks.

## 2023-10-13 ENCOUNTER — TELEPHONE (OUTPATIENT)
Dept: SURGERY | Age: 48
End: 2023-10-13

## 2023-10-13 NOTE — TELEPHONE ENCOUNTER
[facility]  Screening colonoscopy questionnaire  Jennifer Early    Pt Name: Adalgisa Mai  MRN: [id number]  YOB: 1975  Primary Care Physician: TERA Juarez CNP      Have you ever had a colonoscopy? No  When was your last colonoscopy? N/A  Were any polyps removed or any other significant findings? N/A    Have you recently had a stool test to check for colon cancer? No  Was it positive? No    Do you have any family history of colon cancer? YES  If yes, which family member had colon cancer? [address]  Were they diagnosed younger or older than the age of 61? under age of 60yrs    Do you have a history of Crohn's disease or Ulcerative Colitis? No    Do you have a history of constipation? No    Do you have a history of bloody or black stools? Yes    Have you ever had surgery done inside your abdomen? Yes  What surgery? CHOLECYSTECTOMY AND GASTRIC SLEEVE    8. Are you taking any blood thinners? No   If yes, what is the name of the blood thinner? N/A    Scheduling:    10/13/23 - Spoke to patient, colonoscopy scheduled at [place], patient confirmed and information mailed to patient. Surgery date/time: 12/1/23 at :30am  Arrival time: 8am  Prep appt: PHONE CALL at [address] [place] [address]. GOLYTELY INSTRUCTIONS SENT TO PATIENT.

## 2023-11-20 ENCOUNTER — TELEPHONE (OUTPATIENT)
Dept: SURGERY | Age: 48
End: 2023-11-20

## 2023-11-20 RX ORDER — POLYETHYLENE GLYCOL 3350, SODIUM SULFATE ANHYDROUS, SODIUM BICARBONATE, SODIUM CHLORIDE, POTASSIUM CHLORIDE 236; 22.74; 6.74; 5.86; 2.97 G/4L; G/4L; G/4L; G/4L; G/4L
4 POWDER, FOR SOLUTION ORAL ONCE
Qty: 4000 ML | Refills: 0 | Status: SHIPPED | OUTPATIENT
Start: 2023-11-20 | End: 2023-11-20

## 2023-11-23 NOTE — DISCHARGE INSTRUCTIONS

## 2023-11-24 NOTE — PRE-PROCEDURE INSTRUCTIONS
PAT phone call for colonoscopy completed with pt. Date/time/location (entrance C) of surgery/procedure verified with pt. NPO after MN status verified with pt. Need for  verified with pt. Verified need to complete bowel prep/instructions per Dr. Edgardo Vegas pt to take a shower the AM of procedure and to avoid lotions, creams, jewelry. Instructed pt to take BP meds with a small sip of water prior to procedure/surgery.

## 2023-11-30 ENCOUNTER — HOSPITAL ENCOUNTER (OUTPATIENT)
Dept: ULTRASOUND IMAGING | Age: 48
Discharge: HOME | End: 2023-12-02
Payer: COMMERCIAL

## 2023-11-30 ENCOUNTER — ANESTHESIA EVENT (OUTPATIENT)
Dept: OPERATING ROOM | Age: 48
End: 2023-11-30
Payer: COMMERCIAL

## 2023-11-30 DIAGNOSIS — R79.89 ELEVATED LFTS: ICD-10-CM

## 2023-11-30 PROCEDURE — 76705 ECHO EXAM OF ABDOMEN: CPT

## 2023-11-30 RX ORDER — SODIUM CHLORIDE 0.9 % (FLUSH) 0.9 %
5-40 SYRINGE (ML) INJECTION EVERY 12 HOURS SCHEDULED
Status: CANCELLED | OUTPATIENT
Start: 2023-11-30

## 2023-11-30 RX ORDER — SODIUM CHLORIDE 0.9 % (FLUSH) 0.9 %
5-40 SYRINGE (ML) INJECTION PRN
Status: CANCELLED | OUTPATIENT
Start: 2023-11-30

## 2023-11-30 RX ORDER — SODIUM CHLORIDE 9 MG/ML
INJECTION, SOLUTION INTRAVENOUS PRN
Status: CANCELLED | OUTPATIENT
Start: 2023-11-30

## 2023-11-30 RX ORDER — SODIUM CHLORIDE, SODIUM LACTATE, POTASSIUM CHLORIDE, CALCIUM CHLORIDE 600; 310; 30; 20 MG/100ML; MG/100ML; MG/100ML; MG/100ML
INJECTION, SOLUTION INTRAVENOUS CONTINUOUS
Status: CANCELLED | OUTPATIENT
Start: 2023-11-30

## 2023-11-30 RX ORDER — LIDOCAINE HYDROCHLORIDE 10 MG/ML
1 INJECTION, SOLUTION EPIDURAL; INFILTRATION; INTRACAUDAL; PERINEURAL
Status: CANCELLED | OUTPATIENT
Start: 2023-11-30 | End: 2023-12-01

## 2023-12-01 ENCOUNTER — HOSPITAL ENCOUNTER (OUTPATIENT)
Age: 48
Setting detail: OUTPATIENT SURGERY
Discharge: HOME | End: 2023-12-01
Attending: SURGERY | Admitting: SURGERY
Payer: COMMERCIAL

## 2023-12-01 ENCOUNTER — ANESTHESIA (OUTPATIENT)
Dept: OPERATING ROOM | Age: 48
End: 2023-12-01
Payer: COMMERCIAL

## 2023-12-01 VITALS
OXYGEN SATURATION: 98 % | BODY MASS INDEX: 30.76 KG/M2 | RESPIRATION RATE: 16 BRPM | SYSTOLIC BLOOD PRESSURE: 134 MMHG | TEMPERATURE: 97.3 F | DIASTOLIC BLOOD PRESSURE: 65 MMHG | HEART RATE: 64 BPM | HEIGHT: 69 IN | WEIGHT: 207.67 LBS

## 2023-12-01 PROBLEM — Z12.11 COLON CANCER SCREENING: Status: ACTIVE | Noted: 2023-12-01

## 2023-12-01 PROBLEM — Z80.0 FAMILY HISTORY OF COLON CANCER: Status: ACTIVE | Noted: 2023-12-01

## 2023-12-01 PROCEDURE — 45378 DIAGNOSTIC COLONOSCOPY: CPT | Performed by: SURGERY

## 2023-12-01 PROCEDURE — 7100000011 HC PHASE II RECOVERY - ADDTL 15 MIN: Performed by: SURGERY

## 2023-12-01 PROCEDURE — 6360000002 HC RX W HCPCS: Performed by: NURSE ANESTHETIST, CERTIFIED REGISTERED

## 2023-12-01 PROCEDURE — 2500000003 HC RX 250 WO HCPCS: Performed by: NURSE ANESTHETIST, CERTIFIED REGISTERED

## 2023-12-01 PROCEDURE — 7100000010 HC PHASE II RECOVERY - FIRST 15 MIN: Performed by: SURGERY

## 2023-12-01 PROCEDURE — 2580000003 HC RX 258: Performed by: NURSE ANESTHETIST, CERTIFIED REGISTERED

## 2023-12-01 PROCEDURE — 2709999900 HC NON-CHARGEABLE SUPPLY: Performed by: SURGERY

## 2023-12-01 PROCEDURE — 3609027000 HC COLONOSCOPY: Performed by: SURGERY

## 2023-12-01 PROCEDURE — 3700000000 HC ANESTHESIA ATTENDED CARE: Performed by: SURGERY

## 2023-12-01 PROCEDURE — 3700000001 HC ADD 15 MINUTES (ANESTHESIA): Performed by: SURGERY

## 2023-12-01 RX ORDER — PROPOFOL 10 MG/ML
INJECTION, EMULSION INTRAVENOUS
Status: COMPLETED
Start: 2023-12-01 | End: 2023-12-01

## 2023-12-01 RX ORDER — OXYCODONE HYDROCHLORIDE AND ACETAMINOPHEN 5; 325 MG/1; MG/1
1 TABLET ORAL
Status: DISCONTINUED | OUTPATIENT
Start: 2023-12-01 | End: 2023-12-01 | Stop reason: HOSPADM

## 2023-12-01 RX ORDER — OXYCODONE HYDROCHLORIDE AND ACETAMINOPHEN 5; 325 MG/1; MG/1
2 TABLET ORAL
Status: DISCONTINUED | OUTPATIENT
Start: 2023-12-01 | End: 2023-12-01 | Stop reason: HOSPADM

## 2023-12-01 RX ORDER — LIDOCAINE HYDROCHLORIDE 10 MG/ML
INJECTION, SOLUTION INFILTRATION; PERINEURAL PRN
Status: DISCONTINUED | OUTPATIENT
Start: 2023-12-01 | End: 2023-12-01 | Stop reason: SDUPTHER

## 2023-12-01 RX ORDER — SODIUM CHLORIDE, SODIUM LACTATE, POTASSIUM CHLORIDE, CALCIUM CHLORIDE 600; 310; 30; 20 MG/100ML; MG/100ML; MG/100ML; MG/100ML
INJECTION, SOLUTION INTRAVENOUS CONTINUOUS PRN
Status: DISCONTINUED | OUTPATIENT
Start: 2023-12-01 | End: 2023-12-01 | Stop reason: SDUPTHER

## 2023-12-01 RX ORDER — ONDANSETRON 2 MG/ML
4 INJECTION INTRAMUSCULAR; INTRAVENOUS
Status: DISCONTINUED | OUTPATIENT
Start: 2023-12-01 | End: 2023-12-01 | Stop reason: HOSPADM

## 2023-12-01 RX ORDER — GLYCOPYRROLATE 1 MG/5 ML
SYRINGE (ML) INTRAVENOUS PRN
Status: DISCONTINUED | OUTPATIENT
Start: 2023-12-01 | End: 2023-12-01 | Stop reason: SDUPTHER

## 2023-12-01 RX ORDER — HYDRALAZINE HYDROCHLORIDE 20 MG/ML
10 INJECTION INTRAMUSCULAR; INTRAVENOUS
Status: DISCONTINUED | OUTPATIENT
Start: 2023-12-01 | End: 2023-12-01 | Stop reason: HOSPADM

## 2023-12-01 RX ORDER — METOCLOPRAMIDE HYDROCHLORIDE 5 MG/ML
10 INJECTION INTRAMUSCULAR; INTRAVENOUS
Status: DISCONTINUED | OUTPATIENT
Start: 2023-12-01 | End: 2023-12-01 | Stop reason: HOSPADM

## 2023-12-01 RX ORDER — DIPHENHYDRAMINE HYDROCHLORIDE 50 MG/ML
12.5 INJECTION INTRAMUSCULAR; INTRAVENOUS
Status: DISCONTINUED | OUTPATIENT
Start: 2023-12-01 | End: 2023-12-01 | Stop reason: HOSPADM

## 2023-12-01 RX ORDER — LABETALOL HYDROCHLORIDE 5 MG/ML
INJECTION, SOLUTION INTRAVENOUS PRN
Status: DISCONTINUED | OUTPATIENT
Start: 2023-12-01 | End: 2023-12-01 | Stop reason: SDUPTHER

## 2023-12-01 RX ORDER — IPRATROPIUM BROMIDE AND ALBUTEROL SULFATE 2.5; .5 MG/3ML; MG/3ML
1 SOLUTION RESPIRATORY (INHALATION)
Status: DISCONTINUED | OUTPATIENT
Start: 2023-12-01 | End: 2023-12-01 | Stop reason: HOSPADM

## 2023-12-01 RX ORDER — MIDAZOLAM HYDROCHLORIDE 2 MG/2ML
2 INJECTION, SOLUTION INTRAMUSCULAR; INTRAVENOUS
Status: DISCONTINUED | OUTPATIENT
Start: 2023-12-01 | End: 2023-12-01 | Stop reason: HOSPADM

## 2023-12-01 RX ORDER — SODIUM CHLORIDE 9 MG/ML
INJECTION, SOLUTION INTRAVENOUS PRN
Status: DISCONTINUED | OUTPATIENT
Start: 2023-12-01 | End: 2023-12-01 | Stop reason: HOSPADM

## 2023-12-01 RX ORDER — PROMETHAZINE HYDROCHLORIDE 25 MG/ML
6.25 INJECTION, SOLUTION INTRAMUSCULAR; INTRAVENOUS EVERY 5 MIN PRN
Status: DISCONTINUED | OUTPATIENT
Start: 2023-12-01 | End: 2023-12-01 | Stop reason: HOSPADM

## 2023-12-01 RX ORDER — MEPERIDINE HYDROCHLORIDE 50 MG/ML
12.5 INJECTION INTRAMUSCULAR; INTRAVENOUS; SUBCUTANEOUS EVERY 5 MIN PRN
Status: DISCONTINUED | OUTPATIENT
Start: 2023-12-01 | End: 2023-12-01 | Stop reason: HOSPADM

## 2023-12-01 RX ORDER — GLYCOPYRROLATE 0.2 MG/ML
0.4 INJECTION INTRAMUSCULAR; INTRAVENOUS ONCE
Status: DISCONTINUED | OUTPATIENT
Start: 2023-12-01 | End: 2023-12-01 | Stop reason: HOSPADM

## 2023-12-01 RX ORDER — LABETALOL HYDROCHLORIDE 5 MG/ML
10 INJECTION, SOLUTION INTRAVENOUS
Status: DISCONTINUED | OUTPATIENT
Start: 2023-12-01 | End: 2023-12-01 | Stop reason: HOSPADM

## 2023-12-01 RX ORDER — SODIUM CHLORIDE 0.9 % (FLUSH) 0.9 %
5-40 SYRINGE (ML) INJECTION EVERY 12 HOURS SCHEDULED
Status: DISCONTINUED | OUTPATIENT
Start: 2023-12-01 | End: 2023-12-01 | Stop reason: HOSPADM

## 2023-12-01 RX ORDER — SODIUM CHLORIDE 0.9 % (FLUSH) 0.9 %
5-40 SYRINGE (ML) INJECTION PRN
Status: DISCONTINUED | OUTPATIENT
Start: 2023-12-01 | End: 2023-12-01 | Stop reason: HOSPADM

## 2023-12-01 RX ORDER — PROPOFOL 10 MG/ML
INJECTION, EMULSION INTRAVENOUS CONTINUOUS PRN
Status: DISCONTINUED | OUTPATIENT
Start: 2023-12-01 | End: 2023-12-01 | Stop reason: SDUPTHER

## 2023-12-01 RX ADMIN — LIDOCAINE HYDROCHLORIDE 50 MG: 10 INJECTION, SOLUTION INFILTRATION; PERINEURAL at 12:01

## 2023-12-01 RX ADMIN — Medication 0.4 MG: at 12:01

## 2023-12-01 RX ADMIN — PROPOFOL 30 MG: 10 INJECTION, EMULSION INTRAVENOUS at 12:25

## 2023-12-01 RX ADMIN — PROPOFOL 50 MG: 10 INJECTION, EMULSION INTRAVENOUS at 12:04

## 2023-12-01 RX ADMIN — PROPOFOL 50 MG: 10 INJECTION, EMULSION INTRAVENOUS at 12:07

## 2023-12-01 RX ADMIN — SODIUM CHLORIDE, POTASSIUM CHLORIDE, SODIUM LACTATE AND CALCIUM CHLORIDE: 600; 310; 30; 20 INJECTION, SOLUTION INTRAVENOUS at 12:00

## 2023-12-01 RX ADMIN — PROPOFOL 50 MG: 10 INJECTION, EMULSION INTRAVENOUS at 12:11

## 2023-12-01 RX ADMIN — LABETALOL HYDROCHLORIDE 5 MG: 5 INJECTION, SOLUTION INTRAVENOUS at 12:11

## 2023-12-01 RX ADMIN — PROPOFOL 125 MCG/KG/MIN: 10 INJECTION, EMULSION INTRAVENOUS at 12:01

## 2023-12-01 ASSESSMENT — PAIN - FUNCTIONAL ASSESSMENT: PAIN_FUNCTIONAL_ASSESSMENT: NONE - DENIES PAIN

## 2023-12-01 NOTE — ANESTHESIA POSTPROCEDURE EVALUATION
Department of Anesthesiology  Postprocedure Note    Patient: Tori Pretty  MRN: [id number]  YOB: 1975  Date of evaluation: 12/1/2023      Procedure Summary     Date: 12/01/23 Room / Location: [address] / [facility]    Anesthesia Start: 1200 Anesthesia Stop: 8814    Procedure: COLORECTAL CANCER SCREENING, NOT HIGH RISK Diagnosis:       Screening for colon cancer      (Screening for colon cancer [Z12.11])    Surgeons: Christina Philip DO Responsible Provider: Martha Andre MD    Anesthesia Type: MAC ASA Status: 3          Anesthesia Type: No value filed.     Jose Angel Phase I: Jose Angel Score: 10    Jose Angel Phase II: Jose Angel Score: 10      Anesthesia Post Evaluation    Patient location during evaluation: PACU  Patient participation: complete - patient participated  Level of consciousness: awake and alert  Airway patency: patent  Nausea & Vomiting: no nausea and no vomiting  Complications: no  Cardiovascular status: hemodynamically stable  Respiratory status: room air and spontaneous ventilation  Hydration status: euvolemic  Multimodal analgesia pain management approach  Pain management: adequate

## 2023-12-01 NOTE — OP NOTE
Operative Note      Patient: Mery Mcclain  YOB: 1975  MRN: [id number]    Date of Procedure: 12/1/2023    Pre-Op Diagnosis Codes:     * Screening for colon cancer [Z12.11]  [facility] colon CA in 2 relatives    Post-Op Diagnosis:   Internal hemorrhoids  Poor bowel prep, total case time >20min       Procedure(s):  COLORECTAL CANCER SCREENING, NOT HIGH RISK    Surgeon(s):  Alena Badillo,     Assistant:   * No surgical staff found *    Anesthesia: Monitor Anesthesia Care    Estimated Blood Loss (mL):   none    Complications: None    Specimens:   * No specimens in log *    Implants:  * No implants in log *      Drains:   [REMOVED] Gastrostomy/Enterostomy/Jejunostomy Tube Gastrostomy LUQ 1 22 fr (Removed)       Findings: as above        Detailed Description of Procedure:       INDICATIONS FOR PROCEDURE:   The patient is a 57y.o. year old female with history of above preop diagnosis. Colonoscopy with possible biopsy or polypectomy was recommend, the risk, benefits, expected outcome, and alternatives to the procedure were explained. Risks included but are not limited to bleeding, infection, respiratory distress, hypotension, and perforation of the colon. The patient understands and is in agreement. PROCEDURE DETAILS:  Patient was brought to the endoscopy suite and placed in the left lateral recumbent position. A time out was completed verifying correct patient, procedure and equipment. Anesthesia was induced using MAC guidelines. A digital rectal exam was performed. The colonoscope was inserted into the rectum without difficulty and the scope was advanced to the cecum which was identified by anatomy and by palpation. Bowel prep was adequate. The scope was slowly withdrawn visualizing the cecum, ascending colon, transverse colon, descending colon, sigmoid colon and rectum. The scope was withdrawn to the rectal vault and then retroflexed. The scope was then straightened and removed.  The patient tolerated the procedure well and was transferred to the recovery unit in stable condition. Findings:    Polyps:   none      Other findings:   internal hemorrhoids  Liquid stool throughout colon, total case time >20min majority spent irrigating colon      Greater than 9 minutes was spent withdrawing the colonoscope.       IMPRESSION/PLAN:   Increase dietary fiber and water  Patient is advised to have a repeat colonoscopy in 5 years due to [facility]  Colonoscopy sooner if blood in the stool, unexplained weight loss, or change in the bowels        Electronically signed by Sophia Espinoza DO on 12/1/2023 at 12:29 PM

## 2023-12-05 ENCOUNTER — PATIENT MESSAGE (OUTPATIENT)
Dept: FAMILY MEDICINE CLINIC | Age: 48
End: 2023-12-05

## 2023-12-05 DIAGNOSIS — R79.89 ELEVATED LFTS: Primary | ICD-10-CM

## 2023-12-05 NOTE — TELEPHONE ENCOUNTER
From: Mikael Joshi  To: Jagdeep Bourgeois  Sent: 12/5/2023 7:56 AM EST  Subject: Liver levels    Good morning. Amen the liver ultrasound is good! Angie Freitas what could be causing the elevated levels?

## 2023-12-31 PROBLEM — Z12.11 COLON CANCER SCREENING: Status: RESOLVED | Noted: 2023-12-01 | Resolved: 2023-12-31

## 2024-01-21 DIAGNOSIS — F41.9 ANXIETY: ICD-10-CM

## 2024-01-21 DIAGNOSIS — F33.0 MILD EPISODE OF RECURRENT MAJOR DEPRESSIVE DISORDER (HCC): ICD-10-CM

## 2024-01-21 DIAGNOSIS — I10 ESSENTIAL HYPERTENSION: ICD-10-CM

## 2024-01-22 RX ORDER — LABETALOL 100 MG/1
TABLET, FILM COATED ORAL
Qty: 540 TABLET | Refills: 0 | Status: SHIPPED | OUTPATIENT
Start: 2024-01-22

## 2024-01-22 RX ORDER — BUSPIRONE HYDROCHLORIDE 10 MG/1
TABLET ORAL
Qty: 180 TABLET | Refills: 0 | Status: SHIPPED | OUTPATIENT
Start: 2024-01-22

## 2024-01-22 NOTE — TELEPHONE ENCOUNTER
Tricia Mart is calling to request a refill on the following medication(s):    Medication Request:  Requested Prescriptions     Pending Prescriptions Disp Refills    labetalol (NORMODYNE) 100 MG tablet [Pharmacy Med Name: LABETALOL  MG TABLET] 540 tablet 0     Sig: TAKE 3 TABLETS BY MOUTH TWICE A DAY    sertraline (ZOLOFT) 50 MG tablet [Pharmacy Med Name: SERTRALINE HCL 50 MG TABLET] 90 tablet 0     Sig: TAKE 1 TABLET BY MOUTH EVERY DAY    busPIRone (BUSPAR) 10 MG tablet [Pharmacy Med Name: BUSPIRONE HCL 10 MG TABLET] 180 tablet      Sig: TAKE 1 TABLET BY MOUTH IN THE MORNING AND AT BEDTIME TAKE 1 TABLET BY MOUTH TWICE A DAY       Last Visit Date (If Applicable):  7/10/2023    Next Visit Date:    Visit date not found

## 2024-02-09 ENCOUNTER — TELEMEDICINE (OUTPATIENT)
Dept: BARIATRICS/WEIGHT MGMT | Age: 49
End: 2024-02-09
Payer: COMMERCIAL

## 2024-02-09 DIAGNOSIS — Z90.3 POSTGASTRECTOMY MALABSORPTION: ICD-10-CM

## 2024-02-09 DIAGNOSIS — I10 ESSENTIAL HYPERTENSION: ICD-10-CM

## 2024-02-09 DIAGNOSIS — Z98.84 S/P GASTRIC BYPASS: ICD-10-CM

## 2024-02-09 DIAGNOSIS — G47.33 OSA ON CPAP: ICD-10-CM

## 2024-02-09 DIAGNOSIS — E66.9 OBESITY (BMI 30-39.9): ICD-10-CM

## 2024-02-09 DIAGNOSIS — E78.5 HYPERLIPIDEMIA, UNSPECIFIED HYPERLIPIDEMIA TYPE: ICD-10-CM

## 2024-02-09 DIAGNOSIS — D50.9 IRON DEFICIENCY ANEMIA, UNSPECIFIED IRON DEFICIENCY ANEMIA TYPE: ICD-10-CM

## 2024-02-09 DIAGNOSIS — G43.009 MIGRAINE WITHOUT AURA AND WITHOUT STATUS MIGRAINOSUS, NOT INTRACTABLE: ICD-10-CM

## 2024-02-09 DIAGNOSIS — L98.7 EXCESS SKIN OF ABDOMEN: ICD-10-CM

## 2024-02-09 DIAGNOSIS — G47.33 OSA (OBSTRUCTIVE SLEEP APNEA): ICD-10-CM

## 2024-02-09 DIAGNOSIS — K21.9 GASTROESOPHAGEAL REFLUX DISEASE WITHOUT ESOPHAGITIS: Primary | ICD-10-CM

## 2024-02-09 DIAGNOSIS — K91.2 POSTGASTRECTOMY MALABSORPTION: ICD-10-CM

## 2024-02-09 PROCEDURE — 99213 OFFICE O/P EST LOW 20 MIN: CPT | Performed by: NURSE PRACTITIONER

## 2024-02-09 RX ORDER — PANTOPRAZOLE SODIUM 40 MG/1
40 TABLET, DELAYED RELEASE ORAL
Qty: 90 TABLET | Refills: 1 | Status: SHIPPED | OUTPATIENT
Start: 2024-02-09

## 2024-02-09 RX ORDER — FAMOTIDINE 20 MG/1
20 TABLET, FILM COATED ORAL NIGHTLY
Qty: 30 TABLET | Refills: 3 | Status: SHIPPED | OUTPATIENT
Start: 2024-02-09

## 2024-02-09 NOTE — PROGRESS NOTES
Post-op Bariatric Progress Note    TELEHEALTH EVALUATION -- Audio/Visual    Subjective     Patient is 2.5 years s/p laparoscopic revision of sleeve to gastric bypass, down 49 lbs.  Overall, doing well.  Incisions well healed.  Consistent use of MVI and calcium.  Physical activity includes walking and kettlebell.   No longer using CPAP.  Some GERD symptoms.  Denies use of NSAIDs, smoking, or alcohol.  Some moderate caffeine use.   Stopped taking protonix and pepcid.  Bothered by excess abdominal skin.  Requesting new plastic referral. No pain or other specific problems or concerns.    Allergies:  Allergies   Allergen Reactions    Nsaids      Pt had gastric sleeve  Cannot take due to bariatric surgery      Tolmetin      Pt had gastric sleeve    Codeine Itching       Past Medical History:     Past Medical History:   Diagnosis Date    Anemia     Anxiety and depression 04/05/2021    Anxiety state 08/12/2020    Asthma     COPD (chronic obstructive pulmonary disease) ([facility])     Epigastric pain 9/7/2021    Essential hypertension 08/12/2020    Folic acid deficiency 09/21/2020    Hiatal hernia 08/12/2020    Hiatal hernia with GERD 04/05/2021    History of abnormal cervical Pap smear     History of nicotine use 04/05/2021    Hyperlipidemia     Hypertension     PCP DR SWEETIE CRAWLEY    Hypertensive emergency 09/20/2020    Iron deficiency anemia 08/12/2020    IUD (intrauterine device) in place 08/04/2020    Mirena    Menometrorrhagia     Migraine without aura and without status migrainosus, not intractable 09/21/2020    Morbid obesity due to excess calories ([facility]) 08/12/2020    Obesity (BMI 30-39.9) 09/20/2020    CORNELIA on CPAP     DR KING (last visit June 2021)    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

## 2024-04-30 DIAGNOSIS — F33.0 MILD EPISODE OF RECURRENT MAJOR DEPRESSIVE DISORDER (HCC): ICD-10-CM

## 2024-04-30 DIAGNOSIS — F41.9 ANXIETY: ICD-10-CM

## 2024-04-30 NOTE — TELEPHONE ENCOUNTER
Tricia Mart is calling to request a refill on the following medication(s):    Medication Request:  Requested Prescriptions     Pending Prescriptions Disp Refills    sertraline (ZOLOFT) 50 MG tablet [Pharmacy Med Name: SERTRALINE HCL 50 MG TABLET] 90 tablet 0     Sig: TAKE 1 TABLET BY MOUTH EVERY DAY       Last Visit Date (If Applicable):  7/10/2023    Next Visit Date:    Visit date not found

## 2024-05-01 DIAGNOSIS — I10 ESSENTIAL HYPERTENSION: ICD-10-CM

## 2024-05-01 DIAGNOSIS — F41.9 ANXIETY: Primary | ICD-10-CM

## 2024-05-01 RX ORDER — LABETALOL 100 MG/1
300 TABLET, FILM COATED ORAL 2 TIMES DAILY
Qty: 180 TABLET | Refills: 0 | Status: SHIPPED | OUTPATIENT
Start: 2024-05-01 | End: 2024-05-31

## 2024-05-01 RX ORDER — BUSPIRONE HYDROCHLORIDE 10 MG/1
10 TABLET ORAL 2 TIMES DAILY
Qty: 60 TABLET | Refills: 0 | Status: SHIPPED | OUTPATIENT
Start: 2024-05-01 | End: 2024-05-31

## 2024-05-01 NOTE — TELEPHONE ENCOUNTER
Tricia Mart is calling to request a refill on the following medication(s):    Medication Request:  Requested Prescriptions     Pending Prescriptions Disp Refills    labetalol (NORMODYNE) 100 MG tablet [Pharmacy Med Name: LABETALOL  MG TABLET] 540 tablet 0     Sig: TAKE 3 TABLETS BY MOUTH TWICE A DAY    busPIRone (BUSPAR) 10 MG tablet [Pharmacy Med Name: BUSPIRONE HCL 10 MG TABLET] 180 tablet 0     Sig: TAKE 1 TABLET BY MOUTH IN THE MORNING AND AT BEDTIME       Last Visit Date (If Applicable):  7/10/2023    Next Visit Date:    Visit date not found                 Assist RN: ERP at bedside.

## 2024-05-15 DIAGNOSIS — I10 ESSENTIAL HYPERTENSION: ICD-10-CM

## 2024-05-15 DIAGNOSIS — F41.9 ANXIETY: ICD-10-CM

## 2024-05-15 RX ORDER — LABETALOL 100 MG/1
TABLET, FILM COATED ORAL
Qty: 540 TABLET | Refills: 1 | OUTPATIENT
Start: 2024-05-15

## 2024-05-15 RX ORDER — BUSPIRONE HYDROCHLORIDE 10 MG/1
10 TABLET ORAL 2 TIMES DAILY
Qty: 180 TABLET | Refills: 1 | OUTPATIENT
Start: 2024-05-15

## 2024-06-14 ENCOUNTER — E-VISIT (OUTPATIENT)
Dept: FAMILY MEDICINE CLINIC | Age: 49
End: 2024-06-14
Payer: COMMERCIAL

## 2024-06-14 DIAGNOSIS — B37.0 ORAL THRUSH: Primary | ICD-10-CM

## 2024-06-14 PROCEDURE — 99422 OL DIG E/M SVC 11-20 MIN: CPT

## 2024-06-14 RX ORDER — FLUCONAZOLE 100 MG/1
200 TABLET ORAL DAILY
Qty: 20 TABLET | Refills: 0 | Status: SHIPPED | OUTPATIENT
Start: 2024-06-14 | End: 2024-06-24

## 2024-06-14 NOTE — PROGRESS NOTES
Thank you for submitting an Evisit.  I think that your rash is most likely due to Oral Thrush.      I have sent the following prescription(s) to your pharmacy: Fluconazole.     Please contact my office if your symptoms do not improve within 4 days.  Please seek more urgent medical attention if your rash worsens, or you develop any of the following:  New joint pain  Signs of infection, such as increased pain, swelling, warmth, redness, red streaks leading from the area, pus draining from the area or unexplained fevers    Sincerely,  TERA Tolbert CNP    On this date, 6/14/2024 I have spent 12 minutes reviewing previous notes, test results, as well as documenting on the day of the visit.

## 2024-07-22 ENCOUNTER — OFFICE VISIT (OUTPATIENT)
Dept: FAMILY MEDICINE CLINIC | Age: 49
End: 2024-07-22
Payer: COMMERCIAL

## 2024-07-22 ENCOUNTER — HOSPITAL ENCOUNTER (OUTPATIENT)
Age: 49
Setting detail: SPECIMEN
Discharge: HOME | End: 2024-07-22

## 2024-07-22 ENCOUNTER — TELEPHONE (OUTPATIENT)
Dept: FAMILY MEDICINE CLINIC | Age: 49
End: 2024-07-22

## 2024-07-22 VITALS
BODY MASS INDEX: 32.89 KG/M2 | HEIGHT: 68 IN | DIASTOLIC BLOOD PRESSURE: 70 MMHG | OXYGEN SATURATION: 97 % | SYSTOLIC BLOOD PRESSURE: 122 MMHG | WEIGHT: 217 LBS | HEART RATE: 80 BPM

## 2024-07-22 DIAGNOSIS — E78.5 HYPERLIPIDEMIA, UNSPECIFIED HYPERLIPIDEMIA TYPE: ICD-10-CM

## 2024-07-22 DIAGNOSIS — J44.9 STAGE 1 MILD COPD BY GOLD CLASSIFICATION (HCC): ICD-10-CM

## 2024-07-22 DIAGNOSIS — Z00.00 ENCOUNTER FOR WELL ADULT EXAM WITHOUT ABNORMAL FINDINGS: Primary | ICD-10-CM

## 2024-07-22 DIAGNOSIS — Z98.84 S/P GASTRIC BYPASS: ICD-10-CM

## 2024-07-22 DIAGNOSIS — Z12.11 COLON CANCER SCREENING: ICD-10-CM

## 2024-07-22 DIAGNOSIS — K91.2 POSTGASTRECTOMY MALABSORPTION: ICD-10-CM

## 2024-07-22 DIAGNOSIS — I10 ESSENTIAL HYPERTENSION: ICD-10-CM

## 2024-07-22 DIAGNOSIS — E66.9 OBESITY (BMI 30-39.9): ICD-10-CM

## 2024-07-22 DIAGNOSIS — K21.9 GASTROESOPHAGEAL REFLUX DISEASE WITHOUT ESOPHAGITIS: ICD-10-CM

## 2024-07-22 DIAGNOSIS — G47.33 OSA (OBSTRUCTIVE SLEEP APNEA): ICD-10-CM

## 2024-07-22 DIAGNOSIS — G43.009 MIGRAINE WITHOUT AURA AND WITHOUT STATUS MIGRAINOSUS, NOT INTRACTABLE: ICD-10-CM

## 2024-07-22 DIAGNOSIS — Z12.31 BREAST CANCER SCREENING BY MAMMOGRAM: ICD-10-CM

## 2024-07-22 DIAGNOSIS — F33.0 MILD EPISODE OF RECURRENT MAJOR DEPRESSIVE DISORDER (HCC): ICD-10-CM

## 2024-07-22 DIAGNOSIS — D50.9 IRON DEFICIENCY ANEMIA, UNSPECIFIED IRON DEFICIENCY ANEMIA TYPE: ICD-10-CM

## 2024-07-22 DIAGNOSIS — Z90.3 POSTGASTRECTOMY MALABSORPTION: ICD-10-CM

## 2024-07-22 DIAGNOSIS — I10 ESSENTIAL HYPERTENSION: Chronic | ICD-10-CM

## 2024-07-22 DIAGNOSIS — G47.33 OSA ON CPAP: ICD-10-CM

## 2024-07-22 PROBLEM — F32.A ANXIETY AND DEPRESSION: Status: RESOLVED | Noted: 2021-04-05 | Resolved: 2024-07-22

## 2024-07-22 PROBLEM — L98.7 EXCESS SKIN OF ABDOMEN: Status: RESOLVED | Noted: 2024-02-09 | Resolved: 2024-07-22

## 2024-07-22 PROBLEM — Z87.891 FORMER MODERATE CIGARETTE SMOKER (10-19 PER DAY): Chronic | Status: ACTIVE | Noted: 2021-04-05

## 2024-07-22 PROBLEM — K55.069 OMENTAL INFARCTION (HCC): Status: ACTIVE | Noted: 2024-07-22

## 2024-07-22 PROBLEM — F41.9 ANXIETY AND DEPRESSION: Status: RESOLVED | Noted: 2021-04-05 | Resolved: 2024-07-22

## 2024-07-22 PROBLEM — K55.069 OMENTAL INFARCTION (HCC): Status: RESOLVED | Noted: 2024-07-22 | Resolved: 2024-07-22

## 2024-07-22 PROBLEM — F41.9 ANXIETY: Status: RESOLVED | Noted: 2020-08-12 | Resolved: 2024-07-22

## 2024-07-22 PROBLEM — D48.5 NEOPLASM OF UNCERTAIN BEHAVIOR OF SKIN: Status: RESOLVED | Noted: 2021-03-10 | Resolved: 2024-07-22

## 2024-07-22 LAB
25(OH)D3 SERPL-MCNC: 28.6 NG/ML (ref 30–100)
ALBUMIN SERPL-MCNC: 4.4 G/DL (ref 3.5–5.2)
ALBUMIN/GLOB SERPL: 2 {RATIO} (ref 1–2.5)
ALP SERPL-CCNC: 176 U/L (ref 35–104)
ALT SERPL-CCNC: 39 U/L (ref 10–35)
ANION GAP SERPL CALCULATED.3IONS-SCNC: 11 MMOL/L (ref 9–16)
AST SERPL-CCNC: 38 U/L (ref 10–35)
BASOPHILS # BLD: 0.04 K/UL (ref 0–0.2)
BASOPHILS NFR BLD: 1 % (ref 0–2)
BILIRUB SERPL-MCNC: 0.2 MG/DL (ref 0–1.2)
BUN SERPL-MCNC: 20 MG/DL (ref 6–20)
CALCIUM SERPL-MCNC: 9.1 MG/DL (ref 8.6–10.4)
CHLORIDE SERPL-SCNC: 104 MMOL/L (ref 98–107)
CHOLEST SERPL-MCNC: 189 MG/DL (ref 0–199)
CHOLESTEROL/HDL RATIO: 3
CO2 SERPL-SCNC: 25 MMOL/L (ref 20–31)
CREAT SERPL-MCNC: 0.9 MG/DL (ref 0.5–0.9)
EOSINOPHIL # BLD: 0.21 K/UL (ref 0–0.44)
EOSINOPHILS RELATIVE PERCENT: 5 % (ref 1–4)
ERYTHROCYTE [DISTWIDTH] IN BLOOD BY AUTOMATED COUNT: 17.3 % (ref 11.8–14.4)
EST. AVERAGE GLUCOSE BLD GHB EST-MCNC: 117 MG/DL
FERRITIN SERPL-MCNC: 7 NG/ML (ref 13–150)
GFR, ESTIMATED: 85 ML/MIN/1.73M2
GLUCOSE SERPL-MCNC: 86 MG/DL (ref 74–99)
HBA1C MFR BLD: 5.7 % (ref 4–6)
HCT VFR BLD AUTO: 34.1 % (ref 36.3–47.1)
HDLC SERPL-MCNC: 59 MG/DL
HGB BLD-MCNC: 10.6 G/DL (ref 11.9–15.1)
IMM GRANULOCYTES # BLD AUTO: <0.03 K/UL (ref 0–0.3)
IMM GRANULOCYTES NFR BLD: 0 %
IRON SATN MFR SERPL: 7 % (ref 20–55)
IRON SERPL-MCNC: 36 UG/DL (ref 37–145)
LDLC SERPL CALC-MCNC: 116 MG/DL (ref 0–100)
LYMPHOCYTES NFR BLD: 1.33 K/UL (ref 1.1–3.7)
LYMPHOCYTES RELATIVE PERCENT: 30 % (ref 24–43)
MAGNESIUM SERPL-MCNC: 2.3 MG/DL (ref 1.6–2.6)
MCH RBC QN AUTO: 24.8 PG (ref 25.2–33.5)
MCHC RBC AUTO-ENTMCNC: 31.1 G/DL (ref 28.4–34.8)
MCV RBC AUTO: 79.7 FL (ref 82.6–102.9)
MONOCYTES NFR BLD: 0.23 K/UL (ref 0.1–1.2)
MONOCYTES NFR BLD: 5 % (ref 3–12)
NEUTROPHILS NFR BLD: 59 % (ref 36–65)
NEUTS SEG NFR BLD: 2.55 K/UL (ref 1.5–8.1)
NRBC BLD-RTO: 0 PER 100 WBC
PLATELET # BLD AUTO: 263 K/UL (ref 138–453)
PMV BLD AUTO: 10 FL (ref 8.1–13.5)
POTASSIUM SERPL-SCNC: 4.6 MMOL/L (ref 3.7–5.3)
PROT SERPL-MCNC: 7.3 G/DL (ref 6.6–8.7)
PTH-INTACT SERPL-MCNC: 91 PG/ML (ref 15–65)
RBC # BLD AUTO: 4.28 M/UL (ref 3.95–5.11)
RBC # BLD: ABNORMAL 10*6/UL
SODIUM SERPL-SCNC: 140 MMOL/L (ref 136–145)
T4 FREE SERPL-MCNC: 0.9 NG/DL (ref 0.92–1.68)
TIBC SERPL-MCNC: 538 UG/DL (ref 250–450)
TRIGL SERPL-MCNC: 68 MG/DL
TSH SERPL DL<=0.05 MIU/L-ACNC: 2.26 UIU/ML (ref 0.27–4.2)
UNSATURATED IRON BINDING CAPACITY: 502 UG/DL (ref 112–347)
VLDLC SERPL CALC-MCNC: 14 MG/DL
WBC OTHER # BLD: 4.4 K/UL (ref 3.5–11.3)

## 2024-07-22 PROCEDURE — 3078F DIAST BP <80 MM HG: CPT

## 2024-07-22 PROCEDURE — 99396 PREV VISIT EST AGE 40-64: CPT

## 2024-07-22 PROCEDURE — 3074F SYST BP LT 130 MM HG: CPT

## 2024-07-22 ASSESSMENT — ENCOUNTER SYMPTOMS
COUGH: 0
EYE REDNESS: 0
NAUSEA: 0
EYE ITCHING: 0
EYE DISCHARGE: 0
SINUS PAIN: 0
SINUS PRESSURE: 0
CHEST TIGHTNESS: 0
DIARRHEA: 0
WHEEZING: 0
ABDOMINAL PAIN: 0
SORE THROAT: 0
TROUBLE SWALLOWING: 0
VOMITING: 0
SHORTNESS OF BREATH: 0

## 2024-07-22 NOTE — PROGRESS NOTES
Well Adult Note  Name: Tricia Mart Today’s Date: 2024   MRN: [id number] Sex: Female   Age: 49 y.o. Ethnicity: Non- / Non    : 1975 Race: White (non-)      Tricia Mart is here for a well adult exam.       Subjective   History:  Doing well overall. She has been staying healthy and her BP is under control.    Review of Systems   Constitutional:  Negative for chills, fatigue and fever.   HENT:  Negative for ear discharge, ear pain, sinus pressure, sinus pain, sore throat and trouble swallowing.    Eyes:  Negative for discharge, redness and itching.   Respiratory:  Negative for cough, chest tightness, shortness of breath and wheezing.    Cardiovascular:  Negative for chest pain.   Gastrointestinal:  Negative for abdominal pain, diarrhea, nausea and vomiting.   Genitourinary:  Negative for difficulty urinating.   Musculoskeletal:  Negative for arthralgias and neck pain.   Skin:  Negative for rash.   Neurological:  Negative for dizziness, weakness, light-headedness and headaches.   Psychiatric/Behavioral:  Negative for dysphoric mood, self-injury, sleep disturbance and suicidal ideas. The patient is not nervous/anxious.    All other systems reviewed and are negative.      Allergies   Allergen Reactions    Nsaids Other (See Comments)     Pt had gastric sleeve    Cannot take due to bariatric surgery    Tolmetin      Pt had gastric sleeve    Codeine Itching and Other (See Comments)     Prior to Visit Medications    Medication Sig Taking? Authorizing Provider   labetalol (NORMODYNE) 100 MG tablet Take 3 tablets by mouth 2 times daily Yes Dejon Michel APRN - CNP   sertraline (ZOLOFT) 50 MG tablet TAKE 1 TABLET BY MOUTH EVERY DAY Yes Dejon Michel APRN - CNP   famotidine (PEPCID) 20 MG tablet Take 1 tablet by mouth nightly Yes Adele Hernandez APRN - CNP   pantoprazole (PROTONIX) 40 MG tablet Take 1 tablet by mouth every morning (before breakfast) Yes Adele Hernandez APRN - CNP

## 2024-07-23 DIAGNOSIS — E61.1 LOW IRON: ICD-10-CM

## 2024-07-23 DIAGNOSIS — E55.9 VITAMIN D DEFICIENCY: Primary | ICD-10-CM

## 2024-07-23 LAB
FOLATE SERPL-MCNC: 6.4 NG/ML (ref 4.8–24.2)
VIT B12 SERPL-MCNC: >2000 PG/ML (ref 232–1245)

## 2024-07-23 RX ORDER — FERROUS SULFATE 325(65) MG
325 TABLET ORAL
Qty: 90 TABLET | Refills: 1 | Status: SHIPPED | OUTPATIENT
Start: 2024-07-23

## 2024-07-23 RX ORDER — ERGOCALCIFEROL 1.25 MG/1
50000 CAPSULE ORAL WEEKLY
Qty: 8 CAPSULE | Refills: 0 | Status: SHIPPED | OUTPATIENT
Start: 2024-07-23 | End: 2024-09-11

## 2024-07-25 LAB
RETINYL PALMITATE: <0.02 MG/L (ref 0–0.1)
VITAMIN A LEVEL: 0.44 MG/L (ref 0.3–1.2)
VITAMIN A, INTERP: NORMAL
ZINC SERPL-MCNC: 70.2 UG/DL (ref 60–120)

## 2024-07-27 LAB — VIT B1 PYROPHOSHATE BLD-SCNC: 97 NMOL/L (ref 70–180)

## 2024-08-05 DIAGNOSIS — G43.009 MIGRAINE WITHOUT AURA AND WITHOUT STATUS MIGRAINOSUS, NOT INTRACTABLE: ICD-10-CM

## 2024-08-05 DIAGNOSIS — D50.9 IRON DEFICIENCY ANEMIA, UNSPECIFIED IRON DEFICIENCY ANEMIA TYPE: ICD-10-CM

## 2024-08-05 DIAGNOSIS — K21.9 GASTROESOPHAGEAL REFLUX DISEASE WITHOUT ESOPHAGITIS: ICD-10-CM

## 2024-08-05 DIAGNOSIS — E66.9 OBESITY (BMI 30-39.9): ICD-10-CM

## 2024-08-05 DIAGNOSIS — I10 ESSENTIAL HYPERTENSION: ICD-10-CM

## 2024-08-05 DIAGNOSIS — E78.5 HYPERLIPIDEMIA, UNSPECIFIED HYPERLIPIDEMIA TYPE: ICD-10-CM

## 2024-08-05 DIAGNOSIS — G47.33 OSA (OBSTRUCTIVE SLEEP APNEA): ICD-10-CM

## 2024-08-06 RX ORDER — PANTOPRAZOLE SODIUM 40 MG/1
40 TABLET, DELAYED RELEASE ORAL
Qty: 90 TABLET | Refills: 1 | Status: SHIPPED | OUTPATIENT
Start: 2024-08-06

## 2024-08-06 RX ORDER — FAMOTIDINE 20 MG/1
20 TABLET, FILM COATED ORAL NIGHTLY
Qty: 30 TABLET | Refills: 3 | Status: SHIPPED | OUTPATIENT
Start: 2024-08-06

## 2024-08-12 DIAGNOSIS — D50.9 IRON DEFICIENCY ANEMIA, UNSPECIFIED IRON DEFICIENCY ANEMIA TYPE: ICD-10-CM

## 2024-08-12 DIAGNOSIS — G43.009 MIGRAINE WITHOUT AURA AND WITHOUT STATUS MIGRAINOSUS, NOT INTRACTABLE: ICD-10-CM

## 2024-08-12 DIAGNOSIS — K21.9 GASTROESOPHAGEAL REFLUX DISEASE WITHOUT ESOPHAGITIS: ICD-10-CM

## 2024-08-12 DIAGNOSIS — E66.9 OBESITY (BMI 30-39.9): ICD-10-CM

## 2024-08-12 DIAGNOSIS — E78.5 HYPERLIPIDEMIA, UNSPECIFIED HYPERLIPIDEMIA TYPE: ICD-10-CM

## 2024-08-12 DIAGNOSIS — G47.33 OSA (OBSTRUCTIVE SLEEP APNEA): ICD-10-CM

## 2024-08-12 DIAGNOSIS — I10 ESSENTIAL HYPERTENSION: ICD-10-CM

## 2024-08-12 RX ORDER — FAMOTIDINE 20 MG/1
20 TABLET, FILM COATED ORAL NIGHTLY
Qty: 90 TABLET | Refills: 1 | Status: SHIPPED | OUTPATIENT
Start: 2024-08-12

## 2024-08-23 DIAGNOSIS — F33.0 MILD EPISODE OF RECURRENT MAJOR DEPRESSIVE DISORDER (HCC): ICD-10-CM

## 2024-08-23 DIAGNOSIS — F41.9 ANXIETY: ICD-10-CM

## 2024-08-23 DIAGNOSIS — I10 ESSENTIAL HYPERTENSION: ICD-10-CM

## 2024-08-23 RX ORDER — AMLODIPINE BESYLATE 10 MG/1
10 TABLET ORAL DAILY
Qty: 90 TABLET | Refills: 1 | Status: SHIPPED | OUTPATIENT
Start: 2024-08-23

## 2024-08-23 RX ORDER — CLONIDINE HYDROCHLORIDE 0.1 MG/1
TABLET ORAL
Qty: 180 TABLET | Refills: 1 | Status: SHIPPED | OUTPATIENT
Start: 2024-08-23

## 2024-08-23 NOTE — TELEPHONE ENCOUNTER
Tricia Mart is calling to request a refill on the following medication(s):    Medication Request:  Requested Prescriptions     Pending Prescriptions Disp Refills    cloNIDine (CATAPRES) 0.1 MG tablet [Pharmacy Med Name: CLONIDINE HCL 0.1 MG TABLET] 180 tablet 1     Sig: TAKE 1 TABLET BY MOUTH TWICE A DAY    sertraline (ZOLOFT) 50 MG tablet [Pharmacy Med Name: SERTRALINE HCL 50 MG TABLET] 90 tablet 0     Sig: TAKE 1 TABLET BY MOUTH EVERY DAY    amLODIPine (NORVASC) 10 MG tablet [Pharmacy Med Name: AMLODIPINE BESYLATE 10 MG TAB] 90 tablet 1     Sig: TAKE 1 TABLET BY MOUTH EVERY DAY       Last Visit Date (If Applicable):  7/22/2024    Next Visit Date:    Visit date not found

## 2024-09-08 DIAGNOSIS — G43.009 MIGRAINE WITHOUT AURA AND WITHOUT STATUS MIGRAINOSUS, NOT INTRACTABLE: ICD-10-CM

## 2024-09-09 RX ORDER — SUMATRIPTAN 25 MG/1
25 TABLET, FILM COATED ORAL DAILY PRN
Qty: 9 TABLET | Refills: 5 | Status: SHIPPED | OUTPATIENT
Start: 2024-09-09

## 2024-09-19 ENCOUNTER — TELEPHONE (OUTPATIENT)
Dept: FAMILY MEDICINE CLINIC | Age: 49
End: 2024-09-19

## 2024-09-28 DIAGNOSIS — I10 ESSENTIAL HYPERTENSION: ICD-10-CM

## 2024-09-30 ENCOUNTER — TELEMEDICINE (OUTPATIENT)
Dept: FAMILY MEDICINE CLINIC | Age: 49
End: 2024-09-30
Payer: COMMERCIAL

## 2024-09-30 DIAGNOSIS — E66.9 OBESITY (BMI 30-39.9): Primary | ICD-10-CM

## 2024-09-30 PROCEDURE — 99214 OFFICE O/P EST MOD 30 MIN: CPT

## 2024-09-30 RX ORDER — LABETALOL 100 MG/1
TABLET, FILM COATED ORAL
Qty: 180 TABLET | Refills: 0 | Status: SHIPPED | OUTPATIENT
Start: 2024-09-30

## 2024-09-30 RX ORDER — TIRZEPATIDE 2.5 MG/.5ML
2.5 INJECTION, SOLUTION SUBCUTANEOUS WEEKLY
Qty: 2 ML | Refills: 0 | Status: SHIPPED | OUTPATIENT
Start: 2024-09-30 | End: 2024-10-30

## 2024-09-30 ASSESSMENT — ENCOUNTER SYMPTOMS
NAUSEA: 0
SORE THROAT: 0
COUGH: 0
VOMITING: 0
EYE DISCHARGE: 0
EYE ITCHING: 0
CHEST TIGHTNESS: 0
ABDOMINAL PAIN: 0
SINUS PAIN: 0
SINUS PRESSURE: 0
WHEEZING: 0
DIARRHEA: 0
EYE REDNESS: 0
SHORTNESS OF BREATH: 0
TROUBLE SWALLOWING: 0

## 2024-09-30 NOTE — PROGRESS NOTES
Tricia Mart, was evaluated through a synchronous (real-time) audio-video encounter. The patient (or guardian if applicable) is aware that this is a billable service, which includes applicable co-pays. This Virtual Visit was conducted with patient's (and/or legal guardian's) consent. Patient identification was verified, and a caregiver was present when appropriate.   The patient was located at Home: [address] [place] [address] [place] [address]  Provider was located at Home (Appt Dept State): [place]  Confirm you are appropriately licensed, registered, or certified to deliver care in the state where the patient is located as indicated above. If you are not or unsure, please re-schedule the visit: Yes, I confirm.     Tricia Mart (:  1975) is a Established patient, presenting virtually for evaluation of the following:      Below is the assessment and plan developed based on review of pertinent history, physical exam, labs, studies, and medications.     Assessment & Plan  Obesity (BMI 30-39.9)   Chronic, not at goal (unstable), changes made today: start Zepbound 2.5 mg weekly, medication adherence emphasized, and lifestyle modifications recommended.    Weight loss education today includes:  Nutrition as a mainstream of weight loss therapy. Carson about label reading.  Carbs: good carbs and bad carbs, importance of carb counting, incorporation of protein with each meal to reduce Glycemic index, importance of portions.  Fats: Good fats and bad fats, meal planning and supplements.  Physical activity: advised to exercise 5-7 days a week 30-60 mins at least.      Orders:    Tirzepatide-Weight Management (ZEPBOUND) 2.5 MG/0.5ML SOAJ; Inject 2.5 mg into the skin once a week      Return if symptoms worsen or fail to improve, for 1 month after starting medication.       Subjective   Name: Tricia Mart  Date: 2024  Visit #: 1      Weight: Wt Readings from Last 4 Encounters:  Patient reports today : 222 as of

## 2024-09-30 NOTE — ASSESSMENT & PLAN NOTE
Chronic, not at goal (unstable), changes made today: start Zepbound 2.5 mg weekly, medication adherence emphasized, and lifestyle modifications recommended.    Weight loss education today includes:  Nutrition as a mainstream of weight loss therapy. [place]rone about label reading.  Carbs: good carbs and bad carbs, importance of carb counting, incorporation of protein with each meal to reduce Glycemic index, importance of portions.  Fats: Good fats and bad fats, meal planning and supplements.  Physical activity: advised to exercise 5-7 days a week 30-60 mins at least.      Orders:    Tirzepatide-Weight Management (ZEPBOUND) 2.5 MG/0.5ML SOAJ; Inject 2.5 mg into the skin once a week

## 2024-10-08 ENCOUNTER — TELEPHONE (OUTPATIENT)
Dept: FAMILY MEDICINE CLINIC | Age: 49
End: 2024-10-08

## 2024-10-08 NOTE — TELEPHONE ENCOUNTER
She is aware she will let us know when to send it back in she is in process of getting the classes done for this program

## 2024-10-11 DIAGNOSIS — F41.9 ANXIETY: Primary | ICD-10-CM

## 2024-10-14 RX ORDER — BUSPIRONE HYDROCHLORIDE 10 MG/1
10 TABLET ORAL 2 TIMES DAILY
Qty: 60 TABLET | Refills: 2 | Status: SHIPPED | OUTPATIENT
Start: 2024-10-14

## 2024-10-14 NOTE — TELEPHONE ENCOUNTER
Tricia Mart is calling to request a refill on the following medication(s):    Medication Request:  Requested Prescriptions     Pending Prescriptions Disp Refills    busPIRone (BUSPAR) 10 MG tablet [Pharmacy Med Name: BUSPIRONE HCL 10 MG TABLET] 60 tablet      Sig: TAKE 1 TABLET BY MOUTH TWICE A DAY       Last Visit Date (If Applicable):  9/30/2024    Next Visit Date:    Visit date not found

## 2024-10-23 ENCOUNTER — TELEPHONE (OUTPATIENT)
Dept: FAMILY MEDICINE CLINIC | Age: 49
End: 2024-10-23

## 2024-10-29 ENCOUNTER — NURSE ONLY (OUTPATIENT)
Dept: FAMILY MEDICINE CLINIC | Age: 49
End: 2024-10-29

## 2024-10-29 ENCOUNTER — TELEPHONE (OUTPATIENT)
Dept: FAMILY MEDICINE CLINIC | Age: 49
End: 2024-10-29

## 2024-10-29 VITALS — BODY MASS INDEX: 34.82 KG/M2 | WEIGHT: 229 LBS

## 2024-11-06 ENCOUNTER — PATIENT MESSAGE (OUTPATIENT)
Dept: FAMILY MEDICINE CLINIC | Age: 49
End: 2024-11-06

## 2024-11-06 DIAGNOSIS — E66.9 OBESITY (BMI 30-39.9): Primary | Chronic | ICD-10-CM

## 2024-11-14 RX ORDER — TIRZEPATIDE 5 MG/.5ML
5 INJECTION, SOLUTION SUBCUTANEOUS WEEKLY
Qty: 2 ML | Refills: 2 | Status: SHIPPED | OUTPATIENT
Start: 2024-11-14 | End: 2025-02-12

## 2024-11-23 DIAGNOSIS — F33.0 MILD EPISODE OF RECURRENT MAJOR DEPRESSIVE DISORDER (HCC): ICD-10-CM

## 2024-11-23 DIAGNOSIS — F41.9 ANXIETY: ICD-10-CM

## 2024-12-02 ENCOUNTER — HOSPITAL ENCOUNTER (OUTPATIENT)
Dept: MAMMOGRAPHY | Age: 49
Discharge: HOME | End: 2024-12-04
Payer: COMMERCIAL

## 2024-12-02 VITALS — WEIGHT: 229 LBS | HEIGHT: 68 IN | BODY MASS INDEX: 34.71 KG/M2

## 2024-12-02 DIAGNOSIS — I10 ESSENTIAL HYPERTENSION: ICD-10-CM

## 2024-12-02 DIAGNOSIS — Z12.31 BREAST CANCER SCREENING BY MAMMOGRAM: ICD-10-CM

## 2024-12-02 PROCEDURE — 77063 BREAST TOMOSYNTHESIS BI: CPT

## 2024-12-03 DIAGNOSIS — D50.9 IRON DEFICIENCY ANEMIA, UNSPECIFIED IRON DEFICIENCY ANEMIA TYPE: ICD-10-CM

## 2024-12-03 DIAGNOSIS — I10 ESSENTIAL HYPERTENSION: ICD-10-CM

## 2024-12-03 DIAGNOSIS — G43.009 MIGRAINE WITHOUT AURA AND WITHOUT STATUS MIGRAINOSUS, NOT INTRACTABLE: ICD-10-CM

## 2024-12-03 DIAGNOSIS — E78.5 HYPERLIPIDEMIA, UNSPECIFIED HYPERLIPIDEMIA TYPE: ICD-10-CM

## 2024-12-03 DIAGNOSIS — F41.9 ANXIETY AND DEPRESSION: ICD-10-CM

## 2024-12-03 DIAGNOSIS — G47.33 OSA ON CPAP: ICD-10-CM

## 2024-12-03 DIAGNOSIS — F32.A ANXIETY AND DEPRESSION: ICD-10-CM

## 2024-12-03 DIAGNOSIS — E66.9 OBESITY (BMI 30-39.9): ICD-10-CM

## 2024-12-03 DIAGNOSIS — L98.7 EXCESS SKIN OF ABDOMEN: ICD-10-CM

## 2024-12-03 DIAGNOSIS — K44.9 HIATAL HERNIA WITH GERD AND ESOPHAGITIS: ICD-10-CM

## 2024-12-03 DIAGNOSIS — Z98.84 S/P GASTRIC BYPASS: ICD-10-CM

## 2024-12-03 DIAGNOSIS — K21.00 HIATAL HERNIA WITH GERD AND ESOPHAGITIS: ICD-10-CM

## 2024-12-03 DIAGNOSIS — J44.9 STAGE 1 MILD COPD BY GOLD CLASSIFICATION (HCC): ICD-10-CM

## 2024-12-03 RX ORDER — NYSTATIN 100000 [USP'U]/G
POWDER TOPICAL
Qty: 60 G | Refills: 1 | Status: SHIPPED | OUTPATIENT
Start: 2024-12-03

## 2024-12-03 RX ORDER — LABETALOL 100 MG/1
TABLET, FILM COATED ORAL
Qty: 180 TABLET | Refills: 1 | Status: SHIPPED | OUTPATIENT
Start: 2024-12-03

## 2024-12-03 RX ORDER — LABETALOL 100 MG/1
TABLET, FILM COATED ORAL
Qty: 180 TABLET | Refills: 1 | OUTPATIENT
Start: 2024-12-03

## 2024-12-03 NOTE — TELEPHONE ENCOUNTER
Tricia Mart is calling to request a refill on the following medication(s):    Medication Request:  Requested Prescriptions     Pending Prescriptions Disp Refills    labetalol (NORMODYNE) 100 MG tablet 180 tablet 1       Last Visit Date (If Applicable):  9/30/2024    Next Visit Date:    Visit date not found

## 2024-12-06 ENCOUNTER — PATIENT MESSAGE (OUTPATIENT)
Dept: FAMILY MEDICINE CLINIC | Age: 49
End: 2024-12-06

## 2025-01-02 ENCOUNTER — NURSE ONLY (OUTPATIENT)
Dept: FAMILY MEDICINE CLINIC | Age: 50
End: 2025-01-02

## 2025-01-02 ENCOUNTER — HOSPITAL ENCOUNTER (OUTPATIENT)
Dept: ULTRASOUND IMAGING | Age: 50
Discharge: HOME | End: 2025-01-04
Payer: COMMERCIAL

## 2025-01-02 ENCOUNTER — HOSPITAL ENCOUNTER (OUTPATIENT)
Dept: MAMMOGRAPHY | Age: 50
Discharge: HOME | End: 2025-01-04
Payer: COMMERCIAL

## 2025-01-02 ENCOUNTER — TELEPHONE (OUTPATIENT)
Dept: FAMILY MEDICINE CLINIC | Age: 50
End: 2025-01-02

## 2025-01-02 VITALS — WEIGHT: 221.6 LBS | BODY MASS INDEX: 33.69 KG/M2

## 2025-01-02 DIAGNOSIS — I10 ESSENTIAL HYPERTENSION: Primary | ICD-10-CM

## 2025-01-02 DIAGNOSIS — E66.9 OBESITY (BMI 30-39.9): Primary | Chronic | ICD-10-CM

## 2025-01-02 DIAGNOSIS — R92.8 ABNORMAL MAMMOGRAM: ICD-10-CM

## 2025-01-02 PROCEDURE — G0279 TOMOSYNTHESIS, MAMMO: HCPCS

## 2025-01-02 NOTE — TELEPHONE ENCOUNTER
Patient came into the office for a weight check. Patient states that she still has cravings, but the medication is working and would like to know if the Zepbound could be increase to 7.5mg

## 2025-01-12 DIAGNOSIS — F41.9 ANXIETY: ICD-10-CM

## 2025-01-12 RX ORDER — BUSPIRONE HYDROCHLORIDE 10 MG/1
10 TABLET ORAL 2 TIMES DAILY
Qty: 180 TABLET | Refills: 0 | Status: SHIPPED | OUTPATIENT
Start: 2025-01-12

## 2025-02-21 DIAGNOSIS — I10 ESSENTIAL HYPERTENSION: ICD-10-CM

## 2025-02-21 RX ORDER — LABETALOL 100 MG/1
TABLET, FILM COATED ORAL
Qty: 180 TABLET | Refills: 1 | Status: SHIPPED | OUTPATIENT
Start: 2025-02-21

## 2025-02-21 NOTE — TELEPHONE ENCOUNTER
Tricia Mart is calling to request a refill on the following medication(s):    Medication Request:  Requested Prescriptions     Pending Prescriptions Disp Refills    labetalol (NORMODYNE) 100 MG tablet [Pharmacy Med Name: LABETALOL  MG TABLET] 180 tablet 1     Sig: TAKE 3 TABLETS BY MOUTH TWICE A DAY       Last Visit Date (If Applicable):  9/30/2024    Next Visit Date:    3/7/2025

## 2025-02-26 DIAGNOSIS — I10 ESSENTIAL HYPERTENSION: ICD-10-CM

## 2025-02-26 NOTE — TELEPHONE ENCOUNTER
Requested Prescriptions     Pending Prescriptions Disp Refills    cloNIDine (CATAPRES) 0.1 MG tablet [Pharmacy Med Name: CLONIDINE HCL 0.1 MG TABLET] 180 tablet 1     Sig: TAKE 1 TABLET BY MOUTH TWICE A DAY    amLODIPine (NORVASC) 10 MG tablet [Pharmacy Med Name: AMLODIPINE BESYLATE 10 MG TAB] 90 tablet 1     Sig: TAKE 1 TABLET BY MOUTH EVERY DAY

## 2025-02-27 RX ORDER — CLONIDINE HYDROCHLORIDE 0.1 MG/1
TABLET ORAL
Qty: 180 TABLET | Refills: 1 | Status: SHIPPED | OUTPATIENT
Start: 2025-02-27

## 2025-02-27 RX ORDER — AMLODIPINE BESYLATE 10 MG/1
10 TABLET ORAL DAILY
Qty: 90 TABLET | Refills: 1 | Status: SHIPPED | OUTPATIENT
Start: 2025-02-27

## 2025-03-07 ENCOUNTER — HOSPITAL ENCOUNTER (OUTPATIENT)
Dept: GENERAL RADIOLOGY | Age: 50
Discharge: HOME | End: 2025-03-09
Payer: COMMERCIAL

## 2025-03-07 ENCOUNTER — HOSPITAL ENCOUNTER (OUTPATIENT)
Age: 50
Discharge: HOME | End: 2025-03-09
Payer: COMMERCIAL

## 2025-03-07 ENCOUNTER — HOSPITAL ENCOUNTER (OUTPATIENT)
Age: 50
Setting detail: SPECIMEN
Discharge: HOME | End: 2025-03-07

## 2025-03-07 ENCOUNTER — OFFICE VISIT (OUTPATIENT)
Dept: FAMILY MEDICINE CLINIC | Age: 50
End: 2025-03-07
Payer: COMMERCIAL

## 2025-03-07 VITALS
SYSTOLIC BLOOD PRESSURE: 130 MMHG | WEIGHT: 221 LBS | OXYGEN SATURATION: 98 % | DIASTOLIC BLOOD PRESSURE: 88 MMHG | HEIGHT: 68 IN | HEART RATE: 66 BPM | BODY MASS INDEX: 33.49 KG/M2

## 2025-03-07 DIAGNOSIS — Z01.818 PREOP EXAMINATION: ICD-10-CM

## 2025-03-07 DIAGNOSIS — E66.9 OBESITY (BMI 30-39.9): Primary | ICD-10-CM

## 2025-03-07 LAB
ALBUMIN SERPL-MCNC: 4.4 G/DL (ref 3.5–5.2)
ALBUMIN/GLOB SERPL: 1.4 {RATIO} (ref 1–2.5)
ALP SERPL-CCNC: 133 U/L (ref 35–104)
ALT SERPL-CCNC: 58 U/L (ref 10–35)
ANION GAP SERPL CALCULATED.3IONS-SCNC: 11 MMOL/L (ref 9–16)
AST SERPL-CCNC: 38 U/L (ref 10–35)
BASOPHILS # BLD: 0.04 K/UL (ref 0–0.2)
BASOPHILS NFR BLD: 1 % (ref 0–2)
BILIRUB SERPL-MCNC: 0.4 MG/DL (ref 0–1.2)
BILIRUB UR QL STRIP: NEGATIVE
BUN SERPL-MCNC: 21 MG/DL (ref 6–20)
CALCIUM SERPL-MCNC: 9.4 MG/DL (ref 8.6–10.4)
CHLORIDE SERPL-SCNC: 103 MMOL/L (ref 98–107)
CLARITY UR: CLEAR
CO2 SERPL-SCNC: 26 MMOL/L (ref 20–31)
COLOR UR: YELLOW
COMMENT: NORMAL
CREAT SERPL-MCNC: 0.8 MG/DL (ref 0.6–0.9)
EOSINOPHIL # BLD: 0.19 K/UL (ref 0–0.44)
EOSINOPHILS RELATIVE PERCENT: 2 % (ref 1–4)
ERYTHROCYTE [DISTWIDTH] IN BLOOD BY AUTOMATED COUNT: 12.7 % (ref 11.8–14.4)
EST. AVERAGE GLUCOSE BLD GHB EST-MCNC: 77 MG/DL
GFR, ESTIMATED: >90 ML/MIN/1.73M2
GLUCOSE SERPL-MCNC: 75 MG/DL (ref 74–99)
GLUCOSE UR STRIP-MCNC: NEGATIVE MG/DL
HAV IGM SERPL QL IA: NONREACTIVE
HBA1C MFR BLD: 4.3 % (ref 4–6)
HBV CORE IGM SERPL QL IA: NONREACTIVE
HBV SURFACE AG SERPL QL IA: NONREACTIVE
HCG SERPL QL: NEGATIVE
HCT VFR BLD AUTO: 38.7 % (ref 36.3–47.1)
HCV AB SERPL QL IA: NONREACTIVE
HGB BLD-MCNC: 12.4 G/DL (ref 11.9–15.1)
HGB UR QL STRIP.AUTO: NEGATIVE
HIV 1+2 AB+HIV1 P24 AG SERPL QL IA: NONREACTIVE
IMM GRANULOCYTES # BLD AUTO: <0.03 K/UL (ref 0–0.3)
IMM GRANULOCYTES NFR BLD: 0 %
INR PPP: 0.9
KETONES UR STRIP-MCNC: NEGATIVE MG/DL
LEUKOCYTE ESTERASE UR QL STRIP: NEGATIVE
LYMPHOCYTES NFR BLD: 1.22 K/UL (ref 1.1–3.7)
LYMPHOCYTES RELATIVE PERCENT: 15 % (ref 24–43)
MCH RBC QN AUTO: 30.7 PG (ref 25.2–33.5)
MCHC RBC AUTO-ENTMCNC: 32 G/DL (ref 28.4–34.8)
MCV RBC AUTO: 95.8 FL (ref 82.6–102.9)
MONOCYTES NFR BLD: 0.3 K/UL (ref 0.1–1.2)
MONOCYTES NFR BLD: 4 % (ref 3–12)
NEUTROPHILS NFR BLD: 78 % (ref 36–65)
NEUTS SEG NFR BLD: 6.18 K/UL (ref 1.5–8.1)
NITRITE UR QL STRIP: NEGATIVE
NRBC BLD-RTO: 0 PER 100 WBC
PARTIAL THROMBOPLASTIN TIME: 29.9 SEC (ref 23–36.5)
PH UR STRIP: 6 [PH] (ref 5–8)
PLATELET # BLD AUTO: 231 K/UL (ref 138–453)
PMV BLD AUTO: 9.7 FL (ref 8.1–13.5)
POTASSIUM SERPL-SCNC: 4.1 MMOL/L (ref 3.7–5.3)
PROT SERPL-MCNC: 7.5 G/DL (ref 6.6–8.7)
PROT UR STRIP-MCNC: NEGATIVE MG/DL
PROTHROMBIN TIME: 12.4 SEC (ref 11.7–14.9)
RBC # BLD AUTO: 4.04 M/UL (ref 3.95–5.11)
SODIUM SERPL-SCNC: 140 MMOL/L (ref 136–145)
SP GR UR STRIP: 1.01 (ref 1–1.03)
T3FREE SERPL-MCNC: 2.95 PG/ML (ref 2–4.4)
T4 FREE SERPL-MCNC: 1.1 NG/DL (ref 0.92–1.68)
T4 SERPL-MCNC: 7.1 UG/DL (ref 4.5–11.7)
TSH SERPL DL<=0.05 MIU/L-ACNC: 3.52 UIU/ML (ref 0.27–4.2)
UROBILINOGEN UR STRIP-ACNC: NORMAL EU/DL (ref 0–1)
WBC OTHER # BLD: 8 K/UL (ref 3.5–11.3)

## 2025-03-07 PROCEDURE — 99214 OFFICE O/P EST MOD 30 MIN: CPT

## 2025-03-07 PROCEDURE — 3075F SYST BP GE 130 - 139MM HG: CPT

## 2025-03-07 PROCEDURE — 3079F DIAST BP 80-89 MM HG: CPT

## 2025-03-07 PROCEDURE — 71046 X-RAY EXAM CHEST 2 VIEWS: CPT

## 2025-03-07 PROCEDURE — 93000 ELECTROCARDIOGRAM COMPLETE: CPT

## 2025-03-07 RX ORDER — DM/P-EPHED/ACETAMINOPH/DOXYLAM
LIQUID (ML) ORAL
COMMUNITY
Start: 2025-02-23

## 2025-03-07 SDOH — ECONOMIC STABILITY: FOOD INSECURITY: WITHIN THE PAST 12 MONTHS, YOU WORRIED THAT YOUR FOOD WOULD RUN OUT BEFORE YOU GOT MONEY TO BUY MORE.: NEVER TRUE

## 2025-03-07 SDOH — ECONOMIC STABILITY: FOOD INSECURITY: WITHIN THE PAST 12 MONTHS, THE FOOD YOU BOUGHT JUST DIDN'T LAST AND YOU DIDN'T HAVE MONEY TO GET MORE.: NEVER TRUE

## 2025-03-07 SDOH — ECONOMIC STABILITY: INCOME INSECURITY: IN THE LAST 12 MONTHS, WAS THERE A TIME WHEN YOU WERE NOT ABLE TO PAY THE MORTGAGE OR RENT ON TIME?: NO

## 2025-03-07 ASSESSMENT — PATIENT HEALTH QUESTIONNAIRE - PHQ9
2. FEELING DOWN, DEPRESSED OR HOPELESS: NOT AT ALL
5. POOR APPETITE OR OVEREATING: NOT AT ALL
2. FEELING DOWN, DEPRESSED OR HOPELESS: NOT AT ALL
10. IF YOU CHECKED OFF ANY PROBLEMS, HOW DIFFICULT HAVE THESE PROBLEMS MADE IT FOR YOU TO DO YOUR WORK, TAKE CARE OF THINGS AT HOME, OR GET ALONG WITH OTHER PEOPLE: NOT DIFFICULT AT ALL
SUM OF ALL RESPONSES TO PHQ QUESTIONS 1-9: 1
SUM OF ALL RESPONSES TO PHQ QUESTIONS 1-9: 0
4. FEELING TIRED OR HAVING LITTLE ENERGY: SEVERAL DAYS
9. THOUGHTS THAT YOU WOULD BE BETTER OFF DEAD, OR OF HURTING YOURSELF: NOT AT ALL
SUM OF ALL RESPONSES TO PHQ QUESTIONS 1-9: 1
6. FEELING BAD ABOUT YOURSELF - OR THAT YOU ARE A FAILURE OR HAVE LET YOURSELF OR YOUR FAMILY DOWN: NOT AT ALL
3. TROUBLE FALLING OR STAYING ASLEEP: NOT AT ALL
6. FEELING BAD ABOUT YOURSELF - OR THAT YOU ARE A FAILURE OR HAVE LET YOURSELF OR YOUR FAMILY DOWN: NOT AT ALL
5. POOR APPETITE OR OVEREATING: NOT AT ALL
1. LITTLE INTEREST OR PLEASURE IN DOING THINGS: NOT AT ALL
7. TROUBLE CONCENTRATING ON THINGS, SUCH AS READING THE NEWSPAPER OR WATCHING TELEVISION: NOT AT ALL
1. LITTLE INTEREST OR PLEASURE IN DOING THINGS: NOT AT ALL
4. FEELING TIRED OR HAVING LITTLE ENERGY: SEVERAL DAYS
8. MOVING OR SPEAKING SO SLOWLY THAT OTHER PEOPLE COULD HAVE NOTICED. OR THE OPPOSITE, BEING SO FIGETY OR RESTLESS THAT YOU HAVE BEEN MOVING AROUND A LOT MORE THAN USUAL: NOT AT ALL
2. FEELING DOWN, DEPRESSED OR HOPELESS: NOT AT ALL
SUM OF ALL RESPONSES TO PHQ QUESTIONS 1-9: 1
3. TROUBLE FALLING OR STAYING ASLEEP: NOT AT ALL
9. THOUGHTS THAT YOU WOULD BE BETTER OFF DEAD, OR OF HURTING YOURSELF: NOT AT ALL
8. MOVING OR SPEAKING SO SLOWLY THAT OTHER PEOPLE COULD HAVE NOTICED. OR THE OPPOSITE - BEING SO FIDGETY OR RESTLESS THAT YOU HAVE BEEN MOVING AROUND A LOT MORE THAN USUAL: NOT AT ALL
10. IF YOU CHECKED OFF ANY PROBLEMS, HOW DIFFICULT HAVE THESE PROBLEMS MADE IT FOR YOU TO DO YOUR WORK, TAKE CARE OF THINGS AT HOME, OR GET ALONG WITH OTHER PEOPLE: NOT DIFFICULT AT ALL
SUM OF ALL RESPONSES TO PHQ QUESTIONS 1-9: 1
SUM OF ALL RESPONSES TO PHQ QUESTIONS 1-9: 0
9. THOUGHTS THAT YOU WOULD BE BETTER OFF DEAD, OR OF HURTING YOURSELF: NOT AT ALL
5. POOR APPETITE OR OVEREATING: NOT AT ALL
4. FEELING TIRED OR HAVING LITTLE ENERGY: NOT AT ALL
10. IF YOU CHECKED OFF ANY PROBLEMS, HOW DIFFICULT HAVE THESE PROBLEMS MADE IT FOR YOU TO DO YOUR WORK, TAKE CARE OF THINGS AT HOME, OR GET ALONG WITH OTHER PEOPLE: NOT DIFFICULT AT ALL
SUM OF ALL RESPONSES TO PHQ QUESTIONS 1-9: 0
SUM OF ALL RESPONSES TO PHQ QUESTIONS 1-9: 1
SUM OF ALL RESPONSES TO PHQ QUESTIONS 1-9: 0
1. LITTLE INTEREST OR PLEASURE IN DOING THINGS: NOT AT ALL
7. TROUBLE CONCENTRATING ON THINGS, SUCH AS READING THE NEWSPAPER OR WATCHING TELEVISION: NOT AT ALL
3. TROUBLE FALLING OR STAYING ASLEEP: NOT AT ALL
6. FEELING BAD ABOUT YOURSELF - OR THAT YOU ARE A FAILURE OR HAVE LET YOURSELF OR YOUR FAMILY DOWN: NOT AT ALL
8. MOVING OR SPEAKING SO SLOWLY THAT OTHER PEOPLE COULD HAVE NOTICED. OR THE OPPOSITE, BEING SO FIGETY OR RESTLESS THAT YOU HAVE BEEN MOVING AROUND A LOT MORE THAN USUAL: NOT AT ALL
7. TROUBLE CONCENTRATING ON THINGS, SUCH AS READING THE NEWSPAPER OR WATCHING TELEVISION: NOT AT ALL

## 2025-03-07 NOTE — PROGRESS NOTES
[facility] [place]  [address]  [id number]  3/10/2025    Tricia Mart is a 49 y.o. female who presents today for her medical conditions and/or complaints as noted below.    Tricia Mart is scheduled today for Surgical Clearance and Medication Check (Zepbound increase )  .      HPI:     History of Present Illness  The patient presents for surgical clearance, weight management, and hypertension. The patient reports the following.    Scheduled for plastic surgery in [place] on 04/02/2025. Completed all preoperative tests except for an EKG. No new medical conditions or medication changes. History of gastric sleeve surgery, Narendra-en-Y revision, and conversion to gastric bypass.    Recently started Zepbound but weight loss has plateaued.    Participates in [employer] for insurance discounts. Classified as hypertensive in the past. Blood pressure readings are normal. Tolerates amlodipine well at night but not during the day. Single dose of labetalol 300 mg causes hypotension, but three doses of labetalol 100 mg are well-tolerated.    SOCIAL HISTORY  They do not smoke, consume alcohol, or use illicit drugs.    Patient has no further concerns for today's visit.  Previous notes reviewed in the chart.        Vitals:    03/07/25 1222   BP: 130/88   Pulse: 66   SpO2: 98%   Weight: 100.2 kg (221 lb)   Height: 1.727 m (5' 8\")      Past Medical History:   Diagnosis Date    Anemia     Anxiety 08/12/2020    Anxiety and depression 04/05/2021    Anxiety and depression 04/05/2021    Anxiety state 08/12/2020    Asthma     COPD (chronic obstructive pulmonary disease) (HCC)     Epigastric pain 09/07/2021    Essential hypertension 08/12/2020    Excess skin of abdomen 02/09/2024    Folic acid deficiency 09/21/2020    Hiatal hernia 08/12/2020    Hiatal hernia with GERD 04/05/2021    History of abnormal cervical Pap smear     History of nicotine use 04/05/2021    Hyperlipidemia     Hypertension     PCP DR SWEETIE CRAWLEY

## 2025-03-09 LAB
MISCELLANEOUS LAB TEST RESULT: NORMAL
TEST NAME: NORMAL

## 2025-03-10 LAB
MISCELLANEOUS LAB TEST RESULT: NORMAL
TEST NAME: NORMAL

## 2025-03-10 ASSESSMENT — ENCOUNTER SYMPTOMS
NAUSEA: 0
VOMITING: 0
SINUS PRESSURE: 0
EYE ITCHING: 0
DIARRHEA: 0
SHORTNESS OF BREATH: 0
CHEST TIGHTNESS: 0
SORE THROAT: 0
EYE REDNESS: 0
SINUS PAIN: 0
TROUBLE SWALLOWING: 0
COUGH: 0
ABDOMINAL PAIN: 0
WHEEZING: 0
EYE DISCHARGE: 0

## 2025-03-14 DIAGNOSIS — F41.9 ANXIETY: ICD-10-CM

## 2025-03-14 RX ORDER — BUSPIRONE HYDROCHLORIDE 10 MG/1
10 TABLET ORAL 2 TIMES DAILY
Qty: 180 TABLET | Refills: 0 | Status: SHIPPED | OUTPATIENT
Start: 2025-03-14

## 2025-03-14 NOTE — TELEPHONE ENCOUNTER
Requested Prescriptions     Pending Prescriptions Disp Refills    busPIRone (BUSPAR) 10 MG tablet 180 tablet 0     Sig: Take 1 tablet by mouth 2 times daily

## 2025-03-21 ENCOUNTER — TELEPHONE (OUTPATIENT)
Dept: FAMILY MEDICINE CLINIC | Age: 50
End: 2025-03-21

## 2025-03-21 NOTE — TELEPHONE ENCOUNTER
Patient called and stated she called her insurance and they stated that the medication got denied because she didn't loose 5% of weight while taking it. She stated that if Caleb wrote a letter of medical necessity it could be covered.     Caleb declined this request because patient didn't loose enough while being on the medication and it wouldn't be appropriate for patient to continue the medication.

## 2025-03-21 NOTE — TELEPHONE ENCOUNTER
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

## 2025-03-31 DIAGNOSIS — I10 ESSENTIAL HYPERTENSION: ICD-10-CM

## 2025-03-31 RX ORDER — LABETALOL 100 MG/1
TABLET, FILM COATED ORAL
Qty: 180 TABLET | Refills: 1 | Status: SHIPPED | OUTPATIENT
Start: 2025-03-31

## 2025-03-31 RX ORDER — LABETALOL 100 MG/1
TABLET, FILM COATED ORAL
Qty: 180 TABLET | Refills: 1 | Status: CANCELLED | OUTPATIENT
Start: 2025-03-31

## 2025-03-31 NOTE — TELEPHONE ENCOUNTER
Requested Prescriptions     Pending Prescriptions Disp Refills    labetalol (NORMODYNE) 100 MG tablet 180 tablet 1

## 2025-04-14 ENCOUNTER — TELEPHONE (OUTPATIENT)
Dept: FAMILY MEDICINE CLINIC | Age: 50
End: 2025-04-14

## 2025-04-14 DIAGNOSIS — T81.509A POSTOPERATIVE FOREIGN BODY, INITIAL ENCOUNTER: Primary | ICD-10-CM

## 2025-04-14 NOTE — TELEPHONE ENCOUNTER
Patient called and states for the referral she got for general surgery to remove her drains told her that they do not do that her PCP would have to remove them or she needs a referral for plastics. When we have an answer, pt would like to be called with Caleb's response.

## 2025-04-15 ENCOUNTER — TELEPHONE (OUTPATIENT)
Dept: FAMILY MEDICINE CLINIC | Age: 50
End: 2025-04-15

## 2025-04-15 DIAGNOSIS — T81.509A POSTOPERATIVE FOREIGN BODY, INITIAL ENCOUNTER: Primary | ICD-10-CM

## 2025-04-15 NOTE — TELEPHONE ENCOUNTER
The patient stated she reached out to plastic surgery but they stated they will not touch her, The drains need to get removed and told her that PCP has to remove then. She would like to know what the next step is. She was advised of the referral . She is concerned about the swelling.

## 2025-04-15 NOTE — TELEPHONE ENCOUNTER
Patient states that the place you referred her to stated that they can not removed the drain tubes, because they didn't place them. Please advise.

## 2025-04-16 ENCOUNTER — OFFICE VISIT (OUTPATIENT)
Dept: SURGERY | Age: 50
End: 2025-04-16
Payer: COMMERCIAL

## 2025-04-16 VITALS
SYSTOLIC BLOOD PRESSURE: 170 MMHG | BODY MASS INDEX: 34.16 KG/M2 | HEIGHT: 68 IN | WEIGHT: 225.4 LBS | DIASTOLIC BLOOD PRESSURE: 84 MMHG

## 2025-04-16 DIAGNOSIS — Z48.03 ENCOUNTER FOR CHANGE OR REMOVAL OF DRAINS: Primary | ICD-10-CM

## 2025-04-16 PROCEDURE — 3077F SYST BP >= 140 MM HG: CPT

## 2025-04-16 PROCEDURE — 99203 OFFICE O/P NEW LOW 30 MIN: CPT

## 2025-04-16 PROCEDURE — 3079F DIAST BP 80-89 MM HG: CPT

## 2025-04-16 NOTE — PATIENT INSTRUCTIONS
Please advise patient thaat they are due for a follow up with Dr Ma, thanks   Thank you letting us take care of you today. We hope that all your questions were addressed. If a question was overlooked or something else comes to mind after you return home, please call our office at [phone].    If you need to cancel or change an appointment, surgery or procedure, please contact the office at [phone].

## 2025-04-16 NOTE — PROGRESS NOTES
Visit Information    Have you changed or started any medications since your last visit including any over-the-counter medicines, vitamins, or herbal medicines? no   Have you stopped taking any of your medications? Is so, why? -  no  Are you having any side effects from any of your medications? - no    Have you seen any other physician or provider since your last visit?  no   Have you had any other diagnostic tests since your last visit?  no   Have you been seen in the emergency room and/or had an admission in a hospital since we last saw you?  no   Have you had your routine dental cleaning in the past 6 months?  no     Do you have an active MyChart account? If no, what is the barrier?  Yes    Patient Care Team:  Dejon Michel APRN - CNP as PCP - General (Certified Nurse Practitioner)  Dejon Michel APRN - CNP as PCP - Empaneled Provider    Medical History Review  Past Medical, Family, and Social History reviewed and does contribute to the patient presenting condition    Health Maintenance   Topic Date Due    Cervical cancer screen  Never done    DTaP/Tdap/Td vaccine (2 - Td or Tdap) 09/01/2023    COVID-19 Vaccine (2 - 2024-25 season) 09/01/2024    Hepatitis B vaccine (1 of 3 - 19+ 3-dose series) 07/22/2025 (Originally 6[birth year]20/1996)    Pneumococcal 0-49 years Vaccine (1 of 2 - PCV) 07/22/2025 (Originally 6/20/1996)    Flu vaccine (Season Ended) 08/01/2025    Depression Monitoring  03/07/2026    Breast cancer screen  01/02/2027    Lipids  07/22/2029    Colorectal Cancer Screen  12/01/2033    Hepatitis C screen  Completed    HIV screen  Completed    Hepatitis A vaccine  Aged Out    Hib vaccine  Aged Out    Polio vaccine  Aged Out    Meningococcal (ACWY) vaccine  Aged Out    Meningococcal B vaccine  Aged Out    A1C test (Diabetic or Prediabetic)  Discontinued    Diabetes screen  Discontinued

## 2025-04-16 NOTE — H&P
elements of the encounter have been performed by me.  I agree with the assessment, plan and orders as documented by the resident.      Electronically signed by Get Doyle IV, DO  on 5/21/2025 at 9:29 AM

## 2025-04-30 ENCOUNTER — TELEPHONE (OUTPATIENT)
Dept: BARIATRICS/WEIGHT MGMT | Age: 50
End: 2025-04-30

## 2025-05-01 DIAGNOSIS — I10 ESSENTIAL HYPERTENSION: ICD-10-CM

## 2025-05-01 NOTE — TELEPHONE ENCOUNTER
Tricia Mart is calling to request a refill on the following medication(s):    Medication Request:  Requested Prescriptions     Pending Prescriptions Disp Refills    labetalol (NORMODYNE) 100 MG tablet [Pharmacy Med Name: LABETALOL  MG TABLET] 540 tablet 1     Sig: TAKE 3 TABLETS BY MOUTH TWICE A DAY       Last Visit Date (If Applicable):  3/7/2025    Next Visit Date:    Visit date not found

## 2025-05-02 RX ORDER — LABETALOL 100 MG/1
TABLET, FILM COATED ORAL
Qty: 540 TABLET | Refills: 1 | Status: SHIPPED | OUTPATIENT
Start: 2025-05-02

## 2025-06-03 DIAGNOSIS — F41.9 ANXIETY: ICD-10-CM

## 2025-06-03 DIAGNOSIS — F33.0 MILD EPISODE OF RECURRENT MAJOR DEPRESSIVE DISORDER: ICD-10-CM

## 2025-06-03 NOTE — TELEPHONE ENCOUNTER
Requested Prescriptions     Pending Prescriptions Disp Refills    sertraline (ZOLOFT) 50 MG tablet [Pharmacy Med Name: SERTRALINE HCL 50 MG TABLET] 90 tablet 1     Sig: TAKE 1 TABLET BY MOUTH EVERY DAY

## 2025-06-06 ENCOUNTER — TELEPHONE (OUTPATIENT)
Dept: FAMILY MEDICINE CLINIC | Age: 50
End: 2025-06-06

## 2025-06-06 NOTE — TELEPHONE ENCOUNTER
Tricia sent you a message regarding her paperwork for [employer] she needs, checking to make sure you received her message.    Tricia is also still having issue with her incision from tummy tuck, Surgeon wanted a wound swap down so she will be going to , She is also requesting a water pill, legs are swollen since she has gone back to work as .

## 2025-06-07 ENCOUNTER — HOSPITAL ENCOUNTER (EMERGENCY)
Age: 50
Discharge: HOME | End: 2025-06-07
Attending: EMERGENCY MEDICINE
Payer: COMMERCIAL

## 2025-06-07 VITALS
RESPIRATION RATE: 16 BRPM | TEMPERATURE: 98.1 F | HEART RATE: 71 BPM | HEIGHT: 68 IN | BODY MASS INDEX: 31.83 KG/M2 | SYSTOLIC BLOOD PRESSURE: 126 MMHG | WEIGHT: 210 LBS | OXYGEN SATURATION: 96 % | DIASTOLIC BLOOD PRESSURE: 83 MMHG

## 2025-06-07 DIAGNOSIS — Z48.89 ENCOUNTER FOR POST SURGICAL WOUND CHECK: Primary | ICD-10-CM

## 2025-06-07 PROCEDURE — 99283 EMERGENCY DEPT VISIT LOW MDM: CPT

## 2025-06-07 PROCEDURE — 87205 SMEAR GRAM STAIN: CPT

## 2025-06-07 PROCEDURE — 87070 CULTURE OTHR SPECIMN AEROBIC: CPT

## 2025-06-07 ASSESSMENT — PAIN - FUNCTIONAL ASSESSMENT: PAIN_FUNCTIONAL_ASSESSMENT: NONE - DENIES PAIN

## 2025-06-07 NOTE — DISCHARGE INSTRUCTIONS
Refer to the attached instructions.  Follow-up your wound culture results on MyChart.  Continue taking medications as prescribed by your surgeon and primary provider.  Adequate nutrition comprised of protein, vitamin C, zinc, vitamin A and iron may also help with wound healing.  Make sure you are adequately hydrated.  Return back to the emergency department if you notice any concerning or worsening signs or symptoms.

## 2025-06-07 NOTE — ED PROVIDER NOTES
EMERGENCY DEPARTMENT ENCOUNTER    Pt Name: Tricia Mart  MRN: [id number]  Birthdate 1975  Date of evaluation: 6/7/25  CHIEF COMPLAINT       Chief Complaint   Patient presents with    Post-op Problem     Tummy tuck in [place] on 4/2. Told to come to ED for eval.     HISTORY OF PRESENT ILLNESS   Patient is a 49-year-old female who presents to the emergency department for evaluation of a non-healing abdominal incision site following a cosmetic surgical procedure (\"tummy tuck\") performed in [place] in April. She reports that the incision sites have not fully healed and that she has been experiencing intermittent discharge from the wounds. Due to these ongoing issues, her surgeon advised her to present to the ER for a wound culture. The patient denies any systemic signs or symptoms, including fever, chills, increased redness, swelling, or worsening pain. She also reports that she has already been prescribed antibiotics by her surgeon.             REVIEW OF SYSTEMS     Review of Systems   Skin:  Positive for wound.   All other systems reviewed and are negative.    PASTMEDICAL HISTORY     Past Medical History:   Diagnosis Date    Anemia     Anxiety 08/12/2020    Anxiety and depression 04/05/2021    Anxiety and depression 04/05/2021    Anxiety state 08/12/2020    Asthma     COPD (chronic obstructive pulmonary disease) (HCC)     Epigastric pain 09/07/2021    Essential hypertension 08/12/2020    Excess skin of abdomen 02/09/2024    Folic acid deficiency 09/21/2020    Hiatal hernia 08/12/2020    Hiatal hernia with GERD 04/05/2021    History of abnormal cervical Pap smear     History of nicotine use 04/05/2021    Hyperlipidemia     Hypertension     PCP DR SWEETIE CRAWLEY    Hypertensive emergency 09/20/2020    Iron deficiency anemia 08/12/2020    IUD (intrauterine device) in place 08/04/2020    Mirena    Menometrorrhagia     Migraine without aura and without status migrainosus, not intractable 09/21/2020    Morbid obesity due to

## 2025-06-08 LAB
MICROORGANISM SPEC CULT: NORMAL
MICROORGANISM/AGENT SPEC: NORMAL
MICROORGANISM/AGENT SPEC: NORMAL
SPECIMEN DESCRIPTION: NORMAL

## 2025-06-09 ENCOUNTER — TELEPHONE (OUTPATIENT)
Dept: FAMILY MEDICINE CLINIC | Age: 50
End: 2025-06-09

## 2025-06-09 NOTE — TELEPHONE ENCOUNTER
Patient states that you did a letter before for [employer] for her anxiety and depression, blood pressure she just needs that update.

## 2025-06-09 NOTE — TELEPHONE ENCOUNTER
[facility] ED Follow up Call    Reason for ED visit:  Encounter for post surgical wound check     6/9/2025     Hi Tricia , this is Jimena from Dejon Maza's office, just calling to see how you are doing after your recent ED visit.    Did you receive discharge instructions?  Yes  Do you understand the discharge instructions? yes  Did the ED give you any new prescriptions? No:     Do you have one of our red, yellow and green  Zone sheets that help you to determine when you should go to the ED?  No      Do you need or want to make a follow up appt with your PCP?  No    Do you have any further needs in the home, e.g. equipment?  No        FU appts/Provider:    No future appointments.

## 2025-06-11 NOTE — TELEPHONE ENCOUNTER
Patient called in again stating she's gotta get the letter printed our by tomorrow morning. Advise patient provider was out of the office but would be back 6/12

## 2025-07-03 ENCOUNTER — TELEPHONE (OUTPATIENT)
Dept: BARIATRICS/WEIGHT MGMT | Age: 50
End: 2025-07-03

## 2025-07-03 NOTE — TELEPHONE ENCOUNTER
Spoke with patient in regards to scheduling annual post op appointment. She states she is currently out of town but will call back to schedule.

## 2025-09-01 DIAGNOSIS — I10 ESSENTIAL HYPERTENSION: ICD-10-CM

## 2025-09-03 RX ORDER — AMLODIPINE BESYLATE 10 MG/1
10 TABLET ORAL DAILY
Qty: 90 TABLET | Refills: 1 | Status: SHIPPED | OUTPATIENT
Start: 2025-09-03

## (undated) DEVICE — GLOVE ORANGE PI 8   MSG9080

## (undated) DEVICE — CONNECTOR TBNG AUX H2O JET DISP FOR OLY 160/180 SER

## (undated) DEVICE — GOWN,AURORA,NONREINFORCED,LARGE: Brand: MEDLINE

## (undated) DEVICE — Z DISCONTINUED USE 2624850 GLOVE SURG SZ 6 L12IN THK91MIL BRN LTX FREE POLYCHLOROPRENE

## (undated) DEVICE — TROCAR: Brand: KII FIOS FIRST ENTRY

## (undated) DEVICE — SUTURE ABSRB BRAID COAT VLT SH 3-0 27IN VCRL J311H

## (undated) DEVICE — DRESSING TRNSPAR W2XL2.75IN FLM SHT SEMIPERMEABLE WIND

## (undated) DEVICE — Z INACTIVE USE 2653177 SPONGE GZ W2XL2IN NONWOVEN 4 PLY FASTER WICKING ABIL AVANT

## (undated) DEVICE — SUTURE VCRL SZ 3-0 L27IN ABSRB UD L26MM SH 1/2 CIR J416H

## (undated) DEVICE — DRESSING TRNSPAR W5XL4.5IN FLM SHT SEMIPERMEABLE WIND

## (undated) DEVICE — NEEDLE HYPO 25GA L1.5IN BLU POLYPR HUB S STL REG BVL STR

## (undated) DEVICE — SINGLE-USE BIOPSY FORCEPS: Brand: RADIAL JAW 4

## (undated) DEVICE — STAPLER 60 RELOAD WHITE: Brand: SUREFORM

## (undated) DEVICE — CANNULA IV 18GA L15IN BLNT FILL LUERLOCK HUB MJCT

## (undated) DEVICE — TIP COVER ACCESSORY

## (undated) DEVICE — SOLUTION SURG PREP ANTIMICROBIAL 4 OZ SKIN WND EXIDINE

## (undated) DEVICE — STRAP,POSITIONING,KNEE/BODY,FOAM,4X60": Brand: MEDLINE

## (undated) DEVICE — SYRINGE MED 10ML LUERLOCK TIP W/O SFTY DISP

## (undated) DEVICE — 4-PORT MANIFOLD: Brand: NEPTUNE 2

## (undated) DEVICE — REDUCER: Brand: ENDOWRIST

## (undated) DEVICE — BASIN EMSIS 700ML GRAPHITE PLAS KID SHP GRAD

## (undated) DEVICE — COVER LT HNDL BLU PLAS

## (undated) DEVICE — GLOVE SURG SZ 6 THK91MIL LTX FREE SYN POLYISOPRENE ANTI

## (undated) DEVICE — ARM DRAPE

## (undated) DEVICE — TOWEL,OR,DSP,ST,NATURAL,DLX,4/PK,20PK/CS: Brand: MEDLINE

## (undated) DEVICE — STAPLER INT W25MM WHT TRNSOR CIR ANVIL W/ ADVANCING PROX

## (undated) DEVICE — APPLICATOR MEDICATED 26 CC SOLUTION HI LT ORNG CHLORAPREP

## (undated) DEVICE — BLADELESS OBTURATOR: Brand: WECK VISTA

## (undated) DEVICE — GLOVE ORANGE PI 7   MSG9070

## (undated) DEVICE — SYRINGE INFL 60ML DISP ALLIANCE II

## (undated) DEVICE — SHEARS ENDOSCP L36CM DIA5MM ULTRASONIC CRV TIP ADAPTIVE

## (undated) DEVICE — GAUZE,SPONGE,FLUFF,6"X6.75",STRL,5/TRAY: Brand: MEDLINE

## (undated) DEVICE — SUTURE NONABSORBABLE MONOFILAMENT 3-0 PS-1 18 IN BLK ETHILON 1663H

## (undated) DEVICE — APPLIER CLP M L L11.4IN DIA10MM ENDOSCP ROT MULT FOR LIG

## (undated) DEVICE — Z DISCONTINUED BY MEDLINE USE 2711682 TRAY SKIN PREP DRY W/ PREM GLV

## (undated) DEVICE — MASTISOL ADHESIVE AMPULE

## (undated) DEVICE — BINDER ABD XL W15IN 62 74IN CIRC UNISX 5 PNL E CNTCT CLSR

## (undated) DEVICE — YANKAUER,FLEXIBLE HANDLE,REGLR CAPACITY: Brand: MEDLINE INDUSTRIES, INC.

## (undated) DEVICE — STAPLER 60: Brand: SUREFORM

## (undated) DEVICE — STAPLER 60 RELOAD BLUE: Brand: SUREFORM

## (undated) DEVICE — SUTURE NONABSORBABLE MONOFILAMENT 4-0 PS-2 18 IN BLU PROLENE 8682H

## (undated) DEVICE — SUTURE MCRYL SZ 4-0 L18IN ABSRB UD L16MM PC-3 3/8 CIR PRIM Y845G

## (undated) DEVICE — ADHESIVE SKIN CLSR 0.7ML TOP DERMBND ADV

## (undated) DEVICE — MHPB GEN MINOR PACK: Brand: MEDLINE INDUSTRIES, INC.

## (undated) DEVICE — STAPLER 60 RELOAD GREEN: Brand: SUREFORM

## (undated) DEVICE — CONTAINER,SPECIMEN,4OZ,OR STRL: Brand: MEDLINE

## (undated) DEVICE — JELLY,LUBE,STERILE,FLIP TOP,TUBE,2-OZ: Brand: MEDLINE

## (undated) DEVICE — GLOVE ORANGE PI 7 1/2   MSG9075

## (undated) DEVICE — LEGGINGS, PAIR, 31X48, STERILE: Brand: MEDLINE

## (undated) DEVICE — TOTAL TRAY, 16FR 10ML SIL FOLEY, URN: Brand: MEDLINE

## (undated) DEVICE — PERRYSBURG ENDO PACK: Brand: MEDLINE INDUSTRIES, INC.

## (undated) DEVICE — KIT,ANTI FOG,W/SPONGE & FLUID,SOFT PACK: Brand: MEDLINE

## (undated) DEVICE — SUTURE ETHBND EXCEL SZ 0 L30IN NONABSORBABLE GRN L26MM CT-2 X412H

## (undated) DEVICE — INSUFFLATION TUBING SET WITH FILTER, FUNNEL CONNECTOR AND LUER LOCK: Brand: JOSNOE MEDICAL INC

## (undated) DEVICE — ADAPTER CLEANING PORPOISE CLEANING

## (undated) DEVICE — ADAPTER,CATHETER/SYRINGE/LUER,STERILE: Brand: MEDLINE

## (undated) DEVICE — PLUMEPORT SEO LAPAROSCOPIC SMOKE FILTRATION DEVICE: Brand: PLUMEPORT

## (undated) DEVICE — ESOPHAGEAL/PYLORIC WIREGUIDED BALLOON DILATATION CATHETER: Brand: CRE WIREGUIDED

## (undated) DEVICE — RESERVOIR,SUCTION,100CC,SILICONE: Brand: MEDLINE

## (undated) DEVICE — SIMPLICITY FLUFF UNDERPAD 23X36, MODERATE: Brand: SIMPLICITY

## (undated) DEVICE — SUTURE ETHBND EXCEL SZ 3-0 L30IN NONABSORBABLE GRN L26MM SH X832H

## (undated) DEVICE — INTENDED FOR TISSUE SEPARATION, AND OTHER PROCEDURES THAT REQUIRE A SHARP SURGICAL BLADE TO PUNCTURE OR CUT.: Brand: BARD-PARKER ® CARBON RIB-BACK BLADES

## (undated) DEVICE — STRIP,CLOSURE,WOUND,MEDI-STRIP,1/2X4: Brand: MEDLINE

## (undated) DEVICE — VESSEL SEALER EXTEND: Brand: ENDOWRIST

## (undated) DEVICE — Device

## (undated) DEVICE — SYRINGE CATH TIP 50ML

## (undated) DEVICE — GOWN,SIRUS,NONRNF,SETINSLV,XL,20/CS: Brand: MEDLINE

## (undated) DEVICE — GARMENT,MEDLINE,DVT,INT,CALF,MED, GEN2: Brand: MEDLINE

## (undated) DEVICE — SEAL

## (undated) DEVICE — SUTURE PDS II SZ 4-0 L18IN ABSRB UD L19MM PS-2 3/8 CIR PRIM Z496G

## (undated) DEVICE — BLANKET WRM W29.9XL79.1IN UP BODY FORC AIR MISTRAL-AIR

## (undated) DEVICE — ELECTRODE PT RET AD L9FT HI MOIST COND ADH HYDRGEL CORDED

## (undated) DEVICE — CANNULA SEAL

## (undated) DEVICE — SUTURE SZ 0 27IN 5/8 CIR UR-6  TAPER PT VIOLET ABSRB VICRYL J603H

## (undated) DEVICE — DRAIN SURG 19FR 100% SIL RADPQ RND CHN FULL FLUT

## (undated) DEVICE — COUNTER NDL 40 COUNT HLD 70 FOAM BLK ADH W/ MAG

## (undated) DEVICE — DRAPE THYROID

## (undated) DEVICE — SOLUTION IV IRRIG POUR BRL 0.9% SODIUM CHL 2F7124

## (undated) DEVICE — SOLUTION ANTIFOG VIS SYS CLEARIFY LAPSCP

## (undated) DEVICE — BITEBLOCK 54FR W/ DENT RIM BLOX

## (undated) DEVICE — STAPLER INT L L25MM DIA5MM GI WHT TI BARIATRIC CIR CUT 2

## (undated) DEVICE — TUBE FEED GASTROSTOMY MIC 22FR

## (undated) DEVICE — Device: Brand: DEFENDO VALVE AND CONNECTOR KIT

## (undated) DEVICE — TUBING, SUCTION, 3/16" X 10', STRAIGHT: Brand: MEDLINE

## (undated) DEVICE — ELECTRODE ES L3IN S STL BLDE INSUL DISP VALLEYLAB EDGE

## (undated) DEVICE — PROTECTOR ULN NRV PUR FOAM HK LOOP STRP ANATOMICALLY